# Patient Record
Sex: FEMALE | Race: BLACK OR AFRICAN AMERICAN | Employment: UNEMPLOYED | URBAN - METROPOLITAN AREA
[De-identification: names, ages, dates, MRNs, and addresses within clinical notes are randomized per-mention and may not be internally consistent; named-entity substitution may affect disease eponyms.]

---

## 2017-02-13 ENCOUNTER — HOSPITAL ENCOUNTER (EMERGENCY)
Age: 57
Discharge: HOME | End: 2017-02-13
Attending: EMERGENCY MEDICINE
Payer: COMMERCIAL

## 2017-02-13 VITALS
BODY MASS INDEX: 45.99 KG/M2 | RESPIRATION RATE: 20 BRPM | SYSTOLIC BLOOD PRESSURE: 180 MMHG | WEIGHT: 293 LBS | TEMPERATURE: 98.2 F | DIASTOLIC BLOOD PRESSURE: 98 MMHG | HEART RATE: 62 BPM | OXYGEN SATURATION: 96 % | HEIGHT: 67 IN

## 2017-02-13 DIAGNOSIS — G89.4 CHRONIC PAIN SYNDROME: ICD-10-CM

## 2017-02-13 DIAGNOSIS — Z76.0 ENCOUNTER FOR MEDICATION REFILL: ICD-10-CM

## 2017-02-13 DIAGNOSIS — L02.91 ABSCESS: Primary | ICD-10-CM

## 2017-02-13 LAB
-: ABNORMAL
AMORPHOUS: ABNORMAL
BACTERIA: ABNORMAL
BILIRUBIN URINE: NEGATIVE
CASTS UA: ABNORMAL /LPF (ref 0–2)
COLOR: YELLOW
COMMENT UA: ABNORMAL
CRYSTALS, UA: ABNORMAL /HPF
EPITHELIAL CELLS UA: ABNORMAL /HPF (ref 0–5)
GLUCOSE URINE: NEGATIVE
KETONES, URINE: NEGATIVE
LEUKOCYTE ESTERASE, URINE: NEGATIVE
MUCUS: ABNORMAL
NITRITE, URINE: NEGATIVE
OTHER OBSERVATIONS UA: ABNORMAL
PH UA: 8.5 (ref 5–8)
PROTEIN UA: NEGATIVE
RBC UA: ABNORMAL /HPF (ref 0–2)
RENAL EPITHELIAL, UA: ABNORMAL /HPF
SPECIFIC GRAVITY UA: 1.02 (ref 1–1.03)
TRICHOMONAS: ABNORMAL
TURBIDITY: ABNORMAL
URINE HGB: NEGATIVE
UROBILINOGEN, URINE: NORMAL
WBC UA: ABNORMAL /HPF (ref 0–5)
YEAST: ABNORMAL

## 2017-02-13 PROCEDURE — G0383 LEV 4 HOSP TYPE B ED VISIT: HCPCS

## 2017-02-13 PROCEDURE — 87086 URINE CULTURE/COLONY COUNT: CPT

## 2017-02-13 PROCEDURE — 6370000000 HC RX 637 (ALT 250 FOR IP): Performed by: NURSE PRACTITIONER

## 2017-02-13 PROCEDURE — 81001 URINALYSIS AUTO W/SCOPE: CPT

## 2017-02-13 RX ORDER — ACETAMINOPHEN 500 MG
1000 TABLET ORAL ONCE
Status: DISCONTINUED | OUTPATIENT
Start: 2017-02-13 | End: 2017-02-13

## 2017-02-13 RX ORDER — SIMVASTATIN 20 MG
20 TABLET ORAL NIGHTLY
Qty: 14 TABLET | Refills: 0 | Status: SHIPPED | OUTPATIENT
Start: 2017-02-13 | End: 2017-02-16

## 2017-02-13 RX ORDER — CITALOPRAM 20 MG/1
20 TABLET ORAL DAILY
Qty: 14 TABLET | Refills: 0 | Status: SHIPPED | OUTPATIENT
Start: 2017-02-13 | End: 2017-02-16 | Stop reason: SDUPTHER

## 2017-02-13 RX ORDER — DOXYCYCLINE HYCLATE 100 MG
100 TABLET ORAL ONCE
Status: COMPLETED | OUTPATIENT
Start: 2017-02-13 | End: 2017-02-13

## 2017-02-13 RX ORDER — AMLODIPINE BESYLATE 5 MG/1
5 TABLET ORAL DAILY
Qty: 14 TABLET | Refills: 0 | Status: SHIPPED | OUTPATIENT
Start: 2017-02-13 | End: 2017-02-16

## 2017-02-13 RX ORDER — DOXYCYCLINE HYCLATE 100 MG
100 TABLET ORAL 2 TIMES DAILY
Qty: 14 TABLET | Refills: 0 | Status: SHIPPED | OUTPATIENT
Start: 2017-02-13 | End: 2017-02-20

## 2017-02-13 RX ORDER — AMLODIPINE BESYLATE 5 MG/1
5 TABLET ORAL ONCE
Status: COMPLETED | OUTPATIENT
Start: 2017-02-13 | End: 2017-02-13

## 2017-02-13 RX ORDER — GABAPENTIN 300 MG/1
300 CAPSULE ORAL 3 TIMES DAILY
Qty: 42 CAPSULE | Refills: 0 | Status: SHIPPED | OUTPATIENT
Start: 2017-02-13 | End: 2017-02-16

## 2017-02-13 RX ORDER — OXYCODONE HYDROCHLORIDE AND ACETAMINOPHEN 5; 325 MG/1; MG/1
2 TABLET ORAL ONCE
Status: COMPLETED | OUTPATIENT
Start: 2017-02-13 | End: 2017-02-13

## 2017-02-13 RX ADMIN — DOXYCYCLINE HYCLATE 100 MG: 100 TABLET, COATED ORAL at 21:05

## 2017-02-13 RX ADMIN — AMLODIPINE BESYLATE 5 MG: 5 TABLET ORAL at 21:05

## 2017-02-13 RX ADMIN — OXYCODONE HYDROCHLORIDE AND ACETAMINOPHEN 2 TABLET: 5; 325 TABLET ORAL at 20:07

## 2017-02-13 ASSESSMENT — ENCOUNTER SYMPTOMS
TROUBLE SWALLOWING: 0
NAUSEA: 0
VOMITING: 0
BACK PAIN: 1
ROS SKIN COMMENTS: ABSCESS
SHORTNESS OF BREATH: 0
ABDOMINAL PAIN: 0
COUGH: 0

## 2017-02-13 ASSESSMENT — PAIN SCALES - GENERAL
PAINLEVEL_OUTOF10: 10
PAINLEVEL_OUTOF10: 10

## 2017-02-13 ASSESSMENT — PAIN DESCRIPTION - DESCRIPTORS: DESCRIPTORS: ACHING

## 2017-02-13 ASSESSMENT — PAIN DESCRIPTION - PAIN TYPE: TYPE: ACUTE PAIN;CHRONIC PAIN

## 2017-02-13 ASSESSMENT — PAIN DESCRIPTION - LOCATION: LOCATION: BACK;HEAD

## 2017-02-13 ASSESSMENT — PAIN DESCRIPTION - FREQUENCY: FREQUENCY: CONTINUOUS

## 2017-02-14 LAB
CULTURE: NORMAL
CULTURE: NORMAL
Lab: NORMAL
SPECIMEN DESCRIPTION: NORMAL
STATUS: NORMAL

## 2017-02-16 ENCOUNTER — OFFICE VISIT (OUTPATIENT)
Dept: FAMILY MEDICINE CLINIC | Facility: CLINIC | Age: 57
End: 2017-02-16

## 2017-02-16 VITALS
TEMPERATURE: 97.1 F | BODY MASS INDEX: 45.99 KG/M2 | HEIGHT: 67 IN | HEART RATE: 73 BPM | WEIGHT: 293 LBS | DIASTOLIC BLOOD PRESSURE: 78 MMHG | SYSTOLIC BLOOD PRESSURE: 140 MMHG

## 2017-02-16 DIAGNOSIS — I10 ESSENTIAL HYPERTENSION: ICD-10-CM

## 2017-02-16 DIAGNOSIS — Z76.0 MEDICATION REFILL: ICD-10-CM

## 2017-02-16 DIAGNOSIS — M54.50 CHRONIC MIDLINE LOW BACK PAIN WITHOUT SCIATICA: Primary | ICD-10-CM

## 2017-02-16 DIAGNOSIS — E66.01 MORBID OBESITY DUE TO EXCESS CALORIES (HCC): ICD-10-CM

## 2017-02-16 DIAGNOSIS — G89.29 CHRONIC MIDLINE LOW BACK PAIN WITHOUT SCIATICA: Primary | ICD-10-CM

## 2017-02-16 PROCEDURE — 99203 OFFICE O/P NEW LOW 30 MIN: CPT | Performed by: INTERNAL MEDICINE

## 2017-02-16 RX ORDER — RANITIDINE 300 MG/1
300 TABLET ORAL NIGHTLY
Qty: 30 TABLET | Refills: 3 | Status: SHIPPED | OUTPATIENT
Start: 2017-02-16 | End: 2017-04-06 | Stop reason: SDUPTHER

## 2017-02-16 RX ORDER — GABAPENTIN 300 MG/1
300 CAPSULE ORAL 3 TIMES DAILY
Qty: 90 CAPSULE | Refills: 2 | Status: SHIPPED | OUTPATIENT
Start: 2017-02-16 | End: 2017-04-06 | Stop reason: SDUPTHER

## 2017-02-16 RX ORDER — MELOXICAM 7.5 MG/1
7.5 TABLET ORAL DAILY
Qty: 30 TABLET | Refills: 0 | Status: SHIPPED | OUTPATIENT
Start: 2017-02-16 | End: 2017-04-06 | Stop reason: SDUPTHER

## 2017-02-16 RX ORDER — AMLODIPINE BESYLATE 5 MG/1
10 TABLET ORAL DAILY
Qty: 30 TABLET | Refills: 3 | Status: SHIPPED | OUTPATIENT
Start: 2017-02-16 | End: 2017-04-06 | Stop reason: SDUPTHER

## 2017-02-16 RX ORDER — SIMVASTATIN 20 MG
20 TABLET ORAL 3 TIMES DAILY
Qty: 30 TABLET | Refills: 11 | Status: SHIPPED | OUTPATIENT
Start: 2017-02-16 | End: 2017-04-06 | Stop reason: SDUPTHER

## 2017-02-16 RX ORDER — CITALOPRAM 20 MG/1
20 TABLET ORAL DAILY
Qty: 14 TABLET | Refills: 0 | Status: SHIPPED | OUTPATIENT
Start: 2017-02-16 | End: 2017-04-11 | Stop reason: ALTCHOICE

## 2017-02-16 ASSESSMENT — ENCOUNTER SYMPTOMS
ABDOMINAL PAIN: 0
BACK PAIN: 1
COUGH: 0
SHORTNESS OF BREATH: 0

## 2017-03-15 ENCOUNTER — HOSPITAL ENCOUNTER (OUTPATIENT)
Dept: PHYSICAL THERAPY | Age: 57
Setting detail: THERAPIES SERIES
Discharge: HOME | End: 2017-03-15
Payer: COMMERCIAL

## 2017-03-15 ENCOUNTER — TELEPHONE (OUTPATIENT)
Dept: PAIN MANAGEMENT | Age: 57
End: 2017-03-15

## 2017-03-15 PROCEDURE — 97162 PT EVAL MOD COMPLEX 30 MIN: CPT

## 2017-03-15 PROCEDURE — 97110 THERAPEUTIC EXERCISES: CPT

## 2017-03-15 ASSESSMENT — PAIN DESCRIPTION - ORIENTATION: ORIENTATION: LOWER

## 2017-03-15 ASSESSMENT — PAIN DESCRIPTION - PROGRESSION: CLINICAL_PROGRESSION: NOT CHANGED

## 2017-03-15 ASSESSMENT — PAIN DESCRIPTION - LOCATION: LOCATION: BACK

## 2017-03-15 ASSESSMENT — PAIN DESCRIPTION - ONSET: ONSET: ON-GOING

## 2017-03-15 ASSESSMENT — PAIN SCALES - GENERAL: PAINLEVEL_OUTOF10: 7

## 2017-03-15 ASSESSMENT — PAIN DESCRIPTION - PAIN TYPE: TYPE: CHRONIC PAIN

## 2017-03-15 ASSESSMENT — PAIN DESCRIPTION - FREQUENCY: FREQUENCY: CONTINUOUS

## 2017-03-21 ENCOUNTER — HOSPITAL ENCOUNTER (OUTPATIENT)
Dept: PHYSICAL THERAPY | Age: 57
Setting detail: THERAPIES SERIES
Discharge: HOME | End: 2017-03-21
Payer: COMMERCIAL

## 2017-03-21 ENCOUNTER — TELEPHONE (OUTPATIENT)
Dept: PAIN MANAGEMENT | Age: 57
End: 2017-03-21

## 2017-03-21 ENCOUNTER — TELEPHONE (OUTPATIENT)
Dept: FAMILY MEDICINE CLINIC | Age: 57
End: 2017-03-21

## 2017-03-21 PROCEDURE — 97113 AQUATIC THERAPY/EXERCISES: CPT

## 2017-03-21 ASSESSMENT — PAIN SCALES - GENERAL: PAINLEVEL_OUTOF10: 7

## 2017-03-21 ASSESSMENT — PAIN DESCRIPTION - LOCATION: LOCATION: BACK

## 2017-03-21 ASSESSMENT — PAIN DESCRIPTION - DIRECTION: RADIATING_TOWARDS: DENIES

## 2017-03-21 ASSESSMENT — PAIN DESCRIPTION - PAIN TYPE: TYPE: CHRONIC PAIN

## 2017-03-21 ASSESSMENT — PAIN DESCRIPTION - ORIENTATION: ORIENTATION: MID;LOWER

## 2017-03-23 ENCOUNTER — HOSPITAL ENCOUNTER (OUTPATIENT)
Dept: PHYSICAL THERAPY | Age: 57
Setting detail: THERAPIES SERIES
Discharge: HOME | End: 2017-03-23
Payer: COMMERCIAL

## 2017-03-23 PROCEDURE — 97113 AQUATIC THERAPY/EXERCISES: CPT

## 2017-03-23 ASSESSMENT — PAIN DESCRIPTION - ORIENTATION: ORIENTATION: MID;LOWER

## 2017-03-23 ASSESSMENT — PAIN DESCRIPTION - DIRECTION: RADIATING_TOWARDS: DENIES

## 2017-03-23 ASSESSMENT — PAIN SCALES - GENERAL: PAINLEVEL_OUTOF10: 7

## 2017-03-23 ASSESSMENT — PAIN DESCRIPTION - PAIN TYPE: TYPE: CHRONIC PAIN

## 2017-03-23 ASSESSMENT — PAIN DESCRIPTION - LOCATION: LOCATION: BACK

## 2017-03-30 ENCOUNTER — HOSPITAL ENCOUNTER (OUTPATIENT)
Dept: PHYSICAL THERAPY | Age: 57
Setting detail: THERAPIES SERIES
Discharge: HOME | End: 2017-03-30
Payer: COMMERCIAL

## 2017-03-30 PROCEDURE — 97113 AQUATIC THERAPY/EXERCISES: CPT

## 2017-03-30 ASSESSMENT — PAIN DESCRIPTION - DIRECTION: RADIATING_TOWARDS: DENIES

## 2017-03-30 ASSESSMENT — PAIN DESCRIPTION - LOCATION: LOCATION: BACK

## 2017-03-30 ASSESSMENT — PAIN SCALES - GENERAL: PAINLEVEL_OUTOF10: 7

## 2017-03-30 ASSESSMENT — PAIN DESCRIPTION - PAIN TYPE: TYPE: CHRONIC PAIN

## 2017-03-30 ASSESSMENT — PAIN DESCRIPTION - ORIENTATION: ORIENTATION: MID;LOWER

## 2017-04-04 ENCOUNTER — HOSPITAL ENCOUNTER (OUTPATIENT)
Dept: PHYSICAL THERAPY | Age: 57
Setting detail: THERAPIES SERIES
Discharge: HOME | End: 2017-04-04
Payer: COMMERCIAL

## 2017-04-04 PROCEDURE — 97113 AQUATIC THERAPY/EXERCISES: CPT

## 2017-04-04 ASSESSMENT — PAIN SCALES - GENERAL: PAINLEVEL_OUTOF10: 10

## 2017-04-04 ASSESSMENT — PAIN DESCRIPTION - ORIENTATION: ORIENTATION: RIGHT;LEFT

## 2017-04-04 ASSESSMENT — PAIN DESCRIPTION - PAIN TYPE: TYPE: CHRONIC PAIN

## 2017-04-05 ENCOUNTER — HOSPITAL ENCOUNTER (OUTPATIENT)
Dept: PHYSICAL THERAPY | Age: 57
Setting detail: THERAPIES SERIES
Discharge: HOME | End: 2017-04-05
Payer: COMMERCIAL

## 2017-04-05 ENCOUNTER — HOSPITAL ENCOUNTER (OUTPATIENT)
Dept: GENERAL RADIOLOGY | Age: 57
Discharge: HOME | End: 2017-04-05
Payer: COMMERCIAL

## 2017-04-05 ENCOUNTER — HOSPITAL ENCOUNTER (OUTPATIENT)
Age: 57
Discharge: HOME | End: 2017-04-05
Payer: COMMERCIAL

## 2017-04-05 DIAGNOSIS — M54.50 CHRONIC MIDLINE LOW BACK PAIN WITHOUT SCIATICA: ICD-10-CM

## 2017-04-05 DIAGNOSIS — G89.29 CHRONIC MIDLINE LOW BACK PAIN WITHOUT SCIATICA: ICD-10-CM

## 2017-04-05 PROCEDURE — 72100 X-RAY EXAM L-S SPINE 2/3 VWS: CPT

## 2017-04-05 PROCEDURE — 72040 X-RAY EXAM NECK SPINE 2-3 VW: CPT

## 2017-04-05 PROCEDURE — 97113 AQUATIC THERAPY/EXERCISES: CPT

## 2017-04-05 ASSESSMENT — PAIN DESCRIPTION - ORIENTATION: ORIENTATION: RIGHT;LEFT;LOWER

## 2017-04-05 ASSESSMENT — PAIN SCALES - GENERAL: PAINLEVEL_OUTOF10: 10

## 2017-04-05 ASSESSMENT — PAIN DESCRIPTION - LOCATION: LOCATION: GENERALIZED;BACK;KNEE

## 2017-04-05 ASSESSMENT — PAIN DESCRIPTION - PAIN TYPE: TYPE: CHRONIC PAIN

## 2017-04-06 ENCOUNTER — OFFICE VISIT (OUTPATIENT)
Dept: FAMILY MEDICINE CLINIC | Age: 57
End: 2017-04-06
Payer: COMMERCIAL

## 2017-04-06 VITALS
HEIGHT: 67 IN | TEMPERATURE: 97.5 F | WEIGHT: 289 LBS | DIASTOLIC BLOOD PRESSURE: 89 MMHG | SYSTOLIC BLOOD PRESSURE: 142 MMHG | BODY MASS INDEX: 45.36 KG/M2 | HEART RATE: 81 BPM

## 2017-04-06 DIAGNOSIS — E66.01 MORBID OBESITY DUE TO EXCESS CALORIES (HCC): ICD-10-CM

## 2017-04-06 DIAGNOSIS — M54.50 CHRONIC MIDLINE LOW BACK PAIN WITHOUT SCIATICA: Primary | ICD-10-CM

## 2017-04-06 DIAGNOSIS — G89.29 CHRONIC MIDLINE LOW BACK PAIN WITHOUT SCIATICA: Primary | ICD-10-CM

## 2017-04-06 DIAGNOSIS — Z76.0 MEDICATION REFILL: ICD-10-CM

## 2017-04-06 PROCEDURE — 99213 OFFICE O/P EST LOW 20 MIN: CPT | Performed by: INTERNAL MEDICINE

## 2017-04-06 RX ORDER — AMLODIPINE BESYLATE 5 MG/1
10 TABLET ORAL DAILY
Qty: 30 TABLET | Refills: 3 | Status: SHIPPED | OUTPATIENT
Start: 2017-04-06 | End: 2017-04-06 | Stop reason: SDUPTHER

## 2017-04-06 RX ORDER — GABAPENTIN 300 MG/1
300 CAPSULE ORAL 3 TIMES DAILY
Qty: 90 CAPSULE | Refills: 2 | Status: SHIPPED | OUTPATIENT
Start: 2017-04-06 | End: 2019-02-04

## 2017-04-06 RX ORDER — RANITIDINE 300 MG/1
300 TABLET ORAL NIGHTLY
Qty: 30 TABLET | Refills: 3 | Status: SHIPPED | OUTPATIENT
Start: 2017-04-06 | End: 2017-04-06 | Stop reason: SDUPTHER

## 2017-04-06 RX ORDER — GABAPENTIN 300 MG/1
300 CAPSULE ORAL 3 TIMES DAILY
Qty: 90 CAPSULE | Refills: 2 | Status: SHIPPED | OUTPATIENT
Start: 2017-04-06 | End: 2017-04-06 | Stop reason: SDUPTHER

## 2017-04-06 RX ORDER — RANITIDINE 300 MG/1
300 TABLET ORAL NIGHTLY
Qty: 30 TABLET | Refills: 3 | Status: SHIPPED | OUTPATIENT
Start: 2017-04-06 | End: 2017-10-30 | Stop reason: SDUPTHER

## 2017-04-06 RX ORDER — SIMVASTATIN 20 MG
20 TABLET ORAL 3 TIMES DAILY
Qty: 30 TABLET | Refills: 11 | Status: SHIPPED | OUTPATIENT
Start: 2017-04-06 | End: 2017-04-06 | Stop reason: SDUPTHER

## 2017-04-06 RX ORDER — AMLODIPINE BESYLATE 5 MG/1
10 TABLET ORAL DAILY
Qty: 30 TABLET | Refills: 3 | Status: SHIPPED | OUTPATIENT
Start: 2017-04-06 | End: 2017-05-12 | Stop reason: DRUGHIGH

## 2017-04-06 RX ORDER — MELOXICAM 7.5 MG/1
7.5 TABLET ORAL DAILY
Qty: 30 TABLET | Refills: 0 | Status: SHIPPED | OUTPATIENT
Start: 2017-04-06 | End: 2017-04-06 | Stop reason: ALTCHOICE

## 2017-04-06 RX ORDER — SIMVASTATIN 20 MG
20 TABLET ORAL 3 TIMES DAILY
Qty: 30 TABLET | Refills: 11 | Status: SHIPPED | OUTPATIENT
Start: 2017-04-06 | End: 2017-05-12 | Stop reason: SDUPTHER

## 2017-04-06 ASSESSMENT — ENCOUNTER SYMPTOMS
ABDOMINAL PAIN: 0
COUGH: 0
SHORTNESS OF BREATH: 0

## 2017-04-11 ENCOUNTER — INITIAL CONSULT (OUTPATIENT)
Dept: PAIN MANAGEMENT | Age: 57
End: 2017-04-11
Payer: COMMERCIAL

## 2017-04-11 VITALS
HEIGHT: 66 IN | SYSTOLIC BLOOD PRESSURE: 165 MMHG | WEIGHT: 293 LBS | HEART RATE: 73 BPM | RESPIRATION RATE: 16 BRPM | BODY MASS INDEX: 47.09 KG/M2 | OXYGEN SATURATION: 98 % | DIASTOLIC BLOOD PRESSURE: 102 MMHG

## 2017-04-11 DIAGNOSIS — G89.29 BILATERAL CHRONIC KNEE PAIN: ICD-10-CM

## 2017-04-11 DIAGNOSIS — E66.01 MORBID OBESITY WITH BMI OF 45.0-49.9, ADULT (HCC): ICD-10-CM

## 2017-04-11 DIAGNOSIS — M25.562 BILATERAL CHRONIC KNEE PAIN: ICD-10-CM

## 2017-04-11 DIAGNOSIS — M25.561 BILATERAL CHRONIC KNEE PAIN: ICD-10-CM

## 2017-04-11 DIAGNOSIS — M47.816 SPONDYLOSIS OF LUMBAR REGION WITHOUT MYELOPATHY OR RADICULOPATHY: Primary | ICD-10-CM

## 2017-04-11 PROCEDURE — 99244 OFF/OP CNSLTJ NEW/EST MOD 40: CPT | Performed by: ANESTHESIOLOGY

## 2017-04-11 RX ORDER — MELOXICAM 7.5 MG/1
7.5 TABLET ORAL DAILY
COMMUNITY
End: 2017-05-12

## 2017-04-11 ASSESSMENT — ENCOUNTER SYMPTOMS
EYES NEGATIVE: 1
COUGH: 0
STRIDOR: 0
GASTROINTESTINAL NEGATIVE: 1
BACK PAIN: 1
CHOKING: 0
WHEEZING: 1
CHEST TIGHTNESS: 0
APNEA: 0
SHORTNESS OF BREATH: 0

## 2017-04-13 ENCOUNTER — TELEPHONE (OUTPATIENT)
Dept: FAMILY MEDICINE CLINIC | Age: 57
End: 2017-04-13

## 2017-04-13 ENCOUNTER — TELEPHONE (OUTPATIENT)
Dept: PAIN MANAGEMENT | Age: 57
End: 2017-04-13

## 2017-04-13 ENCOUNTER — HOSPITAL ENCOUNTER (OUTPATIENT)
Dept: PHYSICAL THERAPY | Age: 57
Setting detail: THERAPIES SERIES
Discharge: HOME | End: 2017-04-13
Payer: COMMERCIAL

## 2017-04-13 PROCEDURE — 97113 AQUATIC THERAPY/EXERCISES: CPT

## 2017-04-13 PROCEDURE — 97164 PT RE-EVAL EST PLAN CARE: CPT

## 2017-04-13 ASSESSMENT — PAIN DESCRIPTION - LOCATION
LOCATION: BACK;KNEE
LOCATION: BACK;KNEE;HIP

## 2017-04-13 ASSESSMENT — PAIN DESCRIPTION - ORIENTATION
ORIENTATION: LOWER;RIGHT;LEFT
ORIENTATION: RIGHT;LEFT;LOWER

## 2017-04-13 ASSESSMENT — PAIN DESCRIPTION - PROGRESSION: CLINICAL_PROGRESSION: GRADUALLY IMPROVING

## 2017-04-13 ASSESSMENT — PAIN DESCRIPTION - DESCRIPTORS
DESCRIPTORS: CONSTANT;ACHING;SHARP
DESCRIPTORS: ACHING;CONSTANT;SHARP

## 2017-04-13 ASSESSMENT — PAIN DESCRIPTION - PAIN TYPE
TYPE: CHRONIC PAIN
TYPE: CHRONIC PAIN

## 2017-04-13 ASSESSMENT — PAIN DESCRIPTION - FREQUENCY: FREQUENCY: CONTINUOUS

## 2017-04-13 ASSESSMENT — PAIN SCALES - GENERAL
PAINLEVEL_OUTOF10: 3
PAINLEVEL_OUTOF10: 3

## 2017-04-19 ENCOUNTER — HOSPITAL ENCOUNTER (OUTPATIENT)
Dept: PHYSICAL THERAPY | Age: 57
Setting detail: THERAPIES SERIES
Discharge: HOME | End: 2017-04-19
Payer: COMMERCIAL

## 2017-04-19 PROCEDURE — 97113 AQUATIC THERAPY/EXERCISES: CPT

## 2017-04-19 ASSESSMENT — PAIN DESCRIPTION - PAIN TYPE: TYPE: CHRONIC PAIN

## 2017-04-19 ASSESSMENT — PAIN SCALES - GENERAL: PAINLEVEL_OUTOF10: 5

## 2017-04-19 ASSESSMENT — PAIN DESCRIPTION - DESCRIPTORS: DESCRIPTORS: CONSTANT;ACHING;SHARP;STABBING

## 2017-04-19 ASSESSMENT — PAIN DESCRIPTION - LOCATION: LOCATION: GENERALIZED

## 2017-04-21 ENCOUNTER — HOSPITAL ENCOUNTER (OUTPATIENT)
Dept: PHYSICAL THERAPY | Age: 57
Setting detail: THERAPIES SERIES
Discharge: HOME | End: 2017-04-21
Payer: COMMERCIAL

## 2017-04-21 ENCOUNTER — TELEPHONE (OUTPATIENT)
Dept: FAMILY MEDICINE CLINIC | Age: 57
End: 2017-04-21

## 2017-04-21 DIAGNOSIS — F40.240 CLAUSTROPHOBIA: Primary | ICD-10-CM

## 2017-04-21 PROCEDURE — 97110 THERAPEUTIC EXERCISES: CPT

## 2017-04-21 RX ORDER — ALPRAZOLAM 1 MG/1
1 TABLET ORAL
Qty: 2 TABLET | Refills: 0 | OUTPATIENT
Start: 2017-04-21 | End: 2017-04-21 | Stop reason: SDUPTHER

## 2017-04-21 RX ORDER — ALPRAZOLAM 1 MG/1
1 TABLET ORAL
Qty: 2 TABLET | Refills: 0 | OUTPATIENT
Start: 2017-04-21 | End: 2017-04-21

## 2017-04-21 ASSESSMENT — PAIN DESCRIPTION - PAIN TYPE: TYPE: CHRONIC PAIN

## 2017-04-21 ASSESSMENT — PAIN DESCRIPTION - LOCATION: LOCATION: BACK

## 2017-04-21 ASSESSMENT — PAIN SCALES - GENERAL: PAINLEVEL_OUTOF10: 7

## 2017-04-21 ASSESSMENT — PAIN DESCRIPTION - ORIENTATION: ORIENTATION: MID;LOWER

## 2017-04-24 ENCOUNTER — TELEPHONE (OUTPATIENT)
Dept: FAMILY MEDICINE CLINIC | Age: 57
End: 2017-04-24

## 2017-04-24 DIAGNOSIS — M54.5 CHRONIC LOW BACK PAIN, UNSPECIFIED BACK PAIN LATERALITY, WITH SCIATICA PRESENCE UNSPECIFIED: Primary | ICD-10-CM

## 2017-04-24 DIAGNOSIS — G89.29 CHRONIC LOW BACK PAIN, UNSPECIFIED BACK PAIN LATERALITY, WITH SCIATICA PRESENCE UNSPECIFIED: Primary | ICD-10-CM

## 2017-04-25 ENCOUNTER — HOSPITAL ENCOUNTER (OUTPATIENT)
Dept: PHYSICAL THERAPY | Age: 57
Setting detail: THERAPIES SERIES
Discharge: HOME | End: 2017-04-25
Payer: COMMERCIAL

## 2017-04-25 PROCEDURE — 97110 THERAPEUTIC EXERCISES: CPT

## 2017-04-25 ASSESSMENT — PAIN SCALES - GENERAL: PAINLEVEL_OUTOF10: 7

## 2017-04-25 ASSESSMENT — PAIN DESCRIPTION - PAIN TYPE: TYPE: CHRONIC PAIN

## 2017-04-25 ASSESSMENT — PAIN DESCRIPTION - PROGRESSION: CLINICAL_PROGRESSION: GRADUALLY IMPROVING

## 2017-04-25 ASSESSMENT — PAIN DESCRIPTION - LOCATION: LOCATION: BACK

## 2017-04-27 ENCOUNTER — HOSPITAL ENCOUNTER (OUTPATIENT)
Dept: PHYSICAL THERAPY | Age: 57
Setting detail: THERAPIES SERIES
Discharge: HOME | End: 2017-04-27
Payer: COMMERCIAL

## 2017-05-08 ENCOUNTER — TELEPHONE (OUTPATIENT)
Dept: FAMILY MEDICINE CLINIC | Age: 57
End: 2017-05-08

## 2017-05-08 DIAGNOSIS — Z76.0 MEDICATION REFILL: Primary | ICD-10-CM

## 2017-05-12 ENCOUNTER — HOSPITAL ENCOUNTER (OUTPATIENT)
Dept: PAIN MANAGEMENT | Age: 57
Discharge: HOME | End: 2017-05-12
Payer: COMMERCIAL

## 2017-05-12 VITALS
DIASTOLIC BLOOD PRESSURE: 118 MMHG | TEMPERATURE: 98.4 F | WEIGHT: 289 LBS | SYSTOLIC BLOOD PRESSURE: 160 MMHG | BODY MASS INDEX: 45.36 KG/M2 | RESPIRATION RATE: 16 BRPM | HEIGHT: 67 IN | HEART RATE: 80 BPM

## 2017-05-12 DIAGNOSIS — M25.561 CHRONIC PAIN OF RIGHT KNEE: ICD-10-CM

## 2017-05-12 DIAGNOSIS — G89.29 CHRONIC PAIN OF RIGHT KNEE: ICD-10-CM

## 2017-05-12 DIAGNOSIS — G89.29 CHRONIC PAIN OF LEFT KNEE: ICD-10-CM

## 2017-05-12 DIAGNOSIS — M25.562 CHRONIC PAIN OF LEFT KNEE: ICD-10-CM

## 2017-05-12 DIAGNOSIS — G89.29 CHRONIC LOW BACK PAIN, UNSPECIFIED BACK PAIN LATERALITY, WITH SCIATICA PRESENCE UNSPECIFIED: Primary | ICD-10-CM

## 2017-05-12 DIAGNOSIS — M54.5 CHRONIC LOW BACK PAIN, UNSPECIFIED BACK PAIN LATERALITY, WITH SCIATICA PRESENCE UNSPECIFIED: Primary | ICD-10-CM

## 2017-05-12 PROCEDURE — 99204 OFFICE O/P NEW MOD 45 MIN: CPT

## 2017-05-12 PROCEDURE — 80307 DRUG TEST PRSMV CHEM ANLYZR: CPT

## 2017-05-12 RX ORDER — AMLODIPINE BESYLATE 10 MG/1
10 TABLET ORAL DAILY
Qty: 30 TABLET | Refills: 3 | Status: SHIPPED | OUTPATIENT
Start: 2017-05-12 | End: 2017-10-30 | Stop reason: SDUPTHER

## 2017-05-12 RX ORDER — SIMVASTATIN 20 MG
20 TABLET ORAL NIGHTLY
Qty: 30 TABLET | Refills: 11 | Status: SHIPPED | OUTPATIENT
Start: 2017-05-12 | End: 2019-02-04

## 2017-05-12 ASSESSMENT — PAIN DESCRIPTION - DIRECTION: RADIATING_TOWARDS: NO RADIATION

## 2017-05-12 ASSESSMENT — PAIN SCALES - GENERAL: PAINLEVEL_OUTOF10: 7

## 2017-05-12 ASSESSMENT — ENCOUNTER SYMPTOMS
EYE DISCHARGE: 0
JAUNDICE: 0
UNUSUAL HAIR DISTRIBUTION: 0
BACK PAIN: 1
BOWEL INCONTINENCE: 0
STRIDOR: 0
SUSPICIOUS LESIONS: 0
HEMOPTYSIS: 0
SPUTUM PRODUCTION: 0

## 2017-05-12 ASSESSMENT — PAIN DESCRIPTION - FREQUENCY: FREQUENCY: CONTINUOUS

## 2017-05-12 ASSESSMENT — PAIN DESCRIPTION - ORIENTATION: ORIENTATION: LOWER;LEFT;MID

## 2017-05-12 ASSESSMENT — PAIN DESCRIPTION - ONSET: ONSET: ON-GOING

## 2017-05-12 ASSESSMENT — PAIN DESCRIPTION - DESCRIPTORS: DESCRIPTORS: CONSTANT;STABBING;NUMBNESS

## 2017-05-12 ASSESSMENT — PAIN DESCRIPTION - PROGRESSION: CLINICAL_PROGRESSION: NOT CHANGED

## 2017-05-15 LAB
6-ACETYLMORPHINE, UR: NOT DETECTED
7-AMINOCLONAZEPAM, URINE: NOT DETECTED
ALPHA-OH-ALPRAZ, URINE: NOT DETECTED
ALPRAZOLAM, URINE: NOT DETECTED
AMPHETAMINES, URINE: NOT DETECTED
BARBITURATES, URINE: NOT DETECTED
BENZOYLECGONINE, UR: PRESENT
BUPRENORPHINE URINE: NOT DETECTED
CARISOPRODOL, UR: NOT DETECTED
CLONAZEPAM, URINE: NOT DETECTED
CODEINE, URINE: NOT DETECTED
CREATININE URINE: 139.3 MG/DL (ref 20–400)
DIAZEPAM, URINE: NOT DETECTED
EER PAIN MGT DRUG PANEL, HIGH RES/EMIT U: NORMAL
ETHYL GLUCURONIDE UR: NOT DETECTED
FENTANYL URINE: NOT DETECTED
HYDROCODONE, URINE: NOT DETECTED
HYDROMORPHONE, URINE: NOT DETECTED
LORAZEPAM, URINE: NOT DETECTED
MARIJUANA METAB, UR: PRESENT
MDA, UR: NOT DETECTED
MDEA, EVE, UR: NOT DETECTED
MDMA URINE: NOT DETECTED
MEPERIDINE METAB, UR: NOT DETECTED
METHADONE, URINE: NOT DETECTED
METHAMPHETAMINE, URINE: NOT DETECTED
METHYLPHENIDATE: NOT DETECTED
MIDAZOLAM, URINE: NOT DETECTED
MORPHINE URINE: NOT DETECTED
NORBUPRENORPHINE, URINE: NOT DETECTED
NORDIAZEPAM, URINE: NOT DETECTED
NORFENTANYL, URINE: NOT DETECTED
NORHYDROCODONE, URINE: NOT DETECTED
NOROXYCODONE, URINE: NOT DETECTED
NOROXYMORPHONE, URINE: NOT DETECTED
OXAZEPAM, URINE: NOT DETECTED
OXYCODONE URINE: NOT DETECTED
OXYMORPHONE, URINE: NOT DETECTED
PAIN MGT DRUG PANEL, HI RES, UR: NORMAL
PCP,URINE: NOT DETECTED
PHENTERMINE, UR: NOT DETECTED
PROPOXYPHENE, URINE: NOT DETECTED
TAPENTADOL, URINE: NOT DETECTED
TAPENTADOL-O-SULFATE, URINE: NOT DETECTED
TEMAZEPAM, URINE: NOT DETECTED
TRAMADOL, URINE: NOT DETECTED
ZOLPIDEM, URINE: NOT DETECTED

## 2017-06-24 ENCOUNTER — HOSPITAL ENCOUNTER (EMERGENCY)
Age: 57
Discharge: HOME | End: 2017-06-25
Attending: EMERGENCY MEDICINE
Payer: COMMERCIAL

## 2017-06-24 VITALS
RESPIRATION RATE: 16 BRPM | HEIGHT: 67 IN | HEART RATE: 78 BPM | WEIGHT: 289 LBS | DIASTOLIC BLOOD PRESSURE: 99 MMHG | BODY MASS INDEX: 45.36 KG/M2 | OXYGEN SATURATION: 95 % | TEMPERATURE: 97.7 F | SYSTOLIC BLOOD PRESSURE: 145 MMHG

## 2017-06-24 DIAGNOSIS — M54.5 ACUTE LOW BACK PAIN, UNSPECIFIED BACK PAIN LATERALITY, WITH SCIATICA PRESENCE UNSPECIFIED: Primary | ICD-10-CM

## 2017-06-24 PROCEDURE — 99283 EMERGENCY DEPT VISIT LOW MDM: CPT

## 2017-06-24 PROCEDURE — 6360000002 HC RX W HCPCS: Performed by: STUDENT IN AN ORGANIZED HEALTH CARE EDUCATION/TRAINING PROGRAM

## 2017-06-24 PROCEDURE — 96372 THER/PROPH/DIAG INJ SC/IM: CPT

## 2017-06-24 RX ORDER — KETOROLAC TROMETHAMINE 30 MG/ML
30 INJECTION, SOLUTION INTRAMUSCULAR; INTRAVENOUS ONCE
Status: COMPLETED | OUTPATIENT
Start: 2017-06-24 | End: 2017-06-24

## 2017-06-24 RX ADMIN — KETOROLAC TROMETHAMINE 30 MG: 30 INJECTION, SOLUTION INTRAMUSCULAR; INTRAVENOUS at 23:41

## 2017-06-24 ASSESSMENT — ENCOUNTER SYMPTOMS
ABDOMINAL PAIN: 0
SHORTNESS OF BREATH: 0
WHEEZING: 0
BACK PAIN: 1
CHEST TIGHTNESS: 0
COUGH: 0
CONSTIPATION: 0

## 2017-06-24 ASSESSMENT — PAIN DESCRIPTION - LOCATION: LOCATION: BACK

## 2017-06-24 ASSESSMENT — PAIN SCALES - GENERAL
PAINLEVEL_OUTOF10: 10
PAINLEVEL_OUTOF10: 10

## 2017-06-24 ASSESSMENT — PAIN DESCRIPTION - FREQUENCY: FREQUENCY: CONTINUOUS

## 2017-06-25 PROCEDURE — 6370000000 HC RX 637 (ALT 250 FOR IP): Performed by: STUDENT IN AN ORGANIZED HEALTH CARE EDUCATION/TRAINING PROGRAM

## 2017-06-25 RX ORDER — MELOXICAM 15 MG/1
15 TABLET ORAL DAILY
Qty: 10 TABLET | Refills: 0 | Status: SHIPPED | OUTPATIENT
Start: 2017-06-25 | End: 2017-10-11

## 2017-06-25 RX ORDER — CYCLOBENZAPRINE HCL 5 MG
5 TABLET ORAL 3 TIMES DAILY PRN
Qty: 20 TABLET | Refills: 0 | Status: SHIPPED | OUTPATIENT
Start: 2017-06-25 | End: 2017-06-30

## 2017-06-25 RX ORDER — OXYCODONE HYDROCHLORIDE AND ACETAMINOPHEN 5; 325 MG/1; MG/1
1 TABLET ORAL ONCE
Status: COMPLETED | OUTPATIENT
Start: 2017-06-25 | End: 2017-06-25

## 2017-06-25 RX ADMIN — OXYCODONE HYDROCHLORIDE AND ACETAMINOPHEN 1 TABLET: 5; 325 TABLET ORAL at 00:35

## 2017-06-25 ASSESSMENT — PAIN SCALES - GENERAL: PAINLEVEL_OUTOF10: 10

## 2017-06-28 ENCOUNTER — OFFICE VISIT (OUTPATIENT)
Dept: FAMILY MEDICINE CLINIC | Age: 57
End: 2017-06-28
Payer: COMMERCIAL

## 2017-06-28 ENCOUNTER — TELEPHONE (OUTPATIENT)
Dept: FAMILY MEDICINE CLINIC | Age: 57
End: 2017-06-28

## 2017-06-28 VITALS
BODY MASS INDEX: 45.92 KG/M2 | TEMPERATURE: 96.5 F | HEIGHT: 67 IN | WEIGHT: 292.6 LBS | DIASTOLIC BLOOD PRESSURE: 100 MMHG | SYSTOLIC BLOOD PRESSURE: 141 MMHG | HEART RATE: 82 BPM

## 2017-06-28 DIAGNOSIS — M54.42 CHRONIC LEFT-SIDED LOW BACK PAIN WITH LEFT-SIDED SCIATICA: Primary | ICD-10-CM

## 2017-06-28 DIAGNOSIS — G89.29 CHRONIC LEFT-SIDED LOW BACK PAIN WITH LEFT-SIDED SCIATICA: Primary | ICD-10-CM

## 2017-06-28 DIAGNOSIS — I10 ESSENTIAL HYPERTENSION: ICD-10-CM

## 2017-06-28 DIAGNOSIS — E66.01 MORBID OBESITY WITH BMI OF 45.0-49.9, ADULT (HCC): ICD-10-CM

## 2017-06-28 DIAGNOSIS — E79.0 ELEVATED URIC ACID IN BLOOD: ICD-10-CM

## 2017-06-28 DIAGNOSIS — F17.200 SMOKING: Primary | ICD-10-CM

## 2017-06-28 PROCEDURE — 99213 OFFICE O/P EST LOW 20 MIN: CPT | Performed by: INTERNAL MEDICINE

## 2017-06-28 RX ORDER — IBUPROFEN 800 MG/1
800 TABLET ORAL EVERY 8 HOURS PRN
Qty: 60 TABLET | Refills: 1 | Status: SHIPPED | OUTPATIENT
Start: 2017-06-28 | End: 2017-08-14 | Stop reason: ALTCHOICE

## 2017-06-28 RX ORDER — SPIRONOLACTONE 25 MG/1
25 TABLET ORAL DAILY
Qty: 30 TABLET | Refills: 3 | Status: SHIPPED | OUTPATIENT
Start: 2017-06-28 | End: 2017-10-30 | Stop reason: SDUPTHER

## 2017-06-28 ASSESSMENT — ENCOUNTER SYMPTOMS
SHORTNESS OF BREATH: 0
BACK PAIN: 1
COUGH: 0

## 2017-07-03 RX ORDER — NICOTINE 21 MG/24HR
1 PATCH, TRANSDERMAL 24 HOURS TRANSDERMAL EVERY 24 HOURS
Qty: 30 PATCH | Refills: 3 | Status: SHIPPED | OUTPATIENT
Start: 2017-07-03 | End: 2019-02-11

## 2017-07-26 ENCOUNTER — HOSPITAL ENCOUNTER (OUTPATIENT)
Age: 57
Discharge: HOME | End: 2017-07-26
Payer: COMMERCIAL

## 2017-07-26 DIAGNOSIS — E79.0 ELEVATED URIC ACID IN BLOOD: ICD-10-CM

## 2017-07-26 DIAGNOSIS — E66.01 MORBID OBESITY WITH BMI OF 45.0-49.9, ADULT (HCC): ICD-10-CM

## 2017-07-26 LAB
ANION GAP SERPL CALCULATED.3IONS-SCNC: 12 MMOL/L (ref 9–17)
BUN BLDV-MCNC: 12 MG/DL (ref 6–20)
BUN/CREAT BLD: NORMAL (ref 9–20)
CALCIUM SERPL-MCNC: 9.1 MG/DL (ref 8.6–10.4)
CHLORIDE BLD-SCNC: 103 MMOL/L (ref 98–107)
CHOLESTEROL/HDL RATIO: 3.3
CHOLESTEROL: 164 MG/DL
CO2: 26 MMOL/L (ref 20–31)
CREAT SERPL-MCNC: 0.65 MG/DL (ref 0.5–0.9)
GFR AFRICAN AMERICAN: >60 ML/MIN
GFR NON-AFRICAN AMERICAN: >60 ML/MIN
GFR SERPL CREATININE-BSD FRML MDRD: NORMAL ML/MIN/{1.73_M2}
GFR SERPL CREATININE-BSD FRML MDRD: NORMAL ML/MIN/{1.73_M2}
GLUCOSE BLD-MCNC: 85 MG/DL (ref 70–99)
HDLC SERPL-MCNC: 50 MG/DL
LDL CHOLESTEROL: 96 MG/DL (ref 0–130)
POTASSIUM SERPL-SCNC: 4 MMOL/L (ref 3.7–5.3)
SODIUM BLD-SCNC: 141 MMOL/L (ref 135–144)
TRIGL SERPL-MCNC: 90 MG/DL
URIC ACID: 4.6 MG/DL (ref 2.4–5.7)
VLDLC SERPL CALC-MCNC: NORMAL MG/DL (ref 1–30)

## 2017-07-26 PROCEDURE — 80048 BASIC METABOLIC PNL TOTAL CA: CPT

## 2017-07-26 PROCEDURE — 80061 LIPID PANEL: CPT

## 2017-07-26 PROCEDURE — 84550 ASSAY OF BLOOD/URIC ACID: CPT

## 2017-07-26 PROCEDURE — 36415 COLL VENOUS BLD VENIPUNCTURE: CPT

## 2017-07-27 ENCOUNTER — TELEPHONE (OUTPATIENT)
Dept: BARIATRICS/WEIGHT MGMT | Age: 57
End: 2017-07-27

## 2017-08-01 ENCOUNTER — HOSPITAL ENCOUNTER (OUTPATIENT)
Dept: PAIN MANAGEMENT | Age: 57
End: 2017-08-01
Payer: COMMERCIAL

## 2017-08-01 ENCOUNTER — HOSPITAL ENCOUNTER (OUTPATIENT)
Dept: MRI IMAGING | Age: 57
Discharge: HOME | End: 2017-08-01
Payer: COMMERCIAL

## 2017-08-01 DIAGNOSIS — M54.5 CHRONIC LOW BACK PAIN, UNSPECIFIED BACK PAIN LATERALITY, WITH SCIATICA PRESENCE UNSPECIFIED: ICD-10-CM

## 2017-08-01 DIAGNOSIS — G89.29 CHRONIC LOW BACK PAIN, UNSPECIFIED BACK PAIN LATERALITY, WITH SCIATICA PRESENCE UNSPECIFIED: ICD-10-CM

## 2017-08-01 PROCEDURE — 72148 MRI LUMBAR SPINE W/O DYE: CPT

## 2017-08-14 ENCOUNTER — OFFICE VISIT (OUTPATIENT)
Dept: FAMILY MEDICINE CLINIC | Age: 57
End: 2017-08-14
Payer: COMMERCIAL

## 2017-08-14 VITALS
TEMPERATURE: 98.5 F | SYSTOLIC BLOOD PRESSURE: 108 MMHG | HEIGHT: 67 IN | BODY MASS INDEX: 44.67 KG/M2 | WEIGHT: 284.6 LBS | HEART RATE: 73 BPM | DIASTOLIC BLOOD PRESSURE: 81 MMHG

## 2017-08-14 DIAGNOSIS — E66.01 MORBID OBESITY DUE TO EXCESS CALORIES (HCC): ICD-10-CM

## 2017-08-14 DIAGNOSIS — M54.42 CHRONIC LEFT-SIDED LOW BACK PAIN WITH LEFT-SIDED SCIATICA: Primary | ICD-10-CM

## 2017-08-14 DIAGNOSIS — F17.200 SMOKING: ICD-10-CM

## 2017-08-14 DIAGNOSIS — G89.29 CHRONIC LEFT-SIDED LOW BACK PAIN WITH LEFT-SIDED SCIATICA: Primary | ICD-10-CM

## 2017-08-14 DIAGNOSIS — Z12.11 COLON CANCER SCREENING: ICD-10-CM

## 2017-08-14 DIAGNOSIS — Z23 NEED FOR VACCINATION: ICD-10-CM

## 2017-08-14 PROCEDURE — 90472 IMMUNIZATION ADMIN EACH ADD: CPT | Performed by: INTERNAL MEDICINE

## 2017-08-14 PROCEDURE — 99213 OFFICE O/P EST LOW 20 MIN: CPT | Performed by: INTERNAL MEDICINE

## 2017-08-14 PROCEDURE — 90732 PPSV23 VACC 2 YRS+ SUBQ/IM: CPT | Performed by: INTERNAL MEDICINE

## 2017-08-14 PROCEDURE — 90471 IMMUNIZATION ADMIN: CPT | Performed by: INTERNAL MEDICINE

## 2017-08-14 PROCEDURE — 90715 TDAP VACCINE 7 YRS/> IM: CPT | Performed by: INTERNAL MEDICINE

## 2017-08-14 RX ORDER — NAPROXEN 500 MG/1
500 TABLET ORAL 2 TIMES DAILY WITH MEALS
Qty: 60 TABLET | Refills: 3 | Status: SHIPPED | OUTPATIENT
Start: 2017-08-14 | End: 2017-09-23

## 2017-08-14 ASSESSMENT — ENCOUNTER SYMPTOMS
BACK PAIN: 1
ABDOMINAL PAIN: 0
COUGH: 0
SHORTNESS OF BREATH: 0

## 2017-09-06 ENCOUNTER — TELEPHONE (OUTPATIENT)
Dept: FAMILY MEDICINE CLINIC | Age: 57
End: 2017-09-06

## 2017-09-06 DIAGNOSIS — R19.5 POSITIVE FIT (FECAL IMMUNOCHEMICAL TEST): Primary | ICD-10-CM

## 2017-09-06 DIAGNOSIS — Z12.11 COLON CANCER SCREENING: ICD-10-CM

## 2017-09-06 LAB
CONTROL: PRESENT
HEMOCCULT STL QL: POSITIVE

## 2017-09-06 PROCEDURE — 82274 ASSAY TEST FOR BLOOD FECAL: CPT | Performed by: INTERNAL MEDICINE

## 2017-09-13 ENCOUNTER — OFFICE VISIT (OUTPATIENT)
Dept: SURGERY | Age: 57
End: 2017-09-13
Payer: COMMERCIAL

## 2017-09-13 VITALS
WEIGHT: 277 LBS | TEMPERATURE: 97 F | SYSTOLIC BLOOD PRESSURE: 126 MMHG | HEIGHT: 67 IN | BODY MASS INDEX: 43.47 KG/M2 | HEART RATE: 74 BPM | DIASTOLIC BLOOD PRESSURE: 78 MMHG

## 2017-09-13 DIAGNOSIS — R19.5 POSITIVE FIT (FECAL IMMUNOCHEMICAL TEST): Primary | ICD-10-CM

## 2017-09-13 PROCEDURE — 99203 OFFICE O/P NEW LOW 30 MIN: CPT | Performed by: STUDENT IN AN ORGANIZED HEALTH CARE EDUCATION/TRAINING PROGRAM

## 2017-09-13 RX ORDER — POLYETHYLENE GLYCOL 3350 17 G/17G
250 POWDER, FOR SOLUTION ORAL ONCE
Qty: 250 G | Refills: 0 | Status: SHIPPED | OUTPATIENT
Start: 2017-09-13 | End: 2017-09-13

## 2017-09-15 ENCOUNTER — TELEPHONE (OUTPATIENT)
Dept: SURGERY | Age: 57
End: 2017-09-15

## 2017-09-22 ENCOUNTER — HOSPITAL ENCOUNTER (OUTPATIENT)
Age: 57
Setting detail: SPECIMEN
Discharge: HOME | End: 2017-09-22
Payer: COMMERCIAL

## 2017-09-22 ENCOUNTER — OFFICE VISIT (OUTPATIENT)
Dept: FAMILY MEDICINE CLINIC | Age: 57
End: 2017-09-22
Payer: COMMERCIAL

## 2017-09-22 VITALS
DIASTOLIC BLOOD PRESSURE: 90 MMHG | WEIGHT: 289.2 LBS | HEART RATE: 73 BPM | BODY MASS INDEX: 45.39 KG/M2 | HEIGHT: 67 IN | TEMPERATURE: 97 F | SYSTOLIC BLOOD PRESSURE: 130 MMHG

## 2017-09-22 DIAGNOSIS — Z12.4 CERVICAL CANCER SCREENING: ICD-10-CM

## 2017-09-22 DIAGNOSIS — Z01.419 WOMEN'S ANNUAL ROUTINE GYNECOLOGICAL EXAMINATION: Primary | ICD-10-CM

## 2017-09-22 DIAGNOSIS — Z23 NEED FOR INFLUENZA VACCINATION: ICD-10-CM

## 2017-09-22 PROCEDURE — 90471 IMMUNIZATION ADMIN: CPT | Performed by: INTERNAL MEDICINE

## 2017-09-22 PROCEDURE — 90688 IIV4 VACCINE SPLT 0.5 ML IM: CPT | Performed by: INTERNAL MEDICINE

## 2017-09-22 ASSESSMENT — PATIENT HEALTH QUESTIONNAIRE - PHQ9
2. FEELING DOWN, DEPRESSED OR HOPELESS: 1
SUM OF ALL RESPONSES TO PHQ QUESTIONS 1-9: 2
1. LITTLE INTEREST OR PLEASURE IN DOING THINGS: 1
SUM OF ALL RESPONSES TO PHQ9 QUESTIONS 1 & 2: 2

## 2017-09-22 ASSESSMENT — ENCOUNTER SYMPTOMS: ABDOMINAL PAIN: 0

## 2017-09-23 ENCOUNTER — HOSPITAL ENCOUNTER (EMERGENCY)
Age: 57
Discharge: HOME | End: 2017-09-23
Attending: EMERGENCY MEDICINE
Payer: COMMERCIAL

## 2017-09-23 ENCOUNTER — APPOINTMENT (OUTPATIENT)
Dept: GENERAL RADIOLOGY | Age: 57
End: 2017-09-23
Payer: COMMERCIAL

## 2017-09-23 VITALS
SYSTOLIC BLOOD PRESSURE: 172 MMHG | HEART RATE: 89 BPM | TEMPERATURE: 98.7 F | DIASTOLIC BLOOD PRESSURE: 111 MMHG | OXYGEN SATURATION: 96 % | RESPIRATION RATE: 17 BRPM

## 2017-09-23 DIAGNOSIS — M79.675 PAIN OF TOE OF LEFT FOOT: Primary | ICD-10-CM

## 2017-09-23 DIAGNOSIS — F17.200 SMOKER: ICD-10-CM

## 2017-09-23 DIAGNOSIS — I15.9 SECONDARY HYPERTENSION: ICD-10-CM

## 2017-09-23 PROCEDURE — G0382 LEV 3 HOSP TYPE B ED VISIT: HCPCS

## 2017-09-23 PROCEDURE — 73630 X-RAY EXAM OF FOOT: CPT

## 2017-09-23 RX ORDER — NAPROXEN 500 MG/1
500 TABLET ORAL 3 TIMES DAILY PRN
Qty: 60 TABLET | Refills: 0 | Status: SHIPPED | OUTPATIENT
Start: 2017-09-23 | End: 2018-05-18

## 2017-09-23 RX ORDER — NAPROXEN 250 MG/1
500 TABLET ORAL ONCE
Status: DISCONTINUED | OUTPATIENT
Start: 2017-09-23 | End: 2017-09-23 | Stop reason: HOSPADM

## 2017-09-23 ASSESSMENT — PAIN DESCRIPTION - LOCATION: LOCATION: TOE (COMMENT WHICH ONE)

## 2017-09-23 ASSESSMENT — PAIN DESCRIPTION - ORIENTATION: ORIENTATION: LEFT

## 2017-09-23 ASSESSMENT — PAIN DESCRIPTION - DESCRIPTORS: DESCRIPTORS: SHARP

## 2017-09-23 ASSESSMENT — PAIN DESCRIPTION - PAIN TYPE: TYPE: ACUTE PAIN

## 2017-09-23 ASSESSMENT — PAIN SCALES - GENERAL: PAINLEVEL_OUTOF10: 7

## 2017-09-26 LAB
HPV SAMPLE: NORMAL
HPV SOURCE: NORMAL
HPV, GENOTYPE 16: NOT DETECTED
HPV, GENOTYPE 18: NOT DETECTED
HPV, HIGH RISK OTHER: NOT DETECTED
HPV, INTERPRETATION: NORMAL

## 2017-09-28 LAB — CYTOLOGY REPORT: NORMAL

## 2017-10-02 ENCOUNTER — TELEPHONE (OUTPATIENT)
Dept: SURGERY | Age: 57
End: 2017-10-02

## 2017-10-02 NOTE — TELEPHONE ENCOUNTER
Pt called and spoke with Neyda Rachel at [facility]. Pt is scheduled for a colonoscopy today but stated her stools weren't clear. Called Magda at [employer], per Dr. Marcum Search cancel. Pt was informed and I spoke with Lali Bauman at [facility] to cancel.

## 2017-10-04 ENCOUNTER — TELEPHONE (OUTPATIENT)
Dept: SURGERY | Age: 57
End: 2017-10-04

## 2017-10-12 ENCOUNTER — ANESTHESIA EVENT (OUTPATIENT)
Dept: OPERATING ROOM | Age: 57
End: 2017-10-12
Payer: COMMERCIAL

## 2017-10-13 ENCOUNTER — HOSPITAL ENCOUNTER (OUTPATIENT)
Age: 57
Setting detail: OUTPATIENT SURGERY
Discharge: HOME | End: 2017-10-13
Attending: SURGERY | Admitting: SURGERY
Payer: COMMERCIAL

## 2017-10-13 ENCOUNTER — ANESTHESIA (OUTPATIENT)
Dept: OPERATING ROOM | Age: 57
End: 2017-10-13
Payer: COMMERCIAL

## 2017-10-13 VITALS
WEIGHT: 274 LBS | SYSTOLIC BLOOD PRESSURE: 101 MMHG | HEART RATE: 74 BPM | OXYGEN SATURATION: 97 % | DIASTOLIC BLOOD PRESSURE: 73 MMHG | BODY MASS INDEX: 43 KG/M2 | HEIGHT: 67 IN | RESPIRATION RATE: 18 BRPM | TEMPERATURE: 98.4 F

## 2017-10-13 VITALS — SYSTOLIC BLOOD PRESSURE: 122 MMHG | DIASTOLIC BLOOD PRESSURE: 68 MMHG | OXYGEN SATURATION: 99 %

## 2017-10-13 LAB
EKG ATRIAL RATE: 74 BPM
EKG P AXIS: -2 DEGREES
EKG P-R INTERVAL: 140 MS
EKG Q-T INTERVAL: 392 MS
EKG QRS DURATION: 84 MS
EKG QTC CALCULATION (BAZETT): 435 MS
EKG R AXIS: 3 DEGREES
EKG T AXIS: 55 DEGREES
EKG VENTRICULAR RATE: 74 BPM

## 2017-10-13 PROCEDURE — 3609010400 HC COLONOSCOPY POLYPECTOMY HOT BIOPSY: Performed by: SURGERY

## 2017-10-13 PROCEDURE — 7100000011 HC PHASE II RECOVERY - ADDTL 15 MIN: Performed by: SURGERY

## 2017-10-13 PROCEDURE — 88305 TISSUE EXAM BY PATHOLOGIST: CPT

## 2017-10-13 PROCEDURE — 2500000003 HC RX 250 WO HCPCS

## 2017-10-13 PROCEDURE — 7100000010 HC PHASE II RECOVERY - FIRST 15 MIN: Performed by: SURGERY

## 2017-10-13 PROCEDURE — 6360000002 HC RX W HCPCS

## 2017-10-13 PROCEDURE — 3700000001 HC ADD 15 MINUTES (ANESTHESIA): Performed by: SURGERY

## 2017-10-13 PROCEDURE — 2580000003 HC RX 258: Performed by: ANESTHESIOLOGY

## 2017-10-13 PROCEDURE — 2580000003 HC RX 258

## 2017-10-13 PROCEDURE — 93005 ELECTROCARDIOGRAM TRACING: CPT

## 2017-10-13 PROCEDURE — 3700000000 HC ANESTHESIA ATTENDED CARE: Performed by: SURGERY

## 2017-10-13 RX ORDER — FENTANYL CITRATE 50 UG/ML
25 INJECTION, SOLUTION INTRAMUSCULAR; INTRAVENOUS EVERY 5 MIN PRN
Status: DISCONTINUED | OUTPATIENT
Start: 2017-10-13 | End: 2017-10-13 | Stop reason: HOSPADM

## 2017-10-13 RX ORDER — PROPOFOL 10 MG/ML
INJECTION, EMULSION INTRAVENOUS CONTINUOUS PRN
Status: DISCONTINUED | OUTPATIENT
Start: 2017-10-13 | End: 2017-10-13 | Stop reason: SDUPTHER

## 2017-10-13 RX ORDER — MORPHINE SULFATE 2 MG/ML
2 INJECTION, SOLUTION INTRAMUSCULAR; INTRAVENOUS EVERY 5 MIN PRN
Status: DISCONTINUED | OUTPATIENT
Start: 2017-10-13 | End: 2017-10-13 | Stop reason: HOSPADM

## 2017-10-13 RX ORDER — OXYCODONE HYDROCHLORIDE AND ACETAMINOPHEN 5; 325 MG/1; MG/1
1 TABLET ORAL PRN
Status: DISCONTINUED | OUTPATIENT
Start: 2017-10-13 | End: 2017-10-13 | Stop reason: HOSPADM

## 2017-10-13 RX ORDER — LIDOCAINE HYDROCHLORIDE 10 MG/ML
1 INJECTION, SOLUTION EPIDURAL; INFILTRATION; INTRACAUDAL; PERINEURAL
Status: DISCONTINUED | OUTPATIENT
Start: 2017-10-13 | End: 2017-10-13 | Stop reason: HOSPADM

## 2017-10-13 RX ORDER — SODIUM CHLORIDE, SODIUM LACTATE, POTASSIUM CHLORIDE, CALCIUM CHLORIDE 600; 310; 30; 20 MG/100ML; MG/100ML; MG/100ML; MG/100ML
INJECTION, SOLUTION INTRAVENOUS CONTINUOUS
Status: DISCONTINUED | OUTPATIENT
Start: 2017-10-13 | End: 2017-10-13 | Stop reason: HOSPADM

## 2017-10-13 RX ORDER — LIDOCAINE HYDROCHLORIDE 10 MG/ML
INJECTION, SOLUTION EPIDURAL; INFILTRATION; INTRACAUDAL; PERINEURAL PRN
Status: DISCONTINUED | OUTPATIENT
Start: 2017-10-13 | End: 2017-10-13 | Stop reason: SDUPTHER

## 2017-10-13 RX ORDER — LABETALOL HYDROCHLORIDE 5 MG/ML
5 INJECTION, SOLUTION INTRAVENOUS EVERY 10 MIN PRN
Status: DISCONTINUED | OUTPATIENT
Start: 2017-10-13 | End: 2017-10-13 | Stop reason: HOSPADM

## 2017-10-13 RX ORDER — PROPOFOL 10 MG/ML
INJECTION, EMULSION INTRAVENOUS PRN
Status: DISCONTINUED | OUTPATIENT
Start: 2017-10-13 | End: 2017-10-13 | Stop reason: SDUPTHER

## 2017-10-13 RX ORDER — OXYCODONE HYDROCHLORIDE AND ACETAMINOPHEN 5; 325 MG/1; MG/1
2 TABLET ORAL PRN
Status: DISCONTINUED | OUTPATIENT
Start: 2017-10-13 | End: 2017-10-13 | Stop reason: HOSPADM

## 2017-10-13 RX ORDER — ONDANSETRON 2 MG/ML
4 INJECTION INTRAMUSCULAR; INTRAVENOUS
Status: DISCONTINUED | OUTPATIENT
Start: 2017-10-13 | End: 2017-10-13 | Stop reason: HOSPADM

## 2017-10-13 RX ORDER — DIPHENHYDRAMINE HYDROCHLORIDE 50 MG/ML
12.5 INJECTION INTRAMUSCULAR; INTRAVENOUS
Status: DISCONTINUED | OUTPATIENT
Start: 2017-10-13 | End: 2017-10-13 | Stop reason: HOSPADM

## 2017-10-13 RX ORDER — SODIUM CHLORIDE, SODIUM LACTATE, POTASSIUM CHLORIDE, CALCIUM CHLORIDE 600; 310; 30; 20 MG/100ML; MG/100ML; MG/100ML; MG/100ML
INJECTION, SOLUTION INTRAVENOUS CONTINUOUS PRN
Status: DISCONTINUED | OUTPATIENT
Start: 2017-10-13 | End: 2017-10-13 | Stop reason: SDUPTHER

## 2017-10-13 RX ADMIN — LIDOCAINE HYDROCHLORIDE 50 MG: 10 INJECTION, SOLUTION EPIDURAL; INFILTRATION; INTRACAUDAL; PERINEURAL at 10:29

## 2017-10-13 RX ADMIN — PROPOFOL 50 MG: 10 INJECTION, EMULSION INTRAVENOUS at 10:35

## 2017-10-13 RX ADMIN — PROPOFOL 75 MCG/KG/MIN: 10 INJECTION, EMULSION INTRAVENOUS at 10:31

## 2017-10-13 RX ADMIN — SODIUM CHLORIDE, POTASSIUM CHLORIDE, SODIUM LACTATE AND CALCIUM CHLORIDE: 600; 310; 30; 20 INJECTION, SOLUTION INTRAVENOUS at 10:25

## 2017-10-13 RX ADMIN — PROPOFOL 20 MG: 10 INJECTION, EMULSION INTRAVENOUS at 10:31

## 2017-10-13 RX ADMIN — SODIUM CHLORIDE, POTASSIUM CHLORIDE, SODIUM LACTATE AND CALCIUM CHLORIDE: 600; 310; 30; 20 INJECTION, SOLUTION INTRAVENOUS at 09:25

## 2017-10-13 RX ADMIN — PROPOFOL 40 MG: 10 INJECTION, EMULSION INTRAVENOUS at 10:29

## 2017-10-13 ASSESSMENT — PAIN SCALES - GENERAL
PAINLEVEL_OUTOF10: 0

## 2017-10-13 ASSESSMENT — COPD QUESTIONNAIRES: CAT_SEVERITY: MODERATE

## 2017-10-13 ASSESSMENT — PAIN - FUNCTIONAL ASSESSMENT: PAIN_FUNCTIONAL_ASSESSMENT: 0-10

## 2017-10-13 ASSESSMENT — PAIN DESCRIPTION - DESCRIPTORS: DESCRIPTORS: ACHING;CONSTANT

## 2017-10-13 NOTE — ANESTHESIA POSTPROCEDURE EVALUATION
Department of Anesthesiology  Postprocedure Note    Patient: Alyssa Jones  MRN: [id number]  YOB: 1960  Date of evaluation: 10/13/2017  Time:  12:46 PM     Procedure Summary     Date:  10/13/17 Room / Location:  [facility] OR  / [facility] OR    Anesthesia Start:  1025 Anesthesia Stop:  0411    Procedure:  COLONOSCOPY WITH BIOPSY AND POLYPECTOMY (N/A ) Diagnosis:  (POSITIVE FIT)    Surgeon:  Mat Alarcon IV, DO Responsible Provider:  Marylene Sartorius, MD    Anesthesia Type:  MAC ASA Status:  3          Anesthesia Type: MAC    Chichi Phase I:      Chichi Phase II: Chichi Score: 10    Last vitals: Reviewed and per EMR flowsheets.        Anesthesia Post Evaluation    Patient location during evaluation: PACU  Patient participation: complete - patient participated  Level of consciousness: awake and alert  Pain score: 0  Airway patency: patent  Nausea & Vomiting: no nausea and no vomiting  Complications: no  Cardiovascular status: hemodynamically stable  Respiratory status: acceptable  Hydration status: euvolemic

## 2017-10-13 NOTE — ANESTHESIA PRE PROCEDURE
 HAMMER TOE SURGERY Bilateral     HYSTERECTOMY      UTERUS, CERVIX     TUBAL LIGATION      X-RAY FOOT 3+VW      both     Social History   Substance Use Topics    Smoking status: Current Some Day Smoker     Packs/day: 1.00     Years: 41.00     Types: Cigarettes    Smokeless tobacco: Never Used      Comment: down to 1/4 pack per day    Alcohol use 1.2 oz/week     2 Shots of liquor per week      Comment: 4 TIMES A YEAR         Vital Signs (Current)   Vitals:    10/13/17 0918   BP: 123/85   Pulse: 69   Resp: 24   Temp: 97 °F (36.1 °C)   SpO2: 98%     Vital Signs Statistics (for past 48 hrs)     Temp  Av °F (36.1 °C)  Min: 97 °F (36.1 °C)   Min taken time: 10/13/17 0918  Max: 97 °F (36.1 °C)   Max taken time: 10/13/17 0918  Pulse  Av  Min: 71   Min taken time: 10/13/17 0918  Max: 71   Max taken time: 10/13/17 0918  Resp  Av  Min: 24   Min taken time: 10/13/17 0918  Max: 24   Max taken time: 10/13/17 0918  BP  Min: 123/85   Min taken time: 10/13/17 0918  Max: 123/85   Max taken time: 10/13/17 0918  SpO2  Av %  Min: 98 %   Min taken time: 10/13/17 0918  Max: 98 %   Max taken time: 10/13/17 0918  BP Readings from Last 3 Encounters:   10/13/17 123/85   17 (!) 172/111   17 (!) 130/90       BMI  Body mass index is 42.91 kg/m².     CBC   Lab Results   Component Value Date    WBC 9.3 2015    RBC 4.25 2015    HGB 12.5 2015    HCT 38.1 2015    MCV 89.5 2015    RDW 17.7 2015     2015       CMP    Lab Results   Component Value Date     2017    K 4.0 2017     2017    CO2 26 2017    BUN 12 2017    CREATININE 0.65 2017    GFRAA >60 2017    LABGLOM >60 2017    GLUCOSE 85 2017    PROT 6.8 2015    CALCIUM 9.1 2017    BILITOT 0.16 2015    ALKPHOS 107 2015    AST 12 2015    ALT 12 2015       BMP    Lab Results   Component Value Date     07/26/2017    K 4.0 07/26/2017     07/26/2017    CO2 26 07/26/2017    BUN 12 07/26/2017    CREATININE 0.65 07/26/2017    CALCIUM 9.1 07/26/2017    GFRAA >60 07/26/2017    LABGLOM >60 07/26/2017    GLUCOSE 85 07/26/2017       POC Testing  No results for input(s): POCGLU, POCNA, POCK, POCCL, POCBUN, POCHEMO, POCHCT in the last 72 hours. Coags    Lab Results   Component Value Date    PROTIME 10.1 08/27/2012    INR 0.9 08/27/2012    APTT 24.7 08/27/2012       HCG (If Applicable) No results found for: PREGTESTUR, PREGSERUM, HCG, HCGQUANT     ABGs No results found for: PHART, PO2ART, BEX8XGF, EYD6FBW, BEART, M7ZVFFES     Type & Screen (If Applicable)  No results found for: LABABO, [address]    Radiology (If Applicable)    Cardiac Testing (If Applicable)     EKG (If Applicable) nl          Medications prior to admission:   Prior to Admission medications    Medication Sig Start Date End Date Taking?  Authorizing Provider   naproxen (NAPROSYN) 500 MG tablet Take 1 tablet by mouth 3 times daily as needed for Pain  Patient taking differently: Take 500 mg by mouth 2 times daily as needed for Pain  9/23/17  Yes Julienne Hudson MD   nicotine (NICODERM CQ) 14 MG/24HR Place 1 patch onto the skin every 24 hours 7/3/17 7/3/18 Yes Latina Hodgkins, MD   spironolactone (ALDACTONE) 25 MG tablet Take 1 tablet by mouth daily  Patient taking differently: Take 25 mg by mouth nightly  6/28/17  Yes Latina Hodgkins, MD   simvastatin (ZOCOR) 20 MG tablet Take 1 tablet by mouth nightly 5/12/17  Yes Latina Hodgkins, MD   amLODIPine (NORVASC) 10 MG tablet Take 1 tablet by mouth daily  Patient taking differently: Take 10 mg by mouth nightly  5/12/17  Yes Latina Hodgkins, MD   gabapentin (NEURONTIN) 300 MG capsule Take 1 capsule by mouth 3 times daily 4/6/17  Yes Latina Hodgkins, MD   ranitidine (ZANTAC) 300 MG tablet Take 1 tablet by mouth nightly 4/6/17  Yes Latina Hodgkins, MD   EPINEPHrine (EPIPEN) 0.3 MG/0.3ML SOAJ injection Use as directed for allergic reaction 6/24/14   Daiana Jovel MD       Current medications:    Current Facility-Administered Medications   Medication Dose Route Frequency Provider Last Rate Last Dose    lactated ringers infusion   Intravenous Continuous Eric George  mL/hr at 10/13/17 0925      lidocaine PF 1 % injection 1 mL  1 mL Intradermal Once PRN Eric George MD           Allergies: Allergies   Allergen Reactions    Lisinopril Anaphylaxis and Swelling     swelling    Bee Venom Swelling     NO TROUBLE BREATHING    Sulfa Antibiotics Itching, Rash and Other (See Comments)     REDNESS       Problem List:    Patient Active Problem List   Diagnosis Code    Idiopathic angioedema T78. 3XXA    HTN (hypertension) I10    Back pain M54.9    Morbid obesity with BMI of 45.0-49.9, adult (HCC) E66.01, Z68.42    Morbid obesity due to excess calories (HCC) E66.01    Smoking F17.200    Need for vaccination Z23    Colon cancer screening Z12.11       Past Medical History:        Diagnosis Date    Arthritis     GENERALIZED ALL OVER    Cancer ([facility] [address] 1992    CERVICAL, UTERINE    Chronic back pain     scioliosis    Depression     GERD (gastroesophageal reflux disease)     Hyperlipidemia     Hypertension 2007    Pain management clinic    Insomnia     Obesity     BMI -  43    Bre     has been told by family that she stops breathing    Use of cane as ambulatory aid     Wears glasses     READING       Past Surgical History:        Procedure Laterality Date    BREAST SURGERY Left     CYST    BUNIONECTOMY Bilateral     CERVIX SURGERY  1992    FOOT SURGERY Left     PINS IN ALL TOES    HAMMER TOE SURGERY Bilateral     HYSTERECTOMY  1992    UTERUS, CERVIX     TUBAL LIGATION  1982    X-RAY FOOT 3+VW      both       Social History:    Social History   Substance Use Topics    Smoking status: Current Some Day Smoker     Packs/day: 1.00     Years: 41.00     Types: Cigarettes    Smokeless tobacco: Never Used      Comment: down to 1/4 pack per day    Alcohol use 1.2 oz/week     2 Shots of liquor per week      Comment: 4 TIMES A YEAR                                Ready to quit: No  Counseling given: No      Vital Signs (Current):   Vitals:    10/11/17 1456 10/13/17 0901 10/13/17 0918   BP:   123/85   Pulse:   69   Resp:   24   Temp:   97 °F (36.1 °C)   TempSrc:   Temporal   SpO2:   98%   Weight: 274 lb (124.3 kg) 274 lb (124.3 kg)    Height: 5' 7\" (1.702 m) 5' 7\" (1.702 m)                                               BP Readings from Last 3 Encounters:   10/13/17 123/85   09/23/17 (!) 172/111   09/22/17 (!) 130/90       NPO Status: Time of last liquid consumption: 2230                        Time of last solid consumption: 1900                        Date of last liquid consumption: 10/12/17                        Date of last solid food consumption: 10/11/17    BMI:   Wt Readings from Last 3 Encounters:   10/13/17 274 lb (124.3 kg)   09/22/17 289 lb 3.2 oz (131.2 kg)   09/13/17 277 lb (125.6 kg)     Body mass index is 42.91 kg/m². CBC:   Lab Results   Component Value Date    WBC 9.3 03/04/2015    RBC 4.25 03/04/2015    HGB 12.5 03/04/2015    HCT 38.1 03/04/2015    MCV 89.5 03/04/2015    RDW 17.7 03/04/2015     03/04/2015       CMP:   Lab Results   Component Value Date     07/26/2017    K 4.0 07/26/2017     07/26/2017    CO2 26 07/26/2017    BUN 12 07/26/2017    CREATININE 0.65 07/26/2017    GFRAA >60 07/26/2017    LABGLOM >60 07/26/2017    GLUCOSE 85 07/26/2017    PROT 6.8 03/04/2015    CALCIUM 9.1 07/26/2017    BILITOT 0.16 03/04/2015    ALKPHOS 107 03/04/2015    AST 12 03/04/2015    ALT 12 03/04/2015       POC Tests: No results for input(s): POCGLU, POCNA, POCK, POCCL, POCBUN, POCHEMO, POCHCT in the last 72 hours.     Coags:   Lab Results   Component Value Date    PROTIME 10.1 08/27/2012    INR 0.9 08/27/2012    APTT 24.7 08/27/2012       HCG (If Applicable): No results found for: PREGTESTUR, PREGSERUM, HCG, HCGQUANT     ABGs: No results found for: PHART, PO2ART, VBJ2EON, MFH7VEG, BEART, H9OMZPWF     Type & Screen (If Applicable):  No results found for: LABABO, LABRH    Anesthesia Evaluation   no history of anesthetic complications:   Airway: Mallampati: II     Neck ROM: full   Dental:          Pulmonary:   (+) COPD: moderate,      (-) recent URI                        ROS comment: 41 pk yrs  Heavy snoring   Cardiovascular:    (+) hypertension:,                ROS comment: -cp,syn,pnd     Neuro/Psych:      (-) seizures and CVA              Comments: depression GI/Hepatic/Renal:   (+) GERD: well controlled,           Endo/Other:        (-) no Type II DM               Abdominal:           Vascular:                                      Anesthesia Plan      MAC     ASA 3     (Asa 3 MO)  Induction: intravenous.                           Brisa Jolley MD   10/13/2017

## 2017-10-13 NOTE — H&P
H&P   General Surgery        Pt Name: Dia Lazaro  MRN: [id number]  YOB: 1960  Date of evaluation: 10/13/2017      [x] I have examined the patient and reviewed the H&P/Consult completed, and there are no changes to the patient or plans. [] I have examined the patient and reviewed the H&P/Consult and have noted the following changes:     States having proper bowel prep yesterday. Will proceed with colonoscopy today. Consent was obtained at the clinic and is in the chart. All questions were answered appropriately. Physical Exam:  There were no vitals filed for this visit. Gen:  A&Ox3, NAD  CVS: Regular rate and rhythm, no murmurs, no rubs or gallops  Resp: Good bilateral air entry, clear to auscultation b/l, no wheeze or rhonchi  Abd: soft, non-tender, non-distended       Electronically signed by Jeremi Barajas DO  on 10/13/2017 at [address]   History and Physical        PATIENT NAME: Milton Mail OF BIRTH: 1960     TODAY'S DATE: 9/13/2017     CHIEF COMPLAINT:  Positive FIT     HISTORY OF PRESENT ILLNESS:  This is a 64 y.o. female w/ recent diagnosis of Positive FIT test that presents to our clinic for evaluation for a colonoscopy. She denies any family hx of colorectal cancer. She denies any symptoms of rectal bleeding, rectal pain, or any other concerning GI symptoms at this time.  She had the positive FIT as her yearly screening and is now in our office to schedule a colonoscopy.      Past Medical History:    Past Medical History             Diagnosis Date    Chronic back pain       scioliosis    Depression      Hypertension 2007     Pain management clinic    Obesity       BMI -  43            Past Surgical History:    Past Surgical History             Procedure Laterality Date    BREAST SURGERY         left    CERVIX SURGERY        HYSTERECTOMY        TUBAL LIGATION        X-RAY FOOT 3+VW         both            Medications:  Scheduled Meds:  Continuous Infusions:  PRN Meds:.     Allergies:  Lisinopril and Sulfa antibiotics     Social History:   Social History   Social History            Social History    Marital status: Single       Spouse name: N/A    Number of children: N/A    Years of education: N/A          Occupational History    Not on file.              Social History Main Topics    Smoking status: Current Every Day Smoker       Packs/day: 0.50       Years: 30.00       Types: Cigarettes    Smokeless tobacco: Not on file    Alcohol use Yes          Comment: Social    Drug use: No         Comment: Social    Sexual activity: Not Currently      Other Topics Concern    Not on file      Social History Narrative            Family History:   Family History              Problem Relation Age of Onset    High Blood Pressure Mother      Cancer Mother         cervical and breast    Diabetes Father      Heart Disease Maternal Aunt         times 2            REVIEW OF SYSTEMS:       General: Energy level normal for pt. HEENT: Denies sore throat  Cardiovascular: Denies chest pain  Pulmonary: Denies cough, shortness of breath  GI:  Denies hx of constipation  : Denies polyuria, dysuria, hematuria  Endocrine: Denies diabetes, thyroid problems. Hem/Onc: Denies anemia, h/o bleeding or clotting problems.    Neurological: Denies h/o CVA, TIA  Skin: Denies rashes, ulcers  Musculoskeletal: Denies muscle, joint, back pain     PHYSICAL EXAM:    VITALS:  /78  Pulse 74  Temp 97 °F (36.1 °C) (Temporal)   Ht 5' 7\" (1.702 m)  Wt 277 lb (125.6 kg)  BMI 43.38 kg/m2     CONSTITUTIONAL:  Alert and oriented, no acute distress  HEAD: normocephalic, atraumatic  EYES:sclera non icteric  ENT: Mucus membranes moist  NECK:  trachea midline   LUNGS:  Good air movement bilaterally  CARDIOVASCULAR: Regular rate and rhythm  ABDOMEN: soft, non tender, non distended, no rebound or guarding  MUSCULOSKELETAL:  Equal strength bilaterally  SKIN: No abscess or rash  NEUROLOGIC: no focal deficits  PSYCH: affect appropriate     ASSESSMENT       Patient Active Problem List   Diagnosis    Idiopathic angioedema    HTN (hypertension)    Back pain    Morbid obesity with BMI of 45.0-49.9, adult ([address]    Morbid obesity due to excess calories ([address]    Smoking    Need for vaccination    Colon cancer screening      1. Positive FIT test      PLAN     1. Will proceed with colonoscopy at the nearest convenient available time. The risks benefits and alternatives were discussed with her. Perioperative preparation was discussed and all questions were answered and she expresses understanding of the intervention. Consent was obtained. 2. RX for miralax was sent to the patient's pharmacy. Bowel prep instructions provided and went over with the patient in detail. All questions answered. 3. Will only schedule follow up after colonoscopy if biopsy/abnormal findings, etc. If normal colonoscopy will not need follow up. Thank you.     Patient's case was discussed with Dr. Julieta Deal.        Electronically signed by Joycelyn Geller DO  on 9/13/2017 at 10:34 AM      I have reviewed the resident's note and I either performed the key elements of the medical history and physical exam or was present with the resident when the key elements of the medical history and physical exam were performed.  I have discussed the findings, established the care plan and recommendations with Resident.     Electronically signed by Edouard Jovel IV, DO on 9/20/17 at 9:16 AM

## 2017-10-16 LAB — SURGICAL PATHOLOGY REPORT: NORMAL

## 2017-10-30 DIAGNOSIS — I10 ESSENTIAL HYPERTENSION: ICD-10-CM

## 2017-10-30 DIAGNOSIS — Z76.0 MEDICATION REFILL: ICD-10-CM

## 2017-10-30 RX ORDER — SPIRONOLACTONE 25 MG/1
25 TABLET ORAL DAILY
Qty: 30 TABLET | Refills: 3 | Status: SHIPPED | OUTPATIENT
Start: 2017-10-30 | End: 2018-03-06 | Stop reason: SDUPTHER

## 2017-10-30 RX ORDER — AMLODIPINE BESYLATE 10 MG/1
TABLET ORAL
Qty: 30 TABLET | Refills: 3 | Status: SHIPPED | OUTPATIENT
Start: 2017-10-30 | End: 2018-03-06 | Stop reason: SDUPTHER

## 2017-10-30 RX ORDER — RANITIDINE 300 MG/1
300 TABLET ORAL NIGHTLY
Qty: 30 TABLET | Refills: 3 | Status: SHIPPED | OUTPATIENT
Start: 2017-10-30 | End: 2018-03-06 | Stop reason: SDUPTHER

## 2017-11-16 ENCOUNTER — OFFICE VISIT (OUTPATIENT)
Dept: FAMILY MEDICINE CLINIC | Age: 57
End: 2017-11-16
Payer: COMMERCIAL

## 2017-11-16 VITALS
BODY MASS INDEX: 44.73 KG/M2 | HEART RATE: 73 BPM | HEIGHT: 67 IN | DIASTOLIC BLOOD PRESSURE: 73 MMHG | SYSTOLIC BLOOD PRESSURE: 120 MMHG | WEIGHT: 285 LBS | TEMPERATURE: 97.1 F

## 2017-11-16 DIAGNOSIS — Z11.4 ENCOUNTER FOR SCREENING FOR HIV: ICD-10-CM

## 2017-11-16 DIAGNOSIS — Z12.39 BREAST CANCER SCREENING: ICD-10-CM

## 2017-11-16 DIAGNOSIS — R06.83 SNORING: Primary | ICD-10-CM

## 2017-11-16 DIAGNOSIS — Z11.59 ENCOUNTER FOR HEPATITIS C SCREENING TEST FOR LOW RISK PATIENT: ICD-10-CM

## 2017-11-16 DIAGNOSIS — F17.200 SMOKING: ICD-10-CM

## 2017-11-16 DIAGNOSIS — Z87.891 PERSONAL HISTORY OF TOBACCO USE: ICD-10-CM

## 2017-11-16 PROCEDURE — 3017F COLORECTAL CA SCREEN DOC REV: CPT | Performed by: INTERNAL MEDICINE

## 2017-11-16 PROCEDURE — G8427 DOCREV CUR MEDS BY ELIG CLIN: HCPCS | Performed by: INTERNAL MEDICINE

## 2017-11-16 PROCEDURE — G8484 FLU IMMUNIZE NO ADMIN: HCPCS | Performed by: INTERNAL MEDICINE

## 2017-11-16 PROCEDURE — 3014F SCREEN MAMMO DOC REV: CPT | Performed by: INTERNAL MEDICINE

## 2017-11-16 PROCEDURE — G8417 CALC BMI ABV UP PARAM F/U: HCPCS | Performed by: INTERNAL MEDICINE

## 2017-11-16 PROCEDURE — G0296 VISIT TO DETERM LDCT ELIG: HCPCS | Performed by: INTERNAL MEDICINE

## 2017-11-16 PROCEDURE — 99213 OFFICE O/P EST LOW 20 MIN: CPT | Performed by: INTERNAL MEDICINE

## 2017-11-16 PROCEDURE — 4004F PT TOBACCO SCREEN RCVD TLK: CPT | Performed by: INTERNAL MEDICINE

## 2017-11-16 ASSESSMENT — ENCOUNTER SYMPTOMS: ANAL BLEEDING: 0

## 2017-11-16 NOTE — PATIENT INSTRUCTIONS
cancer risk. If you have other serious medical conditions (other cancers, congestive heart failure) that limit your life expectancy to less than 10 years, you should not undergo lung cancer screening with LDCT. The chance is 20%-60% that the LDCT result will show abnormalities. This would require additional testing which could include repeat imaging or even invasive procedures. Most (about 95%) of \"abnormal\" LDCT results are false in the sense that no lung cancer is ultimately found. Additionally, some (about 10%) of the cancers found would not affect your life expectancy, even if undetected and untreated. If you are still smoking, the single most important thing that you can do to reduce your risk of dying of lung cancer is to quit. For this screening to be covered by Medicare and most other insurers, strict criteria must be met. If you do not meet these criteria, but still wish to undergo LDCT testing, you will be required to sign a waiver indicating your willingness to pay for the scan.

## 2017-11-16 NOTE — PROGRESS NOTES
Attending Physician Statement  I have discussed the care of Nikky Rodriguez, including pertinent history and exam findings,  with the resident. I have reviewed the key elements of all parts of the encounter with the resident. I agree with the assessment, plan and orders as documented by the resident.   (GE Modifier)
Visit Information    Have you changed or started any medications since your last visit including any over-the-counter medicines, vitamins, or herbal medicines? no   Have you stopped taking any of your medications? Is so, why? -  no  Are you having any side effects from any of your medications? - no    Have you seen any other physician or provider since your last visit?  no   Have you had any other diagnostic tests since your last visit?  no   Have you been seen in the emergency room and/or had an admission in a hospital since we last saw you?  yes - [facility]   Have you had your routine dental cleaning in the past 6 months?  no     Do you have an active MyChart account? If no, what is the barrier?   No: Declined    Patient Care Team:  Bethel Blevins MD as PCP - General (Family Medicine)    Medical History Review  Past Medical, Family, and Social History reviewed and does not contribute to the patient presenting condition    Health Maintenance   Topic Date Due    Hepatitis C screen  1960    HIV screen  11/19/1975    Smoker: low dose lung CT screening  11/19/2015    Breast cancer screen  09/16/2017    Colon Cancer Screen FIT/FOBT  09/05/2018    Lipid screen  07/26/2022    Cervical cancer screen  09/22/2022    DTaP/Tdap/Td vaccine (2 - Td) 08/14/2027    Flu vaccine  Completed    Pneumococcal med risk  Completed
CHOL 164 07/26/2017    TRIG 90 07/26/2017    HDL 50 07/26/2017    ALT 12 03/04/2015    AST 12 03/04/2015     07/26/2017    K 4.0 07/26/2017     07/26/2017    CREATININE 0.65 07/26/2017    BUN 12 07/26/2017    CO2 26 07/26/2017    TSH 1.54 06/23/2014    INR 0.9 08/27/2012    LABA1C 5.9 10/23/2014    LABMICR 25 09/29/2015     Lab Results   Component Value Date    CALCIUM 9.1 07/26/2017    PHOS 2.5 (L) 03/04/2015     Lab Results   Component Value Date    LDLCHOLESTEROL 96 07/26/2017       Assessment and Plan:    1. Snoring secondary to morbid obesity  - Baseline Diagnostic Sleep Study; Future  - Follow with weight management regarding her morbid obesity    2. Breast cancer screening  - [facility] DIGITAL SCREEN W OR WO CAD BILATERAL; Future    3. Personal history of tobacco use  - Educated about smoking cessation  - CT Lung Screening; Future    4. Encounter for screening for HIV  - HIV Screen; Future    5. Encounter for hepatitis C screening test for low risk patient  - Hepatitis C Antibody; Future          Requested Prescriptions      No prescriptions requested or ordered in this encounter       There are no discontinued medications. Nikkie Carlin received counseling on the following healthy behaviors: nutrition, exercise and medication adherence    Discussed use, benefit, and side effects of prescribed medications. Barriers to medication compliance addressed. All patient questions answered. Pt voiced understanding. Return in about 6 weeks (around 12/28/2017) for F/u sleep study.

## 2017-11-20 ENCOUNTER — TELEPHONE (OUTPATIENT)
Dept: ONCOLOGY | Age: 57
End: 2017-11-20

## 2017-11-27 ENCOUNTER — HOSPITAL ENCOUNTER (OUTPATIENT)
Dept: MAMMOGRAPHY | Age: 57
Discharge: HOME | End: 2017-11-27
Payer: COMMERCIAL

## 2017-11-27 DIAGNOSIS — Z12.39 BREAST CANCER SCREENING: ICD-10-CM

## 2017-11-27 PROCEDURE — G0202 SCR MAMMO BI INCL CAD: HCPCS

## 2017-12-03 ENCOUNTER — HOSPITAL ENCOUNTER (OUTPATIENT)
Dept: SLEEP CENTER | Age: 57
Discharge: HOME | End: 2017-12-03
Payer: COMMERCIAL

## 2017-12-03 DIAGNOSIS — R06.83 SNORING: ICD-10-CM

## 2017-12-03 PROCEDURE — 95810 POLYSOM 6/> YRS 4/> PARAM: CPT

## 2017-12-03 ASSESSMENT — SLEEP AND FATIGUE QUESTIONNAIRES: NECK CIRCUMFERENCE (INCHES): 38

## 2017-12-04 VITALS — BODY MASS INDEX: 44.73 KG/M2 | RESPIRATION RATE: 18 BRPM | HEIGHT: 67 IN | WEIGHT: 285 LBS | HEART RATE: 67 BPM

## 2017-12-04 ASSESSMENT — SLEEP AND FATIGUE QUESTIONNAIRES
HOW LIKELY ARE YOU TO NOD OFF OR FALL ASLEEP WHILE LYING DOWN TO REST IN THE AFTERNOON WHEN CIRCUMSTANCES PERMIT: 3
HOW LIKELY ARE YOU TO NOD OFF OR FALL ASLEEP WHILE SITTING INACTIVE IN A PUBLIC PLACE: 0
HOW LIKELY ARE YOU TO NOD OFF OR FALL ASLEEP WHILE SITTING AND READING: 2
HOW LIKELY ARE YOU TO NOD OFF OR FALL ASLEEP WHILE WATCHING TV: 2
HOW LIKELY ARE YOU TO NOD OFF OR FALL ASLEEP IN A CAR, WHILE STOPPED FOR A FEW MINUTES IN TRAFFIC: 0
HOW LIKELY ARE YOU TO NOD OFF OR FALL ASLEEP WHEN YOU ARE A PASSENGER IN A CAR FOR AN HOUR WITHOUT A BREAK: 0
ESS TOTAL SCORE: 7
HOW LIKELY ARE YOU TO NOD OFF OR FALL ASLEEP WHILE SITTING QUIETLY AFTER LUNCH WITHOUT ALCOHOL: 0
HOW LIKELY ARE YOU TO NOD OFF OR FALL ASLEEP WHILE SITTING AND TALKING TO SOMEONE: 0

## 2017-12-08 ENCOUNTER — HOSPITAL ENCOUNTER (EMERGENCY)
Age: 57
Discharge: HOME | End: 2017-12-08
Attending: EMERGENCY MEDICINE
Payer: COMMERCIAL

## 2017-12-08 VITALS
RESPIRATION RATE: 20 BRPM | TEMPERATURE: 97.9 F | BODY MASS INDEX: 44.73 KG/M2 | HEIGHT: 67 IN | DIASTOLIC BLOOD PRESSURE: 96 MMHG | HEART RATE: 67 BPM | OXYGEN SATURATION: 100 % | WEIGHT: 285 LBS | SYSTOLIC BLOOD PRESSURE: 161 MMHG

## 2017-12-08 DIAGNOSIS — R22.0 FACIAL SWELLING: Primary | ICD-10-CM

## 2017-12-08 PROCEDURE — 99283 EMERGENCY DEPT VISIT LOW MDM: CPT

## 2017-12-08 PROCEDURE — 6370000000 HC RX 637 (ALT 250 FOR IP): Performed by: EMERGENCY MEDICINE

## 2017-12-08 RX ORDER — DIPHENHYDRAMINE HCL 25 MG
25 CAPSULE ORAL EVERY 4 HOURS PRN
Qty: 1 CAPSULE | Refills: 0 | Status: SHIPPED | OUTPATIENT
Start: 2017-12-08 | End: 2017-12-08

## 2017-12-08 RX ORDER — PREDNISONE 20 MG/1
40 TABLET ORAL ONCE
Status: COMPLETED | OUTPATIENT
Start: 2017-12-08 | End: 2017-12-08

## 2017-12-08 RX ORDER — DIPHENHYDRAMINE HCL 25 MG
25 CAPSULE ORAL EVERY 4 HOURS PRN
Qty: 20 CAPSULE | Refills: 0 | Status: SHIPPED | OUTPATIENT
Start: 2017-12-08 | End: 2017-12-18

## 2017-12-08 RX ORDER — DIPHENHYDRAMINE HCL 25 MG
25 TABLET ORAL ONCE
Status: COMPLETED | OUTPATIENT
Start: 2017-12-08 | End: 2017-12-08

## 2017-12-08 RX ORDER — DIAPER,BRIEF,INFANT-TODD,DISP
EACH MISCELLANEOUS
Qty: 1 TUBE | Refills: 1 | Status: SHIPPED | OUTPATIENT
Start: 2017-12-08 | End: 2017-12-15

## 2017-12-08 RX ADMIN — PREDNISONE 40 MG: 20 TABLET ORAL at 16:12

## 2017-12-08 RX ADMIN — DIPHENHYDRAMINE HCL 25 MG: 25 TABLET ORAL at 16:12

## 2017-12-08 ASSESSMENT — ENCOUNTER SYMPTOMS
WHEEZING: 0
ABDOMINAL PAIN: 0
RHINORRHEA: 0
SORE THROAT: 0
FACIAL SWELLING: 1
VOMITING: 0
COUGH: 0
STRIDOR: 0
VOICE CHANGE: 0
TROUBLE SWALLOWING: 0
NAUSEA: 0
SHORTNESS OF BREATH: 0

## 2017-12-08 ASSESSMENT — PAIN DESCRIPTION - PAIN TYPE: TYPE: CHRONIC PAIN

## 2017-12-08 ASSESSMENT — PAIN DESCRIPTION - LOCATION: LOCATION: GENERALIZED

## 2017-12-08 ASSESSMENT — PAIN SCALES - GENERAL: PAINLEVEL_OUTOF10: 6

## 2017-12-08 ASSESSMENT — PAIN DESCRIPTION - DESCRIPTORS: DESCRIPTORS: ACHING

## 2017-12-08 ASSESSMENT — PAIN DESCRIPTION - FREQUENCY: FREQUENCY: CONTINUOUS

## 2017-12-08 NOTE — ED PROVIDER NOTES
[facility]  ED  Emergency Department Encounter  Emergency Medicine Resident     Pt Name: Delroy Vizcaino  MRN: [id number]  [place] 1960  Date of evaluation: 12/8/17  PCP:  Leyla Emery MD    [address] [place] [address]       Chief Complaint   Patient presents with    Allergic Reaction     Pt woke up with swelling to face. No throat or tongue swelling. Pt with previous reaction to lisinopril four years ago and no longer takes it. Pt denies using any new perfumes, detergent or soaps. HISTORY OF PRESENT ILLNESS  (Location/Symptom, Timing/Onset, Context/Setting, Quality, Duration, Modifying Factors, Severity.)      Delroy Vizcaino is a 62 y.o. female who presents a PMH of hypertension and arthritis presenting with swelling of her face. The swelling began this morning when she woke up around 8am. It is not associated with any shortness of breath, chest pain or trouble breathing. She had a reaction of angioedema to lisinopril a few years ago but she had not taken it since this incident. She has allergies to sulfa drugs, bee venom and lisinopril. She denies any pain, pressure or discomfort associated with the swelling. She did not change any products that she used on her skin or take any new medications. She denies any change in vision. She denies any change in sensation. She stated that her skin itches. Patient denies any fever, chills, nausea, vomiting, abdominal pain, chest pain, shortness of breath or any other complaints. REVIEW OF SYSTEMS    (2-9 systems for level 4, 10 or more for level 5)      Review of Systems   Constitutional: Negative for chills and fever. HENT: Positive for facial swelling. Negative for congestion, rhinorrhea, sore throat, trouble swallowing and voice change. Respiratory: Negative for cough, shortness of breath, wheezing and stridor. Cardiovascular: Negative for chest pain and leg swelling. Gastrointestinal: Negative for abdominal pain, nausea and vomiting. family history were discussed and reviewed with the patient/family and is included here or in HPI if pertinent. ALLERGIES / IMMUNIZATIONS / HOME MEDICATIONS       Allergies:  Lisinopril; Bee venom; and Sulfa antibiotics      IMMUNIZATIONS    Immunization History   Administered Date(s) Administered    Influenza, Quadv, 3 Years and older, IM 09/22/2017    Pneumococcal Polysaccharide ([id number]) 08/14/2017    Tdap (Boostrix, Adacel) 08/14/2017         Home Medications:  Prior to Admission medications    Medication Sig Start Date End Date Taking? Authorizing Provider   hydrocortisone 1 % cream Apply topically 2 -3 times daily for 5 - 7 days. 12/8/17 12/15/17 Yes Marixa Pimentel DO   diphenhydrAMINE (BENADRYL) 25 MG capsule Take 1 capsule by mouth every 4 hours as needed for Itching 12/8/17 12/18/17 Yes Marixa Pimentel DO   spironolactone (ALDACTONE) 25 MG tablet TAKE 1 TABLET BY MOUTH DAILY 10/30/17   Heidi Patel MD   ranitidine (ZANTAC) 300 MG tablet TAKE 1 TABLET BY MOUTH NIGHTLY 10/30/17   Heidi Patel MD   amLODIPine (NORVASC) 10 MG tablet TAKE 1 TABLET EVERY DAY 10/30/17   Heidi Patel MD   naproxen (NAPROSYN) 500 MG tablet Take 1 tablet by mouth 3 times daily as needed for Pain  Patient taking differently: Take 500 mg by mouth 2 times daily as needed for Pain  9/23/17   Beryle Snowball, MD   nicotine (NICODERM CQ) 14 MG/24HR Place 1 patch onto the skin every 24 hours 7/3/17 7/3/18  Heidi Patel MD   simvastatin (ZOCOR) 20 MG tablet Take 1 tablet by mouth nightly 5/12/17   Heidi Patel MD   gabapentin (NEURONTIN) 300 MG capsule Take 1 capsule by mouth 3 times daily 4/6/17   Heidi Patel MD   EPINEPHrine (EPIPEN) 0.3 MG/0.3ML SOAJ injection Use as directed for allergic reaction 6/24/14   Braulio Perez MD         PHYSICAL EXAM   (up to 7 for level 4, 8 or more for level 5)      INITIAL VITALS:    height is 5' 7\" (1.702 m) and weight is 285 lb (129.3 kg).  Her

## 2017-12-12 DIAGNOSIS — G89.29 CHRONIC LEFT-SIDED LOW BACK PAIN WITH LEFT-SIDED SCIATICA: ICD-10-CM

## 2017-12-12 DIAGNOSIS — M54.42 CHRONIC LEFT-SIDED LOW BACK PAIN WITH LEFT-SIDED SCIATICA: ICD-10-CM

## 2017-12-13 RX ORDER — NAPROXEN 500 MG/1
500 TABLET ORAL 2 TIMES DAILY WITH MEALS
Qty: 60 TABLET | Refills: 3 | Status: SHIPPED | OUTPATIENT
Start: 2017-12-13 | End: 2018-03-08 | Stop reason: SDUPTHER

## 2017-12-13 NOTE — TELEPHONE ENCOUNTER
Medication is on med list please review and address    Please address the medication refill and close the encounter. If I can be of assistance, please route to the applicable pool. Thank you. Next Visit Date:  Future Appointments  Date Time Provider Nola Olmos   1/4/2018 10:30 AM Shabana Rios MD [address] [place] [address] Maintenance   Topic Date Due    Hepatitis C screen  1960    HIV screen  11/19/1975    Smoker: low dose lung CT screening  11/19/2015    Colon Cancer Screen FIT/FOBT  09/05/2018    Breast cancer screen  11/27/2019    Lipid screen  07/26/2022    Cervical cancer screen  09/22/2022    DTaP/Tdap/Td vaccine (2 - Td) 08/14/2027    Flu vaccine  Completed    Pneumococcal med risk  Completed       Hemoglobin A1C (%)   Date Value   10/23/2014 5.9             ( goal A1C is < 7)   Microalb/Crt.  Ratio (mcg/mg creat)   Date Value   09/29/2015 25     LDL Cholesterol (mg/dL)   Date Value   07/26/2017 96       (goal LDL is <100)   AST (U/L)   Date Value   03/04/2015 12     ALT (U/L)   Date Value   03/04/2015 12     BUN (mg/dL)   Date Value   07/26/2017 12     BP Readings from Last 3 Encounters:   12/08/17 (!) 161/96   11/16/17 120/73   10/13/17 122/68          (goal 120/80)    All Future Testing planned in [facility]  Lab Frequency Next Occurrence   XR Knee Bilateral Standard Once 04/11/2017   XR Knee Bilateral Standard Once 05/12/2017   CT Lung Screening Once 06/11/2018   Hepatitis C Antibody Once 02/16/2018   HIV Screen Once 02/16/2018               Patient Active Problem List:     Idiopathic angioedema     HTN (hypertension)     Back pain     Morbid obesity with BMI of 45.0-49.9, adult ([address]     Morbid obesity due to excess calories ([address]     Smoking     Need for vaccination     Colon cancer screening

## 2017-12-20 ENCOUNTER — TELEPHONE (OUTPATIENT)
Dept: FAMILY MEDICINE CLINIC | Age: 57
End: 2017-12-20

## 2017-12-22 ENCOUNTER — TELEPHONE (OUTPATIENT)
Dept: CASE MANAGEMENT | Age: 57
End: 2017-12-22

## 2017-12-22 NOTE — LETTER
12/22/2017        Sachin Fischer  [address] [place] [address], [id number]    Dear Sachin Fischer:    Our records indicate that you did not have the CT lung screening that was scheduled for you. CT lung screening is indicated for those who have an increased risk for lung cancer. One life is saved for every 320 high risk persons screened. To re-schedule this screening contact our scheduling department at [phone]. Kindest regards,        Farida Monge, RN, BSN  Lung Nurse [facility]  [phone] office  [phone] scheduling    CC:   Carol Rodas MD

## 2017-12-26 ENCOUNTER — TELEPHONE (OUTPATIENT)
Dept: FAMILY MEDICINE CLINIC | Age: 57
End: 2017-12-26

## 2017-12-26 DIAGNOSIS — G47.33 OSA (OBSTRUCTIVE SLEEP APNEA): Primary | ICD-10-CM

## 2017-12-26 NOTE — PROGRESS NOTES
Patient is referred to sleep specialist as she needs CPAP.      Signed  Jeffery Krishnamurthy MD   Family medicine resident - PGY 3

## 2018-01-04 ENCOUNTER — HOSPITAL ENCOUNTER (OUTPATIENT)
Age: 58
Setting detail: SPECIMEN
Discharge: HOME | End: 2018-01-04
Payer: COMMERCIAL

## 2018-01-04 ENCOUNTER — OFFICE VISIT (OUTPATIENT)
Dept: FAMILY MEDICINE CLINIC | Age: 58
End: 2018-01-04
Payer: COMMERCIAL

## 2018-01-04 VITALS
HEIGHT: 67 IN | TEMPERATURE: 97.2 F | DIASTOLIC BLOOD PRESSURE: 84 MMHG | BODY MASS INDEX: 45.74 KG/M2 | WEIGHT: 291.4 LBS | SYSTOLIC BLOOD PRESSURE: 142 MMHG | HEART RATE: 70 BPM

## 2018-01-04 DIAGNOSIS — I10 ESSENTIAL HYPERTENSION: ICD-10-CM

## 2018-01-04 DIAGNOSIS — G47.33 OSA (OBSTRUCTIVE SLEEP APNEA): ICD-10-CM

## 2018-01-04 DIAGNOSIS — I10 ESSENTIAL HYPERTENSION: Primary | ICD-10-CM

## 2018-01-04 LAB
ESTIMATED AVERAGE GLUCOSE: 111 MG/DL
HBA1C MFR BLD: 5.5 % (ref 4–6)

## 2018-01-04 PROCEDURE — 99213 OFFICE O/P EST LOW 20 MIN: CPT | Performed by: INTERNAL MEDICINE

## 2018-01-04 PROCEDURE — G8484 FLU IMMUNIZE NO ADMIN: HCPCS | Performed by: INTERNAL MEDICINE

## 2018-01-04 PROCEDURE — 3014F SCREEN MAMMO DOC REV: CPT | Performed by: INTERNAL MEDICINE

## 2018-01-04 PROCEDURE — G8417 CALC BMI ABV UP PARAM F/U: HCPCS | Performed by: INTERNAL MEDICINE

## 2018-01-04 PROCEDURE — G8427 DOCREV CUR MEDS BY ELIG CLIN: HCPCS | Performed by: INTERNAL MEDICINE

## 2018-01-04 PROCEDURE — 3017F COLORECTAL CA SCREEN DOC REV: CPT | Performed by: INTERNAL MEDICINE

## 2018-01-04 PROCEDURE — 4004F PT TOBACCO SCREEN RCVD TLK: CPT | Performed by: INTERNAL MEDICINE

## 2018-01-04 NOTE — PROGRESS NOTES
Subjective:    Lonnie Kelsey is a 62 y.o. female with  has a past medical history of Arthritis; Cancer ([address] Chronic back pain; Depression; GERD (gastroesophageal reflux disease); Hyperlipidemia; Hypertension; Insomnia; Obesity; Snores; Use of cane as ambulatory aid; and Wears glasses. Family History   Problem Relation Age of Onset    High Blood Pressure Mother     Cancer Mother      cervical and breast    Diabetes Father     Heart Attack Father     Heart Disease Maternal Aunt      times 2       Presented to the office today for:  Chief Complaint   Patient presents with    Results     sleep study results have been scanned into media        HPI  Pt is here for:    HTN and GREYSON:  She has HTN and is taking norvasc 10 and aldactone 25. Denies headache, SOB, chest pain, worsening numbness or weakness. She had sleep study done and was positive. She needs CPAP at night time. She also needs to follow with sleep specialist for that. She has morbid obesity. She is smoker and on nicotine patch 14. She needs to have CT lung screening. Review of Systems   Constitutional: Negative for chills and fever. Cardiovascular: Negative for chest pain. Neurological: Negative for weakness, numbness and headaches. Objective:    BP (!) 142/84 (Site: Left Arm, Position: Sitting, Cuff Size: Medium Adult) Comment: manual  Pulse 70   Temp 97.2 °F (36.2 °C) (Temporal)   Ht 5' 7.01\" (1.702 m)   Wt 291 lb 6.4 oz (132.2 kg)   BMI 45.63 kg/m²    BP Readings from Last 3 Encounters:   01/04/18 (!) 142/84   12/08/17 (!) 161/96   11/16/17 120/73       Physical Exam   Constitutional:   Morbidly obese   HENT:   Head: Normocephalic. Cardiovascular: Normal rate and regular rhythm. Pulmonary/Chest: Effort normal and breath sounds normal.   Nursing note and vitals reviewed.       Lab Results   Component Value Date    WBC 9.3 03/04/2015    HGB 12.5 03/04/2015    HCT 38.1 03/04/2015     03/04/2015    CHOL 164 07/26/2017    TRIG

## 2018-01-11 DIAGNOSIS — G47.33 OSA (OBSTRUCTIVE SLEEP APNEA): Primary | ICD-10-CM

## 2018-02-19 ENCOUNTER — HOSPITAL ENCOUNTER (OUTPATIENT)
Dept: SLEEP CENTER | Age: 58
Discharge: HOME | End: 2018-02-21
Payer: COMMERCIAL

## 2018-02-19 DIAGNOSIS — G47.33 OSA (OBSTRUCTIVE SLEEP APNEA): ICD-10-CM

## 2018-02-19 PROCEDURE — 95811 POLYSOM 6/>YRS CPAP 4/> PARM: CPT

## 2018-03-06 DIAGNOSIS — G89.29 CHRONIC LEFT-SIDED LOW BACK PAIN WITH LEFT-SIDED SCIATICA: ICD-10-CM

## 2018-03-06 DIAGNOSIS — I10 ESSENTIAL HYPERTENSION: ICD-10-CM

## 2018-03-06 DIAGNOSIS — Z76.0 MEDICATION REFILL: ICD-10-CM

## 2018-03-06 DIAGNOSIS — M54.42 CHRONIC LEFT-SIDED LOW BACK PAIN WITH LEFT-SIDED SCIATICA: ICD-10-CM

## 2018-03-09 DIAGNOSIS — I10 ESSENTIAL HYPERTENSION: ICD-10-CM

## 2018-03-09 RX ORDER — SPIRONOLACTONE 25 MG/1
25 TABLET ORAL DAILY
Qty: 30 TABLET | Refills: 3 | Status: SHIPPED | OUTPATIENT
Start: 2018-03-09 | End: 2018-06-29 | Stop reason: SDUPTHER

## 2018-03-09 RX ORDER — AMLODIPINE BESYLATE 10 MG/1
TABLET ORAL
Qty: 30 TABLET | Refills: 3 | Status: SHIPPED | OUTPATIENT
Start: 2018-03-09 | End: 2018-06-29 | Stop reason: SDUPTHER

## 2018-03-09 RX ORDER — NAPROXEN 250 MG/1
500 TABLET ORAL 2 TIMES DAILY WITH MEALS
Qty: 60 TABLET | Refills: 3 | Status: SHIPPED | OUTPATIENT
Start: 2018-03-09 | End: 2018-06-18

## 2018-03-09 RX ORDER — RANITIDINE 300 MG/1
300 TABLET ORAL NIGHTLY
Qty: 30 TABLET | Refills: 3 | Status: SHIPPED | OUTPATIENT
Start: 2018-03-09 | End: 2018-06-29 | Stop reason: SDUPTHER

## 2018-03-12 ENCOUNTER — TELEPHONE (OUTPATIENT)
Dept: FAMILY MEDICINE CLINIC | Age: 58
End: 2018-03-12

## 2018-03-12 RX ORDER — SPIRONOLACTONE 25 MG/1
25 TABLET ORAL DAILY
Qty: 30 TABLET | Refills: 3 | OUTPATIENT
Start: 2018-03-12

## 2018-04-26 LAB — STATUS: NORMAL

## 2018-05-18 ENCOUNTER — OFFICE VISIT (OUTPATIENT)
Dept: FAMILY MEDICINE CLINIC | Age: 58
End: 2018-05-18
Payer: COMMERCIAL

## 2018-05-18 VITALS
SYSTOLIC BLOOD PRESSURE: 151 MMHG | DIASTOLIC BLOOD PRESSURE: 88 MMHG | TEMPERATURE: 96.6 F | HEIGHT: 67 IN | WEIGHT: 293 LBS | BODY MASS INDEX: 45.99 KG/M2 | HEART RATE: 69 BPM

## 2018-05-18 DIAGNOSIS — M79.89 LEG SWELLING: ICD-10-CM

## 2018-05-18 DIAGNOSIS — L02.93 RECURRENT BOILS: ICD-10-CM

## 2018-05-18 DIAGNOSIS — I10 ESSENTIAL HYPERTENSION: Primary | ICD-10-CM

## 2018-05-18 PROCEDURE — G8417 CALC BMI ABV UP PARAM F/U: HCPCS | Performed by: INTERNAL MEDICINE

## 2018-05-18 PROCEDURE — 99213 OFFICE O/P EST LOW 20 MIN: CPT | Performed by: INTERNAL MEDICINE

## 2018-05-18 PROCEDURE — G8427 DOCREV CUR MEDS BY ELIG CLIN: HCPCS | Performed by: INTERNAL MEDICINE

## 2018-05-18 PROCEDURE — 4004F PT TOBACCO SCREEN RCVD TLK: CPT | Performed by: INTERNAL MEDICINE

## 2018-05-18 PROCEDURE — 3017F COLORECTAL CA SCREEN DOC REV: CPT | Performed by: INTERNAL MEDICINE

## 2018-06-18 ENCOUNTER — OFFICE VISIT (OUTPATIENT)
Dept: FAMILY MEDICINE CLINIC | Age: 58
End: 2018-06-18
Payer: COMMERCIAL

## 2018-06-18 VITALS
WEIGHT: 293 LBS | BODY MASS INDEX: 45.99 KG/M2 | SYSTOLIC BLOOD PRESSURE: 116 MMHG | HEIGHT: 67 IN | DIASTOLIC BLOOD PRESSURE: 73 MMHG | HEART RATE: 64 BPM | TEMPERATURE: 96.9 F

## 2018-06-18 DIAGNOSIS — G89.29 CHRONIC LEFT-SIDED LOW BACK PAIN WITH LEFT-SIDED SCIATICA: Primary | ICD-10-CM

## 2018-06-18 DIAGNOSIS — M54.42 CHRONIC LEFT-SIDED LOW BACK PAIN WITH LEFT-SIDED SCIATICA: Primary | ICD-10-CM

## 2018-06-18 PROCEDURE — 4004F PT TOBACCO SCREEN RCVD TLK: CPT | Performed by: INTERNAL MEDICINE

## 2018-06-18 PROCEDURE — G8417 CALC BMI ABV UP PARAM F/U: HCPCS | Performed by: INTERNAL MEDICINE

## 2018-06-18 PROCEDURE — 99213 OFFICE O/P EST LOW 20 MIN: CPT | Performed by: INTERNAL MEDICINE

## 2018-06-18 PROCEDURE — 3017F COLORECTAL CA SCREEN DOC REV: CPT | Performed by: INTERNAL MEDICINE

## 2018-06-18 PROCEDURE — G8427 DOCREV CUR MEDS BY ELIG CLIN: HCPCS | Performed by: INTERNAL MEDICINE

## 2018-06-18 ASSESSMENT — ENCOUNTER SYMPTOMS
BACK PAIN: 1
COUGH: 0
SHORTNESS OF BREATH: 0

## 2018-06-19 ENCOUNTER — TELEPHONE (OUTPATIENT)
Dept: FAMILY MEDICINE CLINIC | Age: 58
End: 2018-06-19

## 2018-06-29 DIAGNOSIS — I10 ESSENTIAL HYPERTENSION: ICD-10-CM

## 2018-06-29 DIAGNOSIS — Z76.0 MEDICATION REFILL: ICD-10-CM

## 2018-06-29 RX ORDER — SPIRONOLACTONE 25 MG/1
25 TABLET ORAL DAILY
Qty: 30 TABLET | Refills: 3 | Status: SHIPPED | OUTPATIENT
Start: 2018-06-29 | End: 2018-08-22 | Stop reason: SDUPTHER

## 2018-06-29 RX ORDER — RANITIDINE 300 MG/1
300 TABLET ORAL NIGHTLY
Qty: 30 TABLET | Refills: 3 | Status: SHIPPED | OUTPATIENT
Start: 2018-06-29 | End: 2019-01-11 | Stop reason: SDUPTHER

## 2018-06-29 RX ORDER — AMLODIPINE BESYLATE 10 MG/1
TABLET ORAL
Qty: 30 TABLET | Refills: 3 | Status: SHIPPED | OUTPATIENT
Start: 2018-06-29 | End: 2018-08-22 | Stop reason: SDUPTHER

## 2018-08-22 ENCOUNTER — OFFICE VISIT (OUTPATIENT)
Dept: FAMILY MEDICINE CLINIC | Age: 58
End: 2018-08-22
Payer: COMMERCIAL

## 2018-08-22 VITALS
SYSTOLIC BLOOD PRESSURE: 154 MMHG | WEIGHT: 284 LBS | BODY MASS INDEX: 44.47 KG/M2 | DIASTOLIC BLOOD PRESSURE: 104 MMHG | HEART RATE: 80 BPM | TEMPERATURE: 97.2 F

## 2018-08-22 DIAGNOSIS — Z91.030 ALLERGY TO HONEY BEE VENOM: ICD-10-CM

## 2018-08-22 DIAGNOSIS — Z00.00 HEALTHCARE MAINTENANCE: ICD-10-CM

## 2018-08-22 DIAGNOSIS — E66.01 MORBID OBESITY WITH BMI OF 40.0-44.9, ADULT (HCC): ICD-10-CM

## 2018-08-22 DIAGNOSIS — G89.29 CHRONIC LEFT-SIDED LOW BACK PAIN WITH LEFT-SIDED SCIATICA: Primary | ICD-10-CM

## 2018-08-22 DIAGNOSIS — I10 ESSENTIAL HYPERTENSION: ICD-10-CM

## 2018-08-22 DIAGNOSIS — Z76.0 MEDICATION REFILL: ICD-10-CM

## 2018-08-22 DIAGNOSIS — M54.42 CHRONIC LEFT-SIDED LOW BACK PAIN WITH LEFT-SIDED SCIATICA: Primary | ICD-10-CM

## 2018-08-22 PROCEDURE — G8417 CALC BMI ABV UP PARAM F/U: HCPCS | Performed by: STUDENT IN AN ORGANIZED HEALTH CARE EDUCATION/TRAINING PROGRAM

## 2018-08-22 PROCEDURE — 4004F PT TOBACCO SCREEN RCVD TLK: CPT | Performed by: STUDENT IN AN ORGANIZED HEALTH CARE EDUCATION/TRAINING PROGRAM

## 2018-08-22 PROCEDURE — G8427 DOCREV CUR MEDS BY ELIG CLIN: HCPCS | Performed by: STUDENT IN AN ORGANIZED HEALTH CARE EDUCATION/TRAINING PROGRAM

## 2018-08-22 PROCEDURE — 3017F COLORECTAL CA SCREEN DOC REV: CPT | Performed by: STUDENT IN AN ORGANIZED HEALTH CARE EDUCATION/TRAINING PROGRAM

## 2018-08-22 PROCEDURE — 99213 OFFICE O/P EST LOW 20 MIN: CPT | Performed by: STUDENT IN AN ORGANIZED HEALTH CARE EDUCATION/TRAINING PROGRAM

## 2018-08-22 RX ORDER — SPIRONOLACTONE 25 MG/1
25 TABLET ORAL DAILY
Qty: 30 TABLET | Refills: 3 | Status: SHIPPED | OUTPATIENT
Start: 2018-08-22 | End: 2019-02-27 | Stop reason: SDUPTHER

## 2018-08-22 RX ORDER — EPINEPHRINE 0.3 MG/.3ML
INJECTION SUBCUTANEOUS
Qty: 1 EACH | Refills: 1 | Status: SHIPPED | OUTPATIENT
Start: 2018-08-22 | End: 2020-03-20

## 2018-08-22 RX ORDER — AMLODIPINE BESYLATE 10 MG/1
TABLET ORAL
Qty: 30 TABLET | Refills: 5 | Status: SHIPPED | OUTPATIENT
Start: 2018-08-22 | End: 2019-02-27 | Stop reason: SDUPTHER

## 2018-08-22 RX ORDER — NAPROXEN 500 MG/1
500 TABLET ORAL 2 TIMES DAILY WITH MEALS
Qty: 60 TABLET | Refills: 3 | Status: SHIPPED | OUTPATIENT
Start: 2018-08-22 | End: 2019-01-11 | Stop reason: SDUPTHER

## 2018-08-22 ASSESSMENT — ENCOUNTER SYMPTOMS
NAUSEA: 0
EYES NEGATIVE: 1
DIARRHEA: 0
SHORTNESS OF BREATH: 0
COUGH: 0
BACK PAIN: 1
GASTROINTESTINAL NEGATIVE: 1
CONSTIPATION: 0
WHEEZING: 0
VOMITING: 0

## 2018-08-22 NOTE — PROGRESS NOTES
Subjective:    Roderick Swann is a 62 y.o. female with  has a past medical history of Arthritis; Cancer ([address] Chronic back pain; Depression; GERD (gastroesophageal reflux disease); Hyperlipidemia; Hypertension; Insomnia; Obesity; Snores; Use of cane as ambulatory aid; and Wears glasses. Family History   Problem Relation Age of Onset    High Blood Pressure Mother     Cancer Mother         cervical and breast    Diabetes Father     Heart Attack Father     Heart Disease Maternal Aunt         times 2       Presented to the office today for:  Chief Complaint   Patient presents with    Back Pain     patient states she is doing better     HPI   Patient is here for her medication refills. Patient requires her refill for HTN medications - Spironolactone and Amlodipine. She no longer wants to take her Ranitidine (Zantac). Patient has had chronic back pain for 30+ years. Patient had back surgery at [facility] - July 17, 2018 . Infusion of L4/L5. No complications  Indicated she would follow-up with pain management. Patient is still taking some Oxycodone. Taking Naproxen with good relief/stiffness, improved mobility. Took a tube out/drain out the day she left. Dressing changed every other day, 2nd visit, the site started to 'ooze' more. No smell, became indurated. No fever, chills. Went to doctor on Monday for wound check, Ordered a wound vac August 1, 2018, and an RN comes in every other day to change/take care of the wound vac. Chronic Back Pain: Pain is unchanged. On average, pain is perceived as moderate (4-6 pain scale) sometimes severe, well controlled with medications. Change in quality of symptoms: no.  Associated symptoms: none. She denies any other symptoms. Nauseated at times when she is in pain. Current treatment: Patient finished her OT/PT goals. Medication side effects: none. Patient is now able to ambulate better since the surgery.   Walking straighter, sitting straighter, limp tablet; Refill: 3  -ECHO order reprinted, instructed patient to complete this test    3. Medication refill  - amLODIPine (NORVASC) 10 MG tablet; TAKE 1 TABLET EVERY DAY  Dispense: 30 tablet; Refill: 5    4. Allergy to honey bee venom  - EPINEPHrine (EPIPEN) 0.3 MG/0.3ML SOAJ injection; Use as directed for allergic reaction  Dispense: 1 each; Refill: 1    5. Morbid obesity with BMI of 40.0-44.9, adult ([facility]  -continue with current management, encourage diet/exercise. 6. Healthcare Maintenance  -will discuss during the next appointment  -low dose CT scan to be completed.   -flu shot to be scheduled      Bronwyn Berke received counseling on the following healthy behaviors: nutrition, exercise and medication adherence    Patient given educational materials : see patient instruction   Bronwyn Riley received counseling on the following healthy behaviors: nutrition, exercise, medication adherence and tobacco cessation  Reviewed prior labs and health maintenance  Continue current medications, diet and exercise. Discussed use, benefit, and side effects of prescribed medications. Barriers to medication compliance addressed. Patient given educational materials - see patient instructions  Was a self-tracking handout given in paper form or via [employer]? No    Requested Prescriptions     Pending Prescriptions Disp Refills    amLODIPine (NORVASC) 10 MG tablet 30 tablet 5     Sig: TAKE 1 TABLET EVERY DAY    spironolactone (ALDACTONE) 25 MG tablet 30 tablet 3     Sig: Take 1 tablet by mouth daily    EPINEPHrine (EPIPEN) 0.3 MG/0.3ML SOAJ injection 1 each 1     Sig: Use as directed for allergic reaction    naproxen (NAPROSYN) 500 MG tablet 60 tablet 3     Sig: Take 1 tablet by mouth 2 times daily (with meals)       All patient questions answered. Patient voiced understanding. Quality Measures    Body mass index is 44.47 kg/m². Elevated. Weight control planned discussed Healthy diet and regular exercise.     BP: (!) 139/101 Blood pressure is high. Treatment plan consists of No treatment change needed. Patient should be adherent to all of her BP medications. Lab Results   Component Value Date    LDLCHOLESTEROL 96 07/26/2017    (goal LDL reduction with dx if diabetes is 50% LDL reduction)      PHQ Scores 9/22/2017   PHQ2 Score 2   PHQ9 Score 2     Interpretation of Total Score Depression Severity: 1-4 = Minimal depression, 5-9 = Mild depression, 10-14 = Moderate depression, 15-19 = Moderately severe depression, 20-27 = Severe depression  Discussed use, benefit, and side effects of prescribed medications. Barriers to medication compliance addressed. All patient questions answered. Pt voiced understanding. There are no discontinued medications. Return in about 2 months (around 10/22/2018) for f/u flu shot.

## 2018-09-26 PROBLEM — Z12.11 COLON CANCER SCREENING: Status: RESOLVED | Noted: 2017-08-14 | Resolved: 2018-09-26

## 2019-01-04 ENCOUNTER — HOSPITAL ENCOUNTER (OUTPATIENT)
Dept: MAMMOGRAPHY | Age: 59
Discharge: HOME | End: 2019-01-06
Payer: COMMERCIAL

## 2019-01-04 DIAGNOSIS — Z12.31 ENCOUNTER FOR SCREENING MAMMOGRAM FOR BREAST CANCER: ICD-10-CM

## 2019-01-04 PROCEDURE — 77067 SCR MAMMO BI INCL CAD: CPT

## 2019-01-11 ENCOUNTER — TELEPHONE (OUTPATIENT)
Dept: FAMILY MEDICINE CLINIC | Age: 59
End: 2019-01-11

## 2019-01-11 DIAGNOSIS — Z76.0 MEDICATION REFILL: ICD-10-CM

## 2019-01-14 RX ORDER — RANITIDINE 300 MG/1
300 TABLET ORAL NIGHTLY
Qty: 30 TABLET | Refills: 3 | Status: SHIPPED | OUTPATIENT
Start: 2019-01-14 | End: 2019-02-04

## 2019-01-16 ENCOUNTER — HOSPITAL ENCOUNTER (OUTPATIENT)
Dept: ULTRASOUND IMAGING | Age: 59
Discharge: HOME | End: 2019-01-18
Payer: COMMERCIAL

## 2019-01-16 ENCOUNTER — HOSPITAL ENCOUNTER (OUTPATIENT)
Dept: MAMMOGRAPHY | Age: 59
End: 2019-01-16
Payer: COMMERCIAL

## 2019-01-16 ENCOUNTER — TELEPHONE (OUTPATIENT)
Dept: FAMILY MEDICINE CLINIC | Age: 59
End: 2019-01-16

## 2019-01-16 DIAGNOSIS — R92.8 ABNORMAL FINDING ON MAMMOGRAPHY: ICD-10-CM

## 2019-01-16 DIAGNOSIS — N63.20 MASS OF LEFT BREAST: Primary | ICD-10-CM

## 2019-01-16 PROCEDURE — 76642 ULTRASOUND BREAST LIMITED: CPT

## 2019-01-23 ENCOUNTER — HOSPITAL ENCOUNTER (OUTPATIENT)
Dept: ULTRASOUND IMAGING | Age: 59
Discharge: HOME | End: 2019-01-25
Payer: COMMERCIAL

## 2019-01-23 ENCOUNTER — HOSPITAL ENCOUNTER (OUTPATIENT)
Dept: MAMMOGRAPHY | Age: 59
Discharge: HOME | End: 2019-01-25
Payer: COMMERCIAL

## 2019-01-23 ENCOUNTER — TELEPHONE (OUTPATIENT)
Dept: FAMILY MEDICINE CLINIC | Age: 59
End: 2019-01-23

## 2019-01-23 VITALS — DIASTOLIC BLOOD PRESSURE: 82 MMHG | HEART RATE: 72 BPM | SYSTOLIC BLOOD PRESSURE: 136 MMHG

## 2019-01-23 DIAGNOSIS — N63.0 BREAST MASS: ICD-10-CM

## 2019-01-23 DIAGNOSIS — Z98.890 STATUS POST BREAST BIOPSY: ICD-10-CM

## 2019-01-23 PROCEDURE — 77065 DX MAMMO INCL CAD UNI: CPT

## 2019-01-23 PROCEDURE — 19083 BX BREAST 1ST LESION US IMAG: CPT

## 2019-01-23 PROCEDURE — 88377 M/PHMTRC ALYS ISHQUANT/SEMIQ: CPT

## 2019-01-23 PROCEDURE — 88360 TUMOR IMMUNOHISTOCHEM/MANUAL: CPT

## 2019-01-23 PROCEDURE — 88305 TISSUE EXAM BY PATHOLOGIST: CPT

## 2019-01-23 PROCEDURE — 2500000003 HC RX 250 WO HCPCS

## 2019-01-23 PROCEDURE — 88342 IMHCHEM/IMCYTCHM 1ST ANTB: CPT

## 2019-01-29 ENCOUNTER — TELEPHONE (OUTPATIENT)
Dept: FAMILY MEDICINE CLINIC | Age: 59
End: 2019-01-29

## 2019-01-29 DIAGNOSIS — C80.1 DUCTAL CARCINOMA (HCC): Primary | ICD-10-CM

## 2019-01-30 ENCOUNTER — TELEPHONE (OUTPATIENT)
Dept: SURGERY | Age: 59
End: 2019-01-30

## 2019-01-30 ENCOUNTER — HOSPITAL ENCOUNTER (OUTPATIENT)
Facility: MEDICAL CENTER | Age: 59
End: 2019-01-30
Payer: COMMERCIAL

## 2019-01-31 ENCOUNTER — TELEPHONE (OUTPATIENT)
Dept: ONCOLOGY | Age: 59
End: 2019-01-31

## 2019-02-01 ENCOUNTER — TELEPHONE (OUTPATIENT)
Dept: ONCOLOGY | Age: 59
End: 2019-02-01

## 2019-02-04 ENCOUNTER — TELEPHONE (OUTPATIENT)
Dept: ONCOLOGY | Age: 59
End: 2019-02-04

## 2019-02-04 ENCOUNTER — INITIAL CONSULT (OUTPATIENT)
Dept: ONCOLOGY | Age: 59
End: 2019-02-04
Payer: COMMERCIAL

## 2019-02-04 VITALS
DIASTOLIC BLOOD PRESSURE: 49 MMHG | RESPIRATION RATE: 20 BRPM | HEIGHT: 65 IN | WEIGHT: 287.9 LBS | TEMPERATURE: 98.3 F | BODY MASS INDEX: 47.97 KG/M2 | HEART RATE: 69 BPM | SYSTOLIC BLOOD PRESSURE: 94 MMHG

## 2019-02-04 DIAGNOSIS — F17.200 SMOKING: ICD-10-CM

## 2019-02-04 DIAGNOSIS — Z17.1 MALIGNANT NEOPLASM OF UPPER-OUTER QUADRANT OF LEFT BREAST IN FEMALE, ESTROGEN RECEPTOR NEGATIVE (HCC): Primary | ICD-10-CM

## 2019-02-04 DIAGNOSIS — C50.412 MALIGNANT NEOPLASM OF UPPER-OUTER QUADRANT OF LEFT BREAST IN FEMALE, ESTROGEN RECEPTOR NEGATIVE (HCC): Primary | ICD-10-CM

## 2019-02-04 DIAGNOSIS — E66.01 MORBID OBESITY DUE TO EXCESS CALORIES (HCC): ICD-10-CM

## 2019-02-04 PROCEDURE — 99201 HC NEW PT, E/M LEVEL 1: CPT

## 2019-02-04 PROCEDURE — 3017F COLORECTAL CA SCREEN DOC REV: CPT | Performed by: INTERNAL MEDICINE

## 2019-02-04 PROCEDURE — 99245 OFF/OP CONSLTJ NEW/EST HI 55: CPT | Performed by: INTERNAL MEDICINE

## 2019-02-04 PROCEDURE — G8417 CALC BMI ABV UP PARAM F/U: HCPCS | Performed by: INTERNAL MEDICINE

## 2019-02-04 PROCEDURE — G8427 DOCREV CUR MEDS BY ELIG CLIN: HCPCS | Performed by: INTERNAL MEDICINE

## 2019-02-04 PROCEDURE — G8484 FLU IMMUNIZE NO ADMIN: HCPCS | Performed by: INTERNAL MEDICINE

## 2019-02-05 LAB — SURGICAL PATHOLOGY REPORT: NORMAL

## 2019-02-06 ENCOUNTER — OFFICE VISIT (OUTPATIENT)
Dept: SURGERY | Age: 59
End: 2019-02-06
Payer: COMMERCIAL

## 2019-02-06 ENCOUNTER — HOSPITAL ENCOUNTER (OUTPATIENT)
Dept: RADIATION ONCOLOGY | Facility: MEDICAL CENTER | Age: 59
Discharge: HOME | End: 2019-02-06
Payer: COMMERCIAL

## 2019-02-06 VITALS
WEIGHT: 289.13 LBS | OXYGEN SATURATION: 97 % | RESPIRATION RATE: 12 BRPM | HEART RATE: 74 BPM | SYSTOLIC BLOOD PRESSURE: 136 MMHG | DIASTOLIC BLOOD PRESSURE: 87 MMHG | HEIGHT: 67 IN | TEMPERATURE: 98.4 F | BODY MASS INDEX: 45.38 KG/M2

## 2019-02-06 VITALS
DIASTOLIC BLOOD PRESSURE: 86 MMHG | BODY MASS INDEX: 45.36 KG/M2 | HEART RATE: 73 BPM | SYSTOLIC BLOOD PRESSURE: 130 MMHG | TEMPERATURE: 97 F | WEIGHT: 289 LBS | HEIGHT: 67 IN

## 2019-02-06 DIAGNOSIS — Z17.1 CARCINOMA OF UPPER-OUTER QUADRANT OF LEFT BREAST IN FEMALE, ESTROGEN RECEPTOR NEGATIVE (HCC): ICD-10-CM

## 2019-02-06 DIAGNOSIS — C50.912 INVASIVE DUCTAL CARCINOMA OF BREAST, FEMALE, LEFT (HCC): Primary | ICD-10-CM

## 2019-02-06 DIAGNOSIS — C50.412 CARCINOMA OF UPPER-OUTER QUADRANT OF LEFT BREAST IN FEMALE, ESTROGEN RECEPTOR NEGATIVE (HCC): ICD-10-CM

## 2019-02-06 PROCEDURE — 3017F COLORECTAL CA SCREEN DOC REV: CPT | Performed by: STUDENT IN AN ORGANIZED HEALTH CARE EDUCATION/TRAINING PROGRAM

## 2019-02-06 PROCEDURE — G8417 CALC BMI ABV UP PARAM F/U: HCPCS | Performed by: STUDENT IN AN ORGANIZED HEALTH CARE EDUCATION/TRAINING PROGRAM

## 2019-02-06 PROCEDURE — 99203 OFFICE O/P NEW LOW 30 MIN: CPT | Performed by: STUDENT IN AN ORGANIZED HEALTH CARE EDUCATION/TRAINING PROGRAM

## 2019-02-06 PROCEDURE — 99213 OFFICE O/P EST LOW 20 MIN: CPT | Performed by: RADIOLOGY

## 2019-02-06 PROCEDURE — 4004F PT TOBACCO SCREEN RCVD TLK: CPT | Performed by: STUDENT IN AN ORGANIZED HEALTH CARE EDUCATION/TRAINING PROGRAM

## 2019-02-06 PROCEDURE — G8484 FLU IMMUNIZE NO ADMIN: HCPCS | Performed by: STUDENT IN AN ORGANIZED HEALTH CARE EDUCATION/TRAINING PROGRAM

## 2019-02-06 PROCEDURE — G8427 DOCREV CUR MEDS BY ELIG CLIN: HCPCS | Performed by: STUDENT IN AN ORGANIZED HEALTH CARE EDUCATION/TRAINING PROGRAM

## 2019-02-06 ASSESSMENT — ENCOUNTER SYMPTOMS
GASTROINTESTINAL NEGATIVE: 1
RESPIRATORY NEGATIVE: 1
BACK PAIN: 1
EYES NEGATIVE: 1
ALLERGIC/IMMUNOLOGIC NEGATIVE: 1

## 2019-02-11 ENCOUNTER — ANESTHESIA EVENT (OUTPATIENT)
Dept: OPERATING ROOM | Age: 59
DRG: 362 | End: 2019-02-11
Payer: COMMERCIAL

## 2019-02-12 ENCOUNTER — HOSPITAL ENCOUNTER (INPATIENT)
Age: 59
LOS: 2 days | Discharge: HOME HEALTH | DRG: 362 | End: 2019-02-14
Attending: SURGERY | Admitting: SURGERY
Payer: COMMERCIAL

## 2019-02-12 ENCOUNTER — ANESTHESIA (OUTPATIENT)
Dept: OPERATING ROOM | Age: 59
DRG: 362 | End: 2019-02-12
Payer: COMMERCIAL

## 2019-02-12 ENCOUNTER — HOSPITAL ENCOUNTER (OUTPATIENT)
Dept: NUCLEAR MEDICINE | Age: 59
Discharge: HOME | DRG: 362 | End: 2019-02-14
Attending: SURGERY
Payer: COMMERCIAL

## 2019-02-12 VITALS — SYSTOLIC BLOOD PRESSURE: 135 MMHG | DIASTOLIC BLOOD PRESSURE: 96 MMHG | OXYGEN SATURATION: 88 % | TEMPERATURE: 97.1 F

## 2019-02-12 DIAGNOSIS — G89.18 POSTOPERATIVE PAIN: Primary | ICD-10-CM

## 2019-02-12 PROBLEM — Z90.13 STATUS POST MASTECTOMY, BILATERAL: Status: ACTIVE | Noted: 2019-02-12

## 2019-02-12 LAB
COMMENT: NORMAL
GFR NON-AFRICAN AMERICAN: >60 ML/MIN
GFR SERPL CREATININE-BSD FRML MDRD: >60 ML/MIN
GFR SERPL CREATININE-BSD FRML MDRD: NORMAL ML/MIN/{1.73_M2}
GLUCOSE BLD-MCNC: 87 MG/DL (ref 74–100)
POC CHLORIDE: 108 MMOL/L (ref 98–107)
POC CREATININE: 0.9 MG/DL (ref 0.51–1.19)
POC HEMATOCRIT: 43 % (ref 36–46)
POC HEMOGLOBIN: 14.8 G/DL (ref 12–16)
POC IONIZED CALCIUM: 1.29 MMOL/L (ref 1.15–1.33)
POC LACTIC ACID: 0.59 MMOL/L (ref 0.56–1.39)
POC POTASSIUM: 4.3 MMOL/L (ref 3.5–4.5)
POC SODIUM: 143 MMOL/L (ref 138–146)

## 2019-02-12 PROCEDURE — 2580000003 HC RX 258: Performed by: SURGERY

## 2019-02-12 PROCEDURE — 88360 TUMOR IMMUNOHISTOCHEM/MANUAL: CPT

## 2019-02-12 PROCEDURE — 6360000002 HC RX W HCPCS: Performed by: ANESTHESIOLOGY

## 2019-02-12 PROCEDURE — 88331 PATH CONSLTJ SURG 1 BLK 1SPC: CPT

## 2019-02-12 PROCEDURE — 85014 HEMATOCRIT: CPT

## 2019-02-12 PROCEDURE — 6370000000 HC RX 637 (ALT 250 FOR IP): Performed by: STUDENT IN AN ORGANIZED HEALTH CARE EDUCATION/TRAINING PROGRAM

## 2019-02-12 PROCEDURE — 3600000014 HC SURGERY LEVEL 4 ADDTL 15MIN: Performed by: SURGERY

## 2019-02-12 PROCEDURE — 87070 CULTURE OTHR SPECIMN AEROBIC: CPT

## 2019-02-12 PROCEDURE — A9520 TC99 TILMANOCEPT DIAG 0.5MCI: HCPCS | Performed by: SURGERY

## 2019-02-12 PROCEDURE — 7100000000 HC PACU RECOVERY - FIRST 15 MIN: Performed by: SURGERY

## 2019-02-12 PROCEDURE — 88377 M/PHMTRC ALYS ISHQUANT/SEMIQ: CPT

## 2019-02-12 PROCEDURE — 78195 LYMPH SYSTEM IMAGING: CPT

## 2019-02-12 PROCEDURE — 83605 ASSAY OF LACTIC ACID: CPT

## 2019-02-12 PROCEDURE — 87205 SMEAR GRAM STAIN: CPT

## 2019-02-12 PROCEDURE — 2709999900 HC NON-CHARGEABLE SUPPLY: Performed by: SURGERY

## 2019-02-12 PROCEDURE — 7100000001 HC PACU RECOVERY - ADDTL 15 MIN: Performed by: SURGERY

## 2019-02-12 PROCEDURE — 82435 ASSAY OF BLOOD CHLORIDE: CPT

## 2019-02-12 PROCEDURE — 87076 CULTURE ANAEROBE IDENT EACH: CPT

## 2019-02-12 PROCEDURE — 2500000003 HC RX 250 WO HCPCS: Performed by: SURGERY

## 2019-02-12 PROCEDURE — 82947 ASSAY GLUCOSE BLOOD QUANT: CPT

## 2019-02-12 PROCEDURE — 88332 PATH CONSLTJ SURG EA ADD BLK: CPT

## 2019-02-12 PROCEDURE — 6360000002 HC RX W HCPCS: Performed by: SURGERY

## 2019-02-12 PROCEDURE — 2580000003 HC RX 258: Performed by: ANESTHESIOLOGY

## 2019-02-12 PROCEDURE — 88307 TISSUE EXAM BY PATHOLOGIST: CPT

## 2019-02-12 PROCEDURE — 6370000000 HC RX 637 (ALT 250 FOR IP): Performed by: SURGERY

## 2019-02-12 PROCEDURE — 84132 ASSAY OF SERUM POTASSIUM: CPT

## 2019-02-12 PROCEDURE — 07B60ZX EXCISION OF LEFT AXILLARY LYMPHATIC, OPEN APPROACH, DIAGNOSTIC: ICD-10-PCS | Performed by: SURGERY

## 2019-02-12 PROCEDURE — 6360000002 HC RX W HCPCS: Performed by: NURSE ANESTHETIST, CERTIFIED REGISTERED

## 2019-02-12 PROCEDURE — 1200000000 HC SEMI PRIVATE

## 2019-02-12 PROCEDURE — 88342 IMHCHEM/IMCYTCHM 1ST ANTB: CPT

## 2019-02-12 PROCEDURE — 3430000000 HC RX DIAGNOSTIC RADIOPHARMACEUTICAL: Performed by: SURGERY

## 2019-02-12 PROCEDURE — 88309 TISSUE EXAM BY PATHOLOGIST: CPT

## 2019-02-12 PROCEDURE — 87075 CULTR BACTERIA EXCEPT BLOOD: CPT

## 2019-02-12 PROCEDURE — 3600000004 HC SURGERY LEVEL 4 BASE: Performed by: SURGERY

## 2019-02-12 PROCEDURE — 82565 ASSAY OF CREATININE: CPT

## 2019-02-12 PROCEDURE — 3700000000 HC ANESTHESIA ATTENDED CARE: Performed by: SURGERY

## 2019-02-12 PROCEDURE — 84295 ASSAY OF SERUM SODIUM: CPT

## 2019-02-12 PROCEDURE — 2500000003 HC RX 250 WO HCPCS: Performed by: NURSE ANESTHETIST, CERTIFIED REGISTERED

## 2019-02-12 PROCEDURE — 3700000001 HC ADD 15 MINUTES (ANESTHESIA): Performed by: SURGERY

## 2019-02-12 PROCEDURE — 86403 PARTICLE AGGLUT ANTBDY SCRN: CPT

## 2019-02-12 PROCEDURE — 0HTV0ZZ RESECTION OF BILATERAL BREAST, OPEN APPROACH: ICD-10-PCS | Performed by: SURGERY

## 2019-02-12 PROCEDURE — 82330 ASSAY OF CALCIUM: CPT

## 2019-02-12 RX ORDER — FENTANYL CITRATE 50 UG/ML
50 INJECTION, SOLUTION INTRAMUSCULAR; INTRAVENOUS EVERY 5 MIN PRN
Status: COMPLETED | OUTPATIENT
Start: 2019-02-12 | End: 2019-02-12

## 2019-02-12 RX ORDER — FENTANYL CITRATE 50 UG/ML
25 INJECTION, SOLUTION INTRAMUSCULAR; INTRAVENOUS EVERY 5 MIN PRN
Status: DISCONTINUED | OUTPATIENT
Start: 2019-02-12 | End: 2019-02-12 | Stop reason: HOSPADM

## 2019-02-12 RX ORDER — ROCURONIUM BROMIDE 10 MG/ML
INJECTION, SOLUTION INTRAVENOUS PRN
Status: DISCONTINUED | OUTPATIENT
Start: 2019-02-12 | End: 2019-02-12 | Stop reason: SDUPTHER

## 2019-02-12 RX ORDER — CYCLOBENZAPRINE HCL 10 MG
10 TABLET ORAL 3 TIMES DAILY
Status: DISCONTINUED | OUTPATIENT
Start: 2019-02-12 | End: 2019-02-14 | Stop reason: HOSPADM

## 2019-02-12 RX ORDER — SODIUM CHLORIDE 9 MG/ML
INJECTION, SOLUTION INTRAVENOUS CONTINUOUS
Status: DISCONTINUED | OUTPATIENT
Start: 2019-02-12 | End: 2019-02-14 | Stop reason: HOSPADM

## 2019-02-12 RX ORDER — AMLODIPINE BESYLATE 10 MG/1
10 TABLET ORAL DAILY
Status: DISCONTINUED | OUTPATIENT
Start: 2019-02-12 | End: 2019-02-14 | Stop reason: HOSPADM

## 2019-02-12 RX ORDER — ONDANSETRON 2 MG/ML
INJECTION INTRAMUSCULAR; INTRAVENOUS PRN
Status: DISCONTINUED | OUTPATIENT
Start: 2019-02-12 | End: 2019-02-12 | Stop reason: SDUPTHER

## 2019-02-12 RX ORDER — CEFAZOLIN SODIUM 1 G/3ML
INJECTION, POWDER, FOR SOLUTION INTRAMUSCULAR; INTRAVENOUS PRN
Status: DISCONTINUED | OUTPATIENT
Start: 2019-02-12 | End: 2019-02-12 | Stop reason: SDUPTHER

## 2019-02-12 RX ORDER — OXYCODONE HYDROCHLORIDE AND ACETAMINOPHEN 5; 325 MG/1; MG/1
1 TABLET ORAL EVERY 4 HOURS PRN
Status: DISCONTINUED | OUTPATIENT
Start: 2019-02-12 | End: 2019-02-14 | Stop reason: HOSPADM

## 2019-02-12 RX ORDER — MORPHINE SULFATE 4 MG/ML
4 INJECTION, SOLUTION INTRAMUSCULAR; INTRAVENOUS
Status: DISCONTINUED | OUTPATIENT
Start: 2019-02-12 | End: 2019-02-14 | Stop reason: HOSPADM

## 2019-02-12 RX ORDER — DEXAMETHASONE SODIUM PHOSPHATE 10 MG/ML
INJECTION INTRAMUSCULAR; INTRAVENOUS PRN
Status: DISCONTINUED | OUTPATIENT
Start: 2019-02-12 | End: 2019-02-12 | Stop reason: SDUPTHER

## 2019-02-12 RX ORDER — SODIUM CHLORIDE 0.9 % (FLUSH) 0.9 %
10 SYRINGE (ML) INJECTION PRN
Status: DISCONTINUED | OUTPATIENT
Start: 2019-02-12 | End: 2019-02-14 | Stop reason: HOSPADM

## 2019-02-12 RX ORDER — POTASSIUM CHLORIDE 20MEQ/15ML
40 LIQUID (ML) ORAL PRN
Status: DISCONTINUED | OUTPATIENT
Start: 2019-02-12 | End: 2019-02-14 | Stop reason: HOSPADM

## 2019-02-12 RX ORDER — LABETALOL HYDROCHLORIDE 5 MG/ML
INJECTION, SOLUTION INTRAVENOUS PRN
Status: DISCONTINUED | OUTPATIENT
Start: 2019-02-12 | End: 2019-02-12 | Stop reason: SDUPTHER

## 2019-02-12 RX ORDER — LIDOCAINE HYDROCHLORIDE 10 MG/ML
INJECTION, SOLUTION EPIDURAL; INFILTRATION; INTRACAUDAL; PERINEURAL PRN
Status: DISCONTINUED | OUTPATIENT
Start: 2019-02-12 | End: 2019-02-12 | Stop reason: SDUPTHER

## 2019-02-12 RX ORDER — MORPHINE SULFATE 2 MG/ML
2 INJECTION, SOLUTION INTRAMUSCULAR; INTRAVENOUS
Status: DISCONTINUED | OUTPATIENT
Start: 2019-02-12 | End: 2019-02-14 | Stop reason: HOSPADM

## 2019-02-12 RX ORDER — ONDANSETRON 2 MG/ML
4 INJECTION INTRAMUSCULAR; INTRAVENOUS EVERY 6 HOURS PRN
Status: DISCONTINUED | OUTPATIENT
Start: 2019-02-12 | End: 2019-02-14 | Stop reason: HOSPADM

## 2019-02-12 RX ORDER — BUPIVACAINE HYDROCHLORIDE 2.5 MG/ML
INJECTION, SOLUTION EPIDURAL; INFILTRATION; INTRACAUDAL PRN
Status: DISCONTINUED | OUTPATIENT
Start: 2019-02-12 | End: 2019-02-12 | Stop reason: ALTCHOICE

## 2019-02-12 RX ORDER — SODIUM CHLORIDE, SODIUM LACTATE, POTASSIUM CHLORIDE, CALCIUM CHLORIDE 600; 310; 30; 20 MG/100ML; MG/100ML; MG/100ML; MG/100ML
INJECTION, SOLUTION INTRAVENOUS CONTINUOUS
Status: DISCONTINUED | OUTPATIENT
Start: 2019-02-12 | End: 2019-02-12

## 2019-02-12 RX ORDER — KETOROLAC TROMETHAMINE 15 MG/ML
15 INJECTION, SOLUTION INTRAMUSCULAR; INTRAVENOUS EVERY 6 HOURS
Status: DISCONTINUED | OUTPATIENT
Start: 2019-02-12 | End: 2019-02-12

## 2019-02-12 RX ORDER — OXYCODONE HYDROCHLORIDE AND ACETAMINOPHEN 5; 325 MG/1; MG/1
2 TABLET ORAL EVERY 4 HOURS PRN
Status: DISCONTINUED | OUTPATIENT
Start: 2019-02-12 | End: 2019-02-14 | Stop reason: HOSPADM

## 2019-02-12 RX ORDER — MAGNESIUM HYDROXIDE 1200 MG/15ML
LIQUID ORAL CONTINUOUS PRN
Status: COMPLETED | OUTPATIENT
Start: 2019-02-12 | End: 2019-02-12

## 2019-02-12 RX ORDER — PROPOFOL 10 MG/ML
INJECTION, EMULSION INTRAVENOUS PRN
Status: DISCONTINUED | OUTPATIENT
Start: 2019-02-12 | End: 2019-02-12 | Stop reason: SDUPTHER

## 2019-02-12 RX ORDER — SPIRONOLACTONE 25 MG/1
25 TABLET ORAL DAILY
Status: DISCONTINUED | OUTPATIENT
Start: 2019-02-13 | End: 2019-02-14 | Stop reason: HOSPADM

## 2019-02-12 RX ORDER — POTASSIUM CHLORIDE 7.45 MG/ML
10 INJECTION INTRAVENOUS PRN
Status: DISCONTINUED | OUTPATIENT
Start: 2019-02-12 | End: 2019-02-14 | Stop reason: HOSPADM

## 2019-02-12 RX ORDER — POTASSIUM CHLORIDE 20 MEQ/1
40 TABLET, EXTENDED RELEASE ORAL PRN
Status: DISCONTINUED | OUTPATIENT
Start: 2019-02-12 | End: 2019-02-14 | Stop reason: HOSPADM

## 2019-02-12 RX ORDER — MIDAZOLAM HYDROCHLORIDE 1 MG/ML
INJECTION INTRAMUSCULAR; INTRAVENOUS PRN
Status: DISCONTINUED | OUTPATIENT
Start: 2019-02-12 | End: 2019-02-12 | Stop reason: SDUPTHER

## 2019-02-12 RX ORDER — GLYCOPYRROLATE 1 MG/5 ML
SYRINGE (ML) INTRAVENOUS PRN
Status: DISCONTINUED | OUTPATIENT
Start: 2019-02-12 | End: 2019-02-12 | Stop reason: SDUPTHER

## 2019-02-12 RX ORDER — SODIUM CHLORIDE 0.9 % (FLUSH) 0.9 %
10 SYRINGE (ML) INJECTION EVERY 12 HOURS SCHEDULED
Status: DISCONTINUED | OUTPATIENT
Start: 2019-02-12 | End: 2019-02-14 | Stop reason: HOSPADM

## 2019-02-12 RX ORDER — MAGNESIUM SULFATE 1 G/100ML
1 INJECTION INTRAVENOUS PRN
Status: DISCONTINUED | OUTPATIENT
Start: 2019-02-12 | End: 2019-02-14 | Stop reason: HOSPADM

## 2019-02-12 RX ORDER — KETOROLAC TROMETHAMINE 30 MG/ML
INJECTION, SOLUTION INTRAMUSCULAR; INTRAVENOUS PRN
Status: DISCONTINUED | OUTPATIENT
Start: 2019-02-12 | End: 2019-02-12 | Stop reason: SDUPTHER

## 2019-02-12 RX ADMIN — OXYCODONE HYDROCHLORIDE AND ACETAMINOPHEN 2 TABLET: 5; 325 TABLET ORAL at 23:30

## 2019-02-12 RX ADMIN — FENTANYL CITRATE 50 MCG: 50 INJECTION, SOLUTION INTRAMUSCULAR; INTRAVENOUS at 09:38

## 2019-02-12 RX ADMIN — LABETALOL 20 MG/4 ML (5 MG/ML) INTRAVENOUS SYRINGE 5 MG: at 10:14

## 2019-02-12 RX ADMIN — SODIUM CHLORIDE, POTASSIUM CHLORIDE, SODIUM LACTATE AND CALCIUM CHLORIDE: 600; 310; 30; 20 INJECTION, SOLUTION INTRAVENOUS at 08:30

## 2019-02-12 RX ADMIN — FENTANYL CITRATE 25 MCG: 50 INJECTION, SOLUTION INTRAMUSCULAR; INTRAVENOUS at 16:47

## 2019-02-12 RX ADMIN — ONDANSETRON 4 MG: 2 INJECTION INTRAMUSCULAR; INTRAVENOUS at 15:42

## 2019-02-12 RX ADMIN — Medication 3 G: at 09:54

## 2019-02-12 RX ADMIN — NEOSTIGMINE METHYLSULFATE 2.5 MG: 1 INJECTION, SOLUTION INTRAMUSCULAR; INTRAVENOUS; SUBCUTANEOUS at 15:40

## 2019-02-12 RX ADMIN — PHENYLEPHRINE HYDROCHLORIDE 50 MCG: 10 INJECTION INTRAVENOUS at 12:13

## 2019-02-12 RX ADMIN — ROCURONIUM BROMIDE 50 MG: 10 INJECTION, SOLUTION INTRAVENOUS at 09:41

## 2019-02-12 RX ADMIN — MIDAZOLAM HYDROCHLORIDE 2 MG: 1 INJECTION, SOLUTION INTRAMUSCULAR; INTRAVENOUS at 09:36

## 2019-02-12 RX ADMIN — FENTANYL CITRATE 100 MCG: 50 INJECTION, SOLUTION INTRAMUSCULAR; INTRAVENOUS at 10:37

## 2019-02-12 RX ADMIN — CEFAZOLIN 3000 MG: 330 INJECTION, POWDER, FOR SOLUTION INTRAMUSCULAR; INTRAVENOUS at 15:22

## 2019-02-12 RX ADMIN — CYCLOBENZAPRINE 10 MG: 10 TABLET, FILM COATED ORAL at 21:33

## 2019-02-12 RX ADMIN — FENTANYL CITRATE 50 MCG: 50 INJECTION, SOLUTION INTRAMUSCULAR; INTRAVENOUS at 09:40

## 2019-02-12 RX ADMIN — FENTANYL CITRATE 100 MCG: 50 INJECTION, SOLUTION INTRAMUSCULAR; INTRAVENOUS at 12:55

## 2019-02-12 RX ADMIN — SODIUM CHLORIDE, POTASSIUM CHLORIDE, SODIUM LACTATE AND CALCIUM CHLORIDE: 600; 310; 30; 20 INJECTION, SOLUTION INTRAVENOUS at 12:25

## 2019-02-12 RX ADMIN — SODIUM CHLORIDE: 9 INJECTION, SOLUTION INTRAVENOUS at 19:23

## 2019-02-12 RX ADMIN — LABETALOL 20 MG/4 ML (5 MG/ML) INTRAVENOUS SYRINGE 10 MG: at 09:49

## 2019-02-12 RX ADMIN — Medication 3 G: at 21:35

## 2019-02-12 RX ADMIN — LIDOCAINE HYDROCHLORIDE 50 MG: 10 INJECTION, SOLUTION EPIDURAL; INFILTRATION; INTRACAUDAL; PERINEURAL at 09:40

## 2019-02-12 RX ADMIN — PHENYLEPHRINE HYDROCHLORIDE 100 MCG: 10 INJECTION INTRAVENOUS at 09:49

## 2019-02-12 RX ADMIN — KETOROLAC TROMETHAMINE 30 MG: 30 INJECTION INTRAMUSCULAR; INTRAVENOUS at 15:04

## 2019-02-12 RX ADMIN — Medication 3 G: at 12:24

## 2019-02-12 RX ADMIN — SODIUM CHLORIDE, POTASSIUM CHLORIDE, SODIUM LACTATE AND CALCIUM CHLORIDE: 600; 310; 30; 20 INJECTION, SOLUTION INTRAVENOUS at 10:35

## 2019-02-12 RX ADMIN — LABETALOL 20 MG/4 ML (5 MG/ML) INTRAVENOUS SYRINGE 5 MG: at 10:32

## 2019-02-12 RX ADMIN — OXYCODONE HYDROCHLORIDE AND ACETAMINOPHEN 2 TABLET: 5; 325 TABLET ORAL at 19:21

## 2019-02-12 RX ADMIN — ONDANSETRON 4 MG: 2 INJECTION INTRAMUSCULAR; INTRAVENOUS at 10:33

## 2019-02-12 RX ADMIN — PROPOFOL 200 MG: 10 INJECTION, EMULSION INTRAVENOUS at 09:40

## 2019-02-12 RX ADMIN — PROPOFOL 150 MG: 10 INJECTION, EMULSION INTRAVENOUS at 09:44

## 2019-02-12 RX ADMIN — TILMANOCEPT 0.4 MILLICURIE: KIT at 08:30

## 2019-02-12 RX ADMIN — Medication 0.4 MG: at 15:40

## 2019-02-12 RX ADMIN — Medication 0.2 MG: at 09:46

## 2019-02-12 RX ADMIN — DEXAMETHASONE SODIUM PHOSPHATE 10 MG: 10 INJECTION, SOLUTION INTRAMUSCULAR; INTRAVENOUS at 10:33

## 2019-02-12 RX ADMIN — FENTANYL CITRATE 25 MCG: 50 INJECTION, SOLUTION INTRAMUSCULAR; INTRAVENOUS at 16:41

## 2019-02-12 RX ADMIN — PROPOFOL 50 MG: 10 INJECTION, EMULSION INTRAVENOUS at 15:52

## 2019-02-12 ASSESSMENT — PULMONARY FUNCTION TESTS
PIF_VALUE: 24
PIF_VALUE: 24
PIF_VALUE: 25
PIF_VALUE: 24
PIF_VALUE: 25
PIF_VALUE: 26
PIF_VALUE: 25
PIF_VALUE: 26
PIF_VALUE: 25
PIF_VALUE: 22
PIF_VALUE: 25
PIF_VALUE: 25
PIF_VALUE: 24
PIF_VALUE: 25
PIF_VALUE: 26
PIF_VALUE: 22
PIF_VALUE: 21
PIF_VALUE: 23
PIF_VALUE: 24
PIF_VALUE: 25
PIF_VALUE: 26
PIF_VALUE: 25
PIF_VALUE: 24
PIF_VALUE: 26
PIF_VALUE: 22
PIF_VALUE: 25
PIF_VALUE: 20
PIF_VALUE: 26
PIF_VALUE: 25
PIF_VALUE: 22
PIF_VALUE: 26
PIF_VALUE: 26
PIF_VALUE: 22
PIF_VALUE: 25
PIF_VALUE: 25
PIF_VALUE: 22
PIF_VALUE: 25
PIF_VALUE: 28
PIF_VALUE: 26
PIF_VALUE: 0
PIF_VALUE: 24
PIF_VALUE: 25
PIF_VALUE: 14
PIF_VALUE: 25
PIF_VALUE: 35
PIF_VALUE: 25
PIF_VALUE: 25
PIF_VALUE: 34
PIF_VALUE: 25
PIF_VALUE: 21
PIF_VALUE: 27
PIF_VALUE: 24
PIF_VALUE: 24
PIF_VALUE: 25
PIF_VALUE: 35
PIF_VALUE: 25
PIF_VALUE: 25
PIF_VALUE: 0
PIF_VALUE: 24
PIF_VALUE: 25
PIF_VALUE: 26
PIF_VALUE: 1
PIF_VALUE: 22
PIF_VALUE: 0
PIF_VALUE: 25
PIF_VALUE: 25
PIF_VALUE: 4
PIF_VALUE: 24
PIF_VALUE: 22
PIF_VALUE: 25
PIF_VALUE: 21
PIF_VALUE: 25
PIF_VALUE: 21
PIF_VALUE: 26
PIF_VALUE: 27
PIF_VALUE: 24
PIF_VALUE: 24
PIF_VALUE: 1
PIF_VALUE: 26
PIF_VALUE: 24
PIF_VALUE: 23
PIF_VALUE: 21
PIF_VALUE: 25
PIF_VALUE: 22
PIF_VALUE: 27
PIF_VALUE: 27
PIF_VALUE: 22
PIF_VALUE: 22
PIF_VALUE: 25
PIF_VALUE: 24
PIF_VALUE: 20
PIF_VALUE: 0
PIF_VALUE: 26
PIF_VALUE: 25
PIF_VALUE: 26
PIF_VALUE: 24
PIF_VALUE: 22
PIF_VALUE: 24
PIF_VALUE: 14
PIF_VALUE: 24
PIF_VALUE: 22
PIF_VALUE: 24
PIF_VALUE: 26
PIF_VALUE: 22
PIF_VALUE: 26
PIF_VALUE: 24
PIF_VALUE: 25
PIF_VALUE: 27
PIF_VALUE: 0
PIF_VALUE: 1
PIF_VALUE: 14
PIF_VALUE: 24
PIF_VALUE: 25
PIF_VALUE: 26
PIF_VALUE: 22
PIF_VALUE: 21
PIF_VALUE: 25
PIF_VALUE: 26
PIF_VALUE: 14
PIF_VALUE: 24
PIF_VALUE: 22
PIF_VALUE: 25
PIF_VALUE: 20
PIF_VALUE: 26
PIF_VALUE: 25
PIF_VALUE: 24
PIF_VALUE: 22
PIF_VALUE: 26
PIF_VALUE: 22
PIF_VALUE: 26
PIF_VALUE: 24
PIF_VALUE: 26
PIF_VALUE: 26
PIF_VALUE: 27
PIF_VALUE: 25
PIF_VALUE: 26
PIF_VALUE: 25
PIF_VALUE: 24
PIF_VALUE: 25
PIF_VALUE: 22
PIF_VALUE: 23
PIF_VALUE: 24
PIF_VALUE: 25
PIF_VALUE: 38
PIF_VALUE: 25
PIF_VALUE: 21
PIF_VALUE: 24
PIF_VALUE: 27
PIF_VALUE: 26
PIF_VALUE: 22
PIF_VALUE: 25
PIF_VALUE: 27
PIF_VALUE: 26
PIF_VALUE: 24
PIF_VALUE: 22
PIF_VALUE: 25
PIF_VALUE: 25
PIF_VALUE: 21
PIF_VALUE: 24
PIF_VALUE: 25
PIF_VALUE: 25
PIF_VALUE: 0
PIF_VALUE: 22
PIF_VALUE: 25
PIF_VALUE: 24
PIF_VALUE: 14
PIF_VALUE: 26
PIF_VALUE: 26
PIF_VALUE: 25
PIF_VALUE: 21
PIF_VALUE: 25
PIF_VALUE: 25
PIF_VALUE: 21
PIF_VALUE: 25
PIF_VALUE: 26
PIF_VALUE: 22
PIF_VALUE: 26
PIF_VALUE: 26
PIF_VALUE: 24
PIF_VALUE: 26
PIF_VALUE: 24
PIF_VALUE: 26
PIF_VALUE: 1
PIF_VALUE: 23
PIF_VALUE: 22
PIF_VALUE: 24
PIF_VALUE: 25
PIF_VALUE: 22
PIF_VALUE: 25
PIF_VALUE: 26
PIF_VALUE: 25
PIF_VALUE: 26
PIF_VALUE: 25
PIF_VALUE: 25
PIF_VALUE: 24
PIF_VALUE: 24
PIF_VALUE: 22
PIF_VALUE: 36
PIF_VALUE: 26
PIF_VALUE: 25
PIF_VALUE: 22
PIF_VALUE: 21
PIF_VALUE: 23
PIF_VALUE: 22
PIF_VALUE: 26
PIF_VALUE: 26
PIF_VALUE: 24
PIF_VALUE: 22
PIF_VALUE: 25
PIF_VALUE: 25
PIF_VALUE: 26
PIF_VALUE: 24
PIF_VALUE: 24
PIF_VALUE: 26
PIF_VALUE: 26
PIF_VALUE: 24
PIF_VALUE: 25
PIF_VALUE: 22
PIF_VALUE: 27
PIF_VALUE: 25
PIF_VALUE: 19
PIF_VALUE: 14
PIF_VALUE: 25
PIF_VALUE: 24
PIF_VALUE: 22
PIF_VALUE: 25
PIF_VALUE: 23
PIF_VALUE: 26
PIF_VALUE: 24
PIF_VALUE: 21
PIF_VALUE: 28
PIF_VALUE: 22
PIF_VALUE: 23
PIF_VALUE: 24
PIF_VALUE: 0
PIF_VALUE: 24
PIF_VALUE: 14
PIF_VALUE: 25
PIF_VALUE: 27
PIF_VALUE: 26
PIF_VALUE: 22
PIF_VALUE: 26
PIF_VALUE: 25
PIF_VALUE: 25
PIF_VALUE: 26
PIF_VALUE: 35
PIF_VALUE: 25
PIF_VALUE: 24
PIF_VALUE: 25
PIF_VALUE: 25
PIF_VALUE: 26
PIF_VALUE: 25
PIF_VALUE: 26
PIF_VALUE: 11
PIF_VALUE: 27
PIF_VALUE: 22
PIF_VALUE: 21
PIF_VALUE: 35
PIF_VALUE: 24
PIF_VALUE: 22
PIF_VALUE: 24
PIF_VALUE: 22
PIF_VALUE: 27
PIF_VALUE: 24
PIF_VALUE: 26
PIF_VALUE: 25
PIF_VALUE: 24
PIF_VALUE: 34
PIF_VALUE: 22
PIF_VALUE: 14
PIF_VALUE: 21
PIF_VALUE: 14
PIF_VALUE: 24
PIF_VALUE: 25
PIF_VALUE: 26
PIF_VALUE: 27
PIF_VALUE: 25
PIF_VALUE: 26
PIF_VALUE: 24
PIF_VALUE: 25
PIF_VALUE: 22
PIF_VALUE: 24
PIF_VALUE: 25
PIF_VALUE: 22
PIF_VALUE: 26
PIF_VALUE: 25
PIF_VALUE: 25
PIF_VALUE: 27
PIF_VALUE: 25
PIF_VALUE: 26
PIF_VALUE: 25
PIF_VALUE: 25
PIF_VALUE: 26
PIF_VALUE: 26
PIF_VALUE: 25
PIF_VALUE: 27
PIF_VALUE: 26
PIF_VALUE: 29
PIF_VALUE: 26
PIF_VALUE: 24
PIF_VALUE: 24
PIF_VALUE: 26
PIF_VALUE: 22
PIF_VALUE: 24
PIF_VALUE: 27
PIF_VALUE: 25
PIF_VALUE: 22
PIF_VALUE: 24
PIF_VALUE: 26
PIF_VALUE: 26
PIF_VALUE: 22
PIF_VALUE: 24
PIF_VALUE: 22
PIF_VALUE: 0
PIF_VALUE: 23
PIF_VALUE: 25
PIF_VALUE: 24
PIF_VALUE: 22
PIF_VALUE: 20
PIF_VALUE: 25
PIF_VALUE: 24
PIF_VALUE: 22
PIF_VALUE: 26
PIF_VALUE: 1
PIF_VALUE: 24
PIF_VALUE: 26
PIF_VALUE: 22
PIF_VALUE: 23
PIF_VALUE: 22
PIF_VALUE: 21
PIF_VALUE: 26
PIF_VALUE: 21
PIF_VALUE: 26
PIF_VALUE: 26
PIF_VALUE: 25
PIF_VALUE: 26
PIF_VALUE: 24
PIF_VALUE: 26
PIF_VALUE: 24
PIF_VALUE: 26
PIF_VALUE: 26
PIF_VALUE: 24
PIF_VALUE: 25
PIF_VALUE: 22
PIF_VALUE: 26
PIF_VALUE: 29
PIF_VALUE: 26
PIF_VALUE: 22
PIF_VALUE: 23
PIF_VALUE: 25
PIF_VALUE: 24
PIF_VALUE: 25
PIF_VALUE: 24
PIF_VALUE: 26
PIF_VALUE: 26

## 2019-02-12 ASSESSMENT — PAIN - FUNCTIONAL ASSESSMENT: PAIN_FUNCTIONAL_ASSESSMENT: 0-10

## 2019-02-12 ASSESSMENT — PAIN SCALES - GENERAL
PAINLEVEL_OUTOF10: 6
PAINLEVEL_OUTOF10: 3
PAINLEVEL_OUTOF10: 0
PAINLEVEL_OUTOF10: 7
PAINLEVEL_OUTOF10: 6
PAINLEVEL_OUTOF10: 7
PAINLEVEL_OUTOF10: 6

## 2019-02-12 ASSESSMENT — LIFESTYLE VARIABLES: SMOKING_STATUS: 1

## 2019-02-13 PROBLEM — Z90.13 S/P MASTECTOMY, BILATERAL: Status: ACTIVE | Noted: 2019-02-13

## 2019-02-13 LAB
ANION GAP SERPL CALCULATED.3IONS-SCNC: 12 MMOL/L (ref 9–17)
BUN BLDV-MCNC: 19 MG/DL (ref 6–20)
BUN/CREAT BLD: ABNORMAL (ref 9–20)
CALCIUM SERPL-MCNC: 8.4 MG/DL (ref 8.6–10.4)
CHLORIDE BLD-SCNC: 104 MMOL/L (ref 98–107)
CO2: 21 MMOL/L (ref 20–31)
CREAT SERPL-MCNC: 1.19 MG/DL (ref 0.5–0.9)
GFR AFRICAN AMERICAN: 56 ML/MIN
GFR NON-AFRICAN AMERICAN: 47 ML/MIN
GFR SERPL CREATININE-BSD FRML MDRD: ABNORMAL ML/MIN/{1.73_M2}
GFR SERPL CREATININE-BSD FRML MDRD: ABNORMAL ML/MIN/{1.73_M2}
GLUCOSE BLD-MCNC: 136 MG/DL (ref 70–99)
HCT VFR BLD CALC: 28.1 % (ref 36.3–47.1)
HEMOGLOBIN: 9.1 G/DL (ref 11.9–15.1)
MAGNESIUM: 2 MG/DL (ref 1.6–2.6)
MCH RBC QN AUTO: 28.7 PG (ref 25.2–33.5)
MCHC RBC AUTO-ENTMCNC: 32.4 G/DL (ref 28.4–34.8)
MCV RBC AUTO: 88.6 FL (ref 82.6–102.9)
NRBC AUTOMATED: 0 PER 100 WBC
PDW BLD-RTO: 15.8 % (ref 11.8–14.4)
PLATELET # BLD: 216 K/UL (ref 138–453)
PMV BLD AUTO: 10.5 FL (ref 8.1–13.5)
POTASSIUM SERPL-SCNC: 5 MMOL/L (ref 3.7–5.3)
RBC # BLD: 3.17 M/UL (ref 3.95–5.11)
SODIUM BLD-SCNC: 137 MMOL/L (ref 135–144)
WBC # BLD: 18.7 K/UL (ref 3.5–11.3)

## 2019-02-13 PROCEDURE — 6370000000 HC RX 637 (ALT 250 FOR IP): Performed by: SURGERY

## 2019-02-13 PROCEDURE — 83735 ASSAY OF MAGNESIUM: CPT

## 2019-02-13 PROCEDURE — 1200000000 HC SEMI PRIVATE

## 2019-02-13 PROCEDURE — 6370000000 HC RX 637 (ALT 250 FOR IP): Performed by: STUDENT IN AN ORGANIZED HEALTH CARE EDUCATION/TRAINING PROGRAM

## 2019-02-13 PROCEDURE — 97530 THERAPEUTIC ACTIVITIES: CPT

## 2019-02-13 PROCEDURE — 80048 BASIC METABOLIC PNL TOTAL CA: CPT

## 2019-02-13 PROCEDURE — 36415 COLL VENOUS BLD VENIPUNCTURE: CPT

## 2019-02-13 PROCEDURE — 6360000002 HC RX W HCPCS: Performed by: STUDENT IN AN ORGANIZED HEALTH CARE EDUCATION/TRAINING PROGRAM

## 2019-02-13 PROCEDURE — 85027 COMPLETE CBC AUTOMATED: CPT

## 2019-02-13 PROCEDURE — 97161 PT EVAL LOW COMPLEX 20 MIN: CPT

## 2019-02-13 PROCEDURE — 2580000003 HC RX 258: Performed by: SURGERY

## 2019-02-13 PROCEDURE — 6360000002 HC RX W HCPCS: Performed by: SURGERY

## 2019-02-13 PROCEDURE — 97165 OT EVAL LOW COMPLEX 30 MIN: CPT

## 2019-02-13 PROCEDURE — 97535 SELF CARE MNGMENT TRAINING: CPT

## 2019-02-13 RX ORDER — FENTANYL CITRATE 50 UG/ML
50 INJECTION, SOLUTION INTRAMUSCULAR; INTRAVENOUS 2 TIMES DAILY PRN
Status: DISCONTINUED | OUTPATIENT
Start: 2019-02-13 | End: 2019-02-14 | Stop reason: HOSPADM

## 2019-02-13 RX ORDER — GABAPENTIN 300 MG/1
300 CAPSULE ORAL 3 TIMES DAILY
Status: DISCONTINUED | OUTPATIENT
Start: 2019-02-13 | End: 2019-02-14 | Stop reason: HOSPADM

## 2019-02-13 RX ORDER — ACETAMINOPHEN 10 MG/ML
1000 INJECTION, SOLUTION INTRAVENOUS ONCE
Status: COMPLETED | OUTPATIENT
Start: 2019-02-13 | End: 2019-02-13

## 2019-02-13 RX ADMIN — ACETAMINOPHEN 1000 MG: 10 INJECTION, SOLUTION INTRAVENOUS at 10:45

## 2019-02-13 RX ADMIN — SPIRONOLACTONE 25 MG: 25 TABLET ORAL at 08:06

## 2019-02-13 RX ADMIN — SODIUM CHLORIDE: 9 INJECTION, SOLUTION INTRAVENOUS at 10:45

## 2019-02-13 RX ADMIN — OXYCODONE HYDROCHLORIDE AND ACETAMINOPHEN 2 TABLET: 5; 325 TABLET ORAL at 12:41

## 2019-02-13 RX ADMIN — Medication 3 G: at 05:08

## 2019-02-13 RX ADMIN — GABAPENTIN 300 MG: 300 CAPSULE ORAL at 10:06

## 2019-02-13 RX ADMIN — GABAPENTIN 300 MG: 300 CAPSULE ORAL at 20:01

## 2019-02-13 RX ADMIN — CYCLOBENZAPRINE 10 MG: 10 TABLET, FILM COATED ORAL at 12:41

## 2019-02-13 RX ADMIN — OXYCODONE HYDROCHLORIDE AND ACETAMINOPHEN 2 TABLET: 5; 325 TABLET ORAL at 08:04

## 2019-02-13 RX ADMIN — AMLODIPINE BESYLATE 10 MG: 10 TABLET ORAL at 08:06

## 2019-02-13 RX ADMIN — OXYCODONE HYDROCHLORIDE AND ACETAMINOPHEN 2 TABLET: 5; 325 TABLET ORAL at 19:40

## 2019-02-13 RX ADMIN — OXYCODONE HYDROCHLORIDE AND ACETAMINOPHEN 2 TABLET: 5; 325 TABLET ORAL at 03:39

## 2019-02-13 RX ADMIN — GABAPENTIN 300 MG: 300 CAPSULE ORAL at 14:26

## 2019-02-13 RX ADMIN — OXYCODONE HYDROCHLORIDE AND ACETAMINOPHEN 2 TABLET: 5; 325 TABLET ORAL at 23:42

## 2019-02-13 RX ADMIN — CYCLOBENZAPRINE 10 MG: 10 TABLET, FILM COATED ORAL at 08:04

## 2019-02-13 ASSESSMENT — PAIN SCALES - GENERAL
PAINLEVEL_OUTOF10: 7
PAINLEVEL_OUTOF10: 8
PAINLEVEL_OUTOF10: 7
PAINLEVEL_OUTOF10: 5
PAINLEVEL_OUTOF10: 7
PAINLEVEL_OUTOF10: 10
PAINLEVEL_OUTOF10: 5
PAINLEVEL_OUTOF10: 7

## 2019-02-13 ASSESSMENT — PAIN DESCRIPTION - PAIN TYPE
TYPE: ACUTE PAIN
TYPE: ACUTE PAIN

## 2019-02-13 ASSESSMENT — PAIN DESCRIPTION - LOCATION
LOCATION: ABDOMEN
LOCATION: INCISION;ABDOMEN

## 2019-02-14 VITALS
BODY MASS INDEX: 41.59 KG/M2 | DIASTOLIC BLOOD PRESSURE: 81 MMHG | HEART RATE: 90 BPM | TEMPERATURE: 97.8 F | OXYGEN SATURATION: 98 % | WEIGHT: 265 LBS | RESPIRATION RATE: 16 BRPM | HEIGHT: 67 IN | SYSTOLIC BLOOD PRESSURE: 137 MMHG

## 2019-02-14 LAB
ANION GAP SERPL CALCULATED.3IONS-SCNC: 9 MMOL/L (ref 9–17)
BUN BLDV-MCNC: 31 MG/DL (ref 6–20)
BUN/CREAT BLD: ABNORMAL (ref 9–20)
CALCIUM SERPL-MCNC: 8.2 MG/DL (ref 8.6–10.4)
CHLORIDE BLD-SCNC: 104 MMOL/L (ref 98–107)
CO2: 23 MMOL/L (ref 20–31)
CREAT SERPL-MCNC: 1.07 MG/DL (ref 0.5–0.9)
GFR AFRICAN AMERICAN: >60 ML/MIN
GFR NON-AFRICAN AMERICAN: 53 ML/MIN
GFR SERPL CREATININE-BSD FRML MDRD: ABNORMAL ML/MIN/{1.73_M2}
GFR SERPL CREATININE-BSD FRML MDRD: ABNORMAL ML/MIN/{1.73_M2}
GLUCOSE BLD-MCNC: 108 MG/DL (ref 70–99)
HCT VFR BLD CALC: 22 % (ref 36.3–47.1)
HCT VFR BLD CALC: 22.8 % (ref 36.3–47.1)
HEMOGLOBIN: 7.1 G/DL (ref 11.9–15.1)
HEMOGLOBIN: 7.2 G/DL (ref 11.9–15.1)
MCH RBC QN AUTO: 28.7 PG (ref 25.2–33.5)
MCHC RBC AUTO-ENTMCNC: 31.1 G/DL (ref 28.4–34.8)
MCV RBC AUTO: 92.3 FL (ref 82.6–102.9)
NRBC AUTOMATED: 0 PER 100 WBC
PDW BLD-RTO: 16.3 % (ref 11.8–14.4)
PLATELET # BLD: 188 K/UL (ref 138–453)
PMV BLD AUTO: 10.3 FL (ref 8.1–13.5)
POTASSIUM SERPL-SCNC: 4.7 MMOL/L (ref 3.7–5.3)
RBC # BLD: 2.47 M/UL (ref 3.95–5.11)
SODIUM BLD-SCNC: 136 MMOL/L (ref 135–144)
WBC # BLD: 17.2 K/UL (ref 3.5–11.3)

## 2019-02-14 PROCEDURE — 85014 HEMATOCRIT: CPT

## 2019-02-14 PROCEDURE — 6370000000 HC RX 637 (ALT 250 FOR IP): Performed by: STUDENT IN AN ORGANIZED HEALTH CARE EDUCATION/TRAINING PROGRAM

## 2019-02-14 PROCEDURE — 36415 COLL VENOUS BLD VENIPUNCTURE: CPT

## 2019-02-14 PROCEDURE — 85027 COMPLETE CBC AUTOMATED: CPT

## 2019-02-14 PROCEDURE — 85018 HEMOGLOBIN: CPT

## 2019-02-14 PROCEDURE — 80048 BASIC METABOLIC PNL TOTAL CA: CPT

## 2019-02-14 PROCEDURE — 6370000000 HC RX 637 (ALT 250 FOR IP): Performed by: SURGERY

## 2019-02-14 RX ORDER — ONDANSETRON 4 MG/1
4 TABLET, ORALLY DISINTEGRATING ORAL 3 TIMES DAILY PRN
Qty: 21 TABLET | Refills: 0 | Status: SHIPPED | OUTPATIENT
Start: 2019-02-14 | End: 2019-03-18

## 2019-02-14 RX ORDER — GABAPENTIN 300 MG/1
300 CAPSULE ORAL 3 TIMES DAILY
Qty: 30 CAPSULE | Refills: 0 | Status: SHIPPED | OUTPATIENT
Start: 2019-02-14 | End: 2019-03-18 | Stop reason: ALTCHOICE

## 2019-02-14 RX ORDER — OXYCODONE HYDROCHLORIDE AND ACETAMINOPHEN 5; 325 MG/1; MG/1
1 TABLET ORAL EVERY 6 HOURS PRN
Qty: 28 TABLET | Refills: 0 | Status: SHIPPED | OUTPATIENT
Start: 2019-02-14 | End: 2019-02-21

## 2019-02-14 RX ORDER — GAUZE BANDAGE 4" X 4"
1 BANDAGE TOPICAL DAILY
Qty: 10 EACH | Refills: 2 | Status: SHIPPED | OUTPATIENT
Start: 2019-02-14 | End: 2019-09-12 | Stop reason: ALTCHOICE

## 2019-02-14 RX ORDER — BISACODYL 10 MG
10 SUPPOSITORY, RECTAL RECTAL ONCE
Status: DISCONTINUED | OUTPATIENT
Start: 2019-02-14 | End: 2019-02-14 | Stop reason: HOSPADM

## 2019-02-14 RX ORDER — DOCUSATE SODIUM 100 MG/1
100 CAPSULE, LIQUID FILLED ORAL 2 TIMES DAILY PRN
Qty: 60 CAPSULE | Refills: 0 | Status: SHIPPED | OUTPATIENT
Start: 2019-02-14 | End: 2019-08-01 | Stop reason: SDUPTHER

## 2019-02-14 RX ORDER — CYCLOBENZAPRINE HCL 10 MG
10 TABLET ORAL 3 TIMES DAILY PRN
Qty: 30 TABLET | Refills: 0 | Status: SHIPPED | OUTPATIENT
Start: 2019-02-14 | End: 2019-02-24

## 2019-02-14 RX ADMIN — OXYCODONE HYDROCHLORIDE AND ACETAMINOPHEN 2 TABLET: 5; 325 TABLET ORAL at 04:02

## 2019-02-14 RX ADMIN — SPIRONOLACTONE 25 MG: 25 TABLET ORAL at 09:22

## 2019-02-14 RX ADMIN — MAGNESIUM HYDROXIDE 30 ML: 400 SUSPENSION ORAL at 09:25

## 2019-02-14 RX ADMIN — GABAPENTIN 300 MG: 300 CAPSULE ORAL at 09:22

## 2019-02-14 RX ADMIN — OXYCODONE HYDROCHLORIDE AND ACETAMINOPHEN 2 TABLET: 5; 325 TABLET ORAL at 09:18

## 2019-02-14 RX ADMIN — GABAPENTIN 300 MG: 300 CAPSULE ORAL at 13:25

## 2019-02-14 RX ADMIN — AMLODIPINE BESYLATE 10 MG: 10 TABLET ORAL at 09:22

## 2019-02-14 RX ADMIN — OXYCODONE HYDROCHLORIDE AND ACETAMINOPHEN 2 TABLET: 5; 325 TABLET ORAL at 13:23

## 2019-02-14 ASSESSMENT — PAIN SCALES - GENERAL
PAINLEVEL_OUTOF10: 8
PAINLEVEL_OUTOF10: 7
PAINLEVEL_OUTOF10: 7

## 2019-02-15 LAB — SURGICAL PATHOLOGY REPORT: NORMAL

## 2019-02-17 ENCOUNTER — HOSPITAL ENCOUNTER (EMERGENCY)
Age: 59
Discharge: HOME | End: 2019-02-17
Attending: EMERGENCY MEDICINE
Payer: COMMERCIAL

## 2019-02-17 VITALS
WEIGHT: 265 LBS | TEMPERATURE: 98.1 F | SYSTOLIC BLOOD PRESSURE: 142 MMHG | RESPIRATION RATE: 15 BRPM | OXYGEN SATURATION: 100 % | DIASTOLIC BLOOD PRESSURE: 89 MMHG | BODY MASS INDEX: 41.5 KG/M2 | HEART RATE: 90 BPM

## 2019-02-17 DIAGNOSIS — T85.698A JP DRAIN, BROKEN, INITIAL ENCOUNTER: Primary | ICD-10-CM

## 2019-02-17 LAB
CULTURE: ABNORMAL
CULTURE: ABNORMAL
DIRECT EXAM: ABNORMAL
DIRECT EXAM: ABNORMAL
Lab: ABNORMAL
SPECIMEN DESCRIPTION: ABNORMAL

## 2019-02-17 PROCEDURE — 99283 EMERGENCY DEPT VISIT LOW MDM: CPT

## 2019-02-18 ENCOUNTER — TELEPHONE (OUTPATIENT)
Dept: ONCOLOGY | Age: 59
End: 2019-02-18

## 2019-02-19 DIAGNOSIS — M54.42 CHRONIC LEFT-SIDED LOW BACK PAIN WITH LEFT-SIDED SCIATICA: ICD-10-CM

## 2019-02-19 DIAGNOSIS — G89.29 CHRONIC LEFT-SIDED LOW BACK PAIN WITH LEFT-SIDED SCIATICA: ICD-10-CM

## 2019-02-19 DIAGNOSIS — Z76.0 MEDICATION REFILL: ICD-10-CM

## 2019-02-19 RX ORDER — NAPROXEN 500 MG/1
TABLET ORAL
Qty: 60 TABLET | Refills: 0 | Status: SHIPPED | OUTPATIENT
Start: 2019-02-19 | End: 2019-03-20 | Stop reason: SDUPTHER

## 2019-02-20 ENCOUNTER — OFFICE VISIT (OUTPATIENT)
Dept: SURGERY | Age: 59
End: 2019-02-20
Payer: COMMERCIAL

## 2019-02-20 VITALS
SYSTOLIC BLOOD PRESSURE: 117 MMHG | WEIGHT: 270 LBS | TEMPERATURE: 97.2 F | BODY MASS INDEX: 42.38 KG/M2 | HEIGHT: 67 IN | HEART RATE: 91 BPM | DIASTOLIC BLOOD PRESSURE: 72 MMHG

## 2019-02-20 DIAGNOSIS — C50.912 INVASIVE DUCTAL CARCINOMA OF BREAST, FEMALE, LEFT (HCC): ICD-10-CM

## 2019-02-20 DIAGNOSIS — Z48.89 ENCOUNTER FOR POSTOPERATIVE CARE: ICD-10-CM

## 2019-02-20 DIAGNOSIS — G89.18 POST-OPERATIVE PAIN: Primary | ICD-10-CM

## 2019-02-20 PROCEDURE — 99024 POSTOP FOLLOW-UP VISIT: CPT | Performed by: STUDENT IN AN ORGANIZED HEALTH CARE EDUCATION/TRAINING PROGRAM

## 2019-02-20 RX ORDER — OXYCODONE HYDROCHLORIDE AND ACETAMINOPHEN 5; 325 MG/1; MG/1
1 TABLET ORAL EVERY 6 HOURS PRN
Qty: 28 TABLET | Refills: 0 | Status: SHIPPED | OUTPATIENT
Start: 2019-02-20 | End: 2019-02-27

## 2019-02-27 ENCOUNTER — HOSPITAL ENCOUNTER (EMERGENCY)
Age: 59
Discharge: HOME | End: 2019-02-27
Attending: EMERGENCY MEDICINE
Payer: COMMERCIAL

## 2019-02-27 ENCOUNTER — OFFICE VISIT (OUTPATIENT)
Dept: SURGERY | Age: 59
End: 2019-02-27
Payer: COMMERCIAL

## 2019-02-27 VITALS
TEMPERATURE: 97 F | HEART RATE: 82 BPM | DIASTOLIC BLOOD PRESSURE: 64 MMHG | SYSTOLIC BLOOD PRESSURE: 104 MMHG | BODY MASS INDEX: 42.28 KG/M2 | WEIGHT: 270 LBS

## 2019-02-27 DIAGNOSIS — Z48.89 ENCOUNTER FOR POSTOPERATIVE CARE: Primary | ICD-10-CM

## 2019-02-27 DIAGNOSIS — Z90.13 STATUS POST MASTECTOMY, BILATERAL: ICD-10-CM

## 2019-02-27 DIAGNOSIS — C50.412 CARCINOMA OF UPPER-OUTER QUADRANT OF LEFT BREAST IN FEMALE, ESTROGEN RECEPTOR NEGATIVE (HCC): ICD-10-CM

## 2019-02-27 DIAGNOSIS — T85.698A BROKEN JACKSON-PRATT DRAIN, INITIAL ENCOUNTER: Primary | ICD-10-CM

## 2019-02-27 DIAGNOSIS — Z17.1 CARCINOMA OF UPPER-OUTER QUADRANT OF LEFT BREAST IN FEMALE, ESTROGEN RECEPTOR NEGATIVE (HCC): ICD-10-CM

## 2019-02-27 DIAGNOSIS — Z76.0 MEDICATION REFILL: ICD-10-CM

## 2019-02-27 DIAGNOSIS — I10 ESSENTIAL HYPERTENSION: ICD-10-CM

## 2019-02-27 PROCEDURE — 99024 POSTOP FOLLOW-UP VISIT: CPT | Performed by: STUDENT IN AN ORGANIZED HEALTH CARE EDUCATION/TRAINING PROGRAM

## 2019-02-27 PROCEDURE — 99283 EMERGENCY DEPT VISIT LOW MDM: CPT

## 2019-02-27 RX ORDER — SPIRONOLACTONE 25 MG/1
25 TABLET ORAL DAILY
Qty: 30 TABLET | Refills: 1 | Status: SHIPPED | OUTPATIENT
Start: 2019-02-27 | End: 2019-09-12 | Stop reason: ALTCHOICE

## 2019-02-27 RX ORDER — AMLODIPINE BESYLATE 10 MG/1
TABLET ORAL
Qty: 30 TABLET | Refills: 1 | Status: SHIPPED | OUTPATIENT
Start: 2019-02-27 | End: 2019-08-01 | Stop reason: SDUPTHER

## 2019-03-06 ENCOUNTER — OFFICE VISIT (OUTPATIENT)
Dept: SURGERY | Age: 59
End: 2019-03-06
Payer: COMMERCIAL

## 2019-03-06 VITALS
WEIGHT: 280.6 LBS | SYSTOLIC BLOOD PRESSURE: 146 MMHG | DIASTOLIC BLOOD PRESSURE: 85 MMHG | TEMPERATURE: 97 F | HEART RATE: 68 BPM | BODY MASS INDEX: 43.94 KG/M2

## 2019-03-06 DIAGNOSIS — Z48.89 ENCOUNTER FOR POSTOPERATIVE CARE: Primary | ICD-10-CM

## 2019-03-06 PROCEDURE — 99024 POSTOP FOLLOW-UP VISIT: CPT | Performed by: STUDENT IN AN ORGANIZED HEALTH CARE EDUCATION/TRAINING PROGRAM

## 2019-03-13 ENCOUNTER — OFFICE VISIT (OUTPATIENT)
Dept: SURGERY | Age: 59
End: 2019-03-13
Payer: COMMERCIAL

## 2019-03-13 VITALS
HEART RATE: 84 BPM | TEMPERATURE: 97.4 F | HEIGHT: 67 IN | WEIGHT: 273.2 LBS | DIASTOLIC BLOOD PRESSURE: 87 MMHG | SYSTOLIC BLOOD PRESSURE: 129 MMHG | BODY MASS INDEX: 42.88 KG/M2

## 2019-03-13 DIAGNOSIS — Z17.1 CARCINOMA OF UPPER-OUTER QUADRANT OF LEFT BREAST IN FEMALE, ESTROGEN RECEPTOR NEGATIVE (HCC): Primary | ICD-10-CM

## 2019-03-13 DIAGNOSIS — C50.412 CARCINOMA OF UPPER-OUTER QUADRANT OF LEFT BREAST IN FEMALE, ESTROGEN RECEPTOR NEGATIVE (HCC): Primary | ICD-10-CM

## 2019-03-13 PROCEDURE — 99024 POSTOP FOLLOW-UP VISIT: CPT | Performed by: SURGERY

## 2019-03-14 ENCOUNTER — HOSPITAL ENCOUNTER (OUTPATIENT)
Facility: MEDICAL CENTER | Age: 59
End: 2019-03-14
Payer: COMMERCIAL

## 2019-03-15 ENCOUNTER — TELEPHONE (OUTPATIENT)
Dept: FAMILY MEDICINE CLINIC | Age: 59
End: 2019-03-15

## 2019-03-18 ENCOUNTER — TELEPHONE (OUTPATIENT)
Dept: ONCOLOGY | Age: 59
End: 2019-03-18

## 2019-03-18 ENCOUNTER — OFFICE VISIT (OUTPATIENT)
Dept: ONCOLOGY | Age: 59
End: 2019-03-18
Payer: COMMERCIAL

## 2019-03-18 VITALS
HEART RATE: 62 BPM | DIASTOLIC BLOOD PRESSURE: 93 MMHG | SYSTOLIC BLOOD PRESSURE: 147 MMHG | BODY MASS INDEX: 42.9 KG/M2 | TEMPERATURE: 98.2 F | WEIGHT: 274 LBS

## 2019-03-18 DIAGNOSIS — Z17.1 MALIGNANT NEOPLASM OF UPPER-OUTER QUADRANT OF LEFT BREAST IN FEMALE, ESTROGEN RECEPTOR NEGATIVE (HCC): Primary | ICD-10-CM

## 2019-03-18 DIAGNOSIS — C50.412 MALIGNANT NEOPLASM OF UPPER-OUTER QUADRANT OF LEFT BREAST IN FEMALE, ESTROGEN RECEPTOR NEGATIVE (HCC): Primary | ICD-10-CM

## 2019-03-18 PROCEDURE — 99212 OFFICE O/P EST SF 10 MIN: CPT

## 2019-03-18 PROCEDURE — G8427 DOCREV CUR MEDS BY ELIG CLIN: HCPCS | Performed by: INTERNAL MEDICINE

## 2019-03-18 PROCEDURE — G8484 FLU IMMUNIZE NO ADMIN: HCPCS | Performed by: INTERNAL MEDICINE

## 2019-03-18 PROCEDURE — 99215 OFFICE O/P EST HI 40 MIN: CPT | Performed by: INTERNAL MEDICINE

## 2019-03-18 PROCEDURE — 4004F PT TOBACCO SCREEN RCVD TLK: CPT | Performed by: INTERNAL MEDICINE

## 2019-03-18 PROCEDURE — 99999 BREAST PROSTHETIC - BILATERAL: CPT | Performed by: INTERNAL MEDICINE

## 2019-03-18 PROCEDURE — 3017F COLORECTAL CA SCREEN DOC REV: CPT | Performed by: INTERNAL MEDICINE

## 2019-03-18 PROCEDURE — G8417 CALC BMI ABV UP PARAM F/U: HCPCS | Performed by: INTERNAL MEDICINE

## 2019-03-18 RX ORDER — OXYCODONE HYDROCHLORIDE AND ACETAMINOPHEN 5; 325 MG/1; MG/1
1 TABLET ORAL EVERY 8 HOURS PRN
Qty: 40 TABLET | Refills: 0 | Status: SHIPPED | OUTPATIENT
Start: 2019-03-18 | End: 2019-04-02

## 2019-03-19 ENCOUNTER — TELEPHONE (OUTPATIENT)
Dept: INTERVENTIONAL RADIOLOGY/VASCULAR | Age: 59
End: 2019-03-19

## 2019-03-19 ENCOUNTER — TELEPHONE (OUTPATIENT)
Dept: ONCOLOGY | Age: 59
End: 2019-03-19

## 2019-03-19 DIAGNOSIS — C50.412 MALIGNANT NEOPLASM OF UPPER-OUTER QUADRANT OF LEFT BREAST IN FEMALE, ESTROGEN RECEPTOR NEGATIVE (HCC): Primary | ICD-10-CM

## 2019-03-19 DIAGNOSIS — Z17.1 MALIGNANT NEOPLASM OF UPPER-OUTER QUADRANT OF LEFT BREAST IN FEMALE, ESTROGEN RECEPTOR NEGATIVE (HCC): Primary | ICD-10-CM

## 2019-03-20 DIAGNOSIS — Z76.0 MEDICATION REFILL: ICD-10-CM

## 2019-03-20 DIAGNOSIS — G89.29 CHRONIC LEFT-SIDED LOW BACK PAIN WITH LEFT-SIDED SCIATICA: ICD-10-CM

## 2019-03-20 DIAGNOSIS — M54.42 CHRONIC LEFT-SIDED LOW BACK PAIN WITH LEFT-SIDED SCIATICA: ICD-10-CM

## 2019-03-20 RX ORDER — NAPROXEN 500 MG/1
TABLET ORAL
Qty: 60 TABLET | Refills: 0 | Status: SHIPPED | OUTPATIENT
Start: 2019-03-20 | End: 2019-04-19 | Stop reason: SDUPTHER

## 2019-03-25 ENCOUNTER — HOSPITAL ENCOUNTER (OUTPATIENT)
Dept: NON INVASIVE DIAGNOSTICS | Age: 59
Discharge: HOME | End: 2019-03-25
Payer: COMMERCIAL

## 2019-03-25 DIAGNOSIS — Z17.1 MALIGNANT NEOPLASM OF UPPER-OUTER QUADRANT OF LEFT BREAST IN FEMALE, ESTROGEN RECEPTOR NEGATIVE (HCC): ICD-10-CM

## 2019-03-25 DIAGNOSIS — C50.412 MALIGNANT NEOPLASM OF UPPER-OUTER QUADRANT OF LEFT BREAST IN FEMALE, ESTROGEN RECEPTOR NEGATIVE (HCC): ICD-10-CM

## 2019-03-25 LAB
LV EF: 60 %
LVEF MODALITY: NORMAL

## 2019-03-25 PROCEDURE — 93306 TTE W/DOPPLER COMPLETE: CPT

## 2019-03-26 RX ORDER — CEFAZOLIN SODIUM 1 G/50ML
1 INJECTION, SOLUTION INTRAVENOUS
Status: CANCELLED | OUTPATIENT
Start: 2019-03-27 | End: 2019-03-27

## 2019-03-26 RX ORDER — SODIUM CHLORIDE 9 MG/ML
INJECTION, SOLUTION INTRAVENOUS CONTINUOUS
Status: CANCELLED | OUTPATIENT
Start: 2019-03-27

## 2019-03-27 ENCOUNTER — HOSPITAL ENCOUNTER (OUTPATIENT)
Dept: INTERVENTIONAL RADIOLOGY/VASCULAR | Age: 59
Discharge: HOME | End: 2019-03-29
Payer: COMMERCIAL

## 2019-03-27 VITALS
OXYGEN SATURATION: 100 % | SYSTOLIC BLOOD PRESSURE: 112 MMHG | WEIGHT: 270 LBS | HEIGHT: 67 IN | BODY MASS INDEX: 42.38 KG/M2 | RESPIRATION RATE: 17 BRPM | DIASTOLIC BLOOD PRESSURE: 56 MMHG | HEART RATE: 65 BPM | TEMPERATURE: 97.2 F

## 2019-03-27 DIAGNOSIS — C50.412 MALIGNANT NEOPLASM OF UPPER-OUTER QUADRANT OF LEFT FEMALE BREAST, UNSPECIFIED ESTROGEN RECEPTOR STATUS (HCC): ICD-10-CM

## 2019-03-27 LAB
INR BLD: 1
PARTIAL THROMBOPLASTIN TIME: 23.7 SEC (ref 23–31)
PROTHROMBIN TIME: 10.3 SEC (ref 9.7–11.6)

## 2019-03-27 PROCEDURE — 77001 FLUOROGUIDE FOR VEIN DEVICE: CPT | Performed by: RADIOLOGY

## 2019-03-27 PROCEDURE — 6360000002 HC RX W HCPCS: Performed by: RADIOLOGY

## 2019-03-27 PROCEDURE — 36561 INSERT TUNNELED CV CATH: CPT | Performed by: RADIOLOGY

## 2019-03-27 PROCEDURE — 7100000010 HC PHASE II RECOVERY - FIRST 15 MIN

## 2019-03-27 PROCEDURE — 7100000001 HC PACU RECOVERY - ADDTL 15 MIN

## 2019-03-27 PROCEDURE — 85730 THROMBOPLASTIN TIME PARTIAL: CPT

## 2019-03-27 PROCEDURE — 85610 PROTHROMBIN TIME: CPT

## 2019-03-27 PROCEDURE — 2580000003 HC RX 258: Performed by: RADIOLOGY

## 2019-03-27 PROCEDURE — 76937 US GUIDE VASCULAR ACCESS: CPT | Performed by: RADIOLOGY

## 2019-03-27 PROCEDURE — C1769 GUIDE WIRE: HCPCS

## 2019-03-27 PROCEDURE — 7100000011 HC PHASE II RECOVERY - ADDTL 15 MIN

## 2019-03-27 PROCEDURE — 99153 MOD SED SAME PHYS/QHP EA: CPT | Performed by: RADIOLOGY

## 2019-03-27 PROCEDURE — 99152 MOD SED SAME PHYS/QHP 5/>YRS: CPT | Performed by: RADIOLOGY

## 2019-03-27 PROCEDURE — 7100000000 HC PACU RECOVERY - FIRST 15 MIN

## 2019-03-27 RX ORDER — SODIUM CHLORIDE 9 MG/ML
INJECTION, SOLUTION INTRAVENOUS CONTINUOUS
Status: DISCONTINUED | OUTPATIENT
Start: 2019-03-27 | End: 2019-03-30 | Stop reason: HOSPADM

## 2019-03-27 RX ORDER — FENTANYL CITRATE 50 UG/ML
INJECTION, SOLUTION INTRAMUSCULAR; INTRAVENOUS
Status: COMPLETED | OUTPATIENT
Start: 2019-03-27 | End: 2019-03-27

## 2019-03-27 RX ORDER — ACETAMINOPHEN 325 MG/1
650 TABLET ORAL EVERY 4 HOURS PRN
Status: DISCONTINUED | OUTPATIENT
Start: 2019-03-27 | End: 2019-03-30 | Stop reason: HOSPADM

## 2019-03-27 RX ORDER — MIDAZOLAM HYDROCHLORIDE 1 MG/ML
INJECTION INTRAMUSCULAR; INTRAVENOUS
Status: COMPLETED | OUTPATIENT
Start: 2019-03-27 | End: 2019-03-27

## 2019-03-27 RX ORDER — CEFAZOLIN SODIUM 1 G/50ML
1 INJECTION, SOLUTION INTRAVENOUS
Status: COMPLETED | OUTPATIENT
Start: 2019-03-27 | End: 2019-03-27

## 2019-03-27 RX ADMIN — CEFAZOLIN SODIUM 1 G: 1 INJECTION, SOLUTION INTRAVENOUS at 11:33

## 2019-03-27 RX ADMIN — SODIUM CHLORIDE: 9 INJECTION, SOLUTION INTRAVENOUS at 10:24

## 2019-03-27 RX ADMIN — Medication 50 MCG: at 11:55

## 2019-03-27 RX ADMIN — Medication 500 UNITS: at 12:05

## 2019-03-27 RX ADMIN — Medication 50 MCG: at 11:48

## 2019-03-27 RX ADMIN — MIDAZOLAM 1 MG: 1 INJECTION INTRAMUSCULAR; INTRAVENOUS at 11:48

## 2019-03-27 RX ADMIN — MIDAZOLAM 1 MG: 1 INJECTION INTRAMUSCULAR; INTRAVENOUS at 11:55

## 2019-03-27 ASSESSMENT — PAIN DESCRIPTION - DESCRIPTORS: DESCRIPTORS: ACHING;DULL

## 2019-03-27 ASSESSMENT — PAIN - FUNCTIONAL ASSESSMENT: PAIN_FUNCTIONAL_ASSESSMENT: 0-10

## 2019-03-27 ASSESSMENT — PAIN SCALES - GENERAL: PAINLEVEL_OUTOF10: 0

## 2019-04-02 ENCOUNTER — TELEPHONE (OUTPATIENT)
Dept: INFUSION THERAPY | Facility: MEDICAL CENTER | Age: 59
End: 2019-04-02

## 2019-04-04 ENCOUNTER — HOSPITAL ENCOUNTER (OUTPATIENT)
Facility: MEDICAL CENTER | Age: 59
End: 2019-04-04
Payer: COMMERCIAL

## 2019-04-05 ENCOUNTER — HOSPITAL ENCOUNTER (OUTPATIENT)
Facility: MEDICAL CENTER | Age: 59
End: 2019-04-05
Payer: COMMERCIAL

## 2019-04-09 ENCOUNTER — INITIAL CONSULT (OUTPATIENT)
Dept: ONCOLOGY | Age: 59
End: 2019-04-09
Payer: COMMERCIAL

## 2019-04-09 DIAGNOSIS — C50.412 MALIGNANT NEOPLASM OF UPPER-OUTER QUADRANT OF LEFT BREAST IN FEMALE, ESTROGEN RECEPTOR NEGATIVE (HCC): Primary | ICD-10-CM

## 2019-04-09 DIAGNOSIS — Z17.1 MALIGNANT NEOPLASM OF UPPER-OUTER QUADRANT OF LEFT BREAST IN FEMALE, ESTROGEN RECEPTOR NEGATIVE (HCC): Primary | ICD-10-CM

## 2019-04-09 PROCEDURE — 99999 PR OFFICE/OUTPT VISIT,PROCEDURE ONLY: CPT | Performed by: INTERNAL MEDICINE

## 2019-04-09 PROCEDURE — 99214 OFFICE O/P EST MOD 30 MIN: CPT

## 2019-04-09 NOTE — TELEPHONE ENCOUNTER
PT WAS IN FOR CHEMO EDUCATION AND HAS NOT ANTIEMETICS , PPI OR NUMBING LOTION FOR MEDIPORT. ALL SCRIPTS CALLED IN AND PENDED TO MD FOR REVIEW.

## 2019-04-09 NOTE — PROGRESS NOTES
WRITER MET WITH SARAH IN THE LIBRARY FOR CHEMO EDUCATION. DOSE DENSE A C + NEULASTA X 4 CYCLES FOLLOWED BY WEEKLY TAXOL X 12. PLEASE REFER TO THE CHEMO ED CHECKLIST. REFERRAL TO SOCIAL SERVICE AND [facility]. DISTRESS TOOL COMPLETED AND ENTERED INTO Epic. FUNCTIONAL ASSESSMENT TOOL COMPLETED.

## 2019-04-11 ENCOUNTER — TELEPHONE (OUTPATIENT)
Dept: ONCOLOGY | Age: 59
End: 2019-04-11

## 2019-04-11 ENCOUNTER — OFFICE VISIT (OUTPATIENT)
Dept: ONCOLOGY | Age: 59
End: 2019-04-11
Payer: COMMERCIAL

## 2019-04-11 ENCOUNTER — HOSPITAL ENCOUNTER (OUTPATIENT)
Dept: INFUSION THERAPY | Facility: MEDICAL CENTER | Age: 59
Discharge: HOME | End: 2019-04-11
Payer: COMMERCIAL

## 2019-04-11 VITALS
SYSTOLIC BLOOD PRESSURE: 132 MMHG | BODY MASS INDEX: 40.72 KG/M2 | HEART RATE: 61 BPM | TEMPERATURE: 98.1 F | DIASTOLIC BLOOD PRESSURE: 76 MMHG | RESPIRATION RATE: 18 BRPM | WEIGHT: 260 LBS

## 2019-04-11 DIAGNOSIS — Z17.1 MALIGNANT NEOPLASM OF UPPER-OUTER QUADRANT OF LEFT BREAST IN FEMALE, ESTROGEN RECEPTOR NEGATIVE (HCC): Primary | ICD-10-CM

## 2019-04-11 DIAGNOSIS — C50.412 MALIGNANT NEOPLASM OF UPPER-OUTER QUADRANT OF LEFT BREAST IN FEMALE, ESTROGEN RECEPTOR NEGATIVE (HCC): Primary | ICD-10-CM

## 2019-04-11 LAB
ABSOLUTE EOS #: 0.2 K/UL (ref 0–0.4)
ABSOLUTE IMMATURE GRANULOCYTE: ABNORMAL K/UL (ref 0–0.3)
ABSOLUTE LYMPH #: 1.9 K/UL (ref 1–4.8)
ABSOLUTE MONO #: 0.7 K/UL (ref 0.2–0.8)
ALBUMIN SERPL-MCNC: 3.9 G/DL (ref 3.5–5.2)
ALBUMIN/GLOBULIN RATIO: ABNORMAL (ref 1–2.5)
ALP BLD-CCNC: 111 U/L (ref 35–104)
ALT SERPL-CCNC: 7 U/L (ref 5–33)
ANION GAP SERPL CALCULATED.3IONS-SCNC: 11 MMOL/L (ref 9–17)
AST SERPL-CCNC: 8 U/L
BASOPHILS # BLD: 1 % (ref 0–2)
BASOPHILS ABSOLUTE: 0.1 K/UL (ref 0–0.2)
BILIRUB SERPL-MCNC: <0.1 MG/DL (ref 0.3–1.2)
BUN BLDV-MCNC: 15 MG/DL (ref 6–20)
BUN/CREAT BLD: 21 (ref 9–20)
CALCIUM SERPL-MCNC: 9.2 MG/DL (ref 8.6–10.4)
CHLORIDE BLD-SCNC: 104 MMOL/L (ref 98–107)
CO2: 23 MMOL/L (ref 20–31)
CREAT SERPL-MCNC: 0.73 MG/DL (ref 0.5–0.9)
DIFFERENTIAL TYPE: ABNORMAL
EOSINOPHILS RELATIVE PERCENT: 3 % (ref 1–4)
GFR AFRICAN AMERICAN: >60 ML/MIN
GFR NON-AFRICAN AMERICAN: >60 ML/MIN
GFR SERPL CREATININE-BSD FRML MDRD: ABNORMAL ML/MIN/{1.73_M2}
GFR SERPL CREATININE-BSD FRML MDRD: ABNORMAL ML/MIN/{1.73_M2}
GLUCOSE BLD-MCNC: 106 MG/DL (ref 70–99)
HCT VFR BLD CALC: 32.6 % (ref 36–46)
HEMOGLOBIN: 10.6 G/DL (ref 12–16)
IMMATURE GRANULOCYTES: ABNORMAL %
LYMPHOCYTES # BLD: 23 % (ref 24–44)
MCH RBC QN AUTO: 26 PG (ref 26–34)
MCHC RBC AUTO-ENTMCNC: 32.4 G/DL (ref 31–37)
MCV RBC AUTO: 80.2 FL (ref 80–100)
MONOCYTES # BLD: 8 % (ref 1–7)
NRBC AUTOMATED: ABNORMAL PER 100 WBC
PDW BLD-RTO: 17.7 % (ref 11.5–14.5)
PLATELET # BLD: 285 K/UL (ref 130–400)
PLATELET ESTIMATE: ABNORMAL
PMV BLD AUTO: 7.7 FL (ref 6–12)
POTASSIUM SERPL-SCNC: 4.5 MMOL/L (ref 3.7–5.3)
RBC # BLD: 4.06 M/UL (ref 4–5.2)
RBC # BLD: ABNORMAL 10*6/UL
SEG NEUTROPHILS: 65 % (ref 36–66)
SEGMENTED NEUTROPHILS ABSOLUTE COUNT: 5.4 K/UL (ref 1.8–7.7)
SODIUM BLD-SCNC: 138 MMOL/L (ref 135–144)
TOTAL PROTEIN: 6.9 G/DL (ref 6.4–8.3)
WBC # BLD: 8.3 K/UL (ref 3.5–11)
WBC # BLD: ABNORMAL 10*3/UL

## 2019-04-11 PROCEDURE — 6360000002 HC RX W HCPCS: Performed by: INTERNAL MEDICINE

## 2019-04-11 PROCEDURE — 96411 CHEMO IV PUSH ADDL DRUG: CPT

## 2019-04-11 PROCEDURE — G8417 CALC BMI ABV UP PARAM F/U: HCPCS | Performed by: INTERNAL MEDICINE

## 2019-04-11 PROCEDURE — 3017F COLORECTAL CA SCREEN DOC REV: CPT | Performed by: INTERNAL MEDICINE

## 2019-04-11 PROCEDURE — 96413 CHEMO IV INFUSION 1 HR: CPT

## 2019-04-11 PROCEDURE — 96409 CHEMO IV PUSH SNGL DRUG: CPT

## 2019-04-11 PROCEDURE — 96367 TX/PROPH/DG ADDL SEQ IV INF: CPT

## 2019-04-11 PROCEDURE — G8427 DOCREV CUR MEDS BY ELIG CLIN: HCPCS | Performed by: INTERNAL MEDICINE

## 2019-04-11 PROCEDURE — 99211 OFF/OP EST MAY X REQ PHY/QHP: CPT | Performed by: INTERNAL MEDICINE

## 2019-04-11 PROCEDURE — 96417 CHEMO IV INFUS EACH ADDL SEQ: CPT

## 2019-04-11 PROCEDURE — 2580000003 HC RX 258: Performed by: INTERNAL MEDICINE

## 2019-04-11 PROCEDURE — 85025 COMPLETE CBC W/AUTO DIFF WBC: CPT

## 2019-04-11 PROCEDURE — 4004F PT TOBACCO SCREEN RCVD TLK: CPT | Performed by: INTERNAL MEDICINE

## 2019-04-11 PROCEDURE — 96375 TX/PRO/DX INJ NEW DRUG ADDON: CPT

## 2019-04-11 PROCEDURE — 80053 COMPREHEN METABOLIC PANEL: CPT

## 2019-04-11 PROCEDURE — 96377 APPLICATON ON-BODY INJECTOR: CPT

## 2019-04-11 PROCEDURE — 36591 DRAW BLOOD OFF VENOUS DEVICE: CPT

## 2019-04-11 PROCEDURE — 99214 OFFICE O/P EST MOD 30 MIN: CPT | Performed by: INTERNAL MEDICINE

## 2019-04-11 PROCEDURE — 96374 THER/PROPH/DIAG INJ IV PUSH: CPT

## 2019-04-11 RX ORDER — ONDANSETRON 8 MG/1
8 TABLET, ORALLY DISINTEGRATING ORAL EVERY 8 HOURS PRN
Qty: 30 TABLET | Refills: 2 | OUTPATIENT
Start: 2019-04-11 | End: 2020-03-20

## 2019-04-11 RX ORDER — DEXAMETHASONE SODIUM PHOSPHATE 10 MG/ML
10 INJECTION INTRAMUSCULAR; INTRAVENOUS ONCE
Status: COMPLETED | OUTPATIENT
Start: 2019-04-11 | End: 2019-04-11

## 2019-04-11 RX ORDER — SODIUM CHLORIDE 0.9 % (FLUSH) 0.9 %
5 SYRINGE (ML) INJECTION PRN
Status: CANCELLED | OUTPATIENT
Start: 2019-04-11

## 2019-04-11 RX ORDER — PALONOSETRON 0.05 MG/ML
0.25 INJECTION, SOLUTION INTRAVENOUS ONCE
Status: CANCELLED | OUTPATIENT
Start: 2019-04-11

## 2019-04-11 RX ORDER — PALONOSETRON 0.05 MG/ML
0.25 INJECTION, SOLUTION INTRAVENOUS ONCE
Status: COMPLETED | OUTPATIENT
Start: 2019-04-11 | End: 2019-04-11

## 2019-04-11 RX ORDER — SODIUM CHLORIDE 0.9 % (FLUSH) 0.9 %
10 SYRINGE (ML) INJECTION PRN
Status: DISCONTINUED | OUTPATIENT
Start: 2019-04-11 | End: 2019-04-12 | Stop reason: HOSPADM

## 2019-04-11 RX ORDER — HEPARIN SODIUM (PORCINE) LOCK FLUSH IV SOLN 100 UNIT/ML 100 UNIT/ML
500 SOLUTION INTRAVENOUS PRN
Status: DISCONTINUED | OUTPATIENT
Start: 2019-04-11 | End: 2019-04-12 | Stop reason: HOSPADM

## 2019-04-11 RX ORDER — OXYCODONE HYDROCHLORIDE AND ACETAMINOPHEN 5; 325 MG/1; MG/1
1 TABLET ORAL EVERY 6 HOURS PRN
Qty: 40 TABLET | Refills: 0 | Status: SHIPPED | OUTPATIENT
Start: 2019-04-11 | End: 2019-04-25 | Stop reason: SDUPTHER

## 2019-04-11 RX ORDER — DOXORUBICIN HYDROCHLORIDE 2 MG/ML
60 INJECTION, SOLUTION INTRAVENOUS ONCE
Status: CANCELLED | OUTPATIENT
Start: 2019-04-11

## 2019-04-11 RX ORDER — PROCHLORPERAZINE MALEATE 10 MG
10 TABLET ORAL EVERY 6 HOURS PRN
Qty: 120 TABLET | Refills: 3 | OUTPATIENT
Start: 2019-04-11 | End: 2020-03-20

## 2019-04-11 RX ORDER — METHYLPREDNISOLONE SODIUM SUCCINATE 125 MG/2ML
125 INJECTION, POWDER, LYOPHILIZED, FOR SOLUTION INTRAMUSCULAR; INTRAVENOUS ONCE
Status: CANCELLED | OUTPATIENT
Start: 2019-04-11

## 2019-04-11 RX ORDER — DOXORUBICIN HYDROCHLORIDE 2 MG/ML
60 INJECTION, SOLUTION INTRAVENOUS ONCE
Status: COMPLETED | OUTPATIENT
Start: 2019-04-11 | End: 2019-04-11

## 2019-04-11 RX ORDER — SODIUM CHLORIDE 9 MG/ML
INJECTION, SOLUTION INTRAVENOUS CONTINUOUS
Status: CANCELLED | OUTPATIENT
Start: 2019-04-11

## 2019-04-11 RX ORDER — DIPHENHYDRAMINE HYDROCHLORIDE 50 MG/ML
50 INJECTION INTRAMUSCULAR; INTRAVENOUS ONCE
Status: CANCELLED | OUTPATIENT
Start: 2019-04-11

## 2019-04-11 RX ORDER — HEPARIN SODIUM (PORCINE) LOCK FLUSH IV SOLN 100 UNIT/ML 100 UNIT/ML
500 SOLUTION INTRAVENOUS PRN
Status: CANCELLED | OUTPATIENT
Start: 2019-04-11

## 2019-04-11 RX ORDER — 0.9 % SODIUM CHLORIDE 0.9 %
10 VIAL (ML) INJECTION ONCE
Status: CANCELLED | OUTPATIENT
Start: 2019-04-11

## 2019-04-11 RX ORDER — OMEPRAZOLE 20 MG/1
20 CAPSULE, DELAYED RELEASE ORAL DAILY
Qty: 30 CAPSULE | Refills: 3 | OUTPATIENT
Start: 2019-04-11 | End: 2019-09-12 | Stop reason: ALTCHOICE

## 2019-04-11 RX ORDER — SODIUM CHLORIDE 0.9 % (FLUSH) 0.9 %
10 SYRINGE (ML) INJECTION PRN
Status: CANCELLED | OUTPATIENT
Start: 2019-04-11

## 2019-04-11 RX ORDER — LIDOCAINE AND PRILOCAINE 25; 25 MG/G; MG/G
CREAM TOPICAL
Qty: 1 TUBE | Refills: 3 | Status: SHIPPED | OUTPATIENT
Start: 2019-04-11 | End: 2019-07-18 | Stop reason: SDUPTHER

## 2019-04-11 RX ORDER — SODIUM CHLORIDE 9 MG/ML
INJECTION, SOLUTION INTRAVENOUS ONCE
Status: CANCELLED | OUTPATIENT
Start: 2019-04-11

## 2019-04-11 RX ORDER — SODIUM CHLORIDE 9 MG/ML
INJECTION, SOLUTION INTRAVENOUS ONCE
Status: DISCONTINUED | OUTPATIENT
Start: 2019-04-11 | End: 2019-04-12 | Stop reason: HOSPADM

## 2019-04-11 RX ADMIN — Medication 500 UNITS: at 14:40

## 2019-04-11 RX ADMIN — DOXORUBICIN HYDROCHLORIDE 140 MG: 2 INJECTION, SOLUTION INTRAVENOUS at 13:12

## 2019-04-11 RX ADMIN — CYCLOPHOSPHAMIDE 1400 MG: 1 INJECTION, POWDER, FOR SOLUTION INTRAVENOUS; ORAL at 13:21

## 2019-04-11 RX ADMIN — SODIUM CHLORIDE 150 MG: 900 INJECTION, SOLUTION INTRAVENOUS at 12:15

## 2019-04-11 RX ADMIN — PEGFILGRASTIM 6 MG: KIT SUBCUTANEOUS at 14:42

## 2019-04-11 RX ADMIN — PALONOSETRON HYDROCHLORIDE 0.25 MG: 0.25 INJECTION, SOLUTION INTRAVENOUS at 12:07

## 2019-04-11 RX ADMIN — DEXAMETHASONE SODIUM PHOSPHATE 10 MG: 10 INJECTION INTRAMUSCULAR; INTRAVENOUS at 12:12

## 2019-04-11 RX ADMIN — SODIUM CHLORIDE: 9 INJECTION, SOLUTION INTRAVENOUS at 12:00

## 2019-04-11 NOTE — PROGRESS NOTES
Patient here for labs, MD visit and chemo. Feels well today. Vitals obtained. Port accessed per policy and labs drawn and sent. Labs reviewed. Seen by MD.  Paticia Malcolm given per [facility]. Chemo given per [facility] with brisk blood flow obtained before during and after chemo given. Completed. Tolerated well. Port flushed per policy and gripper removed intact, band aid to site. Has a return visit scheduled. Discharged ambulatory per self.

## 2019-04-11 NOTE — TELEPHONE ENCOUNTER
Deandra Banks MD VISIT & TX  DR MACE IN TO SEE PATIENT  ORDERS RECEIVED  PROCEED W/TX  RV 2 WEEKS  MD VISIT 4/25/19 @11:40AM  [email or web address]  SCRIPT SENT TO PATIENTS PHARMACY  AVS PRINTED AND GIVEN TO PATIENT WITH INSTRUCTIONS  PATIENT REMAINS IN [address] [place] [address]

## 2019-04-11 NOTE — PROGRESS NOTES
Kirby Plunkett                                                                                                                  4/11/2019  MRN:   [id number]  YOB: 1960  PCP:                           Janett Duran MD  Referring Physician: No ref. provider found  Treating Physician Name: Barney White MD      Reason for visit:  Chief Complaint   Patient presents with    Follow-up     review status of disease    Pain     chronic     Current problems:   IDC of Left Breast cancer, Triple negative, pT2,pN0,M0    Active and recent treatments:  Bilateral Mastectomy with left sentinel lymph node    Summary of Case/History:    Kirby Plunkett a 62 y. o.female invasive carcinoma of the breast.     Patient initially felt a lump in her left breast for 2-3 months. She underwent screening mammogram which showed 2 cm mass in upper outer left breast.  Right breast do not have any concerning findings. Ultrasound of left breast conformed mass measuring 1.9 cm in the upper outer left breast.  Patient underwent biopsy on 1/23/19 which revealed invasive ductal carcinoma, grade 3, ER/MS negative. HER-2 results are pending.     Patient has history of uterus/cervical cancer diagnosed in 1992. Patient states she underwent surgery for the cervical cancer. She does not recall getting any radiation therapy or chemotherapy. Patient states she has been interested in getting bilateral breast reduction due to cosmetic reasons however now with a diagnosis of breast cancer she wishes to proceed with bilateral mastectomy.     Patient has history of breast cancer in her mother as well as 3 paternal aunts. Patient's mother also had cervical and uterine cancer.     Patient underwent bilateral mastectomy with left sentinel lymph node biopsy.   Pathological stage was T2, N0, M0    And is waiting to start chemotherapy for triple negative breast cancer using AC ordered by T    Interim History:    Patient presents to the clinic for follow-up visit and to discuss treatment plan. Patient has recovered from the surgery. Echocardiogram shows preserved left ventricle ejection fraction. Port site looks good. Denies nausea vomiting fever or chills. During this visit patient's allergy, social, medical, surgical history and medications were reviewed and updated.       Past Medical History:   Past Medical History:   Diagnosis Date    Arthritis     GENERALIZED ALL OVER RHEUMATOID AND OSTEO    Cancer ([address] 1992    CERVICAL, UTERINE    Cancer of adrenal cortex ([address] 01/2019    LEFT BREAST    Chronic back pain     scioliosis,  02/2019 WEARING A BACK BRACE    Degenerative disorder of bone     ALL OVER    Depression     Difficult intravenous access     HANDS AR BEST SITE    GERD (gastroesophageal reflux disease)     Hyperlipidemia     HX OF NO MEDS IN YEARS    Hypertension 2007    Pain management clinic    Insomnia     Obesity     BMI -  43    Sleep apnea 2016    NO LONGER USES MACHINE    Snores     has been told by family that she stops breathing    Use of cane as ambulatory aid     Wears glasses     READING    Wears glasses     \"CHEATERS\"       Past Surgical History:     Past Surgical History:   Procedure Laterality Date    BREAST SURGERY Left 1990    CYST    BUNIONECTOMY Bilateral     CERVIX SURGERY  1992    COLONOSCOPY  10/13/2017    FOOT SURGERY Left 2014    PINS IN ALL TOES    HAMMER TOE SURGERY Bilateral     HYSTERECTOMY  1992    UTERUS, CERVIX     MASTECTOMY Bilateral 2/12/2019    TOTAL MASTECTOMY performed by Scotty Bronson DO at [address] [facility], BILATERAL Bilateral 02/12/2019     TOTAL MASTECTOMY,  AXILLARY SENTINEL LYMPH NODE BIOPSY, FROZEN SECTION     OH COLSC FLX W/REMOVAL LESION BY HOT BX FORCEPS N/A 10/13/2017    COLONOSCOPY WITH BIOPSY AND POLYPECTOMY performed by Jann Jovel IV, DO at [facility] TUNNELED VENOUS PORT PLACEMENT  03/27/2019    X-RAY FOOT 3+VW both       Patient Family Social History:     Social History     Socioeconomic History    Marital status: Single     Spouse name: Not on file    Number of children: Not on file    Years of education: Not on file    Highest education level: Not on file   Occupational History    Not on file   Social Needs    Financial resource strain: Not on file    Food insecurity:     Worry: Not on file     Inability: Not on file    Transportation needs:     Medical: Not on file     Non-medical: Not on file   Tobacco Use    Smoking status: Current Some Day Smoker     Packs/day: 0.00     Years: 41.00     Pack years: 0.00     Types: Cigarettes    Smokeless tobacco: Never Used    Tobacco comment: STATES SMOKES A PACK A WEEK   Substance and Sexual Activity    Alcohol use: No    Drug use: Yes     Frequency: 7.0 times per week     Types: Marijuana     Comment: LAST USE 02/10/2018, AND PAIN PILLS     Sexual activity: Not Currently   Lifestyle    Physical activity:     Days per week: Not on file     Minutes per session: Not on file    Stress: Not on file   Relationships    Social connections:     Talks on phone: Not on file     Gets together: Not on file     Attends Nondenominational service: Not on file     Active member of club or organization: Not on file     Attends meetings of clubs or organizations: Not on file     Relationship status: Not on file    Intimate partner violence:     Fear of current or ex partner: Not on file     Emotionally abused: Not on file     Physically abused: Not on file     Forced sexual activity: Not on file   Other Topics Concern    Not on file   Social History Narrative    Not on file      Family History   Problem Relation Age of Onset    High Blood Pressure Mother     Cancer Mother         cervical and breast    Diabetes Father     Heart Attack Father     Heart Disease Maternal Aunt         times 2      Current Medications:     Current Outpatient Medications   Medication Sig Dispense Refill  prochlorperazine (COMPAZINE) 10 MG tablet Take 1 tablet by mouth every 6 hours as needed (CHEMO INDUCED NAUSEA) 120 tablet 3    ondansetron (ZOFRAN-ODT) 8 MG TBDP disintegrating tablet Place 1 tablet under the tongue every 8 hours as needed for Nausea or Vomiting (CHEMO INDUCED NAUSEA) 30 tablet 2    omeprazole (PRILOSEC) 20 MG delayed release capsule Take 1 capsule by mouth daily 30 capsule 3    lidocaine-prilocaine (EMLA) 2.5-2.5 % cream Apply topically as needed PRIOR TO MEDIPORT ACCESS, NO MORE THAN TWICE PER DAY. 1 Tube 3    spironolactone (ALDACTONE) 25 MG tablet Take 1 tablet by mouth daily 30 tablet 1    amLODIPine (NORVASC) 10 MG tablet TAKE 1 TABLET EVERY DAY 30 tablet 1    docusate sodium (COLACE) 100 MG capsule Take 1 capsule by mouth 2 times daily as needed for Constipation 60 capsule 0    Gauze Pads & Dressings (GAUZE DRESSING) 4\"X4\" PADS 1 each by Does not apply route daily 10 each 2    Gauze Pads & Dressings (ABDOMINAL PAD) 8\"X10\" PADS 1 Units by Does not apply route daily 10 each 2    Elastic Bandages & Supports (ABDOMINAL BINDER/ELASTIC XL) MISC Please give pt 1 XL abdominal binder, wear daily for support 1 each 1    EPINEPHrine (EPIPEN) 0.3 MG/0.3ML SOAJ injection Use as directed for allergic reaction (Patient taking differently: 0.3 mg as needed Use as directed for allergic reaction) 1 each 1    oxyCODONE-acetaminophen (PERCOCET) 5-325 MG per tablet Take 1 tablet by mouth every 6 hours as needed for Pain for up to 14 days. Take lowest dose possible to manage pain 40 tablet 0    furosemide (LASIX) 20 MG tablet Take 1 tablet by mouth daily as needed (leg swelling) 21 tablet 1    naproxen (NAPROSYN) 500 MG tablet TAKE 1 TABLET TWICE A DAY WITH MEALS 60 tablet 0     No current facility-administered medications for this visit.       Facility-Administered Medications Ordered in Other Visits   Medication Dose Route Frequency Provider Last Rate Last Dose    heparin flush 100 UNIT/ML injection 500 Units  500 Units Intracatheter PRN Khai Soria MD        sodium chloride flush 0.9 % injection 10 mL  10 mL Intravenous PRN Khai Soria MD           Allergies:   Lisinopril; Bee venom; and Sulfa antibiotics    Review of Systems:    Constitutional: No fever or chills. No night sweats, no weight loss   Eyes: No eye discharge, double vision, or eye pain   HEENT: negative for sore mouth, sore throat, hoarseness and voice change   Respiratory: negative for cough , sputum, dyspnea, wheezing, hemoptysis, chest pain   Cardiovascular: negative for chest pain, dyspnea, palpitations, orthopnea, PND   Gastrointestinal: negative for nausea, vomiting, diarrhea, constipation, abdominal pain, Dysphagia, hematemesis and hematochezia   Genitourinary: negative for frequency, dysuria, nocturia, urinary incontinence, and hematuria   Integument: negative for rash, skin lesions, bruises.    Hematologic/Lymphatic: negative for easy bruising, bleeding, lymphadenopathy, or petechiae   Endocrine: negative for heat or cold intolerance,weight changes, change in bowel habits and hair loss   Musculoskeletal: negative for myalgias, arthralgias, pain, joint swelling,and bone pain   Neurological: negative for headaches, dizziness, seizures, weakness, numbness        Physical Exam:    Vitals: /76   Pulse 61   Temp 98.1 °F (36.7 °C) (Oral)   Resp 18   Wt 260 lb (117.9 kg)   LMP 02/11/1992   BMI 40.72 kg/m²   General appearance - well appearing, no in pain or distress  Mental status - AAO X3  Eyes - pupils equal and reactive, extraocular eye movements intact  Mouth - mucous membranes moist, pharynx normal without lesions  Neck - supple, no significant adenopathy  Lymphatics - no palpable lymphadenopathy, no hepatosplenomegaly  Chest - clear to auscultation, no wheezes, rales or rhonchi, symmetric air entry  Heart - normal rate, regular rhythm, normal S1, S2, no murmurs  Abdomen - soft, nontender, nondistended, no masses or organomegaly  Neurological - alert, oriented, normal speech, no focal findings or movement disorder noted  Extremities - peripheral pulses normal, no pedal edema, no clubbing or cyanosis  Skin - normal coloration and turgor, no rashes, no suspicious skin lesions noted   Breast - examination consistent with bilateral mastectomy      DATA:    Results for orders placed or performed during the hospital encounter of 02/12/19   Anaerobic and Aerobic Culture   Result Value Ref Range    Specimen Description . AXILLA SWAB     Special Requests SKIN ABSCESS LEFT AXILLA     Direct Exam NO NEUTROPHILS SEEN     Direct Exam NO BACTERIA SEEN     Culture NORMAL YOANA     Culture ANAEROCOCCUS PREVOTII  LIGHT GROWTH   (A)    Hemoglobin and hematocrit, blood   Result Value Ref Range    POC Hemoglobin 14.8 12.0 - 16.0 g/dL    POC Hematocrit 43 36 - 46 %   SODIUM (POC)   Result Value Ref Range    POC Sodium 143 138 - 146 mmol/L   POTASSIUM (POC)   Result Value Ref Range    POC Potassium 4.3 3.5 - 4.5 mmol/L   CHLORIDE (POC)   Result Value Ref Range    POC Chloride 108 (H) 98 - 107 mmol/L   CALCIUM, IONIC (POC)   Result Value Ref Range    POC Ionized Calcium 1.29 1.15 - 1.33 mmol/L   Surgical Pathology   Result Value Ref Range    Comment SPECIMEN RECEIVED    Surgical Pathology   Result Value Ref Range    Surgical Pathology Report       [id number]  [employer]  [facility]  ANATOMIC PATHOLOGY  [address] [place] [address]. [facility], [address]  [phone]  Fax: [phone]   [facility]    Patient Name: Lon Mcclure  MR#: [id number]  Specimen #[id number]    Procedures/Addenda  MOLECULAR PATHOLOGY REPORT     Date Ordered:     2/18/2019     Status:  Signed Out       Date Complete:     2/18/2019     By:  Bee Serna M.D. Date Reported:     2/20/2019       INTERPRETATION    LEFT BREAST CARCINOMA:      -  NEGATIVE FOR HER2 (ERBB2) GENE AMPLIFICATION BY FISH.         -  HER2: [facility] Clinical [employer]cology to optimize accuracy and  consistency of HER2 (ERBB2) testing is for prompt appropriate  sectioning of specimens with cold ischemia time < 1 hour and fixation  in 10% buffered formalin for 6-72 hours. The [employer] kit is  designed to detect amplification of the HER2 (ERBB2) gene via  fluorescence in situ hybridization (FISH). This test is approved by  the U.S. Food and Drug Administration. Guevara Fuller M.D. Final Diagnosis  1. LYMPH NODES, EXCISION (LEFT SENTINEL):       - NO NEOPLASM DETECTED. 2.  BREAST WITH AXILLARY LYMPH NODES, MASTECTOMY (LEFT):       - INVASIVE DUCTAL CARCINOMA, 3.9 CM. - EXCISION MARGINS NEGATIVE FOR NEOPLASM.       - LYMPH NODES NEGATIVE FOR NEOPLASM. - NEGATIVE ESTROGEN RECEPTOR.       - NEGATIVE PROGESTERONE RECEPTOR.       - HER2 GENE AMPLIFICATION ASSESSMENT PENDING. 3.  BREAST, MASTECTOMY (RIGHT):       - FIBROCYSTIC CHANGE.       - NO ATYPIA OR NEOPLASIA DETECTED. Pete Watters M.D.  **Electronically Signed Out**         [facility]/2/15/2019  Clinical Information  Pre-op Diagnosis:  INVASIVE DUCTAL CARCINOMA LEFT BREAST   Operative Findings:  SENTINEL LYMPH NODE LEFT AXILLA  SILK ON PRIMARY  LYMPH NODE; LEFT BREAST  SILK AT MEDIAL MARGIN; RIGHT BREAST  SILK AT  MEDIAL MARGIN  Operation Performed:  SIMPLE MASTECTOMY AXILLARY SENTINEL LYMPH NODE  BIOPSY, POSSIBLE FROZEN SECTION, POSSIBLE AXILLARY LYMPH NODE  DISSECTION    Source:  1: SE NTINEL LYMPH NODE LEFT AXILLA, SILK ON PRIMARY LYMPH NODE (A)  2: LEFT BREAST (B)  3: RIGHT BREAST (C)    Gross Description  1. \"SARAH MANNT, SENTINEL LYMPH NODE LEFT AXILLA\" Received fresh are  four nodes from 0.4 to 1.8 cm. The largest is tagged with a suture  designating \"primary lymph node. \"  Cassette summary:  \"A\" one node,  \"B\" one node, \"C\" one node, \"D-E\" largest primary node, 5FS.   2.  \"SARAH VARSHA, LEFT BREAST\" The product of a left simple mastectomy,  49.0 x 35.0 x 9.0 cm and is surmounted by a 45.0 x 39.0 cm ellipse of  tan skin with an eccentrically located non-retracted nipple. Sectioning reveals a 3.9 x 2.5 x 2.0 cm circumscribed focally  hemorrhagic tan-pink mass lesion within the upper outer quadrant that  is approximately 9.0 cm from the deep margin and at least 12.0 cm from  the remaining margins. Surrounding the lesion there is fat necrosis  consistent with a previous biopsy. Superior to the mass, there is a  1.0 cm rubbery pink-tan node, also within the upper outer  quadrant. Additional lymph nodes are identified, 0.4 cm and 0.8 cm with no  obvious tumor involvement. Cassette summary:  \"A\" nipple and skin,  \"B\" deep margin inked black perpendicular, inferior anterior margin  inked red perpendicular, \"C-E\" tumor, \"F\" node superior to tumor and  superior anterior margin perpendicular, \"G\" uninvolved upper inner  quadrant, \"H\" uninvolved lower inner quadrant, \"I\" uninvolved lower  outer quadrant, \"J\" two nodes. 3. \"SARAH VARSHA, RIGHT BREAST\" Product of a right simple mastectomy,  43.0 x 41.0 x 7.0 cm and is surmounted by a 43.0 x 39.0 cm ellipse of  tan-brown skin with an eccentrically located non-retracted nipple. The specimen is oriented with a suture designating medial.  Sectioning  reveals markedly fatty cut surfaces with no discrete masses. There  are no lymph nodes. Cassette summary:  \"A\" nipple and deep margin,  \"B-D\" upper outer quadrant, \"E-F\" lower outer quadrant, \"G-I\" upper  inner quadrant, \"J-K\" lower inner quadrant. tm      Intr aoperative Diagnosis  1. FSDX:  Lymph nodes, no neoplasm detected. Dee Toledo, 1138,  2/12/2019)      Microscopic Description  1-2.   INVASIVE BREAST CARCINOMA         PROCEDURE:     Mastectomy  SPECIMEN LATERALITY AND TUMOR SITE:     Left, not specified further    TUMOR SIZE (LARGEST INVASIVE COMPONENT):     3.9 x 2.5 x 2.0 cm  HISTOLOGIC TYPE OF INVASIVE CARCINOMA:     Ductal; some features  suggest neuroendocrine differentiation; however, control-reactive  immunostain for synaptophysin is negative  HISTOLOGIC GRADE (YOLETTE)       - GLANDULAR/TUBULAR DIFFERENTIATION SCORE:     3  - NUCLEAR PLEOMORPHISM SCORE:     2  - MITOTIC RATE SCORE:     3  - OVERALL GRADE (FROM YOLETTE TOTAL SCORE):     Score 8 = grade 3    TUMOR FOCALITY:       DUCTAL CARCINOMA IN SITU:     Present, few peritumoral foci  TUMOR EXTENSION       - SKIN:     Negative  - NIPPLE:     Negative  - SKELETAL MUSCLE:     N/A    MARGINS - INVASIVE CARCINOMA -  - SITE(S) OF ALL POSITIVE MARGINS: N/A  - IF ALL NEGATI VE, SITE(S) AND DISTANCE TO CLOSEST: 5 cm or more  MARGINS - DCIS -  - SITE(S) OF ALL POSITIVE MARGINS: N/A  - IF ALL NEGATIVE, SITE(S) AND DISTANCE TO CLOSEST:     5 cm or more  LYMPHOVASCULAR INVASION:     Negative    REGIONAL LYMPH NODES -       - NUMBER EXAMINED (SENTINEL AND NON-SENTINEL):     4+3=7  - NUMBER OF SENTINEL:     4  - NUMBER WITH MACROMETASTASES (>2MM):     0  - NUMBER WITH MICROMETASTASES (>0.2MM-2MM AND / OR >200 CELLS):     0;  confirms frozen section diagnoses  - NUMBER WITH ISOLATED TUMOR CELLS (<0.2 MM AND  <200 CELLS):     N/A  - SIZE OF LARGEST METASTATIC DEPOSIT:     N/A   - EXTRANODAL EXTENSION:     N/A  TREATMENT EFFECT: RESPONSE TO PRE-SURGICAL (NEOADJUVANT) THERAPY (IF  APPLICABLE)       - IN BREAST TISSUES:     N/A  - IN LYMPH NODES:     N/A    PATHOLOGIC STAGE CLASSIFICATION (pTNM):     pT2  pN0    CAP InvasiveBreast 4100 in conjunction with AJCC 8 ed. BIOMARKER TESTING  BREAST CARCINOMA       Biomarker studies were performed  on prior breast biopsy from January, 2019 (VS ), with triple negative results. Therefore, per  recommendations, those studies are repeated on a larger tumor specimen  now.   ESTROGEN RECEPTOR*:   -POSITIVE (PERCENT =>1% OF POSITIVE TUMOR CELL NUCLEI):     N/A  ---AVERAGE INTENSITY OF POSITIVE STAINING:     N/A  -NEGATIVE (PERCENT <1% OF POSITIVE TUMOR CELL NUCLEI):     0%  ---AVERAGE INTENSITY OF ANY POSITIVE STAINING: N/A  ---INTERNAL CONTROL PRESENT AND STAIN AS EXPECTED:     Yes  ---INTERNAL CONTROL CELLS ABSENT (COMMENT):     N/A  ---INTERNAL CONTROL CELLS PRESENT BUT DO NOT STAIN (COMMENT):     N/A    PROGESTERONE RECEPTOR*:  ---AVERAGE INTENSITY OF POSITIVE STAINING:     N/A  -NEGATIVE (PERCENT <1% OF POSITIVE TUMOR CELL NUCLEI):     0%  ---AVERAGE INTENSITY OF ANY POSITIVE STAINING: N/A  ---INTERNAL CONTROL PRESENT AND STAIN AS EXPECTED:     Yes  ---INTERNAL CONTROL CELLS ABSENT (COMMENT):     N/A  ---INTERNAL CONTROL CELLS PRESENT BUT DO NOT STAIN (COMMENT):     N/A     HER2*:  HER2 PROTEIN EXPRESSION  (IMMUNOHISTOCHEMISTRY):     N/A  HER2 GENE AMPLIFICATION (INSITU HYBRIDIZATION):     Pending (see  addendum report)         TIME SPECIMEN REMOVED:     February 12, 2019, 1205  TIME SPECIMEN PLACED IN FORMALIN:     February 12, 2019, 1205  COLD ISCHEMIA TIME MEETS CAP / ASCO REQUIREMENTS (LESS THAN 1 HOUR):      Yes  FIXATION TIME (CAP / ASCO GUIDELINES 6-72 HOURS):     29.5 hours  FIXATIVE:     10% BUFFERED FORMALIN    *Information on biomarker regents:  Estrogen Receptor: If performed at [place] [address] laboratory: Immunostain  clone SP1 with indirect biotin streptavidin detection system, vendor  Flora Jang (FDA cleared); evaluated by manual morphometry  (negative=less than 1% tumor cell nuclear staining; otherwise  positive); external control is reactive. Progesterone Receptor: If performed at [place] [address] laboratory: Immunostain  clone 1E2 with indirect biotin streptavidin detection system, vendor  Flora Jang (FDA cleared); evaluated by m anual morphometry  (negative=less than 1% tumor cell nuclear staining; otherwise  positive); external control is reactive. HER 2 insitu hybridization: FDA cleared, vendor [facility]. [facility] BreastBiomarkers 1201 in conjunction with AJCC 8 ed. 3.  Microscopic examination performed.        CBC   Result Value Ref Range    WBC 18.7 (H) 3.5 - 11.3 k/uL    RBC 3.17 (L) 3.95 - 5.11 m/uL    Hemoglobin 9.1 (L) 11.9 - 15.1 g/dL    Hematocrit 28.1 (L) 36.3 - 47.1 %    MCV 88.6 82.6 - 102.9 fL    MCH 28.7 25.2 - 33.5 pg    MCHC 32.4 28.4 - 34.8 g/dL    RDW 15.8 (H) 11.8 - 14.4 %    Platelets 460 366 - 096 k/uL    MPV 10.5 8.1 - 13.5 fL    NRBC Automated 0.0 0.0 per 100 WBC   Basic Metabolic Panel w/ Reflex to MG   Result Value Ref Range    Glucose 136 (H) 70 - 99 mg/dL    BUN 19 6 - 20 mg/dL    CREATININE 1.19 (H) 0.50 - 0.90 mg/dL    Bun/Cre Ratio NOT REPORTED 9 - 20    Calcium 8.4 (L) 8.6 - 10.4 mg/dL    Sodium 137 135 - 144 mmol/L    Potassium 5.0 3.7 - 5.3 mmol/L    Chloride 104 98 - 107 mmol/L    CO2 21 20 - 31 mmol/L    Anion Gap 12 9 - 17 mmol/L    GFR Non-African American 47 (L) >60 mL/min    GFR  56 (L) >60 mL/min    GFR Comment          GFR Staging NOT REPORTED    Magnesium   Result Value Ref Range    Magnesium 2.0 1.6 - 2.6 mg/dL   CBC   Result Value Ref Range    WBC 17.2 (H) 3.5 - 11.3 k/uL    RBC 2.47 (L) 3.95 - 5.11 m/uL    Hemoglobin 7.1 (L) 11.9 - 15.1 g/dL    Hematocrit 22.8 (L) 36.3 - 47.1 %    MCV 92.3 82.6 - 102.9 fL    MCH 28.7 25.2 - 33.5 pg    MCHC 31.1 28.4 - 34.8 g/dL    RDW 16.3 (H) 11.8 - 14.4 %    Platelets 829 839 - 660 k/uL    MPV 10.3 8.1 - 13.5 fL    NRBC Automated 0.0 0.0 per 100 WBC   BASIC METABOLIC PANEL   Result Value Ref Range    Glucose 108 (H) 70 - 99 mg/dL    BUN 31 (H) 6 - 20 mg/dL    CREATININE 1.07 (H) 0.50 - 0.90 mg/dL    Bun/Cre Ratio NOT REPORTED 9 - 20    Calcium 8.2 (L) 8.6 - 10.4 mg/dL    Sodium 136 135 - 144 mmol/L    Potassium 4.7 3.7 - 5.3 mmol/L    Chloride 104 98 - 107 mmol/L    CO2 23 20 - 31 mmol/L    Anion Gap 9 9 - 17 mmol/L    GFR Non-African American 53 (L) >60 mL/min    GFR African American >60 >60 mL/min    GFR Comment          GFR Staging NOT REPORTED    HEMOGLOBIN AND HEMATOCRIT, BLOOD   Result Value Ref Range    Hemoglobin 7.2 (L) 11.9 - 15.1 g/dL    Hematocrit 22.0 (L) 36.3 - 47.1 % Creatinine W/GFR Point of Care   Result Value Ref Range    POC Creatinine 0.90 0.51 - 1.19 mg/dL    GFR Comment >60 >60 mL/min    GFR Non-African American >60 >60 mL/min    GFR Comment         Lactic Acid, POC   Result Value Ref Range    POC Lactic Acid 0.59 0.56 - 1.39 mmol/L   POCT Glucose   Result Value Ref Range    POC Glucose 87 74 - 100 mg/dL         Impression:  Invasive ductal carcinoma of left breast, triple negative, pT2,p N0, M0  Status post bilateral mastectomy and left sentinel lymph node biopsy  Personal history of gynecological malignancy  (uterus/cervical cancer) status post surgery in 1992  Family history of malignancy  Chronic back pain  Hypertension  Obesity      Plan:  I had a detailed discussion with the patient and personally went over the results of her lab workup imaging studies and other relevant clinical data. Toxicity check performed. Labs are adequate for treatment. We will proceed with cycle #1. Reiterated treatment plan. Reviewed potential side effects. Patient has had chemo teaching. Patient was referred to lymphedema clinic earlier  Patient was given a prescription for Percocet due to pain after port placement which is helping her. NCCN guidelines were reviewed and discussed with the patient. The diagnosis and care plan were discussed with the patient in detail. I discussed the natural history of the disease, prognosis, risks and goals of therapy and answered all the patients questions to the best of my ability. Patient expressed understanding and was in agreement. Benjaminnn Lipoma      I spent more than 25 minutes examining, evaluating, reviewing data, counseling the patient and coordinating care.   Greater than 50% of time was spent face-to-face with the patient  This note is created with the assistance of a speech recognition program.  While intending to generate a document that actually reflects the content of the visit, the document can still have some errors including those of syntax and sound a like substitutions which may escape proof reading. It such instances, actual meaning can be extrapolated by contextual diversion.

## 2019-04-15 ENCOUNTER — TELEPHONE (OUTPATIENT)
Dept: ONCOLOGY | Age: 59
End: 2019-04-15

## 2019-04-15 NOTE — TELEPHONE ENCOUNTER
RECEIVED FAXED DURABLE MEDICAL EQUIPMENT PHYSICIAN ORDER FOR MASTECTOMY BRAS AND BREAST PROSTHESIS. PLACED IN MD RACK IN TRIAGE.   UPON COMPLETION FAX BACK TO THAT SPECIAL WOMAN @ [phone]

## 2019-04-16 ENCOUNTER — TELEPHONE (OUTPATIENT)
Dept: ONCOLOGY | Age: 59
End: 2019-04-16

## 2019-04-16 NOTE — TELEPHONE ENCOUNTER
Pt meeting with sahil This week at [facility] court office. I am following at a distance as per patient's wishes. I will wait for her to call me, or if someone asks me to call patient. She is following with Dr. Nicci Silveira at [phone] mastectomy bra and prosthetics ordered.

## 2019-04-16 NOTE — TELEPHONE ENCOUNTER
SW reviewed patient's psychosocial form and she has no needs at this time. [facility] Medicaid and SSA benefits in place. Priscilla Rosa.  Yoana Diaz

## 2019-04-18 ENCOUNTER — HOSPITAL ENCOUNTER (OUTPATIENT)
Facility: MEDICAL CENTER | Age: 59
End: 2019-04-18
Payer: COMMERCIAL

## 2019-04-18 ENCOUNTER — HOSPITAL ENCOUNTER (OUTPATIENT)
Dept: PHYSICAL THERAPY | Facility: CLINIC | Age: 59
Setting detail: THERAPIES SERIES
Discharge: HOME | End: 2019-04-18
Payer: COMMERCIAL

## 2019-04-18 PROCEDURE — 97110 THERAPEUTIC EXERCISES: CPT

## 2019-04-18 PROCEDURE — 97161 PT EVAL LOW COMPLEX 20 MIN: CPT

## 2019-04-18 NOTE — CONSULTS
[] [facility] [address]  [address]  [phone]  F: [phone] [x] [address]   [address]  P: [phone]  F: [phone] [] [address]  [address] [facility]  P: [phone]  F: [phone] [] [address]  [facility]   [address]   [place]: [phone]  F: [phone]     Physical Therapy Upper Extremity Evaluation    Date:  2019  Patient: Jignesh Lomax  : 1960  MRN: [id number]  Physician: Dr Gissell Donovan. [facility]  Insurance: [facility]  Medical Diagnosis: L breast cancer  S/P B mastectomy with L sentinel lymph node    Rehab Codes: M25.612,R 53.83,M54.5,M25.561,M25.562  Onset Date: 19   Next 's appt:     Subjective:   CC:No shld pain, no heaviness in UE's . Does have back and multiple joint pain from arthritis pain level varies. Does c/o fatigue from chemo session last week. Was able to walk 5 blocks - now able to only do one block. HPI: (onset date) :  Type of Cancer- invasive ductal grade 3    T2,N0,M0  Triple neg. Location L  breast   Lymph Node Involvement- neg    Chemotherapy-   Ac followed by Taxol regimen-every 2 weeks.    Radiation-no   Surgery- B mastectomy with L sentinel node biopsy  19, port 3/27/19    PMHx: [] Unremarkable [] Diabetes [] HTN  [] Pacemaker   [] MI/Heart Problems [] Cancer [] Arthritis [x] Other:chronic back pain back surgery 2018,  cervical cancer 89              [x] Refer to full medical chart  In EPIC   Tests: [] X-Ray: [] MRI:  [] Other:    Medications: [x] Refer to full medical record [] None [] Other:  Allergies:      [x] Refer to full medical record [] None [] Other:    Function:  Hand Dominance  [x] Right  [] Left  Working:  [] Normal Duty  [] Light Duty  [] Off D/T Condition  [] Retired    [] Not Employed [x]  Disability -back and arthritis      Job/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 One step into home. Grab bars in place. Pain:  [x] Yes  [] No Location: back and knees Pain Rating: (0-10 scale) 5/10  Pain altered Tx:  [] Yes  [x] No  Action:  Symptoms:  [] Improving [] Worsening [x] Same          Objective:     ROM  °A/P END FEEL STRENGTH    Left Right  Left Right   Sit Shld Flex        Sit Shld Abd        Sit Shld IR        Shoulder Flex 130AA 165AA  5 5   Ext        ABD 105AA 148AA  5 5   ER @  90 44AA WNL      IR 50AA WNL      Elbow Flex. 5 5   Elbow Ext.    5 5   Wrist Flex. Wrist Ext.     WNL WNL       OBSERVATION No Deficit Deficit Not Tested Comments   Forward Head [] [x] []    Rounded Shoulders [] [x] []    NEUROLOGICAL       Cervical ROM/Quadrant [x] [] []              Girth measurement Location Right (cm) Left (cm)     (if > 2 cm difference refer to Lymphedema Specialist)     Met Heads 18.8 19   Ulnar Styloid Process 15.6 16   10 cm distal to Lat. Epicondyle     Antecubital Fossa S-shirt on 28 27    10 cm proximal to Lat.  Epicondyle     Axilla (around arm/deltoid)-shirt on 45.5 45   Total     Axilla to Axilla     Stemmers Sign (middle finger or base of second toe) (edema concerns)   [x] Negative, able to pull up skin  [] Positive, unable to pull up skin    Incision- well healed, ,mobile           TEST INITIAL DATE  4/18/19 RE-EVAL DATE     VISIT# RE-EVAL DATE     VISIT# DISCHARGE DATE: VISIT#   Blood Pressure       Resting Heart Rate              Fatigue          /10       /10       /10          /10   PRESENT 3      WORST 3-5      AVERAGE              Pain          /10          /10           /10          /10   PRESENT       5      WORST.    10+      AVERAGE    5-7             Distress           /10          /10            /10            /10   PRESENT  3      WORST  9      AVERAGE 3-5      Distress (week)          6   /10           /10             /10              /10          TUG TEST seconds              seconds              seconds              seconds          6 MIN WALK Baseline/end of test Baseline/end of test Baseline/end of test Baseline/end of test   HR (bpm) Requested to do at next session      SpO2       dyspnea       fatigue       Distance (ft)              30 sec sit to stand 10      Functional Test  UEFS   60//80  25 % DEFICIT        Assessment:  Problems:    [x] ? Pain: BACK AND KNEES  [x] ? ROM: L SHLD   [] ? Strength:  [x] ? Function: UEFS 25% DEFICIT,   [x] Other:[address] 3-5/10,  [] Incisions:    STG: (to be met in 6 treatments)  1. ? Pain:  7/10 MAX   2. ? ROM: 15 DEGREES  3. ? Strength: tolerate ex program  4. ? Function: lessen fatigue, improve use of B UE's  5. Independent with Home Exercise Programs  6. Education on signs/ symptoms, precautions regarding lymphedema=met  7. Instructed on breast care exercise.-met     LTG: (to be met in 12 treatments)  1. manageable back and knee pain  2. L shld ROM equal to R  3. Fatigue average 2 or less  4. Normal ADL's with B UE:s  5. Able to walk 5 blocks as she did before chemo                     Patient goals: to be able to deal with pain    Rehab Potential:  [x] Good  [] Fair  [] Poor   Suggested Professional Referral:  [x] No  [] Yes:  Barriers to Goal Achievement:  [x] No  [] Yes:  Domestic Concerns:  [x] No  [] Yes:    Pt. Education:  [x] Plans/Goals, Risks/Benefits discussed  [x] Home exercise program  Method of Education: [x] Verbal-  Keep active -  [x] Demo  [x] Written - See below  Comprehension of Education:  [x] Verbalizes understanding. [x] Demonstrates understanding. [] Needs Review. [] Demonstrates/verbalizes understanding of HEP/Ed previously given.     Treatment Plan:  [x] Therapeutic Exercise  [] Modalities:  [] Dry Needling  [] Therapeutic Activity  [] Ultrasound  [] Electrical Stimulation  [] Gait Training   [] Massage       [] Lumbar/Cervical Traction  [] Neuromuscular Re-education [] Cold/hotpack [] Iontophoresis: 4 mg/mL  [x] Instruction in HEP             Dexamethasone Sodium  [] Manual Therapy             Phosphate  mAmin  [] Aquatic Therapy                   [] Vasocompression/    [x] Other: aquatics for LE/back ex's once she feels ready            Game Ready   []  Medication allergies reviewed for use of    Dexamethasone Sodium Phosphate 4mg/ml     with iontophoresis treatments. Pt is not allergic.        Frequency: 1 x/every other week for 12 visits      Todays Treatment:   [x]  Lymphedema education sheet issued/and Lyphedema ACS booklet       [x]  Issued Exercises after Breast Cancer (ACS) pamphlet and reviewed exercises     []  Additional Concerns/Red Flags       Modalities: none    Precautions: B mastectomy    EXERCISES   REPS   Sitting    Scapular squeezes    Shoulder shrugs    Shoulder rolls    Diaphragmatic breathing         Supine    PROM L SHLD X   ER STRETCH  X   AA FLEXION- USE OF R UE X               Side bending with arms overhead    Cane flexion    butterflys (supine elbow Er)    Table flexion slides      Standing    Corner stretch X   Arm flexion and ABD wall slides X                    Specific Instructions for next treatment: 6 min walk test, UE ex's, conditioning ex's      Evaluation Complexity:  History (Personal factors, comorbidities) [] 0 [x] 1-2 [] 3+   Exam (limitations, restrictions) [] 1-2 [x] 3 [] 4+   Clinical presentation (progression) [x] Stable [] Evolving  [] Unstable   Decision Making [x] Low [] Moderate [] High    [x] Low Complexity [] Moderate Complexity [] High Complexity                Treatment Charges: Mins Units   [x] Evaluation       [x]  Low       []  Moderate       []  High 20 1   []  Modalities     [x]  Ther Exercise 25 2   []  Manual Therapy     []  Ther Activities     []  Aquatics     []  Vasocompression     []  Other       TOTAL TREATMENT TIME: 45    Time in: 130P    Time Out: 230P    Electronically signed by: Rylie Pleitez PT        Physician Signature:________________________________Date:__________________  By signing above or cosigning this note, I have reviewed this plan of care and certify a need for medically necessary rehabilitation services.      *PLEASE SIGN ABOVE AND FAX BACK ALL PAGES*

## 2019-04-19 DIAGNOSIS — Z76.0 MEDICATION REFILL: ICD-10-CM

## 2019-04-19 DIAGNOSIS — M54.42 CHRONIC LEFT-SIDED LOW BACK PAIN WITH LEFT-SIDED SCIATICA: ICD-10-CM

## 2019-04-19 DIAGNOSIS — G89.29 CHRONIC LEFT-SIDED LOW BACK PAIN WITH LEFT-SIDED SCIATICA: ICD-10-CM

## 2019-04-22 RX ORDER — NAPROXEN 500 MG/1
TABLET ORAL
Qty: 60 TABLET | Refills: 0 | Status: SHIPPED | OUTPATIENT
Start: 2019-04-22 | End: 2019-06-20 | Stop reason: SDUPTHER

## 2019-04-22 NOTE — FLOWSHEET NOTE
[facility] Fall Risk Assessment    Patient Name:  Arsenio Tello  : 1960        Risk Factor Scale  Score   History of Falls [] Yes  [x] No - did have fall due to back pain 25  0 0   Secondary Diagnosis [] Yes  [x] No 15  0 0   Ambulatory Aid [] Furniture  [] Crutches/cane/walker  [x] None/bedrest/wheelchair/nurse 30  15  0 0   IV/Heparin Lock [] Yes  [x] No 20  0 0   Gait/Transferring [] Impaired  [x] Weak  [] Normal/bedrest/immobile 20  10  0 10   Mental Status [] Forgets limitations  [x] Oriented to own ability 15  0 0      Total:10     Based on the Assessment score: check the appropriate box.     [x]  No intervention needed   Low =   Score of 0-24    []  Use standard prevention interventions Moderate =  Score of 24-44   [] Give patient handout and discuss fall prevention strategies   [] Establish goal of education for patient/family RE: fall prevention strategies    []  Use high risk prevention interventions High = Score of 45 and higher   [] Give patient handout and discuss fall prevention strategies   [] Establish goal of education for patient/family Re: fall prevention strategies   [] Discuss lifeline / other resources    Electronically signed by:   Leroy Donaldson PT  Date: 19

## 2019-04-22 NOTE — TELEPHONE ENCOUNTER
Last visit: 08/22/2018  Last Med refill:   Does patient have enough medication for 72 hours:     Next Visit Date:  Future Appointments   Date Time Provider Nola Olmos   4/25/2019 11:00 AM [facility] STA CHAIR 04 [facility] STA MED None   4/25/2019 11:40 AM Isaiah Escalante MD [facility]   5/2/2019  1:30 PM Ky Faustinog, PT [facility]   5/9/2019 11:00 AM [facility] STA CHAIR 12 [facility] STA MED None       Health Maintenance   Topic Date Due    Hepatitis C screen  1960    HIV screen  11/19/1975    Shingles Vaccine (1 of 2) 11/19/2010    Low dose CT lung screening  11/19/2015    Flu vaccine (Season Ended) 09/01/2019    Potassium monitoring  04/11/2020    Creatinine monitoring  04/11/2020    Diabetes screen  01/04/2021    Lipid screen  07/26/2022    Cervical cancer screen  09/22/2022    DTaP/Tdap/Td vaccine (2 - Td) 08/14/2027    Colon cancer screen colonoscopy  10/13/2027    Pneumococcal 0-64 years Vaccine  Completed       Hemoglobin A1C (%)   Date Value   01/04/2018 5.5   10/23/2014 5.9             ( goal A1C is < 7)   Microalb/Crt.  Ratio (mcg/mg creat)   Date Value   09/29/2015 25     LDL Cholesterol (mg/dL)   Date Value   07/26/2017 96   01/24/2012 143 (H)       (goal LDL is <100)   AST (U/L)   Date Value   04/11/2019 8     ALT (U/L)   Date Value   04/11/2019 7     BUN (mg/dL)   Date Value   04/11/2019 15     BP Readings from Last 3 Encounters:   04/11/19 132/76   03/27/19 (!) 112/56   03/18/19 (!) 147/93          (goal 120/80)    All Future Testing planned in [facility]  Lab Frequency Next Occurrence   US GUIDED NEEDLE PLACEMENT Once 04/23/2019   CBC Auto Differential     Comprehensive Metabolic Panel                 Patient Active Problem List:     Idiopathic angioedema     HTN (hypertension)     Back pain     Morbid obesity with BMI of 45.0-49.9, adult ([address]     Morbid obesity due to excess calories (HCC)     Smoking     Need for vaccination     Carcinoma of upper-outer quadrant of left breast in female, estrogen receptor negative ([facility]     Status post mastectomy, bilateral     S/P mastectomy, bilateral     Malignant neoplasm of upper-outer quadrant of left breast in female, estrogen receptor negative ([facility]

## 2019-04-25 ENCOUNTER — TELEPHONE (OUTPATIENT)
Dept: ONCOLOGY | Age: 59
End: 2019-04-25

## 2019-04-25 ENCOUNTER — OFFICE VISIT (OUTPATIENT)
Dept: ONCOLOGY | Age: 59
End: 2019-04-25
Payer: COMMERCIAL

## 2019-04-25 ENCOUNTER — HOSPITAL ENCOUNTER (OUTPATIENT)
Dept: INFUSION THERAPY | Facility: MEDICAL CENTER | Age: 59
Discharge: HOME | End: 2019-04-25
Payer: COMMERCIAL

## 2019-04-25 VITALS
DIASTOLIC BLOOD PRESSURE: 82 MMHG | BODY MASS INDEX: 42.96 KG/M2 | WEIGHT: 274.3 LBS | TEMPERATURE: 98 F | SYSTOLIC BLOOD PRESSURE: 116 MMHG | RESPIRATION RATE: 18 BRPM | HEART RATE: 79 BPM

## 2019-04-25 DIAGNOSIS — C50.412 MALIGNANT NEOPLASM OF UPPER-OUTER QUADRANT OF LEFT BREAST IN FEMALE, ESTROGEN RECEPTOR NEGATIVE (HCC): Primary | ICD-10-CM

## 2019-04-25 DIAGNOSIS — Z17.1 MALIGNANT NEOPLASM OF UPPER-OUTER QUADRANT OF LEFT BREAST IN FEMALE, ESTROGEN RECEPTOR NEGATIVE (HCC): Primary | ICD-10-CM

## 2019-04-25 LAB
ABSOLUTE EOS #: 0.19 K/UL (ref 0–0.4)
ABSOLUTE IMMATURE GRANULOCYTE: ABNORMAL K/UL (ref 0–0.3)
ABSOLUTE LYMPH #: 1.54 K/UL (ref 1–4.8)
ABSOLUTE MONO #: 0.86 K/UL (ref 0.2–0.8)
ALBUMIN SERPL-MCNC: 3.7 G/DL (ref 3.5–5.2)
ALBUMIN/GLOBULIN RATIO: ABNORMAL (ref 1–2.5)
ALP BLD-CCNC: 107 U/L (ref 35–104)
ALT SERPL-CCNC: 8 U/L (ref 5–33)
ANION GAP SERPL CALCULATED.3IONS-SCNC: 11 MMOL/L (ref 9–17)
AST SERPL-CCNC: 8 U/L
BASOPHILS # BLD: 1 %
BASOPHILS ABSOLUTE: 0.1 K/UL (ref 0–0.2)
BILIRUB SERPL-MCNC: <0.1 MG/DL (ref 0.3–1.2)
BUN BLDV-MCNC: 15 MG/DL (ref 6–20)
BUN/CREAT BLD: 19 (ref 9–20)
CALCIUM SERPL-MCNC: 8.7 MG/DL (ref 8.6–10.4)
CHLORIDE BLD-SCNC: 102 MMOL/L (ref 98–107)
CO2: 24 MMOL/L (ref 20–31)
CREAT SERPL-MCNC: 0.81 MG/DL (ref 0.5–0.9)
DIFFERENTIAL TYPE: ABNORMAL
EOSINOPHILS RELATIVE PERCENT: 2 % (ref 1–4)
GFR AFRICAN AMERICAN: >60 ML/MIN
GFR NON-AFRICAN AMERICAN: >60 ML/MIN
GFR SERPL CREATININE-BSD FRML MDRD: ABNORMAL ML/MIN/{1.73_M2}
GFR SERPL CREATININE-BSD FRML MDRD: ABNORMAL ML/MIN/{1.73_M2}
GLUCOSE BLD-MCNC: 121 MG/DL (ref 70–99)
HCT VFR BLD CALC: 31.8 % (ref 36.3–47.1)
HEMOGLOBIN: 9.5 G/DL (ref 11.9–15.1)
IMMATURE GRANULOCYTES: ABNORMAL %
LYMPHOCYTES # BLD: 16 % (ref 24–44)
MCH RBC QN AUTO: 24.7 PG (ref 25.2–33.5)
MCHC RBC AUTO-ENTMCNC: 29.9 G/DL (ref 28.4–34.8)
MCV RBC AUTO: 82.6 FL (ref 82.6–102.9)
MONOCYTES # BLD: 9 % (ref 1–7)
NRBC AUTOMATED: 0.3 PER 100 WBC
PDW BLD-RTO: 17.1 % (ref 11.8–14.4)
PLATELET # BLD: 225 K/UL (ref 138–453)
PLATELET ESTIMATE: ABNORMAL
PMV BLD AUTO: 9.6 FL (ref 8.1–13.5)
POTASSIUM SERPL-SCNC: 4 MMOL/L (ref 3.7–5.3)
RBC # BLD: 3.85 M/UL (ref 3.95–5.11)
RBC # BLD: ABNORMAL 10*6/UL
SEG NEUTROPHILS: 72 % (ref 36–66)
SEGMENTED NEUTROPHILS ABSOLUTE COUNT: 6.91 K/UL (ref 1.8–7.7)
SODIUM BLD-SCNC: 137 MMOL/L (ref 135–144)
TOTAL PROTEIN: 6.6 G/DL (ref 6.4–8.3)
WBC # BLD: 9.6 K/UL (ref 3.5–11.3)
WBC # BLD: ABNORMAL 10*3/UL

## 2019-04-25 PROCEDURE — 96409 CHEMO IV PUSH SNGL DRUG: CPT

## 2019-04-25 PROCEDURE — 2580000003 HC RX 258: Performed by: INTERNAL MEDICINE

## 2019-04-25 PROCEDURE — 6360000002 HC RX W HCPCS: Performed by: INTERNAL MEDICINE

## 2019-04-25 PROCEDURE — 96375 TX/PRO/DX INJ NEW DRUG ADDON: CPT

## 2019-04-25 PROCEDURE — 36591 DRAW BLOOD OFF VENOUS DEVICE: CPT

## 2019-04-25 PROCEDURE — 99214 OFFICE O/P EST MOD 30 MIN: CPT | Performed by: INTERNAL MEDICINE

## 2019-04-25 PROCEDURE — 85025 COMPLETE CBC W/AUTO DIFF WBC: CPT

## 2019-04-25 PROCEDURE — 3017F COLORECTAL CA SCREEN DOC REV: CPT | Performed by: INTERNAL MEDICINE

## 2019-04-25 PROCEDURE — 96413 CHEMO IV INFUSION 1 HR: CPT

## 2019-04-25 PROCEDURE — 96411 CHEMO IV PUSH ADDL DRUG: CPT

## 2019-04-25 PROCEDURE — 96377 APPLICATON ON-BODY INJECTOR: CPT

## 2019-04-25 PROCEDURE — 96367 TX/PROPH/DG ADDL SEQ IV INF: CPT

## 2019-04-25 PROCEDURE — 99211 OFF/OP EST MAY X REQ PHY/QHP: CPT

## 2019-04-25 PROCEDURE — 80053 COMPREHEN METABOLIC PANEL: CPT

## 2019-04-25 PROCEDURE — G8427 DOCREV CUR MEDS BY ELIG CLIN: HCPCS | Performed by: INTERNAL MEDICINE

## 2019-04-25 PROCEDURE — 4004F PT TOBACCO SCREEN RCVD TLK: CPT | Performed by: INTERNAL MEDICINE

## 2019-04-25 PROCEDURE — 96374 THER/PROPH/DIAG INJ IV PUSH: CPT

## 2019-04-25 PROCEDURE — G8417 CALC BMI ABV UP PARAM F/U: HCPCS | Performed by: INTERNAL MEDICINE

## 2019-04-25 RX ORDER — SODIUM CHLORIDE 9 MG/ML
INJECTION, SOLUTION INTRAVENOUS CONTINUOUS
Status: CANCELLED | OUTPATIENT
Start: 2019-04-25

## 2019-04-25 RX ORDER — FUROSEMIDE 20 MG/1
20 TABLET ORAL DAILY PRN
Qty: 21 TABLET | Refills: 1 | Status: SHIPPED | OUTPATIENT
Start: 2019-04-25 | End: 2019-05-23 | Stop reason: SDUPTHER

## 2019-04-25 RX ORDER — SODIUM CHLORIDE 0.9 % (FLUSH) 0.9 %
5 SYRINGE (ML) INJECTION PRN
Status: CANCELLED | OUTPATIENT
Start: 2019-04-25

## 2019-04-25 RX ORDER — PALONOSETRON 0.05 MG/ML
0.25 INJECTION, SOLUTION INTRAVENOUS ONCE
Status: COMPLETED | OUTPATIENT
Start: 2019-04-25 | End: 2019-04-25

## 2019-04-25 RX ORDER — OXYCODONE HYDROCHLORIDE AND ACETAMINOPHEN 5; 325 MG/1; MG/1
1 TABLET ORAL EVERY 6 HOURS PRN
Qty: 40 TABLET | Refills: 0 | Status: SHIPPED | OUTPATIENT
Start: 2019-04-25 | End: 2019-05-09 | Stop reason: SDUPTHER

## 2019-04-25 RX ORDER — SODIUM CHLORIDE 9 MG/ML
INJECTION, SOLUTION INTRAVENOUS ONCE
Status: CANCELLED | OUTPATIENT
Start: 2019-04-25

## 2019-04-25 RX ORDER — SODIUM CHLORIDE 0.9 % (FLUSH) 0.9 %
10 SYRINGE (ML) INJECTION PRN
Status: DISCONTINUED | OUTPATIENT
Start: 2019-04-25 | End: 2019-04-26 | Stop reason: HOSPADM

## 2019-04-25 RX ORDER — SODIUM CHLORIDE 9 MG/ML
INJECTION, SOLUTION INTRAVENOUS ONCE
Status: COMPLETED | OUTPATIENT
Start: 2019-04-25 | End: 2019-04-25

## 2019-04-25 RX ORDER — METHYLPREDNISOLONE SODIUM SUCCINATE 125 MG/2ML
125 INJECTION, POWDER, LYOPHILIZED, FOR SOLUTION INTRAMUSCULAR; INTRAVENOUS ONCE
Status: CANCELLED | OUTPATIENT
Start: 2019-04-25

## 2019-04-25 RX ORDER — 0.9 % SODIUM CHLORIDE 0.9 %
10 VIAL (ML) INJECTION ONCE
Status: CANCELLED | OUTPATIENT
Start: 2019-04-25

## 2019-04-25 RX ORDER — DEXAMETHASONE SODIUM PHOSPHATE 10 MG/ML
10 INJECTION INTRAMUSCULAR; INTRAVENOUS ONCE
Status: COMPLETED | OUTPATIENT
Start: 2019-04-25 | End: 2019-04-25

## 2019-04-25 RX ORDER — DIPHENHYDRAMINE HYDROCHLORIDE 50 MG/ML
50 INJECTION INTRAMUSCULAR; INTRAVENOUS ONCE
Status: CANCELLED | OUTPATIENT
Start: 2019-04-25

## 2019-04-25 RX ORDER — HEPARIN SODIUM (PORCINE) LOCK FLUSH IV SOLN 100 UNIT/ML 100 UNIT/ML
500 SOLUTION INTRAVENOUS PRN
Status: CANCELLED | OUTPATIENT
Start: 2019-04-25

## 2019-04-25 RX ORDER — SODIUM CHLORIDE 0.9 % (FLUSH) 0.9 %
10 SYRINGE (ML) INJECTION PRN
Status: CANCELLED | OUTPATIENT
Start: 2019-04-25

## 2019-04-25 RX ORDER — PALONOSETRON 0.05 MG/ML
0.25 INJECTION, SOLUTION INTRAVENOUS ONCE
Status: CANCELLED | OUTPATIENT
Start: 2019-04-25

## 2019-04-25 RX ORDER — HEPARIN SODIUM (PORCINE) LOCK FLUSH IV SOLN 100 UNIT/ML 100 UNIT/ML
500 SOLUTION INTRAVENOUS PRN
Status: DISCONTINUED | OUTPATIENT
Start: 2019-04-25 | End: 2019-04-26 | Stop reason: HOSPADM

## 2019-04-25 RX ORDER — DOXORUBICIN HYDROCHLORIDE 2 MG/ML
60 INJECTION, SOLUTION INTRAVENOUS ONCE
Status: CANCELLED | OUTPATIENT
Start: 2019-04-25

## 2019-04-25 RX ORDER — DOXORUBICIN HYDROCHLORIDE 2 MG/ML
60 INJECTION, SOLUTION INTRAVENOUS ONCE
Status: COMPLETED | OUTPATIENT
Start: 2019-04-25 | End: 2019-04-25

## 2019-04-25 RX ADMIN — PALONOSETRON HYDROCHLORIDE 0.25 MG: 0.25 INJECTION, SOLUTION INTRAVENOUS at 14:40

## 2019-04-25 RX ADMIN — Medication 500 UNITS: at 17:03

## 2019-04-25 RX ADMIN — CYCLOPHOSPHAMIDE 1400 MG: 2 INJECTION, POWDER, FOR SOLUTION INTRAVENOUS; ORAL at 15:37

## 2019-04-25 RX ADMIN — DEXAMETHASONE SODIUM PHOSPHATE 10 MG: 10 INJECTION INTRAMUSCULAR; INTRAVENOUS at 14:40

## 2019-04-25 RX ADMIN — PEGFILGRASTIM 6 MG: KIT SUBCUTANEOUS at 16:28

## 2019-04-25 RX ADMIN — Medication 10 ML: at 17:03

## 2019-04-25 RX ADMIN — SODIUM CHLORIDE 150 MG: 900 INJECTION, SOLUTION INTRAVENOUS at 14:44

## 2019-04-25 RX ADMIN — DOXORUBICIN HYDROCHLORIDE 140 MG: 2 INJECTION, SOLUTION INTRAVENOUS at 15:30

## 2019-04-25 RX ADMIN — SODIUM CHLORIDE: 9 INJECTION, SOLUTION INTRAVENOUS at 13:00

## 2019-04-25 NOTE — PROGRESS NOTES
Doris Brown                                                                                                                  4/25/2019  MRN:   [id number]  YOB: 1960  PCP:                           Ines Schmid MD  Referring Physician: No ref. provider found  Treating Physician Name: Betty Grider MD      Reason for visit:  Chief Complaint   Patient presents with    Follow-up     review status of disease    Fatigue    Numbness    Pain     joints, bones. sometimes sharp    Other     only does water therapy    Constipation     discuss medication     Current problems:   IDC of Left Breast cancer, Triple negative, pT2,pN0,M0    Active and recent treatments:  Bilateral Mastectomy with left sentinel lymph node    Summary of Case/History:    Doris Brown a 62 y. o.female invasive carcinoma of the breast.     Patient initially felt a lump in her left breast for 2-3 months. She underwent screening mammogram which showed 2 cm mass in upper outer left breast.  Right breast do not have any concerning findings. Ultrasound of left breast conformed mass measuring 1.9 cm in the upper outer left breast.  Patient underwent biopsy on 1/23/19 which revealed invasive ductal carcinoma, grade 3, ER/N[place] negative. HER-2 results are pending.     Patient has history of uterus/cervical cancer diagnosed in 1992. Patient states she underwent surgery for the cervical cancer. She does not recall getting any radiation therapy or chemotherapy. Patient states she has been interested in getting bilateral breast reduction due to cosmetic reasons however now with a diagnosis of breast cancer she wishes to proceed with bilateral mastectomy.     Patient has history of breast cancer in her mother as well as 3 paternal aunts. Patient's mother also had cervical and uterine cancer.     Patient underwent bilateral mastectomy with left sentinel lymph node biopsy.   Pathological stage was T2, N0, M0    Started chemotherapy for triple negative breast cancer using AC ordered by T on 4/11/19    Interim History:    Patient presents to the clinic for a follow-up visit and to discuss further treatment plan. Patient has been tolerating treatment without unexpected or severe side effects. Denies hospitalization earlier visit. Does complain of joint aches. Which is controlled with her current pain medication regimen. Patient was in the ER with her daughter past night. Patient daughter required blood transfusion. She came back from the ER early morning and feels tired. Weight is stable. Appetite is stable. She has started to lose hair. Patient's leg swelling has worsened and she attributes it to standing for long time with her daughter while in the ER. During this visit patient's allergy, social, medical, surgical history and medications were reviewed and updated.       Past Medical History:   Past Medical History:   Diagnosis Date    Arthritis     GENERALIZED ALL OVER RHEUMATOID AND OSTEO    Cancer ([facility] [address] 1992    CERVICAL, UTERINE    Cancer of adrenal cortex ([facility] [address] 01/2019    LEFT BREAST    Chronic back pain     scioliosis,  02/2019 WEARING A BACK BRACE    Degenerative disorder of bone     ALL OVER    Depression     Difficult intravenous access     HANDS AR BEST SITE    GERD (gastroesophageal reflux disease)     Hyperlipidemia     HX OF NO MEDS IN YEARS    Hypertension 2007    Pain management clinic    Insomnia     Obesity     BMI -  43    Sleep apnea 2016    NO LONGER USES MACHINE    Snores     has been told by family that she stops breathing    Use of cane as ambulatory aid     Wears glasses     READING    Wears glasses     \"CHEATERS\"       Past Surgical History:     Past Surgical History:   Procedure Laterality Date    BREAST SURGERY Left 1990    CYST    BUNIONECTOMY Bilateral     CERVIX SURGERY  1992    COLONOSCOPY  10/13/2017    FOOT SURGERY Left 2014    PINS IN ALL TOES    HAMMER TOE SURGERY Bilateral     Not on file     Emotionally abused: Not on file     Physically abused: Not on file     Forced sexual activity: Not on file   Other Topics Concern    Not on file   Social History Narrative    Not on file      Family History   Problem Relation Age of Onset    High Blood Pressure Mother     Cancer Mother         cervical and breast    Diabetes Father     Heart Attack Father     Heart Disease Maternal Aunt         times 2      Current Medications:     Current Outpatient Medications   Medication Sig Dispense Refill    naproxen (NAPROSYN) 500 MG tablet TAKE 1 TABLET TWICE A DAY WITH MEALS 60 tablet 0    prochlorperazine (COMPAZINE) 10 MG tablet Take 1 tablet by mouth every 6 hours as needed (CHEMO INDUCED NAUSEA) 120 tablet 3    ondansetron (ZOFRAN-ODT) 8 MG TBDP disintegrating tablet Place 1 tablet under the tongue every 8 hours as needed for Nausea or Vomiting (CHEMO INDUCED NAUSEA) 30 tablet 2    omeprazole (PRILOSEC) 20 MG delayed release capsule Take 1 capsule by mouth daily 30 capsule 3    lidocaine-prilocaine (EMLA) 2.5-2.5 % cream Apply topically as needed PRIOR TO MEDIPORT ACCESS, NO MORE THAN TWICE PER DAY. 1 Tube 3    oxyCODONE-acetaminophen (PERCOCET) 5-325 MG per tablet Take 1 tablet by mouth every 6 hours as needed for Pain for up to 14 days.  Take lowest dose possible to manage pain 40 tablet 0    spironolactone (ALDACTONE) 25 MG tablet Take 1 tablet by mouth daily 30 tablet 1    amLODIPine (NORVASC) 10 MG tablet TAKE 1 TABLET EVERY DAY 30 tablet 1    docusate sodium (COLACE) 100 MG capsule Take 1 capsule by mouth 2 times daily as needed for Constipation 60 capsule 0    Gauze Pads & Dressings (GAUZE DRESSING) 4\"X4\" PADS 1 each by Does not apply route daily 10 each 2    Gauze Pads & Dressings (ABDOMINAL PAD) 8\"X10\" PADS 1 Units by Does not apply route daily 10 each 2    Elastic Bandages & Supports (ABDOMINAL BINDER/ELASTIC XL) MISC Please give pt 1 XL abdominal binder, wear daily for pupils equal and reactive, extraocular eye movements intact  Mouth - mucous membranes moist, pharynx normal without lesions  Neck - supple, no significant adenopathy  Lymphatics - no palpable lymphadenopathy, no hepatosplenomegaly  Chest - clear to auscultation, no wheezes, rales or rhonchi, symmetric air entry  Heart - normal rate, regular rhythm, normal S1, S2, no murmurs  Abdomen - soft, nontender, nondistended, no masses or organomegaly  Neurological - alert, oriented, normal speech, no focal findings or movement disorder noted  Extremities - positive pitting edema  Skin - normal coloration and turgor, no rashes, no suspicious skin lesions noted   Breast - examination consistent with bilateral mastectomy      DATA:    Results for orders placed or performed during the hospital encounter of 02/12/19   Anaerobic and Aerobic Culture   Result Value Ref Range    Specimen Description . AXILLA SWAB     Special Requests SKIN ABSCESS LEFT AXILLA     Direct Exam NO NEUTROPHILS SEEN     Direct Exam NO BACTERIA SEEN     Culture NORMAL YOANA     Culture ANAEROCOCCUS PREVOTII  LIGHT GROWTH   (A)    Hemoglobin and hematocrit, blood   Result Value Ref Range    POC Hemoglobin 14.8 12.0 - 16.0 g/dL    POC Hematocrit 43 36 - 46 %   SODIUM (POC)   Result Value Ref Range    POC Sodium 143 138 - 146 mmol/L   POTASSIUM (POC)   Result Value Ref Range    POC Potassium 4.3 3.5 - 4.5 mmol/L   CHLORIDE (POC)   Result Value Ref Range    POC Chloride 108 (H) 98 - 107 mmol/L   CALCIUM, IONIC (POC)   Result Value Ref Range    POC Ionized Calcium 1.29 1.15 - 1.33 mmol/L   Surgical Pathology   Result Value Ref Range    Comment SPECIMEN RECEIVED    Surgical Pathology   Result Value Ref Range    Surgical Pathology Report       [id number]  [facility]  [facility]  ANATOMIC PATHOLOGY  [address] [place] [address].   [place], [address]  [phone]  Fax: [phone]  [address] [facility]    Patient Name: Arian Magana January, 2019 ([place] ), with triple negative results. Therefore, per  recommendations, those studies are repeated on a larger tumor specimen  now. ESTROGEN RECEPTOR*:   -POSITIVE (PERCENT =>1% OF POSITIVE TUMOR CELL NUCLEI):     N/A  ---AVERAGE INTENSITY OF POSITIVE STAINING:     N/A  -NEGATIVE (PERCENT <1% OF POSITIVE TUMOR CELL NUCLEI):     0%  ---AVERAGE INTENSITY OF ANY POSITIVE STAINING: N/A  ---INTERNAL CONTROL PRESENT AND STAIN AS EXPECTED:     Yes  ---INTERNAL CONTROL CELLS ABSENT (COMMENT):     N/A  ---INTERNAL CONTROL CELLS PRESENT BUT DO NOT STAIN (COMMENT):     N/A    PROGESTERONE RECEPTOR*:  ---AVERAGE INTENSITY OF POSITIVE STAINING:     N/A  -NEGATIVE (PERCENT <1% OF POSITIVE TUMOR CELL NUCLEI):     0%  ---AVERAGE INTENSITY OF ANY POSITIVE STAINING: N/A  ---INTERNAL CONTROL PRESENT AND STAIN AS EXPECTED:     Yes  ---INTERNAL CONTROL CELLS ABSENT (COMMENT):     N/A  ---INTERNAL CONTROL CELLS PRESENT BUT DO NOT STAIN (COMMENT):     N/A     HER2*:  HER2 PROTEIN EXPRESSION  (IMMUNOHISTOCHEMISTRY):     N/A  HER2 GENE AMPLIFICATION (INSITU HYBRIDIZATION):     Pending (see  addendum report)         TIME SPECIMEN REMOVED:     February 12, 2019, 1205  TIME SPECIMEN PLACED IN FORMALIN:     February 12, 2019, 1205  COLD ISCHEMIA TIME MEETS CAP / ASCO REQUIREMENTS (LESS THAN 1 HOUR):      Yes  FIXATION TIME (CAP / ASCO GUIDELINES 6-72 HOURS):     29.5 hours  FIXATIVE:     10% BUFFERED FORMALIN    *Information on biomarker regents:  Estrogen Receptor: If performed at [place] [address] laboratory: Immunostain  clone [id number] with indirect biotin streptavidin detection system, vendor  Flora Jang (FDA cleared); evaluated by manual morphometry  (negative=less than 1% tumor cell nuclear staining; otherwise  positive); external control is reactive. Progesterone Receptor:   If performed at [place] [address] laboratory: Immunostain  clone [id number] with indirect biotin streptavidin detection system, christopheror  Flora Jang (FDA cleared); evaluated by m kena davis  (negative=less than 1% tumor cell nuclear staining; otherwise  positive); external control is reactive. HER 2 insitu hybridization: FDA cleared, vendor [facility] [employer]. CAP BreastBiomarkers 1201 in conjunction with AJCC 8 ed. 3.  Microscopic examination performed.        CBC   Result Value Ref Range    WBC 18.7 (H) 3.5 - 11.3 k/uL    RBC 3.17 (L) 3.95 - 5.11 m/uL    Hemoglobin 9.1 (L) 11.9 - 15.1 g/dL    Hematocrit 28.1 (L) 36.3 - 47.1 %    MCV 88.6 82.6 - 102.9 fL    MCH 28.7 25.2 - 33.5 pg    MCHC 32.4 28.4 - 34.8 g/dL    RDW 15.8 (H) 11.8 - 14.4 %    Platelets 666 454 - 995 k/uL    MPV 10.5 8.1 - 13.5 fL    NRBC Automated 0.0 0.0 per 100 WBC   Basic Metabolic Panel w/ Reflex to MG   Result Value Ref Range    Glucose 136 (H) 70 - 99 mg/dL    BUN 19 6 - 20 mg/dL    CREATININE 1.19 (H) 0.50 - 0.90 mg/dL    Bun/Cre Ratio NOT REPORTED 9 - 20    Calcium 8.4 (L) 8.6 - 10.4 mg/dL    Sodium 137 135 - 144 mmol/L    Potassium 5.0 3.7 - 5.3 mmol/L    Chloride 104 98 - 107 mmol/L    CO2 21 20 - 31 mmol/L    Anion Gap 12 9 - 17 mmol/L    GFR Non-African American 47 (L) >60 mL/min    GFR  56 (L) >60 mL/min    GFR Comment          GFR Staging NOT REPORTED    Magnesium   Result Value Ref Range    Magnesium 2.0 1.6 - 2.6 mg/dL   CBC   Result Value Ref Range    WBC 17.2 (H) 3.5 - 11.3 k/uL    RBC 2.47 (L) 3.95 - 5.11 m/uL    Hemoglobin 7.1 (L) 11.9 - 15.1 g/dL    Hematocrit 22.8 (L) 36.3 - 47.1 %    MCV 92.3 82.6 - 102.9 fL    MCH 28.7 25.2 - 33.5 pg    MCHC 31.1 28.4 - 34.8 g/dL    RDW 16.3 (H) 11.8 - 14.4 %    Platelets 525 392 - 647 k/uL    MPV 10.3 8.1 - 13.5 fL    NRBC Automated 0.0 0.0 per 100 WBC   BASIC METABOLIC PANEL   Result Value Ref Range    Glucose 108 (H) 70 - 99 mg/dL    BUN 31 (H) 6 - 20 mg/dL    CREATININE 1.07 (H) 0.50 - 0.90 mg/dL    Bun/Cre Ratio NOT REPORTED 9 - 20    Calcium 8.2 (L) 8.6 - 10.4 mg/dL    Sodium 136 135 - 144 mmol/L Potassium 4.7 3.7 - 5.3 mmol/L    Chloride 104 98 - 107 mmol/L    CO2 23 20 - 31 mmol/L    Anion Gap 9 9 - 17 mmol/L    GFR Non-African American 53 (L) >60 mL/min    GFR African American >60 >60 mL/min    GFR Comment          GFR Staging NOT REPORTED    HEMOGLOBIN AND HEMATOCRIT, BLOOD   Result Value Ref Range    Hemoglobin 7.2 (L) 11.9 - 15.1 g/dL    Hematocrit 22.0 (L) 36.3 - 47.1 %   Creatinine W/GFR Point of Care   Result Value Ref Range    POC Creatinine 0.90 0.51 - 1.19 mg/dL    GFR Comment >60 >60 mL/min    GFR Non-African American >60 >60 mL/min    GFR Comment         Lactic Acid, POC   Result Value Ref Range    POC Lactic Acid 0.59 0.56 - 1.39 mmol/L   POCT Glucose   Result Value Ref Range    POC Glucose 87 74 - 100 mg/dL         Impression:  Invasive ductal carcinoma of left breast, triple negative, pT2,p N0, M0  Status post bilateral mastectomy and left sentinel lymph node biopsy  Personal history of gynecological malignancy  (uterus/cervical cancer) status post surgery in 1992  Cancer related pain  Leg swelling  Family history of malignancy  Chronic back pain  Hypertension  Obesity      Plan:  Personally reviewed the results of clinical data with the patient  Toxicity check performed. Patient is tolerating treatment without unexpected or severe side effects. Continue current pain medication regimen. Patient was given a Percocet refill  Patient was also given a prescription for Lasix 20 mg when necessary for leg swelling  Data dated potential side effects. We will see patient back in office in 2 weeks for next treatment. Labs are adequate for treatment. We will proceed with cycle #2. NCCN guidelines were reviewed and discussed with the patient. The diagnosis and care plan were discussed with the patient in detail. I discussed the natural history of the disease, prognosis, risks and goals of therapy and answered all the patients questions to the best of my ability.   Patient expressed understanding

## 2019-04-25 NOTE — PROGRESS NOTES
Connor Mendoza arrive ambulatory for cycle 2 day 1 treatment and physician visit. Denies complaints or concerns. Vitals as charted. Port accessed; specimen sent. Physician met with patient; ok to proceed with treatment; labs reviewed. Patient premedicated. Adriamycin IVP completed through freeflowing normal saline line. Blood return noted pre; intra; and post IVP. Line flushed with normal saline. Cytoxan infused with no sign of adverse reaction; line flushed with normal saline. Neulasta OBI placed to patient abdomen per her request.  She is provided with card for time of infusion and when to remove OBI. Further is flashing green light prior to discharge. Patient verbalizes understanding of use of OBI as she has had previously. Port flushed and heparinized with intact rosas needle removed per protocol. Patient ambulate off unit per self at discharge; AVS was provided by front office staff.

## 2019-05-06 ENCOUNTER — HOSPITAL ENCOUNTER (OUTPATIENT)
Facility: MEDICAL CENTER | Age: 59
End: 2019-05-06
Payer: COMMERCIAL

## 2019-05-06 ENCOUNTER — HOSPITAL ENCOUNTER (OUTPATIENT)
Facility: MEDICAL CENTER | Age: 59
End: 2019-05-06

## 2019-05-08 ENCOUNTER — TELEPHONE (OUTPATIENT)
Dept: ONCOLOGY | Age: 59
End: 2019-05-08

## 2019-05-08 NOTE — TELEPHONE ENCOUNTER
received referral stating patient was having issues setting up ride to her appointment tomorrow. Patient needs to go to [facility] for treatment due to [facility] being closed.  called patient to confirm need. [facility] provided confirmation number for her previously scheduled ride to [place].  called insurance provider to change ride details. Initial request denied.   forwarded to member services who overran denial.  called patient back to confirm ride details     Ride info:  Eboni Rabago [phone]   ~ 9:30a  Confirmation #[id number]  Call [facility] when ready to go home

## 2019-05-09 ENCOUNTER — HOSPITAL ENCOUNTER (OUTPATIENT)
Dept: INFUSION THERAPY | Age: 59
Discharge: HOME | End: 2019-05-09
Payer: COMMERCIAL

## 2019-05-09 ENCOUNTER — OFFICE VISIT (OUTPATIENT)
Dept: ONCOLOGY | Age: 59
End: 2019-05-09
Payer: COMMERCIAL

## 2019-05-09 VITALS
BODY MASS INDEX: 43.63 KG/M2 | RESPIRATION RATE: 16 BRPM | HEART RATE: 63 BPM | SYSTOLIC BLOOD PRESSURE: 115 MMHG | WEIGHT: 278.6 LBS | TEMPERATURE: 97.6 F | DIASTOLIC BLOOD PRESSURE: 73 MMHG

## 2019-05-09 DIAGNOSIS — Z17.1 MALIGNANT NEOPLASM OF UPPER-OUTER QUADRANT OF LEFT BREAST IN FEMALE, ESTROGEN RECEPTOR NEGATIVE (HCC): Primary | ICD-10-CM

## 2019-05-09 DIAGNOSIS — C50.412 MALIGNANT NEOPLASM OF UPPER-OUTER QUADRANT OF LEFT BREAST IN FEMALE, ESTROGEN RECEPTOR NEGATIVE (HCC): Primary | ICD-10-CM

## 2019-05-09 LAB
ABSOLUTE EOS #: 0.3 K/UL (ref 0–0.4)
ABSOLUTE IMMATURE GRANULOCYTE: ABNORMAL K/UL (ref 0–0.3)
ABSOLUTE LYMPH #: 1.68 K/UL (ref 1–4.8)
ABSOLUTE MONO #: 0.69 K/UL (ref 0.1–0.8)
ALBUMIN SERPL-MCNC: 3.3 G/DL (ref 3.5–5.2)
ALBUMIN/GLOBULIN RATIO: 1.1 (ref 1–2.5)
ALP BLD-CCNC: 97 U/L (ref 35–104)
ALT SERPL-CCNC: 12 U/L (ref 5–33)
ANION GAP SERPL CALCULATED.3IONS-SCNC: 10 MMOL/L (ref 9–17)
AST SERPL-CCNC: 10 U/L
BASOPHILS # BLD: 0 % (ref 0–2)
BASOPHILS ABSOLUTE: 0 K/UL (ref 0–0.2)
BILIRUB SERPL-MCNC: <0.1 MG/DL (ref 0.3–1.2)
BUN BLDV-MCNC: 11 MG/DL (ref 6–20)
BUN/CREAT BLD: ABNORMAL (ref 9–20)
CALCIUM SERPL-MCNC: 9 MG/DL (ref 8.6–10.4)
CHLORIDE BLD-SCNC: 103 MMOL/L (ref 98–107)
CO2: 24 MMOL/L (ref 20–31)
CREAT SERPL-MCNC: 0.6 MG/DL (ref 0.5–0.9)
DIFFERENTIAL TYPE: ABNORMAL
EOSINOPHILS RELATIVE PERCENT: 3 % (ref 1–4)
GFR AFRICAN AMERICAN: >60 ML/MIN
GFR NON-AFRICAN AMERICAN: >60 ML/MIN
GFR SERPL CREATININE-BSD FRML MDRD: ABNORMAL ML/MIN/{1.73_M2}
GFR SERPL CREATININE-BSD FRML MDRD: ABNORMAL ML/MIN/{1.73_M2}
GLUCOSE BLD-MCNC: 110 MG/DL (ref 70–99)
HCT VFR BLD CALC: 29.7 % (ref 36–46)
HEMOGLOBIN: 9.7 G/DL (ref 12–16)
IMMATURE GRANULOCYTES: ABNORMAL %
LYMPHOCYTES # BLD: 17 % (ref 24–44)
MCH RBC QN AUTO: 25.6 PG (ref 26–34)
MCHC RBC AUTO-ENTMCNC: 32.6 G/DL (ref 31–37)
MCV RBC AUTO: 78.7 FL (ref 80–100)
MONOCYTES # BLD: 7 % (ref 1–7)
MORPHOLOGY: NORMAL
NRBC AUTOMATED: ABNORMAL PER 100 WBC
PDW BLD-RTO: 18.9 % (ref 12.5–15.4)
PLATELET # BLD: 163 K/UL (ref 140–450)
PLATELET ESTIMATE: ABNORMAL
PMV BLD AUTO: 7.5 FL (ref 6–12)
POTASSIUM SERPL-SCNC: 4.3 MMOL/L (ref 3.7–5.3)
RBC # BLD: 3.77 M/UL (ref 4–5.2)
RBC # BLD: ABNORMAL 10*6/UL
SEG NEUTROPHILS: 73 % (ref 36–66)
SEGMENTED NEUTROPHILS ABSOLUTE COUNT: 7.23 K/UL (ref 1.8–7.7)
SODIUM BLD-SCNC: 137 MMOL/L (ref 135–144)
TOTAL PROTEIN: 6.2 G/DL (ref 6.4–8.3)
WBC # BLD: 9.9 K/UL (ref 3.5–11)
WBC # BLD: ABNORMAL 10*3/UL

## 2019-05-09 PROCEDURE — 96411 CHEMO IV PUSH ADDL DRUG: CPT

## 2019-05-09 PROCEDURE — 3017F COLORECTAL CA SCREEN DOC REV: CPT | Performed by: INTERNAL MEDICINE

## 2019-05-09 PROCEDURE — 96413 CHEMO IV INFUSION 1 HR: CPT

## 2019-05-09 PROCEDURE — 4004F PT TOBACCO SCREEN RCVD TLK: CPT | Performed by: INTERNAL MEDICINE

## 2019-05-09 PROCEDURE — 96409 CHEMO IV PUSH SNGL DRUG: CPT

## 2019-05-09 PROCEDURE — 6360000002 HC RX W HCPCS: Performed by: INTERNAL MEDICINE

## 2019-05-09 PROCEDURE — G8417 CALC BMI ABV UP PARAM F/U: HCPCS | Performed by: INTERNAL MEDICINE

## 2019-05-09 PROCEDURE — 99214 OFFICE O/P EST MOD 30 MIN: CPT | Performed by: INTERNAL MEDICINE

## 2019-05-09 PROCEDURE — 85025 COMPLETE CBC W/AUTO DIFF WBC: CPT

## 2019-05-09 PROCEDURE — 96377 APPLICATON ON-BODY INJECTOR: CPT

## 2019-05-09 PROCEDURE — G8427 DOCREV CUR MEDS BY ELIG CLIN: HCPCS | Performed by: INTERNAL MEDICINE

## 2019-05-09 PROCEDURE — 80053 COMPREHEN METABOLIC PANEL: CPT

## 2019-05-09 PROCEDURE — 96367 TX/PROPH/DG ADDL SEQ IV INF: CPT

## 2019-05-09 PROCEDURE — 2580000003 HC RX 258: Performed by: INTERNAL MEDICINE

## 2019-05-09 PROCEDURE — 36591 DRAW BLOOD OFF VENOUS DEVICE: CPT

## 2019-05-09 PROCEDURE — 96375 TX/PRO/DX INJ NEW DRUG ADDON: CPT

## 2019-05-09 RX ORDER — HEPARIN SODIUM (PORCINE) LOCK FLUSH IV SOLN 100 UNIT/ML 100 UNIT/ML
500 SOLUTION INTRAVENOUS PRN
Status: CANCELLED | OUTPATIENT
Start: 2019-05-09

## 2019-05-09 RX ORDER — SODIUM CHLORIDE 0.9 % (FLUSH) 0.9 %
10 SYRINGE (ML) INJECTION PRN
Status: DISCONTINUED | OUTPATIENT
Start: 2019-05-09 | End: 2019-05-10 | Stop reason: HOSPADM

## 2019-05-09 RX ORDER — SODIUM CHLORIDE 9 MG/ML
INJECTION, SOLUTION INTRAVENOUS CONTINUOUS
Status: CANCELLED | OUTPATIENT
Start: 2019-05-09

## 2019-05-09 RX ORDER — OXYCODONE HYDROCHLORIDE AND ACETAMINOPHEN 5; 325 MG/1; MG/1
1 TABLET ORAL EVERY 6 HOURS PRN
Qty: 56 TABLET | Refills: 0 | Status: SHIPPED | OUTPATIENT
Start: 2019-05-09 | End: 2019-05-23 | Stop reason: SDUPTHER

## 2019-05-09 RX ORDER — DEXAMETHASONE SODIUM PHOSPHATE 10 MG/ML
10 INJECTION INTRAMUSCULAR; INTRAVENOUS ONCE
Status: COMPLETED | OUTPATIENT
Start: 2019-05-09 | End: 2019-05-09

## 2019-05-09 RX ORDER — DOXORUBICIN HYDROCHLORIDE 2 MG/ML
60 INJECTION, SOLUTION INTRAVENOUS ONCE
Status: CANCELLED | OUTPATIENT
Start: 2019-05-09

## 2019-05-09 RX ORDER — SODIUM CHLORIDE 0.9 % (FLUSH) 0.9 %
10 SYRINGE (ML) INJECTION PRN
Status: CANCELLED | OUTPATIENT
Start: 2019-05-09

## 2019-05-09 RX ORDER — SODIUM CHLORIDE 9 MG/ML
INJECTION, SOLUTION INTRAVENOUS ONCE
Status: CANCELLED | OUTPATIENT
Start: 2019-05-09

## 2019-05-09 RX ORDER — METHYLPREDNISOLONE SODIUM SUCCINATE 125 MG/2ML
125 INJECTION, POWDER, LYOPHILIZED, FOR SOLUTION INTRAMUSCULAR; INTRAVENOUS ONCE
Status: CANCELLED | OUTPATIENT
Start: 2019-05-09

## 2019-05-09 RX ORDER — SODIUM CHLORIDE 0.9 % (FLUSH) 0.9 %
5 SYRINGE (ML) INJECTION PRN
Status: CANCELLED | OUTPATIENT
Start: 2019-05-09

## 2019-05-09 RX ORDER — DOXORUBICIN HYDROCHLORIDE 2 MG/ML
60 INJECTION, SOLUTION INTRAVENOUS ONCE
Status: COMPLETED | OUTPATIENT
Start: 2019-05-09 | End: 2019-05-09

## 2019-05-09 RX ORDER — SODIUM CHLORIDE 9 MG/ML
INJECTION, SOLUTION INTRAVENOUS ONCE
Status: COMPLETED | OUTPATIENT
Start: 2019-05-09 | End: 2019-05-09

## 2019-05-09 RX ORDER — DIPHENHYDRAMINE HYDROCHLORIDE 50 MG/ML
50 INJECTION INTRAMUSCULAR; INTRAVENOUS ONCE
Status: CANCELLED | OUTPATIENT
Start: 2019-05-09

## 2019-05-09 RX ORDER — PALONOSETRON 0.05 MG/ML
0.25 INJECTION, SOLUTION INTRAVENOUS ONCE
Status: COMPLETED | OUTPATIENT
Start: 2019-05-09 | End: 2019-05-09

## 2019-05-09 RX ORDER — PALONOSETRON 0.05 MG/ML
0.25 INJECTION, SOLUTION INTRAVENOUS ONCE
Status: CANCELLED | OUTPATIENT
Start: 2019-05-09

## 2019-05-09 RX ORDER — HEPARIN SODIUM (PORCINE) LOCK FLUSH IV SOLN 100 UNIT/ML 100 UNIT/ML
500 SOLUTION INTRAVENOUS PRN
Status: DISCONTINUED | OUTPATIENT
Start: 2019-05-09 | End: 2019-05-10 | Stop reason: HOSPADM

## 2019-05-09 RX ORDER — 0.9 % SODIUM CHLORIDE 0.9 %
10 VIAL (ML) INJECTION ONCE
Status: CANCELLED | OUTPATIENT
Start: 2019-05-09

## 2019-05-09 RX ADMIN — Medication 10 ML: at 10:39

## 2019-05-09 RX ADMIN — DOXORUBICIN HYDROCHLORIDE 140 MG: 2 INJECTION, SOLUTION INTRAVENOUS at 13:02

## 2019-05-09 RX ADMIN — DEXAMETHASONE SODIUM PHOSPHATE 10 MG: 10 INJECTION INTRAMUSCULAR; INTRAVENOUS at 12:44

## 2019-05-09 RX ADMIN — SODIUM CHLORIDE 150 MG: 900 INJECTION, SOLUTION INTRAVENOUS at 12:03

## 2019-05-09 RX ADMIN — PALONOSETRON HYDROCHLORIDE 0.25 MG: 0.25 INJECTION, SOLUTION INTRAVENOUS at 12:43

## 2019-05-09 RX ADMIN — Medication 500 UNITS: at 14:20

## 2019-05-09 RX ADMIN — PEGFILGRASTIM 6 MG: KIT SUBCUTANEOUS at 14:20

## 2019-05-09 RX ADMIN — SODIUM CHLORIDE: 9 INJECTION, SOLUTION INTRAVENOUS at 11:52

## 2019-05-09 RX ADMIN — Medication 10 ML: at 10:38

## 2019-05-09 RX ADMIN — CYCLOPHOSPHAMIDE 1400 MG: 2 INJECTION, POWDER, FOR SOLUTION INTRAVENOUS; ORAL at 13:09

## 2019-05-09 RX ADMIN — Medication 10 ML: at 14:20

## 2019-05-09 NOTE — PROGRESS NOTES
Sofía Stanley                                                                                                                  5/9/2019  MRN:   [id number]  YOB: 1960  PCP:                           Dario Garcia MD  Referring Physician: No ref. provider found  Treating Physician Name: Rush Ramires MD      Reason for visit:  Chief Complaint   Patient presents with    Follow-up     REVIEW STATUS OF DISEASE      Current problems:   IDC of Left Breast cancer, Triple negative, pT2,pN0,M0    Active and recent treatments:  Bilateral Mastectomy with left sentinel lymph node    Summary of Case/History:    Sofía Stanley a 62 y. o.female invasive carcinoma of the breast.     Patient initially felt a lump in her left breast for 2-3 months. She underwent screening mammogram which showed 2 cm mass in upper outer left breast.  Right breast do not have any concerning findings. Ultrasound of left breast conformed mass measuring 1.9 cm in the upper outer left breast.  Patient underwent biopsy on 1/23/19 which revealed invasive ductal carcinoma, grade 3, ER/[place] negative. HER-2 results are pending.     Patient has history of uterus/cervical cancer diagnosed in 1992. Patient states she underwent surgery for the cervical cancer. She does not recall getting any radiation therapy or chemotherapy. Patient states she has been interested in getting bilateral breast reduction due to cosmetic reasons however now with a diagnosis of breast cancer she wishes to proceed with bilateral mastectomy.     Patient has history of breast cancer in her mother as well as 3 paternal aunts. Patient's mother also had cervical and uterine cancer.     Patient underwent bilateral mastectomy with left sentinel lymph node biopsy.   Pathological stage was T2, N0, M0    Started chemotherapy for triple negative breast cancer using AC ordered by T on 4/11/19    Interim History:    Patient presents to the clinic for follow-up visit and to discuss further treatment plan. Since last office visit patient denies unexpected or severe side effects. Does complain of fatigue which lasted longer. Complains of nausea on and off. Patient continues to lose hair. Like swelling has not much improved. She is on Lasix 20 mg daily. Appetite is stable. During this visit patient's allergy, social, medical, surgical history and medications were reviewed and updated.       Past Medical History:   Past Medical History:   Diagnosis Date    Arthritis     GENERALIZED ALL OVER RHEUMATOID AND OSTEO    Cancer ([address] 1992    CERVICAL, UTERINE    Cancer of adrenal cortex ([address] 01/2019    LEFT BREAST    Chronic back pain     scioliosis,  02/2019 WEARING A BACK BRACE    Degenerative disorder of bone     ALL OVER    Depression     Difficult intravenous access     HANDS AR BEST SITE    GERD (gastroesophageal reflux disease)     Hyperlipidemia     HX OF NO MEDS IN YEARS    Hypertension 2007    Pain management clinic    Insomnia     Obesity     BMI -  43    Sleep apnea 2016    NO LONGER USES MACHINE    Snores     has been told by family that she stops breathing    Use of cane as ambulatory aid     Wears glasses     READING    Wears glasses     \"CHEATERS\"       Past Surgical History:     Past Surgical History:   Procedure Laterality Date    BREAST SURGERY Left 1990    CYST    BUNIONECTOMY Bilateral     CERVIX SURGERY  1992    COLONOSCOPY  10/13/2017    FOOT SURGERY Left 2014    PINS IN ALL TOES    HAMMER TOE SURGERY Bilateral     HYSTERECTOMY  1992    UTERUS, CERVIX     MASTECTOMY Bilateral 2/12/2019    TOTAL MASTECTOMY performed by Clarice Poole DO at [facility], BILATERAL Bilateral 02/12/2019     TOTAL MASTECTOMY,  AXILLARY SENTINEL LYMPH NODE BIOPSY, FROZEN SECTION     OH COLSC FLX W/REMOVAL LESION BY HOT BX FORCEPS N/A 10/13/2017    COLONOSCOPY WITH BIOPSY AND POLYPECTOMY performed by Tyler Jovel IV, DO at [facility] Medications:     Current Outpatient Medications   Medication Sig Dispense Refill    oxyCODONE-acetaminophen (PERCOCET) 5-325 MG per tablet Take 1 tablet by mouth every 6 hours as needed for Pain for up to 14 days. Take lowest dose possible to manage pain 40 tablet 0    furosemide (LASIX) 20 MG tablet Take 1 tablet by mouth daily as needed (leg swelling) 21 tablet 1    naproxen (NAPROSYN) 500 MG tablet TAKE 1 TABLET TWICE A DAY WITH MEALS 60 tablet 0    prochlorperazine (COMPAZINE) 10 MG tablet Take 1 tablet by mouth every 6 hours as needed (CHEMO INDUCED NAUSEA) 120 tablet 3    ondansetron (ZOFRAN-ODT) 8 MG TBDP disintegrating tablet Place 1 tablet under the tongue every 8 hours as needed for Nausea or Vomiting (CHEMO INDUCED NAUSEA) 30 tablet 2    omeprazole (PRILOSEC) 20 MG delayed release capsule Take 1 capsule by mouth daily 30 capsule 3    lidocaine-prilocaine (EMLA) 2.5-2.5 % cream Apply topically as needed PRIOR TO MEDIPORT ACCESS, NO MORE THAN TWICE PER DAY. 1 Tube 3    spironolactone (ALDACTONE) 25 MG tablet Take 1 tablet by mouth daily 30 tablet 1    amLODIPine (NORVASC) 10 MG tablet TAKE 1 TABLET EVERY DAY 30 tablet 1    docusate sodium (COLACE) 100 MG capsule Take 1 capsule by mouth 2 times daily as needed for Constipation 60 capsule 0    Gauze Pads & Dressings (GAUZE DRESSING) 4\"X4\" PADS 1 each by Does not apply route daily 10 each 2    Gauze Pads & Dressings (ABDOMINAL PAD) 8\"X10\" PADS 1 Units by Does not apply route daily 10 each 2    Elastic Bandages & Supports (ABDOMINAL BINDER/ELASTIC XL) MISC Please give pt 1 XL abdominal binder, wear daily for support 1 each 1    EPINEPHrine (EPIPEN) 0.3 MG/0.3ML SOAJ injection Use as directed for allergic reaction (Patient taking differently: 0.3 mg as needed Use as directed for allergic reaction) 1 each 1     No current facility-administered medications for this visit.         Allergies:   Lisinopril; Bee venom; and Sulfa antibiotics    Review of skin lesions noted   Breast - examination consistent with bilateral mastectomy      DATA:    Results for orders placed or performed during the hospital encounter of 02/12/19   Anaerobic and Aerobic Culture   Result Value Ref Range    Specimen Description . AXILLA SWAB     Special Requests SKIN ABSCESS LEFT AXILLA     Direct Exam NO NEUTROPHILS SEEN     Direct Exam NO BACTERIA SEEN     Culture NORMAL YOANA     Culture ANAEROCOCCUS PREVOTII  LIGHT GROWTH   (A)    Hemoglobin and hematocrit, blood   Result Value Ref Range    POC Hemoglobin 14.8 12.0 - 16.0 g/dL    POC Hematocrit 43 36 - 46 %   SODIUM (POC)   Result Value Ref Range    POC Sodium 143 138 - 146 mmol/L   POTASSIUM (POC)   Result Value Ref Range    POC Potassium 4.3 3.5 - 4.5 mmol/L   CHLORIDE (POC)   Result Value Ref Range    POC Chloride 108 (H) 98 - 107 mmol/L   CALCIUM, IONIC (POC)   Result Value Ref Range    POC Ionized Calcium 1.29 1.15 - 1.33 mmol/L   Surgical Pathology   Result Value Ref Range    Comment SPECIMEN RECEIVED    Surgical Pathology   Result Value Ref Range    Surgical Pathology Report       [id number]  [facility]  ANATOMIC PATHOLOGY  [address] [place] [address]. [place], [address]  [phone]  Fax: [phone]  0 [facility]    Patient Name: Brendan Roa  MR#: [id number]  Specimen #[id number]    Procedures/Addenda  MOLECULAR PATHOLOGY REPORT     Date Ordered:     2/18/2019     Status:  Signed Out       Date Complete:     2/18/2019     By:  Cielo Romero M.D. Date Reported:     2/20/2019       INTERPRETATION    LEFT BREAST CARCINOMA:      -  NEGATIVE FOR HER2 (ERBB2) GENE AMPLIFICATION BY FISH. -  HER2: CEP (D17Z1) RATIO, 1.0.      RESULTS-COMMENTS  THE PATIENT'S CARCINOMA IS EVALUATED FOR HER2 (ERBB2) GENE  AMPLIFICATION BY FISH ANALYSIS.     REFERENCE RANGES    This test was interpreted according to the American Society of  Clinical Oncology/College of American from the deep margin and at least 12.0 cm from  the remaining margins. Surrounding the lesion there is fat necrosis  consistent with a previous biopsy. Superior to the mass, there is a  1.0 cm rubbery pink-tan node, also within the upper outer  quadrant. Additional lymph nodes are identified, 0.4 cm and 0.8 cm with no  obvious tumor involvement. Cassette summary:  \"A\" nipple and skin,  \"B\" deep margin inked black perpendicular, inferior anterior margin  inked red perpendicular, \"C-E\" tumor, \"F\" node superior to tumor and  superior anterior margin perpendicular, \"G\" uninvolved upper inner  quadrant, \"H\" uninvolved lower inner quadrant, \"I\" uninvolved lower  outer quadrant, \"J\" two nodes. 3. \"SARAH VARSHA, RIGHT BREAST\" Product of a right simple mastectomy,  43.0 x 41.0 x 7.0 cm and is surmounted by a 43.0 x 39.0 cm ellipse of  tan-brown skin with an eccentrically located non-retracted nipple. The specimen is oriented with a suture designating medial.  Sectioning  reveals markedly fatty cut surfaces with no discrete masses. There  are no lymph nodes. Cassette summary:  \"A\" nipple and deep margin,  \"B-D\" upper outer quadrant, \"E-F\" lower outer quadrant, \"G-I\" upper  inner quadrant, \"J-K\" lower inner quadrant. tm      Intr aoperative Diagnosis  1. FSDX:  Lymph nodes, no neoplasm detected. Alin Bradley, [id number],  2/12/2019)      Microscopic Description  1-2.   INVASIVE BREAST CARCINOMA         PROCEDURE:     Mastectomy  SPECIMEN LATERALITY AND TUMOR SITE:     Left, not specified further    TUMOR SIZE (LARGEST INVASIVE COMPONENT):     3.9 x 2.5 x 2.0 cm  HISTOLOGIC TYPE OF INVASIVE CARCINOMA:     Ductal; some features  suggest neuroendocrine differentiation; however, control-reactive  immunostain for synaptophysin is negative  HISTOLOGIC GRADE (YOLETTE)       - GLANDULAR/TUBULAR DIFFERENTIATION SCORE:     3  - NUCLEAR PLEOMORPHISM SCORE:     2  - MITOTIC RATE SCORE:     3  - OVERALL GRADE (FROM YOLETTE TOTAL SCORE):     Score 8 = grade 3    TUMOR FOCALITY:       DUCTAL CARCINOMA IN SITU:     Present, few peritumoral foci  TUMOR EXTENSION       - SKIN:     Negative  - NIPPLE:     Negative  - SKELETAL MUSCLE:     N/A    MARGINS - INVASIVE CARCINOMA -  - SITE(S) OF ALL POSITIVE MARGINS: N/A  - IF ALL NEGATI VE, SITE(S) AND DISTANCE TO CLOSEST: 5 cm or more  MARGINS - DCIS -  - SITE(S) OF ALL POSITIVE MARGINS: N/A  - IF ALL NEGATIVE, SITE(S) AND DISTANCE TO CLOSEST:     5 cm or more  LYMPHOVASCULAR INVASION:     Negative    REGIONAL LYMPH NODES -       - NUMBER EXAMINED (SENTINEL AND NON-SENTINEL):     4+3=7  - NUMBER OF SENTINEL:     4  - NUMBER WITH MACROMETASTASES (>2MM):     0  - NUMBER WITH MICROMETASTASES (>0.2MM-2MM AND / OR >200 CELLS):     0;  confirms frozen section diagnoses  - NUMBER WITH ISOLATED TUMOR CELLS (<0.2 MM AND  <200 CELLS):     N/A  - SIZE OF LARGEST METASTATIC DEPOSIT:     N/A   - EXTRANODAL EXTENSION:     N/A  TREATMENT EFFECT: RESPONSE TO PRE-SURGICAL (NEOADJUVANT) THERAPY (IF  APPLICABLE)       - IN BREAST TISSUES:     N/A  - IN LYMPH NODES:     N/A    PATHOLOGIC STAGE CLASSIFICATION (pTNM):     pT2  pN0    CAP InvasiveBreast 4100 in conjunction with AJCC 8 ed. BIOMARKER TESTING  BREAST CARCINOMA       Biomarker studies were performed  on prior breast biopsy from January, 2019 (VS ), with triple negative results. Therefore, per  recommendations, those studies are repeated on a larger tumor specimen  now.   ESTROGEN RECEPTOR*:   -POSITIVE (PERCENT =>1% OF POSITIVE TUMOR CELL NUCLEI):     N/A  ---AVERAGE INTENSITY OF POSITIVE STAINING:     N/A  -NEGATIVE (PERCENT <1% OF POSITIVE TUMOR CELL NUCLEI):     0%  ---AVERAGE INTENSITY OF ANY POSITIVE STAINING: N/A  ---INTERNAL CONTROL PRESENT AND STAIN AS EXPECTED:     Yes  ---INTERNAL CONTROL CELLS ABSENT (COMMENT):     N/A  ---INTERNAL CONTROL CELLS PRESENT BUT DO NOT STAIN (COMMENT):     N/A    PROGESTERONE RECEPTOR*:  ---AVERAGE INTENSITY OF POSITIVE STAINING:     N/A  -NEGATIVE (PERCENT <1% OF POSITIVE TUMOR CELL NUCLEI):     0%  ---AVERAGE INTENSITY OF ANY POSITIVE STAINING: N/A  ---INTERNAL CONTROL PRESENT AND STAIN AS EXPECTED:     Yes  ---INTERNAL CONTROL CELLS ABSENT (COMMENT):     N/A  ---INTERNAL CONTROL CELLS PRESENT BUT DO NOT STAIN (COMMENT):     N/A     HER2*:  HER2 PROTEIN EXPRESSION  (IMMUNOHISTOCHEMISTRY):     N/A  HER2 GENE AMPLIFICATION (INSITU HYBRIDIZATION):     Pending (see  addendum report)         TIME SPECIMEN REMOVED:     February 12, 2019, 1205  TIME SPECIMEN PLACED IN FORMALIN:     February 12, 2019, 1205  COLD ISCHEMIA TIME MEETS CAP / ASCO REQUIREMENTS (LESS THAN 1 HOUR):      Yes  FIXATION TIME (CAP / ASCO GUIDELINES 6-72 HOURS):     29.5 hours  FIXATIVE:     10% BUFFERED FORMALIN    *Information on biomarker regents:  Estrogen Receptor: If performed at [facility] laboratory: Immunostain  clone SP1 with indirect biotin streptavidin detection system, vendor  Flora Jang (FDA cleared); evaluated by manual morphometry  (negative=less than 1% tumor cell nuclear staining; otherwise  positive); external control is reactive. Progesterone Receptor: If performed at [facility] laboratory: Immunostain  clone [id number] with indirect biotin streptavidin detection system, vendor  Flora Jang (FDA cleared); evaluated by m anual morphometry  (negative=less than 1% tumor cell nuclear staining; otherwise  positive); external control is reactive. HER 2 insitu hybridization: FDA cleared, vendor [facility] [employer]. [place] [facility] 1201 in conjunction with AJCC 8 ed. 3.  Microscopic examination performed.            Impression:  Invasive ductal carcinoma of left breast, triple negative, pT2,p N0, M0  Status post bilateral mastectomy and left sentinel lymph node biopsy  Personal history of gynecological malignancy  (uterus/cervical cancer) status post surgery in 1992  Cancer related pain  Leg swelling  Anemia secondary to

## 2019-05-10 RX ORDER — AMLODIPINE BESYLATE 10 MG/1
10 TABLET ORAL DAILY
Qty: 30 TABLET | Refills: 5 | Status: SHIPPED | OUTPATIENT
Start: 2019-05-10 | End: 2019-06-20 | Stop reason: SDUPTHER

## 2019-05-10 NOTE — TELEPHONE ENCOUNTER
Please Approve or Refuse.   Send to Pharmacy per Pt's Request:     I advised pt to make apt, but she is currently going through cancer treatments and would like you to just refill it     Next Visit Date:  Visit date not found   Last Visit Date: 8/22/2018    Hemoglobin A1C (%)   Date Value   01/04/2018 5.5   10/23/2014 5.9             ( goal A1C is < 7)   BP Readings from Last 3 Encounters:   05/09/19 115/73   04/25/19 116/82   04/11/19 132/76          (goal 120/80)  BUN   Date Value Ref Range Status   05/09/2019 11 6 - 20 mg/dL Final     CREATININE   Date Value Ref Range Status   05/09/2019 0.60 0.50 - 0.90 mg/dL Final     Potassium   Date Value Ref Range Status   05/09/2019 4.3 3.7 - 5.3 mmol/L Final

## 2019-05-21 ENCOUNTER — TELEPHONE (OUTPATIENT)
Dept: ONCOLOGY | Age: 59
End: 2019-05-21

## 2019-05-23 ENCOUNTER — HOSPITAL ENCOUNTER (OUTPATIENT)
Dept: INFUSION THERAPY | Age: 59
Discharge: HOME | End: 2019-05-23
Payer: COMMERCIAL

## 2019-05-23 ENCOUNTER — OFFICE VISIT (OUTPATIENT)
Dept: ONCOLOGY | Age: 59
End: 2019-05-23
Payer: COMMERCIAL

## 2019-05-23 VITALS
WEIGHT: 269.1 LBS | TEMPERATURE: 98.5 F | BODY MASS INDEX: 42.15 KG/M2 | SYSTOLIC BLOOD PRESSURE: 134 MMHG | HEART RATE: 109 BPM | RESPIRATION RATE: 16 BRPM | DIASTOLIC BLOOD PRESSURE: 83 MMHG

## 2019-05-23 DIAGNOSIS — C50.412 MALIGNANT NEOPLASM OF UPPER-OUTER QUADRANT OF LEFT BREAST IN FEMALE, ESTROGEN RECEPTOR NEGATIVE (HCC): Primary | ICD-10-CM

## 2019-05-23 DIAGNOSIS — Z17.1 MALIGNANT NEOPLASM OF UPPER-OUTER QUADRANT OF LEFT BREAST IN FEMALE, ESTROGEN RECEPTOR NEGATIVE (HCC): Primary | ICD-10-CM

## 2019-05-23 DIAGNOSIS — G62.9 NEUROPATHY: ICD-10-CM

## 2019-05-23 LAB
ABSOLUTE EOS #: 0 K/UL (ref 0–0.4)
ABSOLUTE IMMATURE GRANULOCYTE: ABNORMAL K/UL (ref 0–0.3)
ABSOLUTE LYMPH #: 1.13 K/UL (ref 1–4.8)
ABSOLUTE MONO #: 1.46 K/UL (ref 0.1–1.2)
ALBUMIN SERPL-MCNC: 3.7 G/DL (ref 3.5–5.2)
ALBUMIN/GLOBULIN RATIO: 1.3 (ref 1–2.5)
ALP BLD-CCNC: 97 U/L (ref 35–104)
ALT SERPL-CCNC: 10 U/L (ref 5–33)
ANION GAP SERPL CALCULATED.3IONS-SCNC: 12 MMOL/L (ref 9–17)
AST SERPL-CCNC: 8 U/L
BASOPHILS # BLD: 1 % (ref 0–2)
BASOPHILS ABSOLUTE: 0.08 K/UL (ref 0–0.2)
BILIRUB SERPL-MCNC: <0.1 MG/DL (ref 0.3–1.2)
BUN BLDV-MCNC: 8 MG/DL (ref 6–20)
BUN/CREAT BLD: ABNORMAL (ref 9–20)
CALCIUM SERPL-MCNC: 9.3 MG/DL (ref 8.6–10.4)
CHLORIDE BLD-SCNC: 104 MMOL/L (ref 98–107)
CO2: 24 MMOL/L (ref 20–31)
CREAT SERPL-MCNC: 0.58 MG/DL (ref 0.5–0.9)
DIFFERENTIAL TYPE: ABNORMAL
EOSINOPHILS RELATIVE PERCENT: 0 % (ref 1–4)
GFR AFRICAN AMERICAN: >60 ML/MIN
GFR NON-AFRICAN AMERICAN: >60 ML/MIN
GFR SERPL CREATININE-BSD FRML MDRD: ABNORMAL ML/MIN/{1.73_M2}
GFR SERPL CREATININE-BSD FRML MDRD: ABNORMAL ML/MIN/{1.73_M2}
GLUCOSE BLD-MCNC: 109 MG/DL (ref 70–99)
HCT VFR BLD CALC: 30.4 % (ref 36–46)
HEMOGLOBIN: 10 G/DL (ref 12–16)
IMMATURE GRANULOCYTES: ABNORMAL %
LYMPHOCYTES # BLD: 14 % (ref 24–44)
MCH RBC QN AUTO: 25.5 PG (ref 26–34)
MCHC RBC AUTO-ENTMCNC: 32.9 G/DL (ref 31–37)
MCV RBC AUTO: 77.6 FL (ref 80–100)
MONOCYTES # BLD: 18 % (ref 2–11)
MORPHOLOGY: ABNORMAL
MORPHOLOGY: ABNORMAL
NRBC AUTOMATED: ABNORMAL PER 100 WBC
PDW BLD-RTO: 21.2 % (ref 12.5–15.4)
PLATELET # BLD: 227 K/UL (ref 140–450)
PLATELET ESTIMATE: ABNORMAL
PMV BLD AUTO: 7.3 FL (ref 6–12)
POTASSIUM SERPL-SCNC: 4.1 MMOL/L (ref 3.7–5.3)
RBC # BLD: 3.92 M/UL (ref 4–5.2)
RBC # BLD: ABNORMAL 10*6/UL
SEG NEUTROPHILS: 67 % (ref 36–66)
SEGMENTED NEUTROPHILS ABSOLUTE COUNT: 5.43 K/UL (ref 1.8–7.7)
SODIUM BLD-SCNC: 140 MMOL/L (ref 135–144)
TOTAL PROTEIN: 6.5 G/DL (ref 6.4–8.3)
WBC # BLD: 8.1 K/UL (ref 3.5–11)
WBC # BLD: ABNORMAL 10*3/UL

## 2019-05-23 PROCEDURE — 4004F PT TOBACCO SCREEN RCVD TLK: CPT | Performed by: INTERNAL MEDICINE

## 2019-05-23 PROCEDURE — G8427 DOCREV CUR MEDS BY ELIG CLIN: HCPCS | Performed by: INTERNAL MEDICINE

## 2019-05-23 PROCEDURE — 36591 DRAW BLOOD OFF VENOUS DEVICE: CPT

## 2019-05-23 PROCEDURE — 6360000002 HC RX W HCPCS: Performed by: INTERNAL MEDICINE

## 2019-05-23 PROCEDURE — 85025 COMPLETE CBC W/AUTO DIFF WBC: CPT

## 2019-05-23 PROCEDURE — 3017F COLORECTAL CA SCREEN DOC REV: CPT | Performed by: INTERNAL MEDICINE

## 2019-05-23 PROCEDURE — 80053 COMPREHEN METABOLIC PANEL: CPT

## 2019-05-23 PROCEDURE — 96413 CHEMO IV INFUSION 1 HR: CPT

## 2019-05-23 PROCEDURE — 99214 OFFICE O/P EST MOD 30 MIN: CPT | Performed by: INTERNAL MEDICINE

## 2019-05-23 PROCEDURE — 96375 TX/PRO/DX INJ NEW DRUG ADDON: CPT

## 2019-05-23 PROCEDURE — 2580000003 HC RX 258: Performed by: INTERNAL MEDICINE

## 2019-05-23 PROCEDURE — 96367 TX/PROPH/DG ADDL SEQ IV INF: CPT

## 2019-05-23 PROCEDURE — 96411 CHEMO IV PUSH ADDL DRUG: CPT

## 2019-05-23 PROCEDURE — 96409 CHEMO IV PUSH SNGL DRUG: CPT

## 2019-05-23 PROCEDURE — 96377 APPLICATON ON-BODY INJECTOR: CPT

## 2019-05-23 PROCEDURE — G8417 CALC BMI ABV UP PARAM F/U: HCPCS | Performed by: INTERNAL MEDICINE

## 2019-05-23 RX ORDER — PALONOSETRON 0.05 MG/ML
0.25 INJECTION, SOLUTION INTRAVENOUS ONCE
Status: CANCELLED | OUTPATIENT
Start: 2019-05-23

## 2019-05-23 RX ORDER — DOXORUBICIN HYDROCHLORIDE 2 MG/ML
60 INJECTION, SOLUTION INTRAVENOUS ONCE
Status: COMPLETED | OUTPATIENT
Start: 2019-05-23 | End: 2019-05-23

## 2019-05-23 RX ORDER — FUROSEMIDE 20 MG/1
20 TABLET ORAL DAILY PRN
Qty: 30 TABLET | Refills: 1 | Status: SHIPPED | OUTPATIENT
Start: 2019-05-23 | End: 2019-06-20 | Stop reason: SDUPTHER

## 2019-05-23 RX ORDER — SODIUM CHLORIDE 9 MG/ML
INJECTION, SOLUTION INTRAVENOUS ONCE
Status: CANCELLED | OUTPATIENT
Start: 2019-05-23

## 2019-05-23 RX ORDER — PALONOSETRON 0.05 MG/ML
0.25 INJECTION, SOLUTION INTRAVENOUS ONCE
Status: COMPLETED | OUTPATIENT
Start: 2019-05-23 | End: 2019-05-23

## 2019-05-23 RX ORDER — GABAPENTIN 300 MG/1
300 CAPSULE ORAL 2 TIMES DAILY
Qty: 60 CAPSULE | Refills: 0 | Status: SHIPPED | OUTPATIENT
Start: 2019-05-23 | End: 2019-06-20 | Stop reason: SDUPTHER

## 2019-05-23 RX ORDER — DIPHENHYDRAMINE HYDROCHLORIDE 50 MG/ML
50 INJECTION INTRAMUSCULAR; INTRAVENOUS ONCE
Status: CANCELLED | OUTPATIENT
Start: 2019-05-23

## 2019-05-23 RX ORDER — SODIUM CHLORIDE 9 MG/ML
INJECTION, SOLUTION INTRAVENOUS ONCE
Status: COMPLETED | OUTPATIENT
Start: 2019-05-23 | End: 2019-05-23

## 2019-05-23 RX ORDER — HEPARIN SODIUM (PORCINE) LOCK FLUSH IV SOLN 100 UNIT/ML 100 UNIT/ML
500 SOLUTION INTRAVENOUS PRN
Status: CANCELLED | OUTPATIENT
Start: 2019-05-23

## 2019-05-23 RX ORDER — OXYCODONE HYDROCHLORIDE AND ACETAMINOPHEN 5; 325 MG/1; MG/1
1 TABLET ORAL EVERY 6 HOURS PRN
Qty: 60 TABLET | Refills: 0 | Status: SHIPPED | OUTPATIENT
Start: 2019-05-23 | End: 2019-06-06 | Stop reason: SDUPTHER

## 2019-05-23 RX ORDER — METHYLPREDNISOLONE SODIUM SUCCINATE 125 MG/2ML
125 INJECTION, POWDER, LYOPHILIZED, FOR SOLUTION INTRAMUSCULAR; INTRAVENOUS ONCE
Status: CANCELLED | OUTPATIENT
Start: 2019-05-23

## 2019-05-23 RX ORDER — SODIUM CHLORIDE 0.9 % (FLUSH) 0.9 %
10 SYRINGE (ML) INJECTION PRN
Status: DISCONTINUED | OUTPATIENT
Start: 2019-05-23 | End: 2019-05-24 | Stop reason: HOSPADM

## 2019-05-23 RX ORDER — HEPARIN SODIUM (PORCINE) LOCK FLUSH IV SOLN 100 UNIT/ML 100 UNIT/ML
500 SOLUTION INTRAVENOUS PRN
Status: DISCONTINUED | OUTPATIENT
Start: 2019-05-23 | End: 2019-05-24 | Stop reason: HOSPADM

## 2019-05-23 RX ORDER — DOXORUBICIN HYDROCHLORIDE 2 MG/ML
60 INJECTION, SOLUTION INTRAVENOUS ONCE
Status: CANCELLED | OUTPATIENT
Start: 2019-05-23

## 2019-05-23 RX ORDER — DEXAMETHASONE SODIUM PHOSPHATE 10 MG/ML
10 INJECTION INTRAMUSCULAR; INTRAVENOUS ONCE
Status: COMPLETED | OUTPATIENT
Start: 2019-05-23 | End: 2019-05-23

## 2019-05-23 RX ORDER — SODIUM CHLORIDE 9 MG/ML
INJECTION, SOLUTION INTRAVENOUS CONTINUOUS
Status: CANCELLED | OUTPATIENT
Start: 2019-05-23

## 2019-05-23 RX ORDER — SODIUM CHLORIDE 0.9 % (FLUSH) 0.9 %
10 SYRINGE (ML) INJECTION PRN
Status: CANCELLED | OUTPATIENT
Start: 2019-05-23

## 2019-05-23 RX ORDER — 0.9 % SODIUM CHLORIDE 0.9 %
10 VIAL (ML) INJECTION ONCE
Status: CANCELLED | OUTPATIENT
Start: 2019-05-23

## 2019-05-23 RX ORDER — SODIUM CHLORIDE 0.9 % (FLUSH) 0.9 %
5 SYRINGE (ML) INJECTION PRN
Status: CANCELLED | OUTPATIENT
Start: 2019-05-23

## 2019-05-23 RX ORDER — GABAPENTIN 300 MG/1
300 CAPSULE ORAL 2 TIMES DAILY
Qty: 60 CAPSULE | Refills: 0 | Status: SHIPPED | OUTPATIENT
Start: 2019-05-23 | End: 2019-05-23 | Stop reason: SDUPTHER

## 2019-05-23 RX ADMIN — SODIUM CHLORIDE 150 MG: 900 INJECTION, SOLUTION INTRAVENOUS at 11:30

## 2019-05-23 RX ADMIN — PEGFILGRASTIM 6 MG: KIT SUBCUTANEOUS at 13:37

## 2019-05-23 RX ADMIN — SODIUM CHLORIDE: 9 INJECTION, SOLUTION INTRAVENOUS at 10:55

## 2019-05-23 RX ADMIN — Medication 10 ML: at 13:36

## 2019-05-23 RX ADMIN — DOXORUBICIN HYDROCHLORIDE 140 MG: 2 INJECTION, SOLUTION INTRAVENOUS at 12:19

## 2019-05-23 RX ADMIN — PALONOSETRON HYDROCHLORIDE 0.25 MG: 0.25 INJECTION, SOLUTION INTRAVENOUS at 11:17

## 2019-05-23 RX ADMIN — Medication 500 UNITS: at 13:36

## 2019-05-23 RX ADMIN — CYCLOPHOSPHAMIDE 1400 MG: 2 INJECTION, POWDER, FOR SOLUTION INTRAVENOUS; ORAL at 12:27

## 2019-05-23 RX ADMIN — DEXAMETHASONE SODIUM PHOSPHATE 10 MG: 10 INJECTION INTRAMUSCULAR; INTRAVENOUS at 11:18

## 2019-05-23 NOTE — PROGRESS NOTES
Brent Villafuerte                                                                                                                  5/23/2019  MRN:   [id number]  YOB: 1960  PCP:                           Yogesh Herrera MD  Referring Physician: No ref. provider found  Treating Physician Name: Manuel Casey MD      Reason for visit:  Chief Complaint   Patient presents with    Pain     generalized     Peripheral Neuropathy     bilateral feet     Fatigue    Nausea     Current problems:   IDC of Left Breast cancer, Triple negative, pT2,pN0,M0    Active and recent treatments:  Bilateral Mastectomy with left sentinel lymph node    Summary of Case/History:    Brent Villafuerte a 62 y. o.female invasive carcinoma of the breast.     Patient initially felt a lump in her left breast for 2-3 months. She underwent screening mammogram which showed 2 cm mass in upper outer left breast.  Right breast do not have any concerning findings. Ultrasound of left breast conformed mass measuring 1.9 cm in the upper outer left breast.  Patient underwent biopsy on 1/23/19 which revealed invasive ductal carcinoma, grade 3, ER/WV negative. HER-2 results are pending.     Patient has history of uterus/cervical cancer diagnosed in 1992. Patient states she underwent surgery for the cervical cancer. She does not recall getting any radiation therapy or chemotherapy. Patient states she has been interested in getting bilateral breast reduction due to cosmetic reasons however now with a diagnosis of breast cancer she wishes to proceed with bilateral mastectomy.     Patient has history of breast cancer in her mother as well as 3 paternal aunts. Patient's mother also had cervical and uterine cancer.     Patient underwent bilateral mastectomy with left sentinel lymph node biopsy.   Pathological stage was T2, N0, M0    Started chemotherapy for triple negative breast cancer using AC ordered by T on 4/11/19    Interim History:    Patient presents to the clinic for a follow-up visit and for toxicity check. Patient complains of swelling of feet. Complains of discolorization of her palms. Complains of discolorization of her nails. Complains of fatigue. Denies hospitalization or ER visit. Nausea and vomiting is controlled. Pain medication is helping her deal with the pain secondary to Neulasta. Appetite is stable. During this visit patient's allergy, social, medical, surgical history and medications were reviewed and updated.       Past Medical History:   Past Medical History:   Diagnosis Date    Arthritis     GENERALIZED ALL OVER RHEUMATOID AND OSTEO    Cancer ([facility] [address] 1992    CERVICAL, UTERINE    Cancer of adrenal cortex ([facility] [address] 01/2019    LEFT BREAST    Chronic back pain     scioliosis,  02/2019 WEARING A BACK BRACE    Degenerative disorder of bone     ALL OVER    Depression     Difficult intravenous access     HANDS AR BEST SITE    GERD (gastroesophageal reflux disease)     Hyperlipidemia     HX OF NO MEDS IN YEARS    Hypertension 2007    Pain management clinic    Insomnia     Obesity     BMI -  43    Sleep apnea 2016    NO LONGER USES MACHINE    Snores     has been told by family that she stops breathing    Use of cane as ambulatory aid     Wears glasses     READING    Wears glasses     \"CHEATERS\"       Past Surgical History:     Past Surgical History:   Procedure Laterality Date    BREAST SURGERY Left 1990    CYST    BUNIONECTOMY Bilateral     CERVIX SURGERY  1992    COLONOSCOPY  10/13/2017    FOOT SURGERY Left 2014    PINS IN ALL TOES    HAMMER TOE SURGERY Bilateral     HYSTERECTOMY  1992    UTERUS, CERVIX     MASTECTOMY Bilateral 2/12/2019    TOTAL MASTECTOMY performed by Joanne Pagan [facility], BILATERAL Bilateral 02/12/2019     TOTAL MASTECTOMY,  AXILLARY SENTINEL LYMPH NODE BIOPSY, FROZEN SECTION     OH COLSC FLX W/REMOVAL LESION BY HOT BX FORCEPS N/A 10/13/2017    COLONOSCOPY WITH BIOPSY AND POLYPECTOMY performed by Jennifer Jovel IV, DO at [facility] TUNNELED VENOUS PORT PLACEMENT  03/27/2019    X-RAY FOOT 3+VW      both       Patient Family Social History:     Social History     Socioeconomic History    Marital status: Single     Spouse name: Not on file    Number of children: Not on file    Years of education: Not on file    Highest education level: Not on file   Occupational History    Not on file   Social Needs    Financial resource strain: Not on file    Food insecurity:     Worry: Not on file     Inability: Not on file    Transportation needs:     Medical: Not on file     Non-medical: Not on file   Tobacco Use    Smoking status: Current Some Day Smoker     Packs/day: 0.00     Years: 41.00     Pack years: 0.00     Types: Cigarettes    Smokeless tobacco: Never Used    Tobacco comment: STATES SMOKES A PACK A WEEK   Substance and Sexual Activity    Alcohol use: No    Drug use: Yes     Frequency: 7.0 times per week     Types: Marijuana     Comment: LAST USE 02/10/2018, AND PAIN PILLS     Sexual activity: Not Currently   Lifestyle    Physical activity:     Days per week: Not on file     Minutes per session: Not on file    Stress: Not on file   Relationships    Social connections:     Talks on phone: Not on file     Gets together: Not on file     Attends Spiritism service: Not on file     Active member of club or organization: Not on file     Attends meetings of clubs or organizations: Not on file     Relationship status: Not on file    Intimate partner violence:     Fear of current or ex partner: Not on file     Emotionally abused: Not on file     Physically abused: Not on file     Forced sexual activity: Not on file   Other Topics Concern    Not on file   Social History Narrative    Not on file      Family History   Problem Relation Age of Onset    High Blood Pressure Mother     Cancer Mother         cervical and breast    Diabetes Father     Heart Attack Father     Heart Disease Maternal Aunt         times 2      Current Medications:     Current Outpatient Medications   Medication Sig Dispense Refill    amLODIPine (NORVASC) 10 MG tablet Take 1 tablet by mouth daily 30 tablet 5    Magic Mouthwash (MIRACLE MOUTHWASH) Swish and spit 10 mLs 4 times daily as needed (compression) Mix equal amounts of benadryl, mylanta and viscous lidocaine, use cherry flavor as needed. 1 Bottle 3    oxyCODONE-acetaminophen (PERCOCET) 5-325 MG per tablet Take 1 tablet by mouth every 6 hours as needed for Pain for up to 14 days. Take lowest dose possible to manage pain 56 tablet 0    furosemide (LASIX) 20 MG tablet Take 1 tablet by mouth daily as needed (leg swelling) 21 tablet 1    naproxen (NAPROSYN) 500 MG tablet TAKE 1 TABLET TWICE A DAY WITH MEALS 60 tablet 0    prochlorperazine (COMPAZINE) 10 MG tablet Take 1 tablet by mouth every 6 hours as needed (CHEMO INDUCED NAUSEA) 120 tablet 3    ondansetron (ZOFRAN-ODT) 8 MG TBDP disintegrating tablet Place 1 tablet under the tongue every 8 hours as needed for Nausea or Vomiting (CHEMO INDUCED NAUSEA) 30 tablet 2    omeprazole (PRILOSEC) 20 MG delayed release capsule Take 1 capsule by mouth daily 30 capsule 3    lidocaine-prilocaine (EMLA) 2.5-2.5 % cream Apply topically as needed PRIOR TO MEDIPORT ACCESS, NO MORE THAN TWICE PER DAY.  1 Tube 3    spironolactone (ALDACTONE) 25 MG tablet Take 1 tablet by mouth daily 30 tablet 1    amLODIPine (NORVASC) 10 MG tablet TAKE 1 TABLET EVERY DAY 30 tablet 1    docusate sodium (COLACE) 100 MG capsule Take 1 capsule by mouth 2 times daily as needed for Constipation 60 capsule 0    Gauze Pads & Dressings (GAUZE DRESSING) 4\"X4\" PADS 1 each by Does not apply route daily 10 each 2    Gauze Pads & Dressings (ABDOMINAL PAD) 8\"X10\" PADS 1 Units by Does not apply route daily 10 each 2    Elastic Bandages & Supports (ABDOMINAL BINDER/ELASTIC XL) MISC Please give pt 1 XL abdominal binder, wear daily for support 1 each 1    EPINEPHrine (EPIPEN) 0.3 MG/0.3ML SOAJ injection Use as directed for allergic reaction (Patient taking differently: 0.3 mg as needed Use as directed for allergic reaction) 1 each 1     No current facility-administered medications for this visit. Allergies:   Lisinopril; Bee venom; and Sulfa antibiotics    Review of Systems:    Constitutional: No fever or chills. No night sweats, no weight loss   Eyes: No eye discharge, double vision, or eye pain   HEENT: negative for sore mouth, sore throat, hoarseness and voice change   Respiratory: negative for cough , sputum, dyspnea, wheezing, hemoptysis, chest pain   Cardiovascular: negative for chest pain, dyspnea, palpitations, orthopnea, PND   Gastrointestinal: negative for nausea, vomiting, diarrhea, constipation, abdominal pain, Dysphagia, hematemesis and hematochezia   Genitourinary: negative for frequency, dysuria, nocturia, urinary incontinence, and hematuria   Integument: negative for rash, skin lesions, bruises.    Hematologic/Lymphatic: negative for easy bruising, bleeding, lymphadenopathy, or petechiae   Endocrine: negative for heat or cold intolerance,weight changes, change in bowel habits and hair loss   Musculoskeletal: negative for myalgias, arthralgias, pain, joint swelling,and bone pain   Neurological: negative for headaches, dizziness, seizures, weakness, numbness        Physical Exam:    Vitals: /83   Pulse 109   Temp 98.5 °F (36.9 °C)   Resp 16   Wt 269 lb 1.6 oz (122.1 kg)   LMP 02/11/1992   BMI 42.15 kg/m²   General appearance - well appearing, no in pain or distress  Mental status - AAO X3  Eyes - pupils equal and reactive, extraocular eye movements intact  Mouth - mucous membranes moist, pharynx normal without lesions  Neck - supple, no significant adenopathy  Lymphatics - no palpable lymphadenopathy, no hepatosplenomegaly  Chest - clear to auscultation, no wheezes, rales or rhonchi, symmetric air entry  Heart - normal rate, regular rhythm, normal S1, S2, no murmurs  Abdomen - soft, nontender, nondistended, no masses or organomegaly  Neurological - alert, oriented, normal speech, no focal findings or movement disorder noted  Extremities - positive pitting edema  Skin - normal coloration and turgor, no rashes, no suspicious skin lesions noted   Breast - examination consistent with bilateral mastectomy      DATA:    Results for orders placed or performed during the hospital encounter of 02/12/19   Anaerobic and Aerobic Culture   Result Value Ref Range    Specimen Description . AXILLA SWAB     Special Requests SKIN ABSCESS LEFT AXILLA     Direct Exam NO NEUTROPHILS SEEN     Direct Exam NO BACTERIA SEEN     Culture NORMAL YOANA     Culture ANAEROCOCCUS PREVOTII  LIGHT GROWTH   (A)    Hemoglobin and hematocrit, blood   Result Value Ref Range    POC Hemoglobin 14.8 12.0 - 16.0 g/dL    POC Hematocrit 43 36 - 46 %   SODIUM (POC)   Result Value Ref Range    POC Sodium 143 138 - 146 mmol/L   POTASSIUM (POC)   Result Value Ref Range    POC Potassium 4.3 3.5 - 4.5 mmol/L   CHLORIDE (POC)   Result Value Ref Range    POC Chloride 108 (H) 98 - 107 mmol/L   CALCIUM, IONIC (POC)   Result Value Ref Range    POC Ionized Calcium 1.29 1.15 - 1.33 mmol/L   Surgical Pathology   Result Value Ref Range    Comment SPECIMEN RECEIVED    Surgical Pathology   Result Value Ref Range    Surgical Pathology Report       [id number]  [employer]  [facility]  ANATOMIC PATHOLOGY  [address] [place] [address]. [facility], [address]  [phone]  Fax: [phone]  [facility]    Patient Name: Diana Rojas  MR#: [id number]  Specimen #[id number]    Procedures/Addenda  MOLECULAR PATHOLOGY REPORT     Date Ordered:     2/18/2019     Status:  Signed Out       Date Complete:     2/18/2019     By:  Guevara Fuller M.D.        Date Reported:     2/20/2019       INTERPRETATION    LEFT BREAST CARCINOMA:      -  NEGATIVE FOR HER2 (ERBB2) GENE AMPLIFICATION BY FISH. -  HER2: CEP (D17Z1) RATIO, 1.0.      RESULTS-COMMENTS  THE PATIENT'S CARCINOMA IS EVALUATED FOR HER2 (ERBB2) GENE  AMPLIFICATION BY FISH ANALYSIS. REFERENCE RANGES    This test was interpreted according to the American Society of  Clinical Oncology/College of American Pathologists (ASCO/CAP) 2013  Guideline Recommendations for neoplasm fixed in formalin for 6-72  hours (Arch Pathol Lab Med. doi:  10. 5858/arpa. 9382-7168-PA). According to the 2013 ASCO/CAP Guidelines, a non-amplified result  indicates an ERBB2/CEP17 ratio less than 2.0 with an average ERBB2  copy number less than 4.0; an amplified result indicates either (1) an  ERBB2/CEP17 ratio of 2.0 or greater or (2) an average ERBB2 copy  number of at least 6.0 with an ERBB2/CEP17 ratio less than 2.0; and an  equivocal result indicates an ERBB2/CEP17 ratio less than 2.0 with an  average ERBB2 copy number of at least 4.0 but less than 6.0. Fluorescence in-situ hybridization (fish) analysis is performed with a  probe specific for HER2 (ERBB2) and a probe, D17Z1, for the  pericentromeric region of chromosome 17 (PathiConcludeion, InCast). External  and internal controls perform appropriately. In this assay, the ratio  of HER2 signals to chromosome 17 signals is calculated. Sixty nuclei  are examined, and the ratio of HER2 signals to chromosome 17 signals  determined. Number of Tumor Cells Counted:               (30+30)  Numb er of Observers:                     Two  Average Number of HER2 Signals/Nucleus:          1.7  Average Number of CEP 17 Signals/Nucleus:     1.7  Ratio of average HER2/CEP 17 (D17Z1):          1.0       Specimen:                         Left breast  Fixative:                              10% Buffered formalin  Duration of fixation:                    29.5 hours  Cold ischemia time <1 hour               Yes  Meets ASCO/CAP guidelines for analysis: Yes  Joint recommendation of the College of American Pathologists and  American Society of Clinical Oncology to optimize accuracy and  consistency of HER2 (ERBB2) testing is for prompt appropriate  sectioning of specimens with cold ischemia time < 1 hour and fixation  in 10% buffered formalin for 6-72 hours. The PathVysion kit is  designed to detect amplification of the HER2 (ERBB2) gene via  fluorescence in situ hybridization (FISH). This test is approved by  the U.S. Food and Drug Administration. Bee Serna M.D. Final Diagnosis  1. LYMPH NODES, EXCISION (LEFT SENTINEL):       - NO NEOPLASM DETECTED. 2.  BREAST WITH AXILLARY LYMPH NODES, MASTECTOMY (LEFT):       - INVASIVE DUCTAL CARCINOMA, 3.9 CM. - EXCISION MARGINS NEGATIVE FOR NEOPLASM.       - LYMPH NODES NEGATIVE FOR NEOPLASM. - NEGATIVE ESTROGEN RECEPTOR.       - NEGATIVE PROGESTERONE RECEPTOR.       - HER2 GENE AMPLIFICATION ASSESSMENT PENDING. 3.  BREAST, MASTECTOMY (RIGHT):       - FIBROCYSTIC CHANGE.       - NO ATYPIA OR NEOPLASIA DETECTED. Adilene Mayo M.D.  **Electronically Signed Out**         [facility]/2/15/2019  Clinical Information  Pre-op Diagnosis:  INVASIVE DUCTAL CARCINOMA LEFT BREAST   Operative Findings:  SENTINEL LYMPH NODE LEFT AXILLA  SILK ON PRIMARY  LYMPH NODE; LEFT BREAST  SILK AT MEDIAL MARGIN; RIGHT BREAST  SILK AT  MEDIAL MARGIN  Operation Performed:  SIMPLE MASTECTOMY AXILLARY SENTINEL LYMPH NODE  BIOPSY, POSSIBLE FROZEN SECTION, POSSIBLE AXILLARY LYMPH NODE  DISSECTION    Source:  1: SE NTINEL LYMPH NODE LEFT AXILLA, SILK ON PRIMARY LYMPH NODE (A)  2: LEFT BREAST (B)  3: RIGHT BREAST (C)    Gross Description  1. \"SARAH MADDOX, SENTINEL LYMPH NODE LEFT AXILLA\" Received fresh are  four nodes from 0.4 to 1.8 cm. The largest is tagged with a suture  designating \"primary lymph node. \"  Cassette summary:  \"A\" one node,  \"B\" one node, \"C\" one node, \"D-E\" largest primary node, 5FS.   2.  \"SARAH VARSHA, LEFT BREAST\" The product of a left simple mastectomy,  49.0 x 35.0 x 9.0 cm and is surmounted by a 45.0 x 39.0 cm ellipse of  tan skin with an eccentrically located non-retracted nipple. Sectioning reveals a 3.9 x 2.5 x 2.0 cm circumscribed focally  hemorrhagic tan-pink mass lesion within the upper outer quadrant that  is approximately 9.0 cm from the deep margin and at least 12.0 cm from  the remaining margins. Surrounding the lesion there is fat necrosis  consistent with a previous biopsy. Superior to the mass, there is a  1.0 cm rubbery pink-tan node, also within the upper outer  quadrant. Additional lymph nodes are identified, 0.4 cm and 0.8 cm with no  obvious tumor involvement. Cassette summary:  \"A\" nipple and skin,  \"B\" deep margin inked black perpendicular, inferior anterior margin  inked red perpendicular, \"C-E\" tumor, \"F\" node superior to tumor and  superior anterior margin perpendicular, \"G\" uninvolved upper inner  quadrant, \"H\" uninvolved lower inner quadrant, \"I\" uninvolved lower  outer quadrant, \"J\" two nodes. 3. \"SARAH MANNT, RIGHT BREAST\" Product of a right simple mastectomy,  43.0 x 41.0 x 7.0 cm and is surmounted by a 43.0 x 39.0 cm ellipse of  tan-brown skin with an eccentrically located non-retracted nipple. The specimen is oriented with a suture designating medial.  Sectioning  reveals markedly fatty cut surfaces with no discrete masses. There  are no lymph nodes. Cassette summary:  \"A\" nipple and deep margin,  \"B-D\" upper outer quadrant, \"E-F\" lower outer quadrant, \"G-I\" upper  inner quadrant, \"J-K\" lower inner quadrant. tm      Intr aoperative Diagnosis  1. FSDX:  Lymph nodes, no neoplasm detected. Terrence Daily, 1138,  2/12/2019)      Microscopic Description  1-2.   INVASIVE BREAST CARCINOMA         PROCEDURE:     Mastectomy  SPECIMEN LATERALITY AND TUMOR SITE:     Left, not specified further    TUMOR SIZE (LARGEST INVASIVE COMPONENT):     3.9 x 2.5 x 2.0 cm  HISTOLOGIC TYPE OF INVASIVE CARCINOMA:     Ductal; some features  suggest neuroendocrine differentiation; however, control-reactive  immunostain for synaptophysin is negative  HISTOLOGIC GRADE (YOLETTE)       - GLANDULAR/TUBULAR DIFFERENTIATION SCORE:     3  - NUCLEAR PLEOMORPHISM SCORE:     2  - MITOTIC RATE SCORE:     3  - OVERALL GRADE (FROM YOLETTE TOTAL SCORE):     Score 8 = grade 3    TUMOR FOCALITY:       DUCTAL CARCINOMA IN SITU:     Present, few peritumoral foci  TUMOR EXTENSION       - SKIN:     Negative  - NIPPLE:     Negative  - SKELETAL MUSCLE:     N/A    MARGINS - INVASIVE CARCINOMA -  - SITE(S) OF ALL POSITIVE MARGINS: N/A  - IF ALL NEGATI VE, SITE(S) AND DISTANCE TO CLOSEST: 5 cm or more  MARGINS - DCIS -  - SITE(S) OF ALL POSITIVE MARGINS: N/A  - IF ALL NEGATIVE, SITE(S) AND DISTANCE TO CLOSEST:     5 cm or more  LYMPHOVASCULAR INVASION:     Negative    REGIONAL LYMPH NODES -       - NUMBER EXAMINED (SENTINEL AND NON-SENTINEL):     4+3=7  - NUMBER OF SENTINEL:     4  - NUMBER WITH MACROMETASTASES (>2MM):     0  - NUMBER WITH MICROMETASTASES (>0.2MM-2MM AND / OR >200 CELLS):     0;  confirms frozen section diagnoses  - NUMBER WITH ISOLATED TUMOR CELLS (<0.2 MM AND  <200 CELLS):     N/A  - SIZE OF LARGEST METASTATIC DEPOSIT:     N/A   - EXTRANODAL EXTENSION:     N/A  TREATMENT EFFECT: RESPONSE TO PRE-SURGICAL (NEOADJUVANT) THERAPY (IF  APPLICABLE)       - IN BREAST TISSUES:     N/A  - IN LYMPH NODES:     N/A    PATHOLOGIC STAGE CLASSIFICATION (pTNM):     pT2  pN0    CAP InvasiveBreast 4100 in conjunction with AJCC 8 ed. BIOMARKER TESTING  BREAST CARCINOMA       Biomarker studies were performed  on prior breast biopsy from January, 2019 (VS ), with triple negative results. Therefore, per  recommendations, those studies are repeated on a larger tumor specimen  now.   ESTROGEN RECEPTOR*:   -POSITIVE (PERCENT =>1% OF POSITIVE TUMOR CELL NUCLEI):     N/A  ---AVERAGE INTENSITY OF POSITIVE STAINING: N/A  -NEGATIVE (PERCENT <1% OF POSITIVE TUMOR CELL NUCLEI):     0%  ---AVERAGE INTENSITY OF ANY POSITIVE STAINING: N/A  ---INTERNAL CONTROL PRESENT AND STAIN AS EXPECTED:     Yes  ---INTERNAL CONTROL CELLS ABSENT (COMMENT):     N/A  ---INTERNAL CONTROL CELLS PRESENT BUT DO NOT STAIN (COMMENT):     N/A    PROGESTERONE RECEPTOR*:  ---AVERAGE INTENSITY OF POSITIVE STAINING:     N/A  -NEGATIVE (PERCENT <1% OF POSITIVE TUMOR CELL NUCLEI):     0%  ---AVERAGE INTENSITY OF ANY POSITIVE STAINING: N/A  ---INTERNAL CONTROL PRESENT AND STAIN AS EXPECTED:     Yes  ---INTERNAL CONTROL CELLS ABSENT (COMMENT):     N/A  ---INTERNAL CONTROL CELLS PRESENT BUT DO NOT STAIN (COMMENT):     N/A     HER2*:  HER2 PROTEIN EXPRESSION  (IMMUNOHISTOCHEMISTRY):     N/A  HER2 GENE AMPLIFICATION (INSITU HYBRIDIZATION):     Pending (see  addendum report)         TIME SPECIMEN REMOVED:     February 12, 2019, 1205  TIME SPECIMEN PLACED IN FORMALIN:     February 12, 2019, 1205  COLD ISCHEMIA TIME MEETS CAP / ASCO REQUIREMENTS (LESS THAN 1 HOUR):      Yes  FIXATION TIME (CAP / ASCO GUIDELINES 6-72 HOURS):     29.5 hours  FIXATIVE:     10% BUFFERED FORMALIN    *Information on biomarker regents:  Estrogen Receptor: If performed at [facility] laboratory: Immunostain  clone SP1 with indirect biotin streptavidin detection system, vendor  Flora Jang (FDA cleared); evaluated by manual morphometry  (negative=less than 1% tumor cell nuclear staining; otherwise  positive); external control is reactive. Progesterone Receptor: If performed at [facility] laboratory: Immunostain  clone 1E2 with indirect biotin streptavidin detection system, christopheror  Flora Jang (FDA cleared); evaluated by m anual morphometry  (negative=less than 1% tumor cell nuclear staining; otherwise  positive); external control is reactive. HER 2 insitu hybridization: FDA cleared, vendor [facility] [employer]. [facility] BreastBiomarkers 1201 in conjunction with AJCC 8 ed.       3. Microscopic examination performed. Impression:  Invasive ductal carcinoma of left breast, triple negative, pT2,p N0, M0  Status post bilateral mastectomy and left sentinel lymph node biopsy  Personal history of gynecological malignancy  (uterus/cervical cancer) status post surgery in 1992  Cancer related pain  Leg swelling  Anemia secondary to chemotherapy  Family history of malignancy  Chronic back pain  Hypertension  Obesity      Plan:  I had a detailed discussion with the patient reports he went over results of her lab work-up and other relevant clinical data  Toxicity check performed. Patient is tolerating treatment without unexpectedside effects  Labs are adequate for treatment. We will proceed with treatment today  Continue Lasix as needed for leg swelling. NCCN guidelines were reviewed and discussed with the patient. The diagnosis and care plan were discussed with the patient in detail. I discussed the natural history of the disease, prognosis, risks and goals of therapy and answered all the patients questions to the best of my ability. Patient expressed understanding and was in agreement. Puja Melo      I spent more than 25 minutes examining, evaluating, reviewing data, counseling the patient and coordinating care. Greater than 50% of time was spent face-to-face with the patient  This note is created with the assistance of a speech recognition program.  While intending to generate a document that actually reflects the content of the visit, the document can still have some errors including those of syntax and sound a like substitutions which may escape proof reading. It such instances, actual meaning can be extrapolated by contextual diversion.

## 2019-05-24 ENCOUNTER — HOSPITAL ENCOUNTER (OUTPATIENT)
Dept: INFUSION THERAPY | Facility: MEDICAL CENTER | Age: 59
Discharge: HOME | End: 2019-05-24
Payer: COMMERCIAL

## 2019-05-24 VITALS
TEMPERATURE: 98.3 F | HEART RATE: 73 BPM | DIASTOLIC BLOOD PRESSURE: 79 MMHG | RESPIRATION RATE: 18 BRPM | SYSTOLIC BLOOD PRESSURE: 132 MMHG

## 2019-05-24 DIAGNOSIS — Z17.1 MALIGNANT NEOPLASM OF UPPER-OUTER QUADRANT OF LEFT BREAST IN FEMALE, ESTROGEN RECEPTOR NEGATIVE (HCC): ICD-10-CM

## 2019-05-24 DIAGNOSIS — C50.412 MALIGNANT NEOPLASM OF UPPER-OUTER QUADRANT OF LEFT BREAST IN FEMALE, ESTROGEN RECEPTOR NEGATIVE (HCC): ICD-10-CM

## 2019-05-24 PROCEDURE — 6360000002 HC RX W HCPCS: Performed by: INTERNAL MEDICINE

## 2019-05-24 PROCEDURE — 96372 THER/PROPH/DIAG INJ SC/IM: CPT

## 2019-05-24 RX ADMIN — PEGFILGRASTIM 6 MG: KIT SUBCUTANEOUS at 16:27

## 2019-05-24 NOTE — PROGRESS NOTES
Pt arrives per ambulatory per self for neulasta injection, due to states thinks she rubbed it off her abd in her sleep. Device returned per pt and given to Josef Ledesma, pharmacist.   Pt tolerated injection well and bandaid to site and no reactions or complaints and no bleeding at site. Pt discharged per ambulatory per self.

## 2019-05-24 NOTE — PROGRESS NOTES
On body injector fell off and pt was unable to go to [facility]. Arrives at [facility], 5/24/19 injection credited. No regular neulasta injections available, so the on body product was drawn up in a regular syringe and given subQ.
Pt here for C4D. 1. Pt seen by Dr. Nhung Collier prior to treatment. Labs results reviewed. Pt was treated without incident and d/c'd in stable condition. Pt will return on 6/6 for C1D1 Taxol.
done

## 2019-06-05 ENCOUNTER — HOSPITAL ENCOUNTER (OUTPATIENT)
Facility: MEDICAL CENTER | Age: 59
End: 2019-06-05
Payer: COMMERCIAL

## 2019-06-06 ENCOUNTER — OFFICE VISIT (OUTPATIENT)
Dept: ONCOLOGY | Age: 59
End: 2019-06-06
Payer: COMMERCIAL

## 2019-06-06 ENCOUNTER — HOSPITAL ENCOUNTER (OUTPATIENT)
Dept: INFUSION THERAPY | Facility: MEDICAL CENTER | Age: 59
Discharge: HOME | End: 2019-06-06
Payer: COMMERCIAL

## 2019-06-06 ENCOUNTER — TELEPHONE (OUTPATIENT)
Dept: ONCOLOGY | Age: 59
End: 2019-06-06

## 2019-06-06 VITALS
HEART RATE: 81 BPM | WEIGHT: 269.18 LBS | BODY MASS INDEX: 42.16 KG/M2 | TEMPERATURE: 98.5 F | RESPIRATION RATE: 18 BRPM | DIASTOLIC BLOOD PRESSURE: 69 MMHG | SYSTOLIC BLOOD PRESSURE: 109 MMHG

## 2019-06-06 VITALS
TEMPERATURE: 98.5 F | DIASTOLIC BLOOD PRESSURE: 69 MMHG | SYSTOLIC BLOOD PRESSURE: 109 MMHG | WEIGHT: 275.1 LBS | RESPIRATION RATE: 18 BRPM | HEART RATE: 81 BPM | BODY MASS INDEX: 43.09 KG/M2

## 2019-06-06 DIAGNOSIS — Z17.1 MALIGNANT NEOPLASM OF UPPER-OUTER QUADRANT OF LEFT BREAST IN FEMALE, ESTROGEN RECEPTOR NEGATIVE (HCC): Primary | ICD-10-CM

## 2019-06-06 DIAGNOSIS — Z17.1 MALIGNANT NEOPLASM OF UPPER-OUTER QUADRANT OF LEFT BREAST IN FEMALE, ESTROGEN RECEPTOR NEGATIVE (HCC): ICD-10-CM

## 2019-06-06 DIAGNOSIS — C50.412 MALIGNANT NEOPLASM OF UPPER-OUTER QUADRANT OF LEFT BREAST IN FEMALE, ESTROGEN RECEPTOR NEGATIVE (HCC): ICD-10-CM

## 2019-06-06 DIAGNOSIS — C50.412 MALIGNANT NEOPLASM OF UPPER-OUTER QUADRANT OF LEFT BREAST IN FEMALE, ESTROGEN RECEPTOR NEGATIVE (HCC): Primary | ICD-10-CM

## 2019-06-06 DIAGNOSIS — G62.9 NEUROPATHY: ICD-10-CM

## 2019-06-06 LAB
ABSOLUTE EOS #: 0 K/UL (ref 0–0.44)
ABSOLUTE IMMATURE GRANULOCYTE: 0.21 K/UL (ref 0–0.3)
ABSOLUTE LYMPH #: 1.14 K/UL (ref 1.1–3.7)
ABSOLUTE MONO #: 1.49 K/UL (ref 0.1–1.2)
ALBUMIN SERPL-MCNC: 3.6 G/DL (ref 3.5–5.2)
ALBUMIN/GLOBULIN RATIO: ABNORMAL (ref 1–2.5)
ALP BLD-CCNC: 87 U/L (ref 35–104)
ALT SERPL-CCNC: 8 U/L (ref 5–33)
ANION GAP SERPL CALCULATED.3IONS-SCNC: 10 MMOL/L (ref 9–17)
AST SERPL-CCNC: 7 U/L
BASOPHILS # BLD: 1 % (ref 0–2)
BASOPHILS ABSOLUTE: 0.07 K/UL (ref 0–0.2)
BILIRUB SERPL-MCNC: <0.1 MG/DL (ref 0.3–1.2)
BUN BLDV-MCNC: 8 MG/DL (ref 6–20)
BUN/CREAT BLD: 12 (ref 9–20)
CALCIUM SERPL-MCNC: 8.9 MG/DL (ref 8.6–10.4)
CHLORIDE BLD-SCNC: 103 MMOL/L (ref 98–107)
CO2: 27 MMOL/L (ref 20–31)
CREAT SERPL-MCNC: 0.65 MG/DL (ref 0.5–0.9)
DIFFERENTIAL TYPE: ABNORMAL
EOSINOPHILS RELATIVE PERCENT: 0 % (ref 1–4)
GFR AFRICAN AMERICAN: >60 ML/MIN
GFR NON-AFRICAN AMERICAN: >60 ML/MIN
GFR SERPL CREATININE-BSD FRML MDRD: ABNORMAL ML/MIN/{1.73_M2}
GFR SERPL CREATININE-BSD FRML MDRD: ABNORMAL ML/MIN/{1.73_M2}
GLUCOSE BLD-MCNC: 105 MG/DL (ref 70–99)
HCT VFR BLD CALC: 28.7 % (ref 36.3–47.1)
HEMOGLOBIN: 8.9 G/DL (ref 11.9–15.1)
IMMATURE GRANULOCYTES: 3 %
LYMPHOCYTES # BLD: 16 % (ref 24–43)
MCH RBC QN AUTO: 24.9 PG (ref 25.2–33.5)
MCHC RBC AUTO-ENTMCNC: 31 G/DL (ref 28.4–34.8)
MCV RBC AUTO: 80.2 FL (ref 82.6–102.9)
MONOCYTES # BLD: 21 % (ref 3–12)
MORPHOLOGY: ABNORMAL
NRBC AUTOMATED: 0.3 PER 100 WBC
PDW BLD-RTO: 24.1 % (ref 11.8–14.4)
PLATELET # BLD: 146 K/UL (ref 138–453)
PLATELET ESTIMATE: ABNORMAL
PMV BLD AUTO: 9.7 FL (ref 8.1–13.5)
POTASSIUM SERPL-SCNC: 4 MMOL/L (ref 3.7–5.3)
RBC # BLD: 3.58 M/UL (ref 3.95–5.11)
RBC # BLD: ABNORMAL 10*6/UL
SEG NEUTROPHILS: 59 % (ref 36–65)
SEGMENTED NEUTROPHILS ABSOLUTE COUNT: 4.19 K/UL (ref 1.5–8.1)
SODIUM BLD-SCNC: 140 MMOL/L (ref 135–144)
TOTAL PROTEIN: 6.2 G/DL (ref 6.4–8.3)
WBC # BLD: 7.1 K/UL (ref 3.5–11.3)
WBC # BLD: ABNORMAL 10*3/UL

## 2019-06-06 PROCEDURE — 6360000002 HC RX W HCPCS: Performed by: INTERNAL MEDICINE

## 2019-06-06 PROCEDURE — 4004F PT TOBACCO SCREEN RCVD TLK: CPT | Performed by: INTERNAL MEDICINE

## 2019-06-06 PROCEDURE — 3017F COLORECTAL CA SCREEN DOC REV: CPT | Performed by: INTERNAL MEDICINE

## 2019-06-06 PROCEDURE — 2580000003 HC RX 258: Performed by: INTERNAL MEDICINE

## 2019-06-06 PROCEDURE — 2500000003 HC RX 250 WO HCPCS: Performed by: INTERNAL MEDICINE

## 2019-06-06 PROCEDURE — 85025 COMPLETE CBC W/AUTO DIFF WBC: CPT

## 2019-06-06 PROCEDURE — G8427 DOCREV CUR MEDS BY ELIG CLIN: HCPCS | Performed by: INTERNAL MEDICINE

## 2019-06-06 PROCEDURE — 96375 TX/PRO/DX INJ NEW DRUG ADDON: CPT

## 2019-06-06 PROCEDURE — G8417 CALC BMI ABV UP PARAM F/U: HCPCS | Performed by: INTERNAL MEDICINE

## 2019-06-06 PROCEDURE — 96413 CHEMO IV INFUSION 1 HR: CPT

## 2019-06-06 PROCEDURE — 80053 COMPREHEN METABOLIC PANEL: CPT

## 2019-06-06 PROCEDURE — 99211 OFF/OP EST MAY X REQ PHY/QHP: CPT

## 2019-06-06 PROCEDURE — 99214 OFFICE O/P EST MOD 30 MIN: CPT | Performed by: INTERNAL MEDICINE

## 2019-06-06 PROCEDURE — 36591 DRAW BLOOD OFF VENOUS DEVICE: CPT

## 2019-06-06 RX ORDER — DEXAMETHASONE SODIUM PHOSPHATE 10 MG/ML
10 INJECTION INTRAMUSCULAR; INTRAVENOUS ONCE
Status: COMPLETED | OUTPATIENT
Start: 2019-06-06 | End: 2019-06-06

## 2019-06-06 RX ORDER — OXYCODONE HYDROCHLORIDE AND ACETAMINOPHEN 5; 325 MG/1; MG/1
1 TABLET ORAL EVERY 6 HOURS PRN
Qty: 60 TABLET | Refills: 0 | Status: CANCELLED | OUTPATIENT
Start: 2019-06-06 | End: 2019-06-21

## 2019-06-06 RX ORDER — SODIUM CHLORIDE 0.9 % (FLUSH) 0.9 %
10 SYRINGE (ML) INJECTION PRN
Status: CANCELLED | OUTPATIENT
Start: 2019-06-06

## 2019-06-06 RX ORDER — DIPHENHYDRAMINE HYDROCHLORIDE 50 MG/ML
50 INJECTION INTRAMUSCULAR; INTRAVENOUS ONCE
Status: COMPLETED | OUTPATIENT
Start: 2019-06-06 | End: 2019-06-06

## 2019-06-06 RX ORDER — HEPARIN SODIUM (PORCINE) LOCK FLUSH IV SOLN 100 UNIT/ML 100 UNIT/ML
500 SOLUTION INTRAVENOUS PRN
Status: DISCONTINUED | OUTPATIENT
Start: 2019-06-06 | End: 2019-06-07 | Stop reason: HOSPADM

## 2019-06-06 RX ORDER — METHYLPREDNISOLONE SODIUM SUCCINATE 125 MG/2ML
125 INJECTION, POWDER, LYOPHILIZED, FOR SOLUTION INTRAMUSCULAR; INTRAVENOUS ONCE
Status: CANCELLED | OUTPATIENT
Start: 2019-06-06

## 2019-06-06 RX ORDER — MEPERIDINE HYDROCHLORIDE 50 MG/ML
12.5 INJECTION INTRAMUSCULAR; INTRAVENOUS; SUBCUTANEOUS ONCE
Status: CANCELLED | OUTPATIENT
Start: 2019-06-06

## 2019-06-06 RX ORDER — 0.9 % SODIUM CHLORIDE 0.9 %
10 VIAL (ML) INJECTION ONCE
Status: CANCELLED | OUTPATIENT
Start: 2019-06-06

## 2019-06-06 RX ORDER — SODIUM CHLORIDE 9 MG/ML
INJECTION, SOLUTION INTRAVENOUS ONCE
Status: CANCELLED | OUTPATIENT
Start: 2019-06-06

## 2019-06-06 RX ORDER — ONDANSETRON 2 MG/ML
8 INJECTION INTRAMUSCULAR; INTRAVENOUS ONCE
Status: CANCELLED | OUTPATIENT
Start: 2019-06-06

## 2019-06-06 RX ORDER — HEPARIN SODIUM (PORCINE) LOCK FLUSH IV SOLN 100 UNIT/ML 100 UNIT/ML
500 SOLUTION INTRAVENOUS PRN
Status: CANCELLED | OUTPATIENT
Start: 2019-06-06

## 2019-06-06 RX ORDER — 0.9 % SODIUM CHLORIDE 0.9 %
10 VIAL (ML) INJECTION ONCE
Status: COMPLETED | OUTPATIENT
Start: 2019-06-06 | End: 2019-06-06

## 2019-06-06 RX ORDER — SODIUM CHLORIDE 9 MG/ML
INJECTION, SOLUTION INTRAVENOUS ONCE
Status: DISCONTINUED | OUTPATIENT
Start: 2019-06-06 | End: 2019-06-07 | Stop reason: HOSPADM

## 2019-06-06 RX ORDER — SODIUM CHLORIDE 0.9 % (FLUSH) 0.9 %
10 SYRINGE (ML) INJECTION PRN
Status: DISCONTINUED | OUTPATIENT
Start: 2019-06-06 | End: 2019-06-07 | Stop reason: HOSPADM

## 2019-06-06 RX ORDER — DIPHENHYDRAMINE HYDROCHLORIDE 50 MG/ML
50 INJECTION INTRAMUSCULAR; INTRAVENOUS ONCE
Status: CANCELLED | OUTPATIENT
Start: 2019-06-06

## 2019-06-06 RX ORDER — SODIUM CHLORIDE 9 MG/ML
INJECTION, SOLUTION INTRAVENOUS CONTINUOUS
Status: CANCELLED | OUTPATIENT
Start: 2019-06-06

## 2019-06-06 RX ORDER — ONDANSETRON 2 MG/ML
8 INJECTION INTRAMUSCULAR; INTRAVENOUS ONCE
Status: COMPLETED | OUTPATIENT
Start: 2019-06-06 | End: 2019-06-06

## 2019-06-06 RX ORDER — OXYCODONE HYDROCHLORIDE AND ACETAMINOPHEN 5; 325 MG/1; MG/1
1 TABLET ORAL EVERY 6 HOURS PRN
Qty: 60 TABLET | Refills: 0 | Status: SHIPPED | OUTPATIENT
Start: 2019-06-06 | End: 2019-06-20 | Stop reason: SDUPTHER

## 2019-06-06 RX ORDER — SODIUM CHLORIDE 0.9 % (FLUSH) 0.9 %
5 SYRINGE (ML) INJECTION PRN
Status: CANCELLED | OUTPATIENT
Start: 2019-06-06

## 2019-06-06 RX ADMIN — SODIUM CHLORIDE, PRESERVATIVE FREE 500 UNITS: 5 INJECTION INTRAVENOUS at 16:38

## 2019-06-06 RX ADMIN — ONDANSETRON HYDROCHLORIDE 8 MG: 2 INJECTION, SOLUTION INTRAMUSCULAR; INTRAVENOUS at 15:04

## 2019-06-06 RX ADMIN — FAMOTIDINE 20 MG: 10 INJECTION, SOLUTION INTRAVENOUS at 15:04

## 2019-06-06 RX ADMIN — PACLITAXEL 192 MG: 6 INJECTION, SOLUTION INTRAVENOUS at 15:17

## 2019-06-06 RX ADMIN — DIPHENHYDRAMINE HYDROCHLORIDE 50 MG: 50 INJECTION INTRAMUSCULAR; INTRAVENOUS at 15:05

## 2019-06-06 RX ADMIN — SODIUM CHLORIDE: 9 INJECTION, SOLUTION INTRAVENOUS at 14:30

## 2019-06-06 RX ADMIN — Medication 10 ML: at 16:35

## 2019-06-06 RX ADMIN — DEXAMETHASONE SODIUM PHOSPHATE 10 MG: 10 INJECTION INTRAMUSCULAR; INTRAVENOUS at 15:05

## 2019-06-06 NOTE — TELEPHONE ENCOUNTER
Nessa Leger MD VISIT & TX  DR MACE INTO SEE PATIENT  ORDERS RECEIVED  RV 2 WEEKS  MD VISIT 6/20/19 @11:40AM  [email or web address]  SCRIPT FOR COMPRESSION STOCKINGS GIVEN TO PATIENT  AVS PRINTED AND GIVEN TO PATIENT WITH INSTRUCTIONS  PATIENT REMAINS IN [address] [place] [address]

## 2019-06-06 NOTE — PROGRESS NOTES
Sofía Lilly presents for C1 D1 weekly Taxol. Questions answered. No complaints or problems since last chemo infusion. Port flushed, heparinized and deaccessed. Discharged ambulatory in stable condition.

## 2019-06-06 NOTE — PROGRESS NOTES
John Short                                                                                                                  6/6/2019  MRN:   [id number]  YOB: 1960  PCP:                           Stevenson Lomeli MD  Referring Physician: No ref. provider found  Treating Physician Name: Alida Bonner MD      Reason for visit:  Chief Complaint   Patient presents with    Follow-up     review status of disease   Toxicity check  Bilateral lower extremity swelling    Current problems:   IDC of Left Breast cancer, Triple negative, pT2,pN0,M0    Active and recent treatments:  Bilateral Mastectomy with left sentinel lymph node    Summary of Case/History:    John Short a 62 y. o.female invasive carcinoma of the breast.     Patient initially felt a lump in her left breast for 2-3 months. She underwent screening mammogram which showed 2 cm mass in upper outer left breast.  Right breast do not have any concerning findings. Ultrasound of left breast conformed mass measuring 1.9 cm in the upper outer left breast.  Patient underwent biopsy on 1/23/19 which revealed invasive ductal carcinoma, grade 3, ER/VT negative. HER-2 results are pending.     Patient has history of uterus/cervical cancer diagnosed in 1992. Patient states she underwent surgery for the cervical cancer. She does not recall getting any radiation therapy or chemotherapy. Patient states she has been interested in getting bilateral breast reduction due to cosmetic reasons however now with a diagnosis of breast cancer she wishes to proceed with bilateral mastectomy.     Patient has history of breast cancer in her mother as well as 3 paternal aunts. Patient's mother also had cervical and uterine cancer.     Patient underwent bilateral mastectomy with left sentinel lymph node biopsy.   Pathological stage was T2, N0, M0    Started chemotherapy for triple negative breast cancer using AC ordered by T on 4/11/19    Interim History:    Patient presents to the clinic for a follow-up visit and to discuss further treatment plan. Patient is complaining of neck swelling did swelling is better when patient raises her legs. Also complains of discomfort in the feet at times. Complains of being tired. Denies any fevers. Denies any chills. Complains of discolorization of nails. Patient is scheduled to be started on Taxol today. During this visit patient's allergy, social, medical, surgical history and medications were reviewed and updated.       Past Medical History:   Past Medical History:   Diagnosis Date    Arthritis     GENERALIZED ALL OVER RHEUMATOID AND OSTEO    Cancer ([address] 1992    CERVICAL, UTERINE    Cancer of adrenal cortex ([address] 01/2019    LEFT BREAST    Chronic back pain     scioliosis,  02/2019 WEARING A BACK BRACE    Degenerative disorder of bone     ALL OVER    Depression     Difficult intravenous access     HANDS AR BEST SITE    GERD (gastroesophageal reflux disease)     Hyperlipidemia     HX OF NO MEDS IN YEARS    Hypertension 2007    Pain management clinic    Insomnia     Obesity     BMI -  43    Sleep apnea 2016    NO LONGER USES MACHINE    Snores     has been told by family that she stops breathing    Use of cane as ambulatory aid     Wears glasses     READING    Wears glasses     \"CHEATERS\"       Past Surgical History:     Past Surgical History:   Procedure Laterality Date    BREAST SURGERY Left 1990    CYST    BUNIONECTOMY Bilateral     CERVIX SURGERY  1992    COLONOSCOPY  10/13/2017    FOOT SURGERY Left 2014    PINS IN ALL TOES    HAMMER TOE SURGERY Bilateral     HYSTERECTOMY  1992    UTERUS, CERVIX     MASTECTOMY Bilateral 2/12/2019    TOTAL MASTECTOMY performed by Marianna Link DO at [facility], BILATERAL Bilateral 02/12/2019     TOTAL MASTECTOMY,  AXILLARY SENTINEL LYMPH NODE BIOPSY, FROZEN SECTION     NV COLSC FLX W/REMOVAL LESION BY HOT BX FORCEPS N/A 10/13/2017    COLONOSCOPY WITH BIOPSY AND POLYPECTOMY performed by Chauncey Jovel IV, DO at [facility] [place] TUNNELED VENOUS PORT PLACEMENT  03/27/2019    X-RAY FOOT 3+VW      both       Patient Family Social History:     Social History     Socioeconomic History    Marital status: Single     Spouse name: Not on file    Number of children: Not on file    Years of education: Not on file    Highest education level: Not on file   Occupational History    Not on file   Social Needs    Financial resource strain: Not on file    Food insecurity:     Worry: Not on file     Inability: Not on file    Transportation needs:     Medical: Not on file     Non-medical: Not on file   Tobacco Use    Smoking status: Current Some Day Smoker     Packs/day: 0.00     Years: 41.00     Pack years: 0.00     Types: Cigarettes    Smokeless tobacco: Never Used    Tobacco comment: STATES SMOKES A PACK A WEEK   Substance and Sexual Activity    Alcohol use: No    Drug use: Yes     Frequency: 7.0 times per week     Types: Marijuana     Comment: LAST USE 02/10/2018, AND PAIN PILLS     Sexual activity: Not Currently   Lifestyle    Physical activity:     Days per week: Not on file     Minutes per session: Not on file    Stress: Not on file   Relationships    Social connections:     Talks on phone: Not on file     Gets together: Not on file     Attends Gnosticism service: Not on file     Active member of club or organization: Not on file     Attends meetings of clubs or organizations: Not on file     Relationship status: Not on file    Intimate partner violence:     Fear of current or ex partner: Not on file     Emotionally abused: Not on file     Physically abused: Not on file     Forced sexual activity: Not on file   Other Topics Concern    Not on file   Social History Narrative    Not on file      Family History   Problem Relation Age of Onset    High Blood Pressure Mother     Cancer Mother         cervical and breast    Diabetes Father     Heart Attack Father     Heart Disease Maternal Aunt         times 2      Current Medications:     Current Outpatient Medications   Medication Sig Dispense Refill    oxyCODONE-acetaminophen (PERCOCET) 5-325 MG per tablet Take 1 tablet by mouth every 6 hours as needed for Pain for up to 15 days. Take lowest dose possible to manage pain 60 tablet 0    furosemide (LASIX) 20 MG tablet Take 1 tablet by mouth daily as needed (leg swelling) 30 tablet 1    gabapentin (NEURONTIN) 300 MG capsule Take 1 capsule by mouth 2 times daily for 30 days. 60 capsule 0    amLODIPine (NORVASC) 10 MG tablet Take 1 tablet by mouth daily 30 tablet 5    Magic Mouthwash (MIRACLE MOUTHWASH) Swish and spit 10 mLs 4 times daily as needed (compression) Mix equal amounts of benadryl, mylanta and viscous lidocaine, use cherry flavor as needed. 1 Bottle 3    naproxen (NAPROSYN) 500 MG tablet TAKE 1 TABLET TWICE A DAY WITH MEALS 60 tablet 0    prochlorperazine (COMPAZINE) 10 MG tablet Take 1 tablet by mouth every 6 hours as needed (CHEMO INDUCED NAUSEA) 120 tablet 3    ondansetron (ZOFRAN-ODT) 8 MG TBDP disintegrating tablet Place 1 tablet under the tongue every 8 hours as needed for Nausea or Vomiting (CHEMO INDUCED NAUSEA) 30 tablet 2    omeprazole (PRILOSEC) 20 MG delayed release capsule Take 1 capsule by mouth daily 30 capsule 3    lidocaine-prilocaine (EMLA) 2.5-2.5 % cream Apply topically as needed PRIOR TO MEDIPORT ACCESS, NO MORE THAN TWICE PER DAY.  1 Tube 3    spironolactone (ALDACTONE) 25 MG tablet Take 1 tablet by mouth daily 30 tablet 1    amLODIPine (NORVASC) 10 MG tablet TAKE 1 TABLET EVERY DAY 30 tablet 1    docusate sodium (COLACE) 100 MG capsule Take 1 capsule by mouth 2 times daily as needed for Constipation 60 capsule 0    Gauze Pads & Dressings (GAUZE DRESSING) 4\"X4\" PADS 1 each by Does not apply route daily 10 each 2    Gauze Pads & Dressings (ABDOMINAL PAD) 8\"X10\" PADS 1 Units by Does not apply route palpable lymphadenopathy, no hepatosplenomegaly  Chest - clear to auscultation, no wheezes, rales or rhonchi, symmetric air entry  Heart - normal rate, regular rhythm, normal S1, S2, no murmurs  Abdomen - soft, nontender, nondistended, no masses or organomegaly  Neurological - alert, oriented, normal speech, no focal findings or movement disorder noted  Extremities - positive pitting edema  Skin - normal coloration and turgor, no rashes, no suspicious skin lesions noted   Breast - examination consistent with bilateral mastectomy      DATA:    Results for orders placed or performed during the hospital encounter of 02/12/19   Anaerobic and Aerobic Culture   Result Value Ref Range    Specimen Description . AXILLA SWAB     Special Requests SKIN ABSCESS LEFT AXILLA     Direct Exam NO NEUTROPHILS SEEN     Direct Exam NO BACTERIA SEEN     Culture NORMAL YOANA     Culture ANAEROCOCCUS PREVOTII  LIGHT GROWTH   (A)    Hemoglobin and hematocrit, blood   Result Value Ref Range    POC Hemoglobin 14.8 12.0 - 16.0 g/dL    POC Hematocrit 43 36 - 46 %   SODIUM (POC)   Result Value Ref Range    POC Sodium 143 138 - 146 mmol/L   POTASSIUM (POC)   Result Value Ref Range    POC Potassium 4.3 3.5 - 4.5 mmol/L   CHLORIDE (POC)   Result Value Ref Range    POC Chloride 108 (H) 98 - 107 mmol/L   CALCIUM, IONIC (POC)   Result Value Ref Range    POC Ionized Calcium 1.29 1.15 - 1.33 mmol/L   Surgical Pathology   Result Value Ref Range    Comment SPECIMEN RECEIVED    Surgical Pathology   Result Value Ref Range    Surgical Pathology Report       [id number]  [employer]  [facility]  ANATOMIC PATHOLOGY  [address] [place] [address].   [place], [address]  [phone]  Fax: [phone]  [facility]    Patient Name: Gaurav Tim  MR#: [id number]  Specimen #[id number]    Procedures/Addenda  MOLECULAR PATHOLOGY REPORT     Date Ordered:     2/18/2019     Status:  Signed Out       Date Complete:     2/18/2019 By:  Mel Velasco M.D. Date Reported:     2/20/2019       INTERPRETATION    LEFT BREAST CARCINOMA:      -  NEGATIVE FOR HER2 (ERBB2) GENE AMPLIFICATION BY FISH. -  HER2: CEP (D17Z1) RATIO, 1.0.      RESULTS-COMMENTS  THE PATIENT'S CARCINOMA IS EVALUATED FOR HER2 (ERBB2) GENE  AMPLIFICATION BY FISH ANALYSIS. REFERENCE RANGES    This test was interpreted according to the American Society of  Clinical Oncology/College of American Pathologists (ASCO/CAP) 2013  Guideline Recommendations for neoplasm fixed in formalin for 6-72  hours (Arch Pathol Lab Med. doi:  10. 5858/arpa. 3412-4322-RU). According to the 2013 ASCO/CAP Guidelines, a non-amplified result  indicates an ERBB2/CEP17 ratio less than 2.0 with an average ERBB2  copy number less than 4.0; an amplified result indicates either (1) an  ERBB2/CEP17 ratio of 2.0 or greater or (2) an average ERBB2 copy  number of at least 6.0 with an ERBB2/CEP17 ratio less than 2.0; and an  equivocal result indicates an ERBB2/CEP17 ratio less than 2.0 with an  average ERBB2 copy number of at least 4.0 but less than 6.0. Fluorescence in-situ hybridization (fish) analysis is performed with a  probe specific for HER2 (ERBB2) and a probe, D17Z1, for the  pericentromeric region of chromosome 17 ([employer]). External  and internal controls perform appropriately. In this assay, the ratio  of HER2 signals to chromosome 17 signals is calculated. Sixty nuclei  are examined, and the ratio of HER2 signals to chromosome 17 signals  determined. Number of Tumor Cells Counted:               (30+30)  Numb er of Observers:                     Two  Average Number of HER2 Signals/Nucleus:          1.7  Average Number of CEP 17 Signals/Nucleus:     1.7  Ratio of average HER2/CEP 17 (D17Z1):          1.0       Specimen:                         Left breast  Fixative:                              10% Buffered formalin  Duration of fixation:                    29.5 specified further    TUMOR SIZE (LARGEST INVASIVE COMPONENT):     3.9 x 2.5 x 2.0 cm  HISTOLOGIC TYPE OF INVASIVE CARCINOMA:     Ductal; some features  suggest neuroendocrine differentiation; however, control-reactive  immunostain for synaptophysin is negative  HISTOLOGIC GRADE (YOLETTE)       - GLANDULAR/TUBULAR DIFFERENTIATION SCORE:     3  - NUCLEAR PLEOMORPHISM SCORE:     2  - MITOTIC RATE SCORE:     3  - OVERALL GRADE (FROM YOLETTE TOTAL SCORE):     Score 8 = grade 3    TUMOR FOCALITY:       DUCTAL CARCINOMA IN SITU:     Present, few peritumoral foci  TUMOR EXTENSION       - SKIN:     Negative  - NIPPLE:     Negative  - SKELETAL MUSCLE:     N/A    MARGINS - INVASIVE CARCINOMA -  - SITE(S) OF ALL POSITIVE MARGINS: N/A  - IF ALL NEGATI VE, SITE(S) AND DISTANCE TO CLOSEST: 5 cm or more  MARGINS - DCIS -  - SITE(S) OF ALL POSITIVE MARGINS: N/A  - IF ALL NEGATIVE, SITE(S) AND DISTANCE TO CLOSEST:     5 cm or more  LYMPHOVASCULAR INVASION:     Negative    REGIONAL LYMPH NODES -       - NUMBER EXAMINED (SENTINEL AND NON-SENTINEL):     4+3=7  - NUMBER OF SENTINEL:     4  - NUMBER WITH MACROMETASTASES (>2MM):     0  - NUMBER WITH MICROMETASTASES (>0.2MM-2MM AND / OR >200 CELLS):     0;  confirms frozen section diagnoses  - NUMBER WITH ISOLATED TUMOR CELLS (<0.2 MM AND  <200 CELLS):     N/A  - SIZE OF LARGEST METASTATIC DEPOSIT:     N/A   - EXTRANODAL EXTENSION:     N/A  TREATMENT EFFECT: RESPONSE TO PRE-SURGICAL (NEOADJUVANT) THERAPY (IF  APPLICABLE)       - IN BREAST TISSUES:     N/A  - IN LYMPH NODES:     N/A    PATHOLOGIC STAGE CLASSIFICATION (pTNM):     pT2  pN0    CAP InvasiveBreast 4100 in conjunction with AJCC 8 ed. BIOMARKER TESTING  BREAST CARCINOMA       Biomarker studies were performed  on prior breast biopsy from January, 2019 (VS ), with triple negative results. Therefore, per  recommendations, those studies are repeated on a larger tumor specimen  now.   ESTROGEN RECEPTOR*:   -POSITIVE (PERCENT =>1% OF POSITIVE TUMOR CELL NUCLEI):     N/A  ---AVERAGE INTENSITY OF POSITIVE STAINING:     N/A  -NEGATIVE (PERCENT <1% OF POSITIVE TUMOR CELL NUCLEI):     0%  ---AVERAGE INTENSITY OF ANY POSITIVE STAINING: N/A  ---INTERNAL CONTROL PRESENT AND STAIN AS EXPECTED:     Yes  ---INTERNAL CONTROL CELLS ABSENT (COMMENT):     N/A  ---INTERNAL CONTROL CELLS PRESENT BUT DO NOT STAIN (COMMENT):     N/A    PROGESTERONE RECEPTOR*:  ---AVERAGE INTENSITY OF POSITIVE STAINING:     N/A  -NEGATIVE (PERCENT <1% OF POSITIVE TUMOR CELL NUCLEI):     0%  ---AVERAGE INTENSITY OF ANY POSITIVE STAINING: N/A  ---INTERNAL CONTROL PRESENT AND STAIN AS EXPECTED:     Yes  ---INTERNAL CONTROL CELLS ABSENT (COMMENT):     N/A  ---INTERNAL CONTROL CELLS PRESENT BUT DO NOT STAIN (COMMENT):     N/A     HER2*:  HER2 PROTEIN EXPRESSION  (IMMUNOHISTOCHEMISTRY):     N/A  HER2 GENE AMPLIFICATION (INSITU HYBRIDIZATION):     Pending (see  addendum report)         TIME SPECIMEN REMOVED:     February 12, 2019, 1205  TIME SPECIMEN PLACED IN FORMALIN:     February 12, 2019, 1205  COLD ISCHEMIA TIME MEETS CAP / ASCO REQUIREMENTS (LESS THAN 1 HOUR):      Yes  FIXATION TIME (CAP / ASCO GUIDELINES 6-72 HOURS):     29.5 hours  FIXATIVE:     10% BUFFERED FORMALIN    *Information on biomarker regents:  Estrogen Receptor: If performed at [facility] laboratory: Immunostain  clone SP1 with indirect biotin streptavidin detection system, vendor  Flora Jang (FDA cleared); evaluated by manual morphometry  (negative=less than 1% tumor cell nuclear staining; otherwise  positive); external control is reactive. Progesterone Receptor: If performed at [facility] laboratory: Immunostain  clone 1E2 with indirect biotin streptavidin detection system, vendor  Flora Jang (FDA cleared); evaluated by m anual morphometry  (negative=less than 1% tumor cell nuclear staining; otherwise  positive); external control is reactive.   HER 2 insitu hybridization: FDA cleared, vendor [facility] [employer]. CAP BreastBiomarkers 1201 in conjunction with AJCC 8 ed. 3.  Microscopic examination performed. Impression:  Invasive ductal carcinoma of left breast, triple negative, pT2,p N0, M0  Status post bilateral mastectomy and left sentinel lymph node biopsy  Personal history of gynecological malignancy  (uterus/cervical cancer) status post surgery in 1992  Cancer related pain  Leg swelling  Anemia secondary to chemotherapy  Family history of malignancy  Chronic back pain  Hypertension  Obesity      Plan:  Reviewed results of lab workup and other relevant clinical data  Toxicity check performed. Patient is tolerating treatment without unexpected or severe side effects  Labs are adequate for treatment. We will proceed with first weekly treatment of Taxol. Reviewed potential side effects. Reviewed risk-benefit profile  We will give prescription for compression stocking. On 2 new close monitoring for toxicity. NCCN guidelines were reviewed and discussed with the patient. The diagnosis and care plan were discussed with the patient in detail. I discussed the natural history of the disease, prognosis, risks and goals of therapy and answered all the patients questions to the best of my ability. Patient expressed understanding and was in agreement. Christa Birmingham      I spent more than 25 minutes examining, evaluating, reviewing data, counseling the patient and coordinating care. Greater than 50% of time was spent face-to-face with the patient  This note is created with the assistance of a speech recognition program.  While intending to generate a document that actually reflects the content of the visit, the document can still have some errors including those of syntax and sound a like substitutions which may escape proof reading. It such instances, actual meaning can be extrapolated by contextual diversion.

## 2019-06-12 RX ORDER — SODIUM CHLORIDE 9 MG/ML
INJECTION, SOLUTION INTRAVENOUS CONTINUOUS
Status: CANCELLED | OUTPATIENT
Start: 2019-06-13

## 2019-06-12 RX ORDER — 0.9 % SODIUM CHLORIDE 0.9 %
10 VIAL (ML) INJECTION ONCE
Status: CANCELLED | OUTPATIENT
Start: 2019-06-13

## 2019-06-12 RX ORDER — MEPERIDINE HYDROCHLORIDE 50 MG/ML
12.5 INJECTION INTRAMUSCULAR; INTRAVENOUS; SUBCUTANEOUS ONCE
Status: CANCELLED | OUTPATIENT
Start: 2019-06-13

## 2019-06-12 RX ORDER — METHYLPREDNISOLONE SODIUM SUCCINATE 125 MG/2ML
125 INJECTION, POWDER, LYOPHILIZED, FOR SOLUTION INTRAMUSCULAR; INTRAVENOUS ONCE
Status: CANCELLED | OUTPATIENT
Start: 2019-06-13

## 2019-06-12 RX ORDER — DIPHENHYDRAMINE HYDROCHLORIDE 50 MG/ML
50 INJECTION INTRAMUSCULAR; INTRAVENOUS ONCE
Status: CANCELLED | OUTPATIENT
Start: 2019-06-13

## 2019-06-12 RX ORDER — SODIUM CHLORIDE 0.9 % (FLUSH) 0.9 %
5 SYRINGE (ML) INJECTION PRN
Status: CANCELLED | OUTPATIENT
Start: 2019-06-13

## 2019-06-13 ENCOUNTER — HOSPITAL ENCOUNTER (OUTPATIENT)
Dept: INFUSION THERAPY | Facility: MEDICAL CENTER | Age: 59
Discharge: HOME | End: 2019-06-13
Payer: COMMERCIAL

## 2019-06-13 VITALS
SYSTOLIC BLOOD PRESSURE: 119 MMHG | HEART RATE: 71 BPM | TEMPERATURE: 98.2 F | RESPIRATION RATE: 18 BRPM | DIASTOLIC BLOOD PRESSURE: 61 MMHG

## 2019-06-13 DIAGNOSIS — C50.412 MALIGNANT NEOPLASM OF UPPER-OUTER QUADRANT OF LEFT BREAST IN FEMALE, ESTROGEN RECEPTOR NEGATIVE (HCC): Primary | ICD-10-CM

## 2019-06-13 DIAGNOSIS — Z17.1 MALIGNANT NEOPLASM OF UPPER-OUTER QUADRANT OF LEFT BREAST IN FEMALE, ESTROGEN RECEPTOR NEGATIVE (HCC): Primary | ICD-10-CM

## 2019-06-13 LAB
ABSOLUTE EOS #: 0.07 K/UL (ref 0–0.44)
ABSOLUTE IMMATURE GRANULOCYTE: 0.06 K/UL (ref 0–0.3)
ABSOLUTE LYMPH #: 1.04 K/UL (ref 1.1–3.7)
ABSOLUTE MONO #: 0.97 K/UL (ref 0.1–1.2)
ALBUMIN SERPL-MCNC: 3.5 G/DL (ref 3.5–5.2)
ALBUMIN/GLOBULIN RATIO: ABNORMAL (ref 1–2.5)
ALP BLD-CCNC: 72 U/L (ref 35–104)
ALT SERPL-CCNC: 8 U/L (ref 5–33)
ANION GAP SERPL CALCULATED.3IONS-SCNC: 9 MMOL/L (ref 9–17)
AST SERPL-CCNC: 9 U/L
BASOPHILS # BLD: 1 % (ref 0–2)
BASOPHILS ABSOLUTE: 0.09 K/UL (ref 0–0.2)
BILIRUB SERPL-MCNC: <0.1 MG/DL (ref 0.3–1.2)
BUN BLDV-MCNC: 13 MG/DL (ref 6–20)
BUN/CREAT BLD: 23 (ref 9–20)
CALCIUM SERPL-MCNC: 8.8 MG/DL (ref 8.6–10.4)
CHLORIDE BLD-SCNC: 106 MMOL/L (ref 98–107)
CO2: 24 MMOL/L (ref 20–31)
CREAT SERPL-MCNC: 0.57 MG/DL (ref 0.5–0.9)
DIFFERENTIAL TYPE: ABNORMAL
EOSINOPHILS RELATIVE PERCENT: 1 % (ref 1–4)
GFR AFRICAN AMERICAN: >60 ML/MIN
GFR NON-AFRICAN AMERICAN: >60 ML/MIN
GFR SERPL CREATININE-BSD FRML MDRD: ABNORMAL ML/MIN/{1.73_M2}
GFR SERPL CREATININE-BSD FRML MDRD: ABNORMAL ML/MIN/{1.73_M2}
GLUCOSE BLD-MCNC: 104 MG/DL (ref 70–99)
HCT VFR BLD CALC: 28.2 % (ref 36.3–47.1)
HEMOGLOBIN: 8.6 G/DL (ref 11.9–15.1)
IMMATURE GRANULOCYTES: 1 %
LYMPHOCYTES # BLD: 14 % (ref 24–43)
MCH RBC QN AUTO: 24.8 PG (ref 25.2–33.5)
MCHC RBC AUTO-ENTMCNC: 30.5 G/DL (ref 28.4–34.8)
MCV RBC AUTO: 81.3 FL (ref 82.6–102.9)
MONOCYTES # BLD: 13 % (ref 3–12)
NRBC AUTOMATED: 0 PER 100 WBC
PDW BLD-RTO: 26 % (ref 11.8–14.4)
PLATELET # BLD: 255 K/UL (ref 138–453)
PLATELET ESTIMATE: ABNORMAL
PMV BLD AUTO: 9.6 FL (ref 8.1–13.5)
POTASSIUM SERPL-SCNC: 4 MMOL/L (ref 3.7–5.3)
RBC # BLD: 3.47 M/UL (ref 3.95–5.11)
RBC # BLD: ABNORMAL 10*6/UL
SEG NEUTROPHILS: 70 % (ref 36–65)
SEGMENTED NEUTROPHILS ABSOLUTE COUNT: 5.17 K/UL (ref 1.5–8.1)
SODIUM BLD-SCNC: 139 MMOL/L (ref 135–144)
TOTAL PROTEIN: 6.1 G/DL (ref 6.4–8.3)
WBC # BLD: 7.4 K/UL (ref 3.5–11.3)
WBC # BLD: ABNORMAL 10*3/UL

## 2019-06-13 PROCEDURE — 6360000002 HC RX W HCPCS: Performed by: INTERNAL MEDICINE

## 2019-06-13 PROCEDURE — 2580000003 HC RX 258: Performed by: INTERNAL MEDICINE

## 2019-06-13 PROCEDURE — 2500000003 HC RX 250 WO HCPCS: Performed by: INTERNAL MEDICINE

## 2019-06-13 PROCEDURE — 80053 COMPREHEN METABOLIC PANEL: CPT

## 2019-06-13 PROCEDURE — 96374 THER/PROPH/DIAG INJ IV PUSH: CPT

## 2019-06-13 PROCEDURE — 96413 CHEMO IV INFUSION 1 HR: CPT

## 2019-06-13 PROCEDURE — 96375 TX/PRO/DX INJ NEW DRUG ADDON: CPT

## 2019-06-13 PROCEDURE — 85025 COMPLETE CBC W/AUTO DIFF WBC: CPT

## 2019-06-13 PROCEDURE — 36591 DRAW BLOOD OFF VENOUS DEVICE: CPT

## 2019-06-13 RX ORDER — ONDANSETRON 2 MG/ML
8 INJECTION INTRAMUSCULAR; INTRAVENOUS ONCE
Status: COMPLETED | OUTPATIENT
Start: 2019-06-13 | End: 2019-06-13

## 2019-06-13 RX ORDER — SODIUM CHLORIDE 0.9 % (FLUSH) 0.9 %
10 SYRINGE (ML) INJECTION PRN
Status: DISCONTINUED | OUTPATIENT
Start: 2019-06-13 | End: 2019-06-14 | Stop reason: HOSPADM

## 2019-06-13 RX ORDER — SODIUM CHLORIDE 9 MG/ML
INJECTION, SOLUTION INTRAVENOUS ONCE
Status: COMPLETED | OUTPATIENT
Start: 2019-06-13 | End: 2019-06-13

## 2019-06-13 RX ORDER — DIPHENHYDRAMINE HYDROCHLORIDE 50 MG/ML
50 INJECTION INTRAMUSCULAR; INTRAVENOUS ONCE
Status: COMPLETED | OUTPATIENT
Start: 2019-06-13 | End: 2019-06-13

## 2019-06-13 RX ORDER — 0.9 % SODIUM CHLORIDE 0.9 %
10 VIAL (ML) INJECTION ONCE
Status: COMPLETED | OUTPATIENT
Start: 2019-06-13 | End: 2019-06-13

## 2019-06-13 RX ORDER — DEXAMETHASONE SODIUM PHOSPHATE 10 MG/ML
10 INJECTION INTRAMUSCULAR; INTRAVENOUS ONCE
Status: COMPLETED | OUTPATIENT
Start: 2019-06-13 | End: 2019-06-13

## 2019-06-13 RX ORDER — HEPARIN SODIUM (PORCINE) LOCK FLUSH IV SOLN 100 UNIT/ML 100 UNIT/ML
500 SOLUTION INTRAVENOUS PRN
Status: DISCONTINUED | OUTPATIENT
Start: 2019-06-13 | End: 2019-06-14 | Stop reason: HOSPADM

## 2019-06-13 RX ADMIN — SODIUM CHLORIDE, PRESERVATIVE FREE 500 UNITS: 5 INJECTION INTRAVENOUS at 13:45

## 2019-06-13 RX ADMIN — SODIUM CHLORIDE: 9 INJECTION, SOLUTION INTRAVENOUS at 10:40

## 2019-06-13 RX ADMIN — DEXAMETHASONE SODIUM PHOSPHATE 10 MG: 10 INJECTION INTRAMUSCULAR; INTRAVENOUS at 11:36

## 2019-06-13 RX ADMIN — Medication 10 ML: at 13:45

## 2019-06-13 RX ADMIN — ONDANSETRON HYDROCHLORIDE 8 MG: 2 INJECTION, SOLUTION INTRAMUSCULAR; INTRAVENOUS at 11:33

## 2019-06-13 RX ADMIN — Medication 10 ML: at 11:32

## 2019-06-13 RX ADMIN — DIPHENHYDRAMINE HYDROCHLORIDE 50 MG: 50 INJECTION, SOLUTION INTRAMUSCULAR; INTRAVENOUS at 11:33

## 2019-06-13 RX ADMIN — PACLITAXEL 192 MG: 6 INJECTION, SOLUTION INTRAVENOUS at 12:11

## 2019-06-13 RX ADMIN — FAMOTIDINE 20 MG: 10 INJECTION, SOLUTION INTRAVENOUS at 11:33

## 2019-06-13 ASSESSMENT — PAIN DESCRIPTION - LOCATION: LOCATION: GENERALIZED

## 2019-06-13 ASSESSMENT — PAIN DESCRIPTION - PAIN TYPE: TYPE: CHRONIC PAIN

## 2019-06-13 ASSESSMENT — PAIN SCALES - GENERAL: PAINLEVEL_OUTOF10: 5

## 2019-06-13 NOTE — PROGRESS NOTES
Patient arrive ambulatory per self for cycle 2 day 1 taxol treatment. Patient states continues to have some lower extremity edema and continues to have some neuropathy. Denies other complaints or concerns. Vitals as charted. Port accessed; specimen sent. Labs reviewed. Patient premedicated. Taxol infusion begin slowly with no adverse reaction; then increased to infuse over 1 hour. Line flushed with normal saline. Port flushed and heparinized with intact rosas needle removed per protocol. Patient ambulate off unit per self at discharge.

## 2019-06-18 ENCOUNTER — HOSPITAL ENCOUNTER (OUTPATIENT)
Facility: MEDICAL CENTER | Age: 59
End: 2019-06-18
Payer: COMMERCIAL

## 2019-06-19 DIAGNOSIS — Z17.1 MALIGNANT NEOPLASM OF UPPER-OUTER QUADRANT OF LEFT BREAST IN FEMALE, ESTROGEN RECEPTOR NEGATIVE (HCC): Primary | ICD-10-CM

## 2019-06-19 DIAGNOSIS — C50.412 MALIGNANT NEOPLASM OF UPPER-OUTER QUADRANT OF LEFT BREAST IN FEMALE, ESTROGEN RECEPTOR NEGATIVE (HCC): Primary | ICD-10-CM

## 2019-06-20 ENCOUNTER — HOSPITAL ENCOUNTER (OUTPATIENT)
Dept: INFUSION THERAPY | Facility: MEDICAL CENTER | Age: 59
Discharge: HOME | End: 2019-06-20
Payer: COMMERCIAL

## 2019-06-20 ENCOUNTER — OFFICE VISIT (OUTPATIENT)
Dept: ONCOLOGY | Age: 59
End: 2019-06-20
Payer: COMMERCIAL

## 2019-06-20 ENCOUNTER — TELEPHONE (OUTPATIENT)
Dept: ONCOLOGY | Age: 59
End: 2019-06-20

## 2019-06-20 VITALS
SYSTOLIC BLOOD PRESSURE: 128 MMHG | TEMPERATURE: 98.5 F | WEIGHT: 269.6 LBS | HEART RATE: 80 BPM | BODY MASS INDEX: 42.23 KG/M2 | DIASTOLIC BLOOD PRESSURE: 70 MMHG | RESPIRATION RATE: 16 BRPM

## 2019-06-20 VITALS — BODY MASS INDEX: 42.07 KG/M2 | WEIGHT: 268.6 LBS

## 2019-06-20 DIAGNOSIS — G89.29 CHRONIC LEFT-SIDED LOW BACK PAIN WITH LEFT-SIDED SCIATICA: ICD-10-CM

## 2019-06-20 DIAGNOSIS — M54.42 CHRONIC LEFT-SIDED LOW BACK PAIN WITH LEFT-SIDED SCIATICA: ICD-10-CM

## 2019-06-20 DIAGNOSIS — Z17.1 MALIGNANT NEOPLASM OF UPPER-OUTER QUADRANT OF LEFT BREAST IN FEMALE, ESTROGEN RECEPTOR NEGATIVE (HCC): Primary | ICD-10-CM

## 2019-06-20 DIAGNOSIS — C50.412 MALIGNANT NEOPLASM OF UPPER-OUTER QUADRANT OF LEFT BREAST IN FEMALE, ESTROGEN RECEPTOR NEGATIVE (HCC): Primary | ICD-10-CM

## 2019-06-20 DIAGNOSIS — G62.9 NEUROPATHY: ICD-10-CM

## 2019-06-20 DIAGNOSIS — Z76.0 MEDICATION REFILL: ICD-10-CM

## 2019-06-20 LAB
ABSOLUTE EOS #: 0.12 K/UL (ref 0–0.44)
ABSOLUTE IMMATURE GRANULOCYTE: 0 K/UL (ref 0–0.3)
ABSOLUTE LYMPH #: 1.12 K/UL (ref 1.1–3.7)
ABSOLUTE MONO #: 0.87 K/UL (ref 0.1–1.2)
ALBUMIN SERPL-MCNC: 3.8 G/DL (ref 3.5–5.2)
ALBUMIN/GLOBULIN RATIO: ABNORMAL (ref 1–2.5)
ALP BLD-CCNC: 74 U/L (ref 35–104)
ALT SERPL-CCNC: 9 U/L (ref 5–33)
ANION GAP SERPL CALCULATED.3IONS-SCNC: 9 MMOL/L (ref 9–17)
AST SERPL-CCNC: 11 U/L
BASOPHILS # BLD: 1 % (ref 0–2)
BASOPHILS ABSOLUTE: 0.06 K/UL (ref 0–0.2)
BILIRUB SERPL-MCNC: 0.12 MG/DL (ref 0.3–1.2)
BUN BLDV-MCNC: 11 MG/DL (ref 6–20)
BUN/CREAT BLD: 21 (ref 9–20)
CALCIUM SERPL-MCNC: 9.2 MG/DL (ref 8.6–10.4)
CHLORIDE BLD-SCNC: 105 MMOL/L (ref 98–107)
CO2: 25 MMOL/L (ref 20–31)
CREAT SERPL-MCNC: 0.52 MG/DL (ref 0.5–0.9)
DIFFERENTIAL TYPE: ABNORMAL
EOSINOPHILS RELATIVE PERCENT: 2 % (ref 1–4)
GFR AFRICAN AMERICAN: >60 ML/MIN
GFR NON-AFRICAN AMERICAN: >60 ML/MIN
GFR SERPL CREATININE-BSD FRML MDRD: ABNORMAL ML/MIN/{1.73_M2}
GFR SERPL CREATININE-BSD FRML MDRD: ABNORMAL ML/MIN/{1.73_M2}
GLUCOSE BLD-MCNC: 98 MG/DL (ref 70–99)
HCT VFR BLD CALC: 29 % (ref 36.3–47.1)
HEMOGLOBIN: 8.9 G/DL (ref 11.9–15.1)
IMMATURE GRANULOCYTES: 0 %
LYMPHOCYTES # BLD: 18 % (ref 24–43)
MCH RBC QN AUTO: 24.8 PG (ref 25.2–33.5)
MCHC RBC AUTO-ENTMCNC: 30.7 G/DL (ref 28.4–34.8)
MCV RBC AUTO: 80.8 FL (ref 82.6–102.9)
MONOCYTES # BLD: 14 % (ref 3–12)
MORPHOLOGY: ABNORMAL
NRBC AUTOMATED: 0 PER 100 WBC
PDW BLD-RTO: 26.9 % (ref 11.8–14.4)
PLATELET # BLD: 217 K/UL (ref 138–453)
PLATELET ESTIMATE: ABNORMAL
PMV BLD AUTO: 9.6 FL (ref 8.1–13.5)
POTASSIUM SERPL-SCNC: 4 MMOL/L (ref 3.7–5.3)
RBC # BLD: 3.59 M/UL (ref 3.95–5.11)
RBC # BLD: ABNORMAL 10*6/UL
SEG NEUTROPHILS: 65 % (ref 36–65)
SEGMENTED NEUTROPHILS ABSOLUTE COUNT: 4.03 K/UL (ref 1.5–8.1)
SODIUM BLD-SCNC: 139 MMOL/L (ref 135–144)
TOTAL PROTEIN: 6.4 G/DL (ref 6.4–8.3)
WBC # BLD: 6.2 K/UL (ref 3.5–11.3)
WBC # BLD: ABNORMAL 10*3/UL

## 2019-06-20 PROCEDURE — 85025 COMPLETE CBC W/AUTO DIFF WBC: CPT

## 2019-06-20 PROCEDURE — 99214 OFFICE O/P EST MOD 30 MIN: CPT | Performed by: INTERNAL MEDICINE

## 2019-06-20 PROCEDURE — 2580000003 HC RX 258: Performed by: INTERNAL MEDICINE

## 2019-06-20 PROCEDURE — 36591 DRAW BLOOD OFF VENOUS DEVICE: CPT

## 2019-06-20 PROCEDURE — 3017F COLORECTAL CA SCREEN DOC REV: CPT | Performed by: INTERNAL MEDICINE

## 2019-06-20 PROCEDURE — 96376 TX/PRO/DX INJ SAME DRUG ADON: CPT

## 2019-06-20 PROCEDURE — G8428 CUR MEDS NOT DOCUMENT: HCPCS | Performed by: INTERNAL MEDICINE

## 2019-06-20 PROCEDURE — 99211 OFF/OP EST MAY X REQ PHY/QHP: CPT | Performed by: INTERNAL MEDICINE

## 2019-06-20 PROCEDURE — 80053 COMPREHEN METABOLIC PANEL: CPT

## 2019-06-20 PROCEDURE — 6360000002 HC RX W HCPCS: Performed by: INTERNAL MEDICINE

## 2019-06-20 PROCEDURE — 4004F PT TOBACCO SCREEN RCVD TLK: CPT | Performed by: INTERNAL MEDICINE

## 2019-06-20 PROCEDURE — G8417 CALC BMI ABV UP PARAM F/U: HCPCS | Performed by: INTERNAL MEDICINE

## 2019-06-20 PROCEDURE — 2500000003 HC RX 250 WO HCPCS: Performed by: INTERNAL MEDICINE

## 2019-06-20 PROCEDURE — 96413 CHEMO IV INFUSION 1 HR: CPT

## 2019-06-20 PROCEDURE — 96375 TX/PRO/DX INJ NEW DRUG ADDON: CPT

## 2019-06-20 RX ORDER — MEPERIDINE HYDROCHLORIDE 50 MG/ML
12.5 INJECTION INTRAMUSCULAR; INTRAVENOUS; SUBCUTANEOUS ONCE
Status: CANCELLED | OUTPATIENT
Start: 2019-06-27

## 2019-06-20 RX ORDER — SODIUM CHLORIDE 9 MG/ML
INJECTION, SOLUTION INTRAVENOUS CONTINUOUS
Status: CANCELLED | OUTPATIENT
Start: 2019-06-27

## 2019-06-20 RX ORDER — SODIUM CHLORIDE 9 MG/ML
INJECTION, SOLUTION INTRAVENOUS ONCE
Status: CANCELLED | OUTPATIENT
Start: 2019-06-27

## 2019-06-20 RX ORDER — DEXAMETHASONE SODIUM PHOSPHATE 10 MG/ML
10 INJECTION INTRAMUSCULAR; INTRAVENOUS ONCE
Status: COMPLETED | OUTPATIENT
Start: 2019-06-20 | End: 2019-06-20

## 2019-06-20 RX ORDER — DIPHENHYDRAMINE HYDROCHLORIDE 50 MG/ML
50 INJECTION INTRAMUSCULAR; INTRAVENOUS ONCE
Status: CANCELLED | OUTPATIENT
Start: 2019-06-27

## 2019-06-20 RX ORDER — MEPERIDINE HYDROCHLORIDE 50 MG/ML
12.5 INJECTION INTRAMUSCULAR; INTRAVENOUS; SUBCUTANEOUS ONCE
Status: CANCELLED | OUTPATIENT
Start: 2019-06-20

## 2019-06-20 RX ORDER — SODIUM CHLORIDE 0.9 % (FLUSH) 0.9 %
10 SYRINGE (ML) INJECTION PRN
Status: CANCELLED | OUTPATIENT
Start: 2019-06-27

## 2019-06-20 RX ORDER — DIPHENHYDRAMINE HYDROCHLORIDE 50 MG/ML
50 INJECTION INTRAMUSCULAR; INTRAVENOUS ONCE
Status: CANCELLED | OUTPATIENT
Start: 2019-06-20

## 2019-06-20 RX ORDER — SODIUM CHLORIDE 9 MG/ML
INJECTION, SOLUTION INTRAVENOUS CONTINUOUS
Status: CANCELLED | OUTPATIENT
Start: 2019-06-20

## 2019-06-20 RX ORDER — 0.9 % SODIUM CHLORIDE 0.9 %
10 VIAL (ML) INJECTION ONCE
Status: CANCELLED | OUTPATIENT
Start: 2019-06-27

## 2019-06-20 RX ORDER — ONDANSETRON 2 MG/ML
8 INJECTION INTRAMUSCULAR; INTRAVENOUS ONCE
Status: COMPLETED | OUTPATIENT
Start: 2019-06-20 | End: 2019-06-20

## 2019-06-20 RX ORDER — METHYLPREDNISOLONE SODIUM SUCCINATE 125 MG/2ML
125 INJECTION, POWDER, LYOPHILIZED, FOR SOLUTION INTRAMUSCULAR; INTRAVENOUS ONCE
Status: CANCELLED | OUTPATIENT
Start: 2019-06-20

## 2019-06-20 RX ORDER — 0.9 % SODIUM CHLORIDE 0.9 %
10 VIAL (ML) INJECTION ONCE
Status: DISCONTINUED | OUTPATIENT
Start: 2019-06-20 | End: 2019-06-21 | Stop reason: HOSPADM

## 2019-06-20 RX ORDER — HEPARIN SODIUM (PORCINE) LOCK FLUSH IV SOLN 100 UNIT/ML 100 UNIT/ML
500 SOLUTION INTRAVENOUS PRN
Status: DISCONTINUED | OUTPATIENT
Start: 2019-06-20 | End: 2019-06-21 | Stop reason: HOSPADM

## 2019-06-20 RX ORDER — NAPROXEN 500 MG/1
500 TABLET ORAL DAILY PRN
Qty: 30 TABLET | Refills: 0 | Status: SHIPPED | OUTPATIENT
Start: 2019-06-20 | End: 2020-03-20

## 2019-06-20 RX ORDER — DIPHENHYDRAMINE HYDROCHLORIDE 50 MG/ML
50 INJECTION INTRAMUSCULAR; INTRAVENOUS ONCE
Status: COMPLETED | OUTPATIENT
Start: 2019-06-20 | End: 2019-06-20

## 2019-06-20 RX ORDER — SODIUM CHLORIDE 9 MG/ML
INJECTION, SOLUTION INTRAVENOUS ONCE
Status: COMPLETED | OUTPATIENT
Start: 2019-06-20 | End: 2019-06-20

## 2019-06-20 RX ORDER — SODIUM CHLORIDE 0.9 % (FLUSH) 0.9 %
5 SYRINGE (ML) INJECTION PRN
Status: CANCELLED | OUTPATIENT
Start: 2019-06-27

## 2019-06-20 RX ORDER — FUROSEMIDE 20 MG/1
20 TABLET ORAL DAILY PRN
Qty: 30 TABLET | Refills: 1 | Status: SHIPPED | OUTPATIENT
Start: 2019-06-20 | End: 2019-07-18 | Stop reason: SDUPTHER

## 2019-06-20 RX ORDER — SODIUM CHLORIDE 0.9 % (FLUSH) 0.9 %
5 SYRINGE (ML) INJECTION PRN
Status: DISCONTINUED | OUTPATIENT
Start: 2019-06-20 | End: 2019-06-21 | Stop reason: HOSPADM

## 2019-06-20 RX ORDER — OXYCODONE HYDROCHLORIDE AND ACETAMINOPHEN 5; 325 MG/1; MG/1
1 TABLET ORAL EVERY 6 HOURS PRN
Qty: 60 TABLET | Refills: 0 | Status: SHIPPED | OUTPATIENT
Start: 2019-06-20 | End: 2019-07-01 | Stop reason: SDUPTHER

## 2019-06-20 RX ORDER — HEPARIN SODIUM (PORCINE) LOCK FLUSH IV SOLN 100 UNIT/ML 100 UNIT/ML
500 SOLUTION INTRAVENOUS PRN
Status: CANCELLED | OUTPATIENT
Start: 2019-06-27

## 2019-06-20 RX ORDER — GABAPENTIN 300 MG/1
300 CAPSULE ORAL 3 TIMES DAILY
Qty: 90 CAPSULE | Refills: 0 | Status: SHIPPED | OUTPATIENT
Start: 2019-06-20 | End: 2019-07-18 | Stop reason: SDUPTHER

## 2019-06-20 RX ORDER — SODIUM CHLORIDE 0.9 % (FLUSH) 0.9 %
10 SYRINGE (ML) INJECTION PRN
Status: DISCONTINUED | OUTPATIENT
Start: 2019-06-20 | End: 2019-06-21 | Stop reason: HOSPADM

## 2019-06-20 RX ORDER — METHYLPREDNISOLONE SODIUM SUCCINATE 125 MG/2ML
125 INJECTION, POWDER, LYOPHILIZED, FOR SOLUTION INTRAMUSCULAR; INTRAVENOUS ONCE
Status: CANCELLED | OUTPATIENT
Start: 2019-06-27

## 2019-06-20 RX ORDER — 0.9 % SODIUM CHLORIDE 0.9 %
10 VIAL (ML) INJECTION ONCE
Status: CANCELLED | OUTPATIENT
Start: 2019-06-20

## 2019-06-20 RX ORDER — ONDANSETRON 2 MG/ML
8 INJECTION INTRAMUSCULAR; INTRAVENOUS ONCE
Status: CANCELLED | OUTPATIENT
Start: 2019-06-27

## 2019-06-20 RX ADMIN — Medication 10 ML: at 14:52

## 2019-06-20 RX ADMIN — DEXAMETHASONE SODIUM PHOSPHATE 10 MG: 10 INJECTION INTRAMUSCULAR; INTRAVENOUS at 12:52

## 2019-06-20 RX ADMIN — ONDANSETRON HYDROCHLORIDE 8 MG: 2 INJECTION, SOLUTION INTRAMUSCULAR; INTRAVENOUS at 12:43

## 2019-06-20 RX ADMIN — DIPHENHYDRAMINE HYDROCHLORIDE 50 MG: 50 INJECTION, SOLUTION INTRAMUSCULAR; INTRAVENOUS at 13:00

## 2019-06-20 RX ADMIN — FAMOTIDINE 20 MG: 10 INJECTION, SOLUTION INTRAVENOUS at 12:46

## 2019-06-20 RX ADMIN — PACLITAXEL 192 MG: 6 INJECTION, SOLUTION INTRAVENOUS at 13:27

## 2019-06-20 RX ADMIN — SODIUM CHLORIDE: 9 INJECTION, SOLUTION INTRAVENOUS at 12:39

## 2019-06-20 RX ADMIN — Medication 10 ML: at 11:15

## 2019-06-20 RX ADMIN — Medication 500 UNITS: at 14:52

## 2019-06-20 NOTE — TELEPHONE ENCOUNTER
Shade Israel FOR MD VISIT & TX  DR MACE IN TO SEE PATIENT  ORDERS RECEIVED  PROCEED W/TX  RV ON 7/1/19 @ 11AM  TX ON 7/5/19 @11AM DUE TO HOLIDAY  AVS PRINTED AND GIVEN TO PATIENT WITH INSTRUCTIONS  PATIENT REMAINS IN [address] [place] [address]

## 2019-06-20 NOTE — PROGRESS NOTES
reaction) 1 each 1     No current facility-administered medications for this visit. Facility-Administered Medications Ordered in Other Visits   Medication Dose Route Frequency Provider Last Rate Last Dose    0.9 % sodium chloride infusion   Intravenous Once [facility], [facility]        sodium chloride flush 0.9 % injection 5 mL  5 mL Intravenous PRN [facility], [facility]        sodium chloride flush 0.9 % injection 10 mL  10 mL Intravenous PRN [facility], [facility]   10 mL at 06/20/19 1115    heparin flush 100 UNIT/ML injection 500 Units  500 Units Intercatheter PRN [facility], [facility]           Allergies:   Lisinopril; Bee venom; and Sulfa antibiotics    Review of Systems:    Constitutional: No fever or chills. No night sweats, no weight loss  +fatigue; dysgeusia  Eyes: No eye discharge, double vision, or eye pain   HEENT: negative for sore mouth, sore throat, hoarseness and voice change   Respiratory: negative for cough, sputum, dyspnea, wheezing, hemoptysis, chest pain   Cardiovascular: negative for chest pain, dyspnea, palpitations, orthopnea, PND   Gastrointestinal: negative for nausea, vomiting, diarrhea, constipation, abdominal pain, Dysphagia, hematemesis and hematochezia   Genitourinary: negative for frequency, dysuria, nocturia, urinary incontinence, and hematuria   Integument: negative for rash, skin lesions, bruises.    Hematologic/Lymphatic: negative for easy bruising, bleeding, lymphadenopathy, or petechiae   Endocrine: negative for heat or cold intolerance,weight changes, change in bowel habits and hair loss   Musculoskeletal: negative for myalgias, pain, joint swelling; +arhtralgias and bone pain   Neurological: negative for headaches, dizziness, seizures, weakness, numbness        Physical Exam:    Vitals: /70 (Site: Left Lower Arm, Position: Sitting, Cuff Size: Medium Adult)   Pulse 80   Temp 98.5 °F (36.9 °C) (Oral)   Resp 16   Wt 269 lb 9.6 oz (122.3 kg)   LMP 02/11/1992   BMI 42.23 kg/m² General appearance - well appearing, no in pain or distress  Mental status - AAO X3  Eyes - pupils equal and reactive, extraocular eye movements intact  Mouth - mucous membranes moist, pharynx normal without lesions  Neck - supple, no significant adenopathy  Lymphatics - no palpable lymphadenopathy, no hepatosplenomegaly  Chest - clear to auscultation, no wheezes, rales or rhonchi, symmetric air entry  Heart - normal rate, regular rhythm, normal S1, S2, no murmurs  Abdomen - soft, nontender, nondistended, no masses or organomegaly  Neurological - alert, oriented, normal speech, no focal findings or movement disorder noted; +neuropathy - improved  Extremities - positive pitting edema  Skin - normal coloration and turgor, no rashes, no suspicious skin lesions noted; +nail changes  Breast - examination consistent with bilateral mastectomy      DATA:    Results for orders placed or performed during the hospital encounter of 02/12/19   Anaerobic and Aerobic Culture   Result Value Ref Range    Specimen Description . AXILLA SWAB     Special Requests SKIN ABSCESS LEFT AXILLA     Direct Exam NO NEUTROPHILS SEEN     Direct Exam NO BACTERIA SEEN     Culture NORMAL YOANA     Culture ANAEROCOCCUS PREVOTII  LIGHT GROWTH   (A)    Hemoglobin and hematocrit, blood   Result Value Ref Range    POC Hemoglobin 14.8 12.0 - 16.0 g/dL    POC Hematocrit 43 36 - 46 %   SODIUM (POC)   Result Value Ref Range    POC Sodium 143 138 - 146 mmol/L   POTASSIUM (POC)   Result Value Ref Range    POC Potassium 4.3 3.5 - 4.5 mmol/L   CHLORIDE (POC)   Result Value Ref Range    POC Chloride 108 (H) 98 - 107 mmol/L   CALCIUM, IONIC (POC)   Result Value Ref Range    POC Ionized Calcium 1.29 1.15 - 1.33 mmol/L   Surgical Pathology   Result Value Ref Range    Comment SPECIMEN RECEIVED    Surgical Pathology   Result Value Ref Range    Surgical Pathology Report       [id number]  [employer]  CONSULTING PATHOLOGISTS [place]  ANATOMIC PATHOLOGY  1238 NODE  DISSECTION    Source:  1: SE NTINEL LYMPH NODE LEFT AXILLA, SILK ON PRIMARY LYMPH NODE (A)  2: LEFT BREAST (B)  3: RIGHT BREAST (C)    Gross Description  1. \"SARAH MANNT, SENTINEL LYMPH NODE LEFT AXILLA\" Received fresh are  four nodes from 0.4 to 1.8 cm. The largest is tagged with a suture  designating \"primary lymph node. \"  Cassette summary:  \"A\" one node,  \"B\" one node, \"C\" one node, \"D-E\" largest primary node, 5FS. 2.  \"SARAH VARSHA, LEFT BREAST\" The product of a left simple mastectomy,  49.0 x 35.0 x 9.0 cm and is surmounted by a 45.0 x 39.0 cm ellipse of  tan skin with an eccentrically located non-retracted nipple. Sectioning reveals a 3.9 x 2.5 x 2.0 cm circumscribed focally  hemorrhagic tan-pink mass lesion within the upper outer quadrant that  is approximately 9.0 cm from the deep margin and at least 12.0 cm from  the remaining margins. Surrounding the lesion there is fat necrosis  consistent with a previous biopsy. Superior to the mass, there is a  1.0 cm rubbery pink-tan node, also within the upper outer  quadrant. Additional lymph nodes are identified, 0.4 cm and 0.8 cm with no  obvious tumor involvement. Cassette summary:  \"A\" nipple and skin,  \"B\" deep margin inked black perpendicular, inferior anterior margin  inked red perpendicular, \"C-E\" tumor, \"F\" node superior to tumor and  superior anterior margin perpendicular, \"G\" uninvolved upper inner  quadrant, \"H\" uninvolved lower inner quadrant, \"I\" uninvolved lower  outer quadrant, \"J\" two nodes. 3. \"SARAH VARSHA, RIGHT BREAST\" Product of a right simple mastectomy,  43.0 x 41.0 x 7.0 cm and is surmounted by a 43.0 x 39.0 cm ellipse of  tan-brown skin with an eccentrically located non-retracted nipple. The specimen is oriented with a suture designating medial.  Sectioning  reveals markedly fatty cut surfaces with no discrete masses. There  are no lymph nodes.   Cassette summary:  \"A\" nipple and deep margin,  \"B-D\" upper outer quadrant, \"E-F\" lower

## 2019-06-20 NOTE — PROGRESS NOTES
Rosalee Dubose here per ambulatory for MD visit and cycle 3 day 1. Accessed port per policy. Sent blood to lab, reviewed lab work. Ordered premedications prior to MD seeing pt. Completed premedications. Taxol stated at 50 ml/hre X 15 minutes. Increased rate to 285, completed without difficulty. Pt back next week for cycle 4.

## 2019-06-27 ENCOUNTER — HOSPITAL ENCOUNTER (OUTPATIENT)
Dept: INFUSION THERAPY | Facility: MEDICAL CENTER | Age: 59
Discharge: HOME | End: 2019-06-27
Payer: COMMERCIAL

## 2019-06-27 VITALS
HEART RATE: 83 BPM | SYSTOLIC BLOOD PRESSURE: 122 MMHG | TEMPERATURE: 98.2 F | DIASTOLIC BLOOD PRESSURE: 77 MMHG | RESPIRATION RATE: 20 BRPM

## 2019-06-27 DIAGNOSIS — C50.412 MALIGNANT NEOPLASM OF UPPER-OUTER QUADRANT OF LEFT BREAST IN FEMALE, ESTROGEN RECEPTOR NEGATIVE (HCC): Primary | ICD-10-CM

## 2019-06-27 DIAGNOSIS — Z17.1 MALIGNANT NEOPLASM OF UPPER-OUTER QUADRANT OF LEFT BREAST IN FEMALE, ESTROGEN RECEPTOR NEGATIVE (HCC): Primary | ICD-10-CM

## 2019-06-27 LAB
ABSOLUTE EOS #: 0.17 K/UL (ref 0–0.4)
ABSOLUTE IMMATURE GRANULOCYTE: 0.06 K/UL (ref 0–0.3)
ABSOLUTE LYMPH #: 0.7 K/UL (ref 1–4.8)
ABSOLUTE MONO #: 0.99 K/UL (ref 0.2–0.8)
ALBUMIN SERPL-MCNC: 3.7 G/DL (ref 3.5–5.2)
ALBUMIN/GLOBULIN RATIO: ABNORMAL (ref 1–2.5)
ALP BLD-CCNC: 83 U/L (ref 35–104)
ALT SERPL-CCNC: 12 U/L (ref 5–33)
ANION GAP SERPL CALCULATED.3IONS-SCNC: 10 MMOL/L (ref 9–17)
AST SERPL-CCNC: 12 U/L
BASOPHILS # BLD: 1 %
BASOPHILS ABSOLUTE: 0.06 K/UL (ref 0–0.2)
BILIRUB SERPL-MCNC: <0.1 MG/DL (ref 0.3–1.2)
BUN BLDV-MCNC: 11 MG/DL (ref 6–20)
BUN/CREAT BLD: 16 (ref 9–20)
CALCIUM SERPL-MCNC: 9.2 MG/DL (ref 8.6–10.4)
CHLORIDE BLD-SCNC: 103 MMOL/L (ref 98–107)
CO2: 26 MMOL/L (ref 20–31)
CREAT SERPL-MCNC: 0.69 MG/DL (ref 0.5–0.9)
DIFFERENTIAL TYPE: ABNORMAL
EOSINOPHILS RELATIVE PERCENT: 3 % (ref 1–4)
GFR AFRICAN AMERICAN: >60 ML/MIN
GFR NON-AFRICAN AMERICAN: >60 ML/MIN
GFR SERPL CREATININE-BSD FRML MDRD: ABNORMAL ML/MIN/{1.73_M2}
GFR SERPL CREATININE-BSD FRML MDRD: ABNORMAL ML/MIN/{1.73_M2}
GLUCOSE BLD-MCNC: 115 MG/DL (ref 70–99)
HCT VFR BLD CALC: 30.2 % (ref 36.3–47.1)
HEMOGLOBIN: 9.2 G/DL (ref 11.9–15.1)
IMMATURE GRANULOCYTES: 1 %
LYMPHOCYTES # BLD: 12 % (ref 24–44)
MCH RBC QN AUTO: 25.4 PG (ref 25.2–33.5)
MCHC RBC AUTO-ENTMCNC: 30.5 G/DL (ref 28.4–34.8)
MCV RBC AUTO: 83.4 FL (ref 82.6–102.9)
MONOCYTES # BLD: 17 % (ref 1–7)
MORPHOLOGY: ABNORMAL
NRBC AUTOMATED: 0 PER 100 WBC
PDW BLD-RTO: 28.9 % (ref 11.8–14.4)
PLATELET # BLD: 194 K/UL (ref 138–453)
PLATELET ESTIMATE: ABNORMAL
PMV BLD AUTO: 9.2 FL (ref 8.1–13.5)
POTASSIUM SERPL-SCNC: 4 MMOL/L (ref 3.7–5.3)
RBC # BLD: 3.62 M/UL (ref 3.95–5.11)
RBC # BLD: ABNORMAL 10*6/UL
SEG NEUTROPHILS: 66 % (ref 36–66)
SEGMENTED NEUTROPHILS ABSOLUTE COUNT: 3.82 K/UL (ref 1.8–7.7)
SODIUM BLD-SCNC: 139 MMOL/L (ref 135–144)
TOTAL PROTEIN: 6.2 G/DL (ref 6.4–8.3)
WBC # BLD: 5.8 K/UL (ref 3.5–11.3)
WBC # BLD: ABNORMAL 10*3/UL

## 2019-06-27 PROCEDURE — 96413 CHEMO IV INFUSION 1 HR: CPT

## 2019-06-27 PROCEDURE — 96374 THER/PROPH/DIAG INJ IV PUSH: CPT

## 2019-06-27 PROCEDURE — 80053 COMPREHEN METABOLIC PANEL: CPT

## 2019-06-27 PROCEDURE — 96375 TX/PRO/DX INJ NEW DRUG ADDON: CPT

## 2019-06-27 PROCEDURE — 2500000003 HC RX 250 WO HCPCS: Performed by: INTERNAL MEDICINE

## 2019-06-27 PROCEDURE — 36591 DRAW BLOOD OFF VENOUS DEVICE: CPT

## 2019-06-27 PROCEDURE — 6360000002 HC RX W HCPCS: Performed by: INTERNAL MEDICINE

## 2019-06-27 PROCEDURE — 85025 COMPLETE CBC W/AUTO DIFF WBC: CPT

## 2019-06-27 PROCEDURE — 2580000003 HC RX 258: Performed by: INTERNAL MEDICINE

## 2019-06-27 RX ORDER — SODIUM CHLORIDE 9 MG/ML
INJECTION, SOLUTION INTRAVENOUS ONCE
Status: DISCONTINUED | OUTPATIENT
Start: 2019-06-27 | End: 2019-06-28 | Stop reason: HOSPADM

## 2019-06-27 RX ORDER — 0.9 % SODIUM CHLORIDE 0.9 %
10 VIAL (ML) INJECTION ONCE
Status: DISCONTINUED | OUTPATIENT
Start: 2019-06-27 | End: 2019-06-28 | Stop reason: HOSPADM

## 2019-06-27 RX ORDER — DIPHENHYDRAMINE HYDROCHLORIDE 50 MG/ML
50 INJECTION INTRAMUSCULAR; INTRAVENOUS ONCE
Status: COMPLETED | OUTPATIENT
Start: 2019-06-27 | End: 2019-06-27

## 2019-06-27 RX ORDER — SODIUM CHLORIDE 0.9 % (FLUSH) 0.9 %
10 SYRINGE (ML) INJECTION PRN
Status: DISCONTINUED | OUTPATIENT
Start: 2019-06-27 | End: 2019-06-28 | Stop reason: HOSPADM

## 2019-06-27 RX ORDER — HEPARIN SODIUM (PORCINE) LOCK FLUSH IV SOLN 100 UNIT/ML 100 UNIT/ML
500 SOLUTION INTRAVENOUS PRN
Status: DISCONTINUED | OUTPATIENT
Start: 2019-06-27 | End: 2019-06-28 | Stop reason: HOSPADM

## 2019-06-27 RX ORDER — ONDANSETRON 2 MG/ML
8 INJECTION INTRAMUSCULAR; INTRAVENOUS ONCE
Status: COMPLETED | OUTPATIENT
Start: 2019-06-27 | End: 2019-06-27

## 2019-06-27 RX ORDER — DEXAMETHASONE SODIUM PHOSPHATE 10 MG/ML
10 INJECTION INTRAMUSCULAR; INTRAVENOUS ONCE
Status: COMPLETED | OUTPATIENT
Start: 2019-06-27 | End: 2019-06-27

## 2019-06-27 RX ADMIN — ONDANSETRON HYDROCHLORIDE 8 MG: 2 INJECTION, SOLUTION INTRAMUSCULAR; INTRAVENOUS at 12:23

## 2019-06-27 RX ADMIN — Medication 500 UNITS: at 14:20

## 2019-06-27 RX ADMIN — PACLITAXEL 192 MG: 6 INJECTION, SOLUTION INTRAVENOUS at 12:53

## 2019-06-27 RX ADMIN — DIPHENHYDRAMINE HYDROCHLORIDE 50 MG: 50 INJECTION, SOLUTION INTRAMUSCULAR; INTRAVENOUS at 12:20

## 2019-06-27 RX ADMIN — FAMOTIDINE 20 MG: 10 INJECTION, SOLUTION INTRAVENOUS at 12:00

## 2019-06-27 RX ADMIN — DEXAMETHASONE SODIUM PHOSPHATE 10 MG: 10 INJECTION INTRAMUSCULAR; INTRAVENOUS at 11:58

## 2019-06-27 RX ADMIN — SODIUM CHLORIDE: 9 INJECTION, SOLUTION INTRAVENOUS at 11:57

## 2019-06-27 NOTE — PROGRESS NOTES
Patient here for labs and chemo. Feels well today. Vitals obtained. Port accessed per policy and labs drawn and sent. Labs reviewed. Premeds given per [facility]. Chemo hung per MAR. Infusing. No complaints. Completed. No complaints. Port flushed per policy and gripper removed intact, band aid to site. Has a return visit scheduled. Discharged ambulatory per self.

## 2019-07-01 ENCOUNTER — OFFICE VISIT (OUTPATIENT)
Dept: ONCOLOGY | Age: 59
End: 2019-07-01
Payer: COMMERCIAL

## 2019-07-01 ENCOUNTER — HOSPITAL ENCOUNTER (OUTPATIENT)
Facility: MEDICAL CENTER | Age: 59
Discharge: HOME | End: 2019-07-01
Payer: COMMERCIAL

## 2019-07-01 ENCOUNTER — HOSPITAL ENCOUNTER (OUTPATIENT)
Dept: GENERAL RADIOLOGY | Age: 59
Discharge: HOME | End: 2019-07-03
Payer: COMMERCIAL

## 2019-07-01 ENCOUNTER — HOSPITAL ENCOUNTER (OUTPATIENT)
Dept: INFUSION THERAPY | Facility: MEDICAL CENTER | Age: 59
Discharge: HOME | End: 2019-07-01
Payer: COMMERCIAL

## 2019-07-01 ENCOUNTER — HOSPITAL ENCOUNTER (OUTPATIENT)
Facility: MEDICAL CENTER | Age: 59
End: 2019-07-01
Payer: COMMERCIAL

## 2019-07-01 ENCOUNTER — HOSPITAL ENCOUNTER (OUTPATIENT)
Age: 59
Discharge: HOME | End: 2019-07-01
Payer: COMMERCIAL

## 2019-07-01 ENCOUNTER — HOSPITAL ENCOUNTER (OUTPATIENT)
Age: 59
Discharge: HOME | End: 2019-07-03
Payer: COMMERCIAL

## 2019-07-01 ENCOUNTER — TELEPHONE (OUTPATIENT)
Dept: ONCOLOGY | Age: 59
End: 2019-07-01

## 2019-07-01 VITALS
DIASTOLIC BLOOD PRESSURE: 85 MMHG | RESPIRATION RATE: 18 BRPM | HEART RATE: 80 BPM | SYSTOLIC BLOOD PRESSURE: 120 MMHG | TEMPERATURE: 97.7 F | BODY MASS INDEX: 41.04 KG/M2 | WEIGHT: 262 LBS

## 2019-07-01 DIAGNOSIS — G62.9 NEUROPATHY: ICD-10-CM

## 2019-07-01 DIAGNOSIS — Z17.1 MALIGNANT NEOPLASM OF UPPER-OUTER QUADRANT OF LEFT BREAST IN FEMALE, ESTROGEN RECEPTOR NEGATIVE (HCC): Primary | ICD-10-CM

## 2019-07-01 DIAGNOSIS — C50.412 MALIGNANT NEOPLASM OF UPPER-OUTER QUADRANT OF LEFT BREAST IN FEMALE, ESTROGEN RECEPTOR NEGATIVE (HCC): Primary | ICD-10-CM

## 2019-07-01 DIAGNOSIS — R53.83 OTHER FATIGUE: ICD-10-CM

## 2019-07-01 LAB
-: ABNORMAL
ABSOLUTE EOS #: 0.1 K/UL (ref 0–0.4)
ABSOLUTE IMMATURE GRANULOCYTE: 0 K/UL (ref 0–0.3)
ABSOLUTE LYMPH #: 1.01 K/UL (ref 1–4.8)
ABSOLUTE MONO #: 0.19 K/UL (ref 0.2–0.8)
ALBUMIN SERPL-MCNC: 3.8 G/DL (ref 3.5–5.2)
ALBUMIN/GLOBULIN RATIO: ABNORMAL (ref 1–2.5)
ALP BLD-CCNC: 86 U/L (ref 35–104)
ALT SERPL-CCNC: 13 U/L (ref 5–33)
AMORPHOUS: ABNORMAL
ANION GAP SERPL CALCULATED.3IONS-SCNC: 12 MMOL/L (ref 9–17)
AST SERPL-CCNC: 11 U/L
BACTERIA: ABNORMAL
BASOPHILS # BLD: 0 %
BASOPHILS ABSOLUTE: 0 K/UL (ref 0–0.2)
BILIRUB SERPL-MCNC: 0.32 MG/DL (ref 0.3–1.2)
BILIRUBIN URINE: NEGATIVE
BUN BLDV-MCNC: 15 MG/DL (ref 6–20)
BUN/CREAT BLD: 21 (ref 9–20)
CALCIUM SERPL-MCNC: 9.4 MG/DL (ref 8.6–10.4)
CASTS UA: ABNORMAL /LPF
CASTS UA: ABNORMAL /LPF
CHLORIDE BLD-SCNC: 104 MMOL/L (ref 98–107)
CO2: 23 MMOL/L (ref 20–31)
COLOR: YELLOW
COMMENT UA: ABNORMAL
CREAT SERPL-MCNC: 0.72 MG/DL (ref 0.5–0.9)
CRYSTALS, UA: ABNORMAL /HPF
DIFFERENTIAL TYPE: ABNORMAL
DIRECT EXAM: NORMAL
EOSINOPHILS RELATIVE PERCENT: 2 % (ref 1–4)
EPITHELIAL CELLS UA: ABNORMAL /HPF (ref 0–5)
GFR AFRICAN AMERICAN: >60 ML/MIN
GFR NON-AFRICAN AMERICAN: >60 ML/MIN
GFR SERPL CREATININE-BSD FRML MDRD: ABNORMAL ML/MIN/{1.73_M2}
GFR SERPL CREATININE-BSD FRML MDRD: ABNORMAL ML/MIN/{1.73_M2}
GLUCOSE BLD-MCNC: 90 MG/DL (ref 70–99)
GLUCOSE URINE: NEGATIVE
HCT VFR BLD CALC: 35 % (ref 36.3–47.1)
HEMOGLOBIN: 10.7 G/DL (ref 11.9–15.1)
IMMATURE GRANULOCYTES: 0 %
KETONES, URINE: NEGATIVE
LEUKOCYTE ESTERASE, URINE: NEGATIVE
LYMPHOCYTES # BLD: 21 % (ref 24–44)
Lab: NORMAL
MCH RBC QN AUTO: 25.7 PG (ref 25.2–33.5)
MCHC RBC AUTO-ENTMCNC: 30.6 G/DL (ref 28.4–34.8)
MCV RBC AUTO: 83.9 FL (ref 82.6–102.9)
MONOCYTES # BLD: 4 % (ref 1–7)
MORPHOLOGY: ABNORMAL
MUCUS: ABNORMAL
NITRITE, URINE: NEGATIVE
NRBC AUTOMATED: 0 PER 100 WBC
OTHER OBSERVATIONS UA: ABNORMAL
PDW BLD-RTO: 28.1 % (ref 11.8–14.4)
PH UA: 5.5 (ref 5–8)
PLATELET # BLD: 196 K/UL (ref 138–453)
PLATELET ESTIMATE: ABNORMAL
PMV BLD AUTO: 9.6 FL (ref 8.1–13.5)
POTASSIUM SERPL-SCNC: 3.9 MMOL/L (ref 3.7–5.3)
PROTEIN UA: ABNORMAL
RBC # BLD: 4.17 M/UL (ref 3.95–5.11)
RBC # BLD: ABNORMAL 10*6/UL
RBC UA: ABNORMAL /HPF (ref 0–2)
RENAL EPITHELIAL, UA: ABNORMAL /HPF
SEG NEUTROPHILS: 73 % (ref 36–66)
SEGMENTED NEUTROPHILS ABSOLUTE COUNT: 3.5 K/UL (ref 1.8–7.7)
SODIUM BLD-SCNC: 139 MMOL/L (ref 135–144)
SPECIFIC GRAVITY UA: 1.02 (ref 1–1.03)
SPECIMEN DESCRIPTION: NORMAL
TOTAL PROTEIN: 6.7 G/DL (ref 6.4–8.3)
TRICHOMONAS: ABNORMAL
TURBIDITY: ABNORMAL
URINE HGB: NEGATIVE
UROBILINOGEN, URINE: NORMAL
WBC # BLD: 4.8 K/UL (ref 3.5–11.3)
WBC # BLD: ABNORMAL 10*3/UL
WBC UA: ABNORMAL /HPF (ref 0–5)
YEAST: ABNORMAL

## 2019-07-01 PROCEDURE — 99214 OFFICE O/P EST MOD 30 MIN: CPT | Performed by: INTERNAL MEDICINE

## 2019-07-01 PROCEDURE — 87804 INFLUENZA ASSAY W/OPTIC: CPT

## 2019-07-01 PROCEDURE — 80053 COMPREHEN METABOLIC PANEL: CPT

## 2019-07-01 PROCEDURE — 6360000002 HC RX W HCPCS: Performed by: INTERNAL MEDICINE

## 2019-07-01 PROCEDURE — 85025 COMPLETE CBC W/AUTO DIFF WBC: CPT

## 2019-07-01 PROCEDURE — 81001 URINALYSIS AUTO W/SCOPE: CPT

## 2019-07-01 PROCEDURE — G8417 CALC BMI ABV UP PARAM F/U: HCPCS | Performed by: INTERNAL MEDICINE

## 2019-07-01 PROCEDURE — 96360 HYDRATION IV INFUSION INIT: CPT

## 2019-07-01 PROCEDURE — G8427 DOCREV CUR MEDS BY ELIG CLIN: HCPCS | Performed by: INTERNAL MEDICINE

## 2019-07-01 PROCEDURE — 4004F PT TOBACCO SCREEN RCVD TLK: CPT | Performed by: INTERNAL MEDICINE

## 2019-07-01 PROCEDURE — 36415 COLL VENOUS BLD VENIPUNCTURE: CPT

## 2019-07-01 PROCEDURE — 2580000003 HC RX 258: Performed by: INTERNAL MEDICINE

## 2019-07-01 PROCEDURE — 99212 OFFICE O/P EST SF 10 MIN: CPT | Performed by: INTERNAL MEDICINE

## 2019-07-01 PROCEDURE — 71046 X-RAY EXAM CHEST 2 VIEWS: CPT

## 2019-07-01 PROCEDURE — 3017F COLORECTAL CA SCREEN DOC REV: CPT | Performed by: INTERNAL MEDICINE

## 2019-07-01 PROCEDURE — 96361 HYDRATE IV INFUSION ADD-ON: CPT

## 2019-07-01 RX ORDER — SODIUM CHLORIDE 0.9 % (FLUSH) 0.9 %
10 SYRINGE (ML) INJECTION PRN
Status: DISCONTINUED | OUTPATIENT
Start: 2019-07-01 | End: 2019-07-02 | Stop reason: HOSPADM

## 2019-07-01 RX ORDER — HEPARIN SODIUM (PORCINE) LOCK FLUSH IV SOLN 100 UNIT/ML 100 UNIT/ML
500 SOLUTION INTRAVENOUS PRN
Status: CANCELLED | OUTPATIENT
Start: 2019-07-01

## 2019-07-01 RX ORDER — OXYCODONE HYDROCHLORIDE AND ACETAMINOPHEN 5; 325 MG/1; MG/1
1 TABLET ORAL EVERY 6 HOURS PRN
Qty: 60 TABLET | Refills: 0 | Status: SHIPPED | OUTPATIENT
Start: 2019-07-03 | End: 2019-07-18 | Stop reason: SDUPTHER

## 2019-07-01 RX ORDER — SODIUM CHLORIDE 0.9 % (FLUSH) 0.9 %
10 SYRINGE (ML) INJECTION PRN
Status: CANCELLED | OUTPATIENT
Start: 2019-07-01

## 2019-07-01 RX ORDER — HEPARIN SODIUM (PORCINE) LOCK FLUSH IV SOLN 100 UNIT/ML 100 UNIT/ML
500 SOLUTION INTRAVENOUS PRN
Status: DISCONTINUED | OUTPATIENT
Start: 2019-07-01 | End: 2019-07-02 | Stop reason: HOSPADM

## 2019-07-01 RX ORDER — SODIUM CHLORIDE 0.9 % (FLUSH) 0.9 %
5 SYRINGE (ML) INJECTION PRN
Status: CANCELLED | OUTPATIENT
Start: 2019-07-01

## 2019-07-01 RX ADMIN — SODIUM CHLORIDE, PRESERVATIVE FREE 500 UNITS: 5 INJECTION INTRAVENOUS at 15:59

## 2019-07-01 RX ADMIN — SODIUM CHLORIDE: 9 INJECTION, SOLUTION INTRAVENOUS at 14:22

## 2019-07-01 NOTE — TELEPHONE ENCOUNTER
Ngozi Marin MD VISIT  DR MACE IN TO SEE PATIENT  ORDERS RECEIVED  CHEST X-RAY LABS TODAY  SCRIPT SENT TO PATIENT'S PHARMACY  HYDRATION TODAY PROCEED W/ [address] ON 7/5/19  RV 7/18/19  MD VISIT 7/18/19 @ 11:40AM [address] @ 11AM  AVS PRINTED AND GIVEN TO PATIENT W/ INSTRUCTIONS  PATIENT TO REGISTRATION FOR CHEST X-RAY LABS CDP CMP RAPID INFLUENZA A & B  TO RETURN BACK TO TX ROOM FOR HYDRATION AFTER TESTING IS COMPLETED

## 2019-07-01 NOTE — PROGRESS NOTES
Patient here for hydration following MD visit. Complains of fatigue today,  Port accessed per policy. Hydration bag hung per MAR. Infusing. No complaints. Sleeping on and off. Completed. Tolerated well. Port flushed per policy and gripper removed intact, band aid to site. Has a return visit scheduled. Discharged ambulatory per self.

## 2019-07-01 NOTE — PROGRESS NOTES
Michael Hammond                                                                                                                  7/1/2019  MRN:   [id number]  YOB: 1960  PCP:                           Urmila Schwartz MD  Referring Physician: No ref. provider found  Treating Physician Name: [facility], MD      Reason for visit:  Chief Complaint   Patient presents with    Follow-up     review status of disease    Congestion     x3 days of congestion, nasal congestion, and cough. Afebrile per patient. Current problems:   IDC of Left Breast cancer, Triple negative, pT2,pN0,M0    Active and recent treatments:  Bilateral Mastectomy with left sentinel lymph node    Summary of Case/History:    Michael Hammond a 62 y. o.female invasive carcinoma of the breast.     Patient initially felt a lump in her left breast for 2-3 months. She underwent screening mammogram which showed 2 cm mass in upper outer left breast.  Right breast do not have any concerning findings. Ultrasound of left breast conformed mass measuring 1.9 cm in the upper outer left breast.  Patient underwent biopsy on 1/23/19 which revealed invasive ductal carcinoma, grade 3, ER/NJ negative. HER-2 results are pending.     Patient has history of uterus/cervical cancer diagnosed in 1992. Patient states she underwent surgery for the cervical cancer. She does not recall getting any radiation therapy or chemotherapy. Patient states she has been interested in getting bilateral breast reduction due to cosmetic reasons however now with a diagnosis of breast cancer she wishes to proceed with bilateral mastectomy.     Patient has history of breast cancer in her mother as well as 3 paternal aunts. Patient's mother also had cervical and uterine cancer.     Patient underwent bilateral mastectomy with left sentinel lymph node biopsy.   Pathological stage was T2, N0, M0    Started chemotherapy for triple negative breast cancer using AC ordered by T on 4/11/19    Interim History:    Patient presents to the clinic for an unscheduled visit. She has developed URI with productive cough, sinus drainage and congestions, aches and pains, runny eyes. She denies any fever, rash, nausea, vomiting, abdominal pain, dysuria. She has been working on maintaining hydration but her appetite is poor and she has lost weight. Her leg swelling has resolved and her neuropathy is stable. During this visit patient's allergy, social, medical, surgical history and medications were reviewed and updated.       Past Medical History:   Past Medical History:   Diagnosis Date    Arthritis     GENERALIZED ALL OVER RHEUMATOID AND OSTEO    Cancer ([facility] [address] 1992    CERVICAL, UTERINE    Cancer of adrenal cortex ([facility] [address] 01/2019    LEFT BREAST    Chronic back pain     scioliosis,  02/2019 WEARING A BACK BRACE    Degenerative disorder of bone     ALL OVER    Depression     Difficult intravenous access     HANDS AR BEST SITE    GERD (gastroesophageal reflux disease)     Hyperlipidemia     HX OF NO MEDS IN YEARS    Hypertension 2007    Pain management clinic    Insomnia     Obesity     BMI -  43    Sleep apnea 2016    NO LONGER USES MACHINE    Snores     has been told by family that she stops breathing    Use of cane as ambulatory aid     Wears glasses     READING    Wears glasses     \"CHEATERS\"       Past Surgical History:     Past Surgical History:   Procedure Laterality Date    BREAST SURGERY Left 1990    CYST    BUNIONECTOMY Bilateral     CERVIX SURGERY  1992    COLONOSCOPY  10/13/2017    FOOT SURGERY Left 2014    PINS IN ALL TOES    HAMMER TOE SURGERY Bilateral     HYSTERECTOMY  1992    UTERUS, CERVIX     MASTECTOMY Bilateral 2/12/2019    TOTAL MASTECTOMY performed by Rush Carbajal DO at [facility], BILATERAL Bilateral 02/12/2019     TOTAL MASTECTOMY,  AXILLARY SENTINEL LYMPH NODE BIOPSY, FROZEN SECTION     NJ COLSC FLX W/REMOVAL LESION BY HOT BX FORCEPS N/A 10/13/2017    COLONOSCOPY WITH BIOPSY AND POLYPECTOMY performed by Isaías Jovel IV, DO at [facility] TUNNELED VENOUS PORT PLACEMENT  03/27/2019    X-RAY FOOT 3+VW      both       Patient Family Social History:     Social History     Socioeconomic History    Marital status: Single     Spouse name: Not on file    Number of children: Not on file    Years of education: Not on file    Highest education level: Not on file   Occupational History    Not on file   Social Needs    Financial resource strain: Not on file    Food insecurity:     Worry: Not on file     Inability: Not on file    Transportation needs:     Medical: Not on file     Non-medical: Not on file   Tobacco Use    Smoking status: Current Some Day Smoker     Packs/day: 0.00     Years: 41.00     Pack years: 0.00     Types: Cigarettes    Smokeless tobacco: Never Used    Tobacco comment: STATES SMOKES A PACK A WEEK   Substance and Sexual Activity    Alcohol use: No    Drug use: Yes     Frequency: 7.0 times per week     Types: Marijuana     Comment: LAST USE 02/10/2018, AND PAIN PILLS     Sexual activity: Not Currently   Lifestyle    Physical activity:     Days per week: Not on file     Minutes per session: Not on file    Stress: Not on file   Relationships    Social connections:     Talks on phone: Not on file     Gets together: Not on file     Attends Jew service: Not on file     Active member of club or organization: Not on file     Attends meetings of clubs or organizations: Not on file     Relationship status: Not on file    Intimate partner violence:     Fear of current or ex partner: Not on file     Emotionally abused: Not on file     Physically abused: Not on file     Forced sexual activity: Not on file   Other Topics Concern    Not on file   Social History Narrative    Not on file      Family History   Problem Relation Age of Onset    High Blood Pressure Mother     Cancer Mother 2/20/2019       INTERPRETATION    LEFT BREAST CARCINOMA:      -  NEGATIVE FOR HER2 (ERBB2) GENE AMPLIFICATION BY FISH. -  HER2: CEP (D17Z1) RATIO, 1.0.      RESULTS-COMMENTS  THE PATIENT'S CARCINOMA IS EVALUATED FOR HER2 (ERBB2) GENE  AMPLIFICATION BY FISH ANALYSIS. REFERENCE RANGES    This test was interpreted according to the American Society of  Clinical Oncology/College of American Pathologists (ASCO/CAP) 2013  Guideline Recommendations for neoplasm fixed in formalin for 6-72  hours (Arch Pathol Lab Med. doi:  10. 5858/arpa. 1336-8275-CQ). According to the 2013 ASCO/CAP Guidelines, a non-amplified result  indicates an ERBB2/CEP17 ratio less than 2.0 with an average ERBB2  copy number less than 4.0; an amplified result indicates either (1) an  ERBB2/CEP17 ratio of 2.0 or greater or (2) an average ERBB2 copy  number of at least 6.0 with an ERBB2/CEP17 ratio less than 2.0; and an  equivocal result indicates an ERBB2/CEP17 ratio less than 2.0 with an  average ERBB2 copy number of at least 4.0 but less than 6.0. Fluorescence in-situ hybridization (fish) analysis is performed with a  probe specific for HER2 (ERBB2) and a probe, D17Z1, for the  pericentromeric region of chromosome 17 ([employer]). External  and internal controls perform appropriately. In this assay, the ratio  of HER2 signals to chromosome 17 signals is calculated. Sixty nuclei  are examined, and the ratio of HER2 signals to chromosome 17 signals  determined. Number of Tumor Cells Counted:               (30+30)  Numb er of Observers:                     Two  Average Number of HER2 Signals/Nucleus:          1.7  Average Number of CEP 17 Signals/Nucleus:     1.7  Ratio of average HER2/CEP 17 (D17Z1):          1.0       Specimen:                         Left breast  Fixative:                              10% Buffered formalin  Duration of fixation:                    29.5 hours  Cold ischemia time <1 hour N/A  ---AVERAGE INTENSITY OF POSITIVE STAINING:     N/A  -NEGATIVE (PERCENT <1% OF POSITIVE TUMOR CELL NUCLEI):     0%  ---AVERAGE INTENSITY OF ANY POSITIVE STAINING: N/A  ---INTERNAL CONTROL PRESENT AND STAIN AS EXPECTED:     Yes  ---INTERNAL CONTROL CELLS ABSENT (COMMENT):     N/A  ---INTERNAL CONTROL CELLS PRESENT BUT DO NOT STAIN (COMMENT):     N/A    PROGESTERONE RECEPTOR*:  ---AVERAGE INTENSITY OF POSITIVE STAINING:     N/A  -NEGATIVE (PERCENT <1% OF POSITIVE TUMOR CELL NUCLEI):     0%  ---AVERAGE INTENSITY OF ANY POSITIVE STAINING: N/A  ---INTERNAL CONTROL PRESENT AND STAIN AS EXPECTED:     Yes  ---INTERNAL CONTROL CELLS ABSENT (COMMENT):     N/A  ---INTERNAL CONTROL CELLS PRESENT BUT DO NOT STAIN (COMMENT):     N/A     HER2*:  HER2 PROTEIN EXPRESSION  (IMMUNOHISTOCHEMISTRY):     N/A  HER2 GENE AMPLIFICATION (INSITU HYBRIDIZATION):     Pending (see  addendum report)         TIME SPECIMEN REMOVED:     February 12, 2019, 1205  TIME SPECIMEN PLACED IN FORMALIN:     February 12, 2019, 1205  COLD ISCHEMIA TIME MEETS CAP / ASCO REQUIREMENTS (LESS THAN 1 HOUR):      Yes  FIXATION TIME (CAP / ASCO GUIDELINES 6-72 HOURS):     29.5 hours  FIXATIVE:     10% BUFFERED FORMALIN    *Information on biomarker regents:  Estrogen Receptor: If performed at [facility] laboratory: Immunostain  clone SP1 with indirect biotin streptavidin detection system, vendor  [employer] (FDA cleared); evaluated by manual morphometry  (negative=less than 1% tumor cell nuclear staining; otherwise  positive); external control is reactive. Progesterone Receptor: If performed at [facility] laboratory: Immunostain  clone [id number] with indirect biotin streptavidin detection system, vendor  Flora Jang (FDA cleared); evaluated by m anual morphometry  (negative=less than 1% tumor cell nuclear staining; otherwise  positive); external control is reactive. HER 2 insitu hybridization: FDA cleared, vendor [facility] [employer].     [facility] in conjunction with AJCC 8 ed. 3.  Microscopic examination performed. Impression:  Invasive ductal carcinoma of left breast, triple negative, pT2,p N0, M0  Status post bilateral mastectomy and left sentinel lymph node biopsy  Personal history of gynecological malignancy (uterus/cervical cancer) status post surgery in 1992  Cancer related pain  Leg swelling  Anemia secondary to chemotherapy  Family history of malignancy  Chronic back pain  Hypertension  Obesity      Plan:  I had a detailed discussion with the patient reports he went over results of her lab work-up and other relevant clinical data. I will obtain CBC CMP. I will also obtain chest x-ray since patient is complaining of cough and symptoms of respiratory tract infection. We will check for influenza screen. Patient has decreased oral intake. We will schedule IV fluid hydration for the patient  Patient is scheduled for chemotherapy on Friday due to Fourth of July holiday. Reiterated treatment plan. Continue weekly Taxol. We anticipate to treat patient with total of 12 weeks of weekly Taxol. Monitor for toxicity  NCCN guidelines were reviewed and discussed with the patient. The diagnosis and care plan were discussed with the patient in detail. I discussed the natural history of the disease, prognosis, risks and goals of therapy and answered all the patients questions to the best of my ability. Patient expressed understanding and was in agreement. Stephanie Dears      I spent more than 25 minutes examining, evaluating, reviewing data, counseling the patient and coordinating care.   Greater than 50% of time was spent face-to-face with the patient  This note is created with the assistance of a speech recognition program.  While intending to generate a document that actually reflects the content of the visit, the document can still have some errors including those of syntax and sound a like substitutions which may escape proof reading. It such instances, actual meaning can be extrapolated by contextual diversion.

## 2019-07-05 ENCOUNTER — HOSPITAL ENCOUNTER (OUTPATIENT)
Dept: INFUSION THERAPY | Facility: MEDICAL CENTER | Age: 59
Discharge: HOME | End: 2019-07-05
Payer: COMMERCIAL

## 2019-07-05 VITALS
HEART RATE: 91 BPM | DIASTOLIC BLOOD PRESSURE: 61 MMHG | SYSTOLIC BLOOD PRESSURE: 103 MMHG | TEMPERATURE: 97.4 F | RESPIRATION RATE: 18 BRPM

## 2019-07-05 DIAGNOSIS — Z17.1 MALIGNANT NEOPLASM OF UPPER-OUTER QUADRANT OF LEFT BREAST IN FEMALE, ESTROGEN RECEPTOR NEGATIVE (HCC): Primary | ICD-10-CM

## 2019-07-05 DIAGNOSIS — C50.412 MALIGNANT NEOPLASM OF UPPER-OUTER QUADRANT OF LEFT BREAST IN FEMALE, ESTROGEN RECEPTOR NEGATIVE (HCC): Primary | ICD-10-CM

## 2019-07-05 LAB
ABSOLUTE EOS #: 0.22 K/UL (ref 0–0.44)
ABSOLUTE IMMATURE GRANULOCYTE: 0 K/UL (ref 0–0.3)
ABSOLUTE LYMPH #: 1.37 K/UL (ref 1.1–3.7)
ABSOLUTE MONO #: 0.65 K/UL (ref 0.1–1.2)
ALBUMIN SERPL-MCNC: 3.7 G/DL (ref 3.5–5.2)
ALBUMIN/GLOBULIN RATIO: ABNORMAL (ref 1–2.5)
ALP BLD-CCNC: 88 U/L (ref 35–104)
ALT SERPL-CCNC: 12 U/L (ref 5–33)
ANION GAP SERPL CALCULATED.3IONS-SCNC: 10 MMOL/L (ref 9–17)
AST SERPL-CCNC: 14 U/L
BASOPHILS # BLD: 1 % (ref 0–2)
BASOPHILS ABSOLUTE: 0.07 K/UL (ref 0–0.2)
BILIRUB SERPL-MCNC: <0.1 MG/DL (ref 0.3–1.2)
BUN BLDV-MCNC: 17 MG/DL (ref 6–20)
BUN/CREAT BLD: 22 (ref 9–20)
CALCIUM SERPL-MCNC: 8.9 MG/DL (ref 8.6–10.4)
CHLORIDE BLD-SCNC: 104 MMOL/L (ref 98–107)
CO2: 25 MMOL/L (ref 20–31)
CREAT SERPL-MCNC: 0.78 MG/DL (ref 0.5–0.9)
DIFFERENTIAL TYPE: ABNORMAL
EOSINOPHILS RELATIVE PERCENT: 3 % (ref 1–4)
GFR AFRICAN AMERICAN: >60 ML/MIN
GFR NON-AFRICAN AMERICAN: >60 ML/MIN
GFR SERPL CREATININE-BSD FRML MDRD: ABNORMAL ML/MIN/{1.73_M2}
GFR SERPL CREATININE-BSD FRML MDRD: ABNORMAL ML/MIN/{1.73_M2}
GLUCOSE BLD-MCNC: 112 MG/DL (ref 70–99)
HCT VFR BLD CALC: 28 % (ref 36.3–47.1)
HEMOGLOBIN: 8.5 G/DL (ref 11.9–15.1)
IMMATURE GRANULOCYTES: 0 %
LYMPHOCYTES # BLD: 19 % (ref 24–43)
MCH RBC QN AUTO: 26 PG (ref 25.2–33.5)
MCHC RBC AUTO-ENTMCNC: 30.4 G/DL (ref 28.4–34.8)
MCV RBC AUTO: 85.6 FL (ref 82.6–102.9)
MONOCYTES # BLD: 9 % (ref 3–12)
MORPHOLOGY: ABNORMAL
NRBC AUTOMATED: 0 PER 100 WBC
PDW BLD-RTO: 28.7 % (ref 11.8–14.4)
PLATELET # BLD: 214 K/UL (ref 138–453)
PLATELET ESTIMATE: ABNORMAL
PMV BLD AUTO: 9.3 FL (ref 8.1–13.5)
POTASSIUM SERPL-SCNC: 4.6 MMOL/L (ref 3.7–5.3)
RBC # BLD: 3.27 M/UL (ref 3.95–5.11)
RBC # BLD: ABNORMAL 10*6/UL
SEG NEUTROPHILS: 68 % (ref 36–65)
SEGMENTED NEUTROPHILS ABSOLUTE COUNT: 4.89 K/UL (ref 1.5–8.1)
SODIUM BLD-SCNC: 139 MMOL/L (ref 135–144)
TOTAL PROTEIN: 6.2 G/DL (ref 6.4–8.3)
WBC # BLD: 7.2 K/UL (ref 3.5–11.3)
WBC # BLD: ABNORMAL 10*3/UL

## 2019-07-05 PROCEDURE — 2500000003 HC RX 250 WO HCPCS: Performed by: INTERNAL MEDICINE

## 2019-07-05 PROCEDURE — 85025 COMPLETE CBC W/AUTO DIFF WBC: CPT

## 2019-07-05 PROCEDURE — 80053 COMPREHEN METABOLIC PANEL: CPT

## 2019-07-05 PROCEDURE — 6360000002 HC RX W HCPCS: Performed by: INTERNAL MEDICINE

## 2019-07-05 PROCEDURE — 96413 CHEMO IV INFUSION 1 HR: CPT

## 2019-07-05 PROCEDURE — 96375 TX/PRO/DX INJ NEW DRUG ADDON: CPT

## 2019-07-05 PROCEDURE — 2580000003 HC RX 258: Performed by: INTERNAL MEDICINE

## 2019-07-05 PROCEDURE — 36591 DRAW BLOOD OFF VENOUS DEVICE: CPT

## 2019-07-05 RX ORDER — HEPARIN SODIUM (PORCINE) LOCK FLUSH IV SOLN 100 UNIT/ML 100 UNIT/ML
500 SOLUTION INTRAVENOUS PRN
Status: DISCONTINUED | OUTPATIENT
Start: 2019-07-05 | End: 2019-07-06 | Stop reason: HOSPADM

## 2019-07-05 RX ORDER — DEXAMETHASONE SODIUM PHOSPHATE 10 MG/ML
10 INJECTION INTRAMUSCULAR; INTRAVENOUS ONCE
Status: COMPLETED | OUTPATIENT
Start: 2019-07-05 | End: 2019-07-05

## 2019-07-05 RX ORDER — SODIUM CHLORIDE 0.9 % (FLUSH) 0.9 %
5 SYRINGE (ML) INJECTION PRN
Status: CANCELLED | OUTPATIENT
Start: 2019-07-05

## 2019-07-05 RX ORDER — SODIUM CHLORIDE 9 MG/ML
INJECTION, SOLUTION INTRAVENOUS CONTINUOUS
Status: CANCELLED | OUTPATIENT
Start: 2019-07-05

## 2019-07-05 RX ORDER — SODIUM CHLORIDE 0.9 % (FLUSH) 0.9 %
10 SYRINGE (ML) INJECTION PRN
Status: DISCONTINUED | OUTPATIENT
Start: 2019-07-05 | End: 2019-07-06 | Stop reason: HOSPADM

## 2019-07-05 RX ORDER — SODIUM CHLORIDE 9 MG/ML
INJECTION, SOLUTION INTRAVENOUS ONCE
Status: COMPLETED | OUTPATIENT
Start: 2019-07-05 | End: 2019-07-05

## 2019-07-05 RX ORDER — 0.9 % SODIUM CHLORIDE 0.9 %
10 VIAL (ML) INJECTION ONCE
Status: CANCELLED | OUTPATIENT
Start: 2019-07-05

## 2019-07-05 RX ORDER — METHYLPREDNISOLONE SODIUM SUCCINATE 125 MG/2ML
125 INJECTION, POWDER, LYOPHILIZED, FOR SOLUTION INTRAMUSCULAR; INTRAVENOUS ONCE
Status: CANCELLED | OUTPATIENT
Start: 2019-07-05

## 2019-07-05 RX ORDER — DIPHENHYDRAMINE HYDROCHLORIDE 50 MG/ML
50 INJECTION INTRAMUSCULAR; INTRAVENOUS ONCE
Status: CANCELLED | OUTPATIENT
Start: 2019-07-05

## 2019-07-05 RX ORDER — MEPERIDINE HYDROCHLORIDE 50 MG/ML
12.5 INJECTION INTRAMUSCULAR; INTRAVENOUS; SUBCUTANEOUS ONCE
Status: CANCELLED | OUTPATIENT
Start: 2019-07-05

## 2019-07-05 RX ORDER — DIPHENHYDRAMINE HYDROCHLORIDE 50 MG/ML
50 INJECTION INTRAMUSCULAR; INTRAVENOUS ONCE
Status: COMPLETED | OUTPATIENT
Start: 2019-07-05 | End: 2019-07-05

## 2019-07-05 RX ORDER — ONDANSETRON 2 MG/ML
8 INJECTION INTRAMUSCULAR; INTRAVENOUS ONCE
Status: COMPLETED | OUTPATIENT
Start: 2019-07-05 | End: 2019-07-05

## 2019-07-05 RX ADMIN — FAMOTIDINE 20 MG: 10 INJECTION, SOLUTION INTRAVENOUS at 12:09

## 2019-07-05 RX ADMIN — Medication 500 UNITS: at 14:28

## 2019-07-05 RX ADMIN — Medication 10 ML: at 14:28

## 2019-07-05 RX ADMIN — SODIUM CHLORIDE: 9 INJECTION, SOLUTION INTRAVENOUS at 11:13

## 2019-07-05 RX ADMIN — DIPHENHYDRAMINE HYDROCHLORIDE 50 MG: 50 INJECTION, SOLUTION INTRAMUSCULAR; INTRAVENOUS at 12:04

## 2019-07-05 RX ADMIN — DEXAMETHASONE SODIUM PHOSPHATE 10 MG: 10 INJECTION INTRAMUSCULAR; INTRAVENOUS at 12:12

## 2019-07-05 RX ADMIN — ONDANSETRON HYDROCHLORIDE 8 MG: 2 INJECTION, SOLUTION INTRAMUSCULAR; INTRAVENOUS at 12:06

## 2019-07-05 RX ADMIN — PACLITAXEL 192 MG: 6 INJECTION, SOLUTION INTRAVENOUS at 12:47

## 2019-07-05 ASSESSMENT — PAIN DESCRIPTION - PAIN TYPE: TYPE: CHRONIC PAIN

## 2019-07-05 ASSESSMENT — PAIN SCALES - GENERAL: PAINLEVEL_OUTOF10: 6

## 2019-07-05 ASSESSMENT — PAIN DESCRIPTION - LOCATION: LOCATION: GENERALIZED

## 2019-07-05 NOTE — PROGRESS NOTES
1103:  Pt arrives ambulatory for RN draw and C5D1. Orders reviewed. VS obtained. Pt feels better since she had IV hydration this past Mon 7/1/19 and had seen Dr. Alen Parker. Toxicity assessment completed. 1113:  Pt's port accessed per policy as charted in LDA's, labs drawn and IVF's hung as per [facility] for med line. Lab results reviewed. Okay to proceed with today's treatment as planned. Pt pre-medicated as per MAR. Taxol hung and titrated as per MAR. Pt tolerated Taxol well without complaints. No adverse reactions noted. Pt's port flushed and heparinized as per MAR. Pt's port de-accessed as charted in LDA's. Appointment calendar given to the patient. RTC on 7/11/19. Discharged to home.

## 2019-07-08 ENCOUNTER — HOSPITAL ENCOUNTER (OUTPATIENT)
Facility: MEDICAL CENTER | Age: 59
End: 2019-07-08
Payer: COMMERCIAL

## 2019-07-10 RX ORDER — 0.9 % SODIUM CHLORIDE 0.9 %
10 VIAL (ML) INJECTION ONCE
Status: CANCELLED | OUTPATIENT
Start: 2019-07-11

## 2019-07-10 RX ORDER — SODIUM CHLORIDE 9 MG/ML
INJECTION, SOLUTION INTRAVENOUS ONCE
Status: CANCELLED | OUTPATIENT
Start: 2019-07-11

## 2019-07-10 RX ORDER — DIPHENHYDRAMINE HYDROCHLORIDE 50 MG/ML
50 INJECTION INTRAMUSCULAR; INTRAVENOUS ONCE
Status: CANCELLED | OUTPATIENT
Start: 2019-07-11

## 2019-07-10 RX ORDER — METHYLPREDNISOLONE SODIUM SUCCINATE 125 MG/2ML
125 INJECTION, POWDER, LYOPHILIZED, FOR SOLUTION INTRAMUSCULAR; INTRAVENOUS ONCE
Status: CANCELLED | OUTPATIENT
Start: 2019-07-11

## 2019-07-10 RX ORDER — MEPERIDINE HYDROCHLORIDE 50 MG/ML
12.5 INJECTION INTRAMUSCULAR; INTRAVENOUS; SUBCUTANEOUS ONCE
Status: CANCELLED | OUTPATIENT
Start: 2019-07-11

## 2019-07-10 RX ORDER — SODIUM CHLORIDE 0.9 % (FLUSH) 0.9 %
5 SYRINGE (ML) INJECTION PRN
Status: CANCELLED | OUTPATIENT
Start: 2019-07-11

## 2019-07-10 RX ORDER — ONDANSETRON 2 MG/ML
8 INJECTION INTRAMUSCULAR; INTRAVENOUS ONCE
Status: CANCELLED | OUTPATIENT
Start: 2019-07-11

## 2019-07-10 RX ORDER — SODIUM CHLORIDE 9 MG/ML
INJECTION, SOLUTION INTRAVENOUS CONTINUOUS
Status: CANCELLED | OUTPATIENT
Start: 2019-07-11

## 2019-07-10 RX ORDER — HEPARIN SODIUM (PORCINE) LOCK FLUSH IV SOLN 100 UNIT/ML 100 UNIT/ML
500 SOLUTION INTRAVENOUS PRN
Status: CANCELLED | OUTPATIENT
Start: 2019-07-11

## 2019-07-10 RX ORDER — SODIUM CHLORIDE 0.9 % (FLUSH) 0.9 %
10 SYRINGE (ML) INJECTION PRN
Status: CANCELLED | OUTPATIENT
Start: 2019-07-11

## 2019-07-11 ENCOUNTER — HOSPITAL ENCOUNTER (OUTPATIENT)
Dept: INFUSION THERAPY | Facility: MEDICAL CENTER | Age: 59
Discharge: HOME | End: 2019-07-11
Payer: COMMERCIAL

## 2019-07-11 VITALS
DIASTOLIC BLOOD PRESSURE: 68 MMHG | SYSTOLIC BLOOD PRESSURE: 112 MMHG | HEART RATE: 79 BPM | RESPIRATION RATE: 18 BRPM | TEMPERATURE: 98.3 F

## 2019-07-11 DIAGNOSIS — Z17.1 MALIGNANT NEOPLASM OF UPPER-OUTER QUADRANT OF LEFT BREAST IN FEMALE, ESTROGEN RECEPTOR NEGATIVE (HCC): Primary | ICD-10-CM

## 2019-07-11 DIAGNOSIS — C50.412 MALIGNANT NEOPLASM OF UPPER-OUTER QUADRANT OF LEFT BREAST IN FEMALE, ESTROGEN RECEPTOR NEGATIVE (HCC): Primary | ICD-10-CM

## 2019-07-11 LAB
ABSOLUTE EOS #: 0.14 K/UL (ref 0–0.44)
ABSOLUTE IMMATURE GRANULOCYTE: 0 K/UL (ref 0–0.3)
ABSOLUTE LYMPH #: 0.99 K/UL (ref 1.1–3.7)
ABSOLUTE MONO #: 0.52 K/UL (ref 0.1–1.2)
ALBUMIN SERPL-MCNC: 3.8 G/DL (ref 3.5–5.2)
ALBUMIN/GLOBULIN RATIO: ABNORMAL (ref 1–2.5)
ALP BLD-CCNC: 86 U/L (ref 35–104)
ALT SERPL-CCNC: 13 U/L (ref 5–33)
ANION GAP SERPL CALCULATED.3IONS-SCNC: 10 MMOL/L (ref 9–17)
AST SERPL-CCNC: 11 U/L
BASOPHILS # BLD: 1 % (ref 0–2)
BASOPHILS ABSOLUTE: 0.05 K/UL (ref 0–0.2)
BILIRUB SERPL-MCNC: 0.22 MG/DL (ref 0.3–1.2)
BUN BLDV-MCNC: 17 MG/DL (ref 6–20)
BUN/CREAT BLD: 20 (ref 9–20)
CALCIUM SERPL-MCNC: 9.3 MG/DL (ref 8.6–10.4)
CHLORIDE BLD-SCNC: 104 MMOL/L (ref 98–107)
CO2: 25 MMOL/L (ref 20–31)
CREAT SERPL-MCNC: 0.86 MG/DL (ref 0.5–0.9)
DIFFERENTIAL TYPE: ABNORMAL
EOSINOPHILS RELATIVE PERCENT: 3 % (ref 1–4)
GFR AFRICAN AMERICAN: >60 ML/MIN
GFR NON-AFRICAN AMERICAN: >60 ML/MIN
GFR SERPL CREATININE-BSD FRML MDRD: ABNORMAL ML/MIN/{1.73_M2}
GFR SERPL CREATININE-BSD FRML MDRD: ABNORMAL ML/MIN/{1.73_M2}
GLUCOSE BLD-MCNC: 112 MG/DL (ref 70–99)
HCT VFR BLD CALC: 30.3 % (ref 36.3–47.1)
HEMOGLOBIN: 9.4 G/DL (ref 11.9–15.1)
IMMATURE GRANULOCYTES: 0 %
LYMPHOCYTES # BLD: 21 % (ref 24–43)
MCH RBC QN AUTO: 26.9 PG (ref 25.2–33.5)
MCHC RBC AUTO-ENTMCNC: 31 G/DL (ref 28.4–34.8)
MCV RBC AUTO: 86.8 FL (ref 82.6–102.9)
MONOCYTES # BLD: 11 % (ref 3–12)
NRBC AUTOMATED: 0 PER 100 WBC
PDW BLD-RTO: ABNORMAL % (ref 11.8–14.4)
PLATELET # BLD: 245 K/UL (ref 138–453)
PLATELET ESTIMATE: ABNORMAL
PMV BLD AUTO: 9.3 FL (ref 8.1–13.5)
POTASSIUM SERPL-SCNC: 4 MMOL/L (ref 3.7–5.3)
RBC # BLD: 3.49 M/UL (ref 3.95–5.11)
RBC # BLD: ABNORMAL 10*6/UL
SEG NEUTROPHILS: 64 % (ref 36–65)
SEGMENTED NEUTROPHILS ABSOLUTE COUNT: 3 K/UL (ref 1.5–8.1)
SODIUM BLD-SCNC: 139 MMOL/L (ref 135–144)
TOTAL PROTEIN: 6.5 G/DL (ref 6.4–8.3)
WBC # BLD: 4.7 K/UL (ref 3.5–11.3)
WBC # BLD: ABNORMAL 10*3/UL

## 2019-07-11 PROCEDURE — 96413 CHEMO IV INFUSION 1 HR: CPT

## 2019-07-11 PROCEDURE — 6360000002 HC RX W HCPCS: Performed by: INTERNAL MEDICINE

## 2019-07-11 PROCEDURE — 85025 COMPLETE CBC W/AUTO DIFF WBC: CPT

## 2019-07-11 PROCEDURE — 2580000003 HC RX 258: Performed by: INTERNAL MEDICINE

## 2019-07-11 PROCEDURE — 2500000003 HC RX 250 WO HCPCS: Performed by: INTERNAL MEDICINE

## 2019-07-11 PROCEDURE — 96375 TX/PRO/DX INJ NEW DRUG ADDON: CPT

## 2019-07-11 PROCEDURE — 36591 DRAW BLOOD OFF VENOUS DEVICE: CPT

## 2019-07-11 PROCEDURE — 80053 COMPREHEN METABOLIC PANEL: CPT

## 2019-07-11 RX ORDER — ONDANSETRON 2 MG/ML
8 INJECTION INTRAMUSCULAR; INTRAVENOUS ONCE
Status: COMPLETED | OUTPATIENT
Start: 2019-07-11 | End: 2019-07-11

## 2019-07-11 RX ORDER — SODIUM CHLORIDE 9 MG/ML
INJECTION, SOLUTION INTRAVENOUS ONCE
Status: COMPLETED | OUTPATIENT
Start: 2019-07-11 | End: 2019-07-11

## 2019-07-11 RX ORDER — DIPHENHYDRAMINE HYDROCHLORIDE 50 MG/ML
50 INJECTION INTRAMUSCULAR; INTRAVENOUS ONCE
Status: COMPLETED | OUTPATIENT
Start: 2019-07-11 | End: 2019-07-11

## 2019-07-11 RX ORDER — 0.9 % SODIUM CHLORIDE 0.9 %
10 VIAL (ML) INJECTION ONCE
Status: COMPLETED | OUTPATIENT
Start: 2019-07-11 | End: 2019-07-11

## 2019-07-11 RX ORDER — SODIUM CHLORIDE 0.9 % (FLUSH) 0.9 %
10 SYRINGE (ML) INJECTION PRN
Status: DISCONTINUED | OUTPATIENT
Start: 2019-07-11 | End: 2019-07-12 | Stop reason: HOSPADM

## 2019-07-11 RX ORDER — HEPARIN SODIUM (PORCINE) LOCK FLUSH IV SOLN 100 UNIT/ML 100 UNIT/ML
500 SOLUTION INTRAVENOUS PRN
Status: DISCONTINUED | OUTPATIENT
Start: 2019-07-11 | End: 2019-07-12 | Stop reason: HOSPADM

## 2019-07-11 RX ORDER — DEXAMETHASONE SODIUM PHOSPHATE 10 MG/ML
10 INJECTION INTRAMUSCULAR; INTRAVENOUS ONCE
Status: COMPLETED | OUTPATIENT
Start: 2019-07-11 | End: 2019-07-11

## 2019-07-11 RX ADMIN — ONDANSETRON HYDROCHLORIDE 8 MG: 2 INJECTION, SOLUTION INTRAMUSCULAR; INTRAVENOUS at 12:55

## 2019-07-11 RX ADMIN — Medication 10 ML: at 15:07

## 2019-07-11 RX ADMIN — DEXAMETHASONE SODIUM PHOSPHATE 10 MG: 10 INJECTION INTRAMUSCULAR; INTRAVENOUS at 13:00

## 2019-07-11 RX ADMIN — DIPHENHYDRAMINE HYDROCHLORIDE 50 MG: 50 INJECTION, SOLUTION INTRAMUSCULAR; INTRAVENOUS at 13:10

## 2019-07-11 RX ADMIN — SODIUM CHLORIDE: 9 INJECTION, SOLUTION INTRAVENOUS at 12:54

## 2019-07-11 RX ADMIN — PACLITAXEL 100 MG: 6 INJECTION, SOLUTION INTRAVENOUS at 13:46

## 2019-07-11 RX ADMIN — Medication 500 UNITS: at 15:07

## 2019-07-11 RX ADMIN — Medication 10 ML: at 11:20

## 2019-07-11 RX ADMIN — Medication 10 ML: at 13:09

## 2019-07-11 RX ADMIN — FAMOTIDINE 20 MG: 10 INJECTION, SOLUTION INTRAVENOUS at 13:05

## 2019-07-11 ASSESSMENT — PAIN SCALES - GENERAL: PAINLEVEL_OUTOF10: 7

## 2019-07-11 ASSESSMENT — PAIN DESCRIPTION - LOCATION: LOCATION: OTHER (COMMENT)

## 2019-07-11 ASSESSMENT — PAIN DESCRIPTION - PAIN TYPE: TYPE: CHRONIC PAIN

## 2019-07-11 NOTE — PROGRESS NOTES
Pt arrives per ambulatory per self and labs and orders reviewed and pt tolerated premeds and chemo well. NS flushing line before and between and after meds and chemo. NO reactions or complaints and blood return present throughout infusions. Taxol started at 100 ml /hr for 12 min and then to max rate. Pt discharged per ambulatory with AVS from last md visit with next appts.

## 2019-07-12 ENCOUNTER — HOSPITAL ENCOUNTER (OUTPATIENT)
Facility: MEDICAL CENTER | Age: 59
End: 2019-07-12
Payer: COMMERCIAL

## 2019-07-18 ENCOUNTER — HOSPITAL ENCOUNTER (OUTPATIENT)
Facility: MEDICAL CENTER | Age: 59
End: 2019-07-18
Payer: COMMERCIAL

## 2019-07-18 ENCOUNTER — OFFICE VISIT (OUTPATIENT)
Dept: ONCOLOGY | Age: 59
End: 2019-07-18
Payer: COMMERCIAL

## 2019-07-18 ENCOUNTER — TELEPHONE (OUTPATIENT)
Dept: ONCOLOGY | Age: 59
End: 2019-07-18

## 2019-07-18 ENCOUNTER — HOSPITAL ENCOUNTER (OUTPATIENT)
Dept: INFUSION THERAPY | Facility: MEDICAL CENTER | Age: 59
Discharge: HOME | End: 2019-07-18
Payer: COMMERCIAL

## 2019-07-18 VITALS
HEART RATE: 66 BPM | DIASTOLIC BLOOD PRESSURE: 79 MMHG | SYSTOLIC BLOOD PRESSURE: 106 MMHG | RESPIRATION RATE: 19 BRPM | BODY MASS INDEX: 40.58 KG/M2 | TEMPERATURE: 98.6 F | WEIGHT: 259.1 LBS

## 2019-07-18 DIAGNOSIS — G62.9 NEUROPATHY: ICD-10-CM

## 2019-07-18 DIAGNOSIS — C50.412 MALIGNANT NEOPLASM OF UPPER-OUTER QUADRANT OF LEFT BREAST IN FEMALE, ESTROGEN RECEPTOR NEGATIVE (HCC): Primary | ICD-10-CM

## 2019-07-18 DIAGNOSIS — Z17.1 MALIGNANT NEOPLASM OF UPPER-OUTER QUADRANT OF LEFT BREAST IN FEMALE, ESTROGEN RECEPTOR NEGATIVE (HCC): Primary | ICD-10-CM

## 2019-07-18 LAB
ABSOLUTE EOS #: 0.06 K/UL (ref 0–0.4)
ABSOLUTE IMMATURE GRANULOCYTE: 0.06 K/UL (ref 0–0.3)
ABSOLUTE LYMPH #: 1.02 K/UL (ref 1–4.8)
ABSOLUTE MONO #: 0.66 K/UL (ref 0.2–0.8)
ALBUMIN SERPL-MCNC: 3.8 G/DL (ref 3.5–5.2)
ALBUMIN/GLOBULIN RATIO: ABNORMAL (ref 1–2.5)
ALP BLD-CCNC: 85 U/L (ref 35–104)
ALT SERPL-CCNC: 16 U/L (ref 5–33)
ANION GAP SERPL CALCULATED.3IONS-SCNC: 11 MMOL/L (ref 9–17)
AST SERPL-CCNC: 14 U/L
BASOPHILS # BLD: 0 %
BASOPHILS ABSOLUTE: 0 K/UL (ref 0–0.2)
BILIRUB SERPL-MCNC: 0.17 MG/DL (ref 0.3–1.2)
BUN BLDV-MCNC: 10 MG/DL (ref 6–20)
BUN/CREAT BLD: 12 (ref 9–20)
CALCIUM SERPL-MCNC: 9.5 MG/DL (ref 8.6–10.4)
CHLORIDE BLD-SCNC: 104 MMOL/L (ref 98–107)
CO2: 24 MMOL/L (ref 20–31)
CREAT SERPL-MCNC: 0.82 MG/DL (ref 0.5–0.9)
DIFFERENTIAL TYPE: ABNORMAL
EOSINOPHILS RELATIVE PERCENT: 1 % (ref 1–4)
GFR AFRICAN AMERICAN: >60 ML/MIN
GFR NON-AFRICAN AMERICAN: >60 ML/MIN
GFR SERPL CREATININE-BSD FRML MDRD: ABNORMAL ML/MIN/{1.73_M2}
GFR SERPL CREATININE-BSD FRML MDRD: ABNORMAL ML/MIN/{1.73_M2}
GLUCOSE BLD-MCNC: 104 MG/DL (ref 70–99)
HCT VFR BLD CALC: 30.9 % (ref 36.3–47.1)
HEMOGLOBIN: 9.6 G/DL (ref 11.9–15.1)
IMMATURE GRANULOCYTES: 1 %
LYMPHOCYTES # BLD: 17 % (ref 24–44)
MCH RBC QN AUTO: 27.7 PG (ref 25.2–33.5)
MCHC RBC AUTO-ENTMCNC: 31.1 G/DL (ref 28.4–34.8)
MCV RBC AUTO: 89 FL (ref 82.6–102.9)
MONOCYTES # BLD: 11 % (ref 1–7)
MORPHOLOGY: ABNORMAL
MORPHOLOGY: ABNORMAL
NRBC AUTOMATED: 0.3 PER 100 WBC
PDW BLD-RTO: 26.9 % (ref 11.8–14.4)
PLATELET # BLD: 250 K/UL (ref 138–453)
PLATELET ESTIMATE: ABNORMAL
PMV BLD AUTO: 9.6 FL (ref 8.1–13.5)
POTASSIUM SERPL-SCNC: 4 MMOL/L (ref 3.7–5.3)
RBC # BLD: 3.47 M/UL (ref 3.95–5.11)
RBC # BLD: ABNORMAL 10*6/UL
SEG NEUTROPHILS: 70 % (ref 36–66)
SEGMENTED NEUTROPHILS ABSOLUTE COUNT: 4.2 K/UL (ref 1.8–7.7)
SODIUM BLD-SCNC: 139 MMOL/L (ref 135–144)
TOTAL PROTEIN: 6.3 G/DL (ref 6.4–8.3)
WBC # BLD: 6 K/UL (ref 3.5–11.3)
WBC # BLD: ABNORMAL 10*3/UL

## 2019-07-18 PROCEDURE — 36591 DRAW BLOOD OFF VENOUS DEVICE: CPT

## 2019-07-18 PROCEDURE — 99211 OFF/OP EST MAY X REQ PHY/QHP: CPT

## 2019-07-18 PROCEDURE — 99214 OFFICE O/P EST MOD 30 MIN: CPT | Performed by: INTERNAL MEDICINE

## 2019-07-18 PROCEDURE — 96375 TX/PRO/DX INJ NEW DRUG ADDON: CPT

## 2019-07-18 PROCEDURE — G8427 DOCREV CUR MEDS BY ELIG CLIN: HCPCS | Performed by: INTERNAL MEDICINE

## 2019-07-18 PROCEDURE — 3017F COLORECTAL CA SCREEN DOC REV: CPT | Performed by: INTERNAL MEDICINE

## 2019-07-18 PROCEDURE — 80053 COMPREHEN METABOLIC PANEL: CPT

## 2019-07-18 PROCEDURE — 2500000003 HC RX 250 WO HCPCS: Performed by: INTERNAL MEDICINE

## 2019-07-18 PROCEDURE — G8417 CALC BMI ABV UP PARAM F/U: HCPCS | Performed by: INTERNAL MEDICINE

## 2019-07-18 PROCEDURE — 96413 CHEMO IV INFUSION 1 HR: CPT

## 2019-07-18 PROCEDURE — 96417 CHEMO IV INFUS EACH ADDL SEQ: CPT

## 2019-07-18 PROCEDURE — 85025 COMPLETE CBC W/AUTO DIFF WBC: CPT

## 2019-07-18 PROCEDURE — 4004F PT TOBACCO SCREEN RCVD TLK: CPT | Performed by: INTERNAL MEDICINE

## 2019-07-18 PROCEDURE — 2580000003 HC RX 258: Performed by: INTERNAL MEDICINE

## 2019-07-18 PROCEDURE — 6360000002 HC RX W HCPCS: Performed by: INTERNAL MEDICINE

## 2019-07-18 RX ORDER — DIPHENHYDRAMINE HYDROCHLORIDE 50 MG/ML
50 INJECTION INTRAMUSCULAR; INTRAVENOUS ONCE
Status: COMPLETED | OUTPATIENT
Start: 2019-07-18 | End: 2019-07-18

## 2019-07-18 RX ORDER — OXYCODONE HYDROCHLORIDE AND ACETAMINOPHEN 5; 325 MG/1; MG/1
1 TABLET ORAL EVERY 6 HOURS PRN
Qty: 60 TABLET | Refills: 0 | Status: SHIPPED | OUTPATIENT
Start: 2019-07-18 | End: 2019-08-02

## 2019-07-18 RX ORDER — LIDOCAINE AND PRILOCAINE 25; 25 MG/G; MG/G
CREAM TOPICAL
Qty: 1 TUBE | Refills: 3 | Status: SHIPPED | OUTPATIENT
Start: 2019-07-18 | End: 2020-03-20

## 2019-07-18 RX ORDER — 0.9 % SODIUM CHLORIDE 0.9 %
10 VIAL (ML) INJECTION ONCE
Status: COMPLETED | OUTPATIENT
Start: 2019-07-18 | End: 2019-07-18

## 2019-07-18 RX ORDER — HEPARIN SODIUM (PORCINE) LOCK FLUSH IV SOLN 100 UNIT/ML 100 UNIT/ML
500 SOLUTION INTRAVENOUS PRN
Status: DISCONTINUED | OUTPATIENT
Start: 2019-07-18 | End: 2019-07-19 | Stop reason: HOSPADM

## 2019-07-18 RX ORDER — ONDANSETRON 2 MG/ML
8 INJECTION INTRAMUSCULAR; INTRAVENOUS ONCE
Status: CANCELLED | OUTPATIENT
Start: 2019-07-18

## 2019-07-18 RX ORDER — 0.9 % SODIUM CHLORIDE 0.9 %
10 VIAL (ML) INJECTION ONCE
Status: CANCELLED | OUTPATIENT
Start: 2019-07-18

## 2019-07-18 RX ORDER — DEXAMETHASONE SODIUM PHOSPHATE 10 MG/ML
10 INJECTION INTRAMUSCULAR; INTRAVENOUS ONCE
Status: COMPLETED | OUTPATIENT
Start: 2019-07-18 | End: 2019-07-18

## 2019-07-18 RX ORDER — SODIUM CHLORIDE 9 MG/ML
INJECTION, SOLUTION INTRAVENOUS ONCE
Status: COMPLETED | OUTPATIENT
Start: 2019-07-18 | End: 2019-07-18

## 2019-07-18 RX ORDER — MEPERIDINE HYDROCHLORIDE 50 MG/ML
12.5 INJECTION INTRAMUSCULAR; INTRAVENOUS; SUBCUTANEOUS ONCE
Status: CANCELLED | OUTPATIENT
Start: 2019-07-18

## 2019-07-18 RX ORDER — SODIUM CHLORIDE 0.9 % (FLUSH) 0.9 %
10 SYRINGE (ML) INJECTION PRN
Status: CANCELLED | OUTPATIENT
Start: 2019-07-18

## 2019-07-18 RX ORDER — SODIUM CHLORIDE 0.9 % (FLUSH) 0.9 %
10 SYRINGE (ML) INJECTION PRN
Status: DISCONTINUED | OUTPATIENT
Start: 2019-07-18 | End: 2019-07-19 | Stop reason: HOSPADM

## 2019-07-18 RX ORDER — METHYLPREDNISOLONE SODIUM SUCCINATE 125 MG/2ML
125 INJECTION, POWDER, LYOPHILIZED, FOR SOLUTION INTRAMUSCULAR; INTRAVENOUS ONCE
Status: CANCELLED | OUTPATIENT
Start: 2019-07-18

## 2019-07-18 RX ORDER — GABAPENTIN 300 MG/1
300 CAPSULE ORAL 3 TIMES DAILY
Qty: 90 CAPSULE | Refills: 0 | Status: SHIPPED | OUTPATIENT
Start: 2019-07-18 | End: 2019-08-15 | Stop reason: SDUPTHER

## 2019-07-18 RX ORDER — DIPHENHYDRAMINE HYDROCHLORIDE 50 MG/ML
50 INJECTION INTRAMUSCULAR; INTRAVENOUS ONCE
Status: CANCELLED | OUTPATIENT
Start: 2019-07-18

## 2019-07-18 RX ORDER — ONDANSETRON 2 MG/ML
8 INJECTION INTRAMUSCULAR; INTRAVENOUS ONCE
Status: COMPLETED | OUTPATIENT
Start: 2019-07-18 | End: 2019-07-18

## 2019-07-18 RX ORDER — HEPARIN SODIUM (PORCINE) LOCK FLUSH IV SOLN 100 UNIT/ML 100 UNIT/ML
500 SOLUTION INTRAVENOUS PRN
Status: CANCELLED | OUTPATIENT
Start: 2019-07-18

## 2019-07-18 RX ORDER — SODIUM CHLORIDE 9 MG/ML
INJECTION, SOLUTION INTRAVENOUS CONTINUOUS
Status: CANCELLED | OUTPATIENT
Start: 2019-07-18

## 2019-07-18 RX ORDER — SODIUM CHLORIDE 0.9 % (FLUSH) 0.9 %
5 SYRINGE (ML) INJECTION PRN
Status: CANCELLED | OUTPATIENT
Start: 2019-07-18

## 2019-07-18 RX ORDER — SODIUM CHLORIDE 9 MG/ML
INJECTION, SOLUTION INTRAVENOUS ONCE
Status: CANCELLED | OUTPATIENT
Start: 2019-07-18

## 2019-07-18 RX ORDER — FUROSEMIDE 20 MG/1
20 TABLET ORAL DAILY PRN
Qty: 30 TABLET | Refills: 1 | Status: SHIPPED | OUTPATIENT
Start: 2019-07-18 | End: 2019-08-15 | Stop reason: SDUPTHER

## 2019-07-18 RX ADMIN — Medication 500 UNITS: at 14:29

## 2019-07-18 RX ADMIN — DIPHENHYDRAMINE HYDROCHLORIDE 50 MG: 50 INJECTION, SOLUTION INTRAMUSCULAR; INTRAVENOUS at 12:31

## 2019-07-18 RX ADMIN — SODIUM CHLORIDE: 9 INJECTION, SOLUTION INTRAVENOUS at 12:29

## 2019-07-18 RX ADMIN — PACLITAXEL 192 MG: 6 INJECTION, SOLUTION INTRAVENOUS at 12:59

## 2019-07-18 RX ADMIN — Medication 10 ML: at 14:29

## 2019-07-18 RX ADMIN — ONDANSETRON 8 MG: 2 INJECTION INTRAMUSCULAR; INTRAVENOUS at 12:29

## 2019-07-18 RX ADMIN — DEXAMETHASONE SODIUM PHOSPHATE 10 MG: 10 INJECTION INTRAMUSCULAR; INTRAVENOUS at 12:31

## 2019-07-18 RX ADMIN — FAMOTIDINE 20 MG: 10 INJECTION INTRAVENOUS at 12:31

## 2019-07-18 NOTE — PROGRESS NOTES
[facility] arrives ambulatory alone  Order for C7 D1 reviewed  C/o hacky cough with clear sputum production  Denies fever  Rt chest port accessed per protocol with #20 G 3/4 L Rosas needle  Good blood return noted and blood work obtained and sent to lab  Lab results reviewed  Dr Branden Ortega vs and examined  Order to proceed with chemotherapy  See MAR for pre medications  Taxol initiated slowly for first 15 minutes and no reaction.   Rate increased to infuse over one hour  Tolerated well  Iv line flushed for thirty minutes post Taxol and rosas needle flushed and removed with needle intact  Band aid applied  Discharged per ambulatory

## 2019-07-18 NOTE — PROGRESS NOTES
presents to the clinic for a follow-up visit and for toxicity check and to review results of her blood work-up. Patient has been tolerating treatment without any unexpected or severe side effects. Neuropathy is stable. Continues to complain of cough but denies fever or discolorized phlegm. Leg swelling is stable. Does complain of fatigue however she is able to carry out activities of daily life without any assistance. She is trying to keep her self hydrated in the warm weather. Nausea is controlled. During this visit patient's allergy, social, medical, surgical history and medications were reviewed and updated.       Past Medical History:   Past Medical History:   Diagnosis Date    Arthritis     GENERALIZED ALL OVER RHEUMATOID AND OSTEO    Cancer ([address] 1992    CERVICAL, UTERINE    Cancer of adrenal cortex ([facility] [address] 01/2019    LEFT BREAST    Chronic back pain     scioliosis,  02/2019 WEARING A BACK BRACE    Degenerative disorder of bone     ALL OVER    Depression     Difficult intravenous access     HANDS AR BEST SITE    GERD (gastroesophageal reflux disease)     Hyperlipidemia     HX OF NO MEDS IN YEARS    Hypertension 2007    Pain management clinic    Insomnia     Obesity     BMI -  43    Sleep apnea 2016    NO LONGER USES MACHINE    Snores     has been told by family that she stops breathing    Use of cane as ambulatory aid     Wears glasses     READING    Wears glasses     \"CHEATERS\"       Past Surgical History:     Past Surgical History:   Procedure Laterality Date    BREAST SURGERY Left 1990    CYST    BUNIONECTOMY Bilateral     CERVIX SURGERY  1992    COLONOSCOPY  10/13/2017    FOOT SURGERY Left 2014    PINS IN ALL TOES    HAMMER TOE SURGERY Bilateral     HYSTERECTOMY  1992    UTERUS, CERVIX     MASTECTOMY Bilateral 2/12/2019    TOTAL MASTECTOMY performed by Sherri Wilcox DO at [place] [address] Bilateral 02/12/2019    [facility],  [address] [place] [address] LYMPH NODE BIOPSY, FROZEN SECTION     CO COLSC FLX W/REMOVAL LESION BY HOT BX FORCEPS N/A 10/13/2017    COLONOSCOPY WITH BIOPSY AND POLYPECTOMY performed by Debbi Prader Canos IV,  at [address]     TUNNELED VENOUS PORT PLACEMENT  03/27/2019    X-RAY FOOT 3+VW      both       Patient Family Social History:     Social History     Socioeconomic History    Marital status: Single     Spouse name: Not on file    Number of children: Not on file    Years of education: Not on file    Highest education level: Not on file   Occupational History    Not on file   Social Needs    Financial resource strain: Not on file    Food insecurity:     Worry: Not on file     Inability: Not on file    Transportation needs:     Medical: Not on file     Non-medical: Not on file   Tobacco Use    Smoking status: Current Some Day Smoker     Packs/day: 0.00     Years: 41.00     Pack years: 0.00     Types: Cigarettes    Smokeless tobacco: Never Used    Tobacco comment: STATES SMOKES A PACK A WEEK   Substance and Sexual Activity    Alcohol use: No    Drug use: Yes     Frequency: 7.0 times per week     Types: Marijuana     Comment: LAST USE 02/10/2018, AND PAIN PILLS     Sexual activity: Not Currently   Lifestyle    Physical activity:     Days per week: Not on file     Minutes per session: Not on file    Stress: Not on file   Relationships    Social connections:     Talks on phone: Not on file     Gets together: Not on file     Attends [employer] service: Not on file     Active member of club or organization: Not on file     Attends meetings of clubs or organizations: Not on file     Relationship status: Not on file    Intimate partner violence:     Fear of current or ex partner: Not on file     Emotionally abused: Not on file     Physically abused: Not on file     Forced sexual activity: Not on file   Other Topics Concern    Not on file   Social History Narrative    Not on file      Family History Musculoskeletal: negative for myalgias, pain, joint swelling; +arhtralgias and bone pain   Neurological: negative for headaches, dizziness, seizures, weakness, numbness; neuropathy is stable        Physical Exam:    Vitals: /79 (Site: Right Lower Arm, Position: Sitting, Cuff Size: Medium Adult)   Pulse 66   Temp 98.6 °F (37 °C) (Oral)   Resp 19   Wt 259 lb 1.6 oz (117.5 kg)   LMP 02/11/1992   BMI 40.58 kg/m²   General appearance - well appearing, no in pain or distress  Mental status - AAO X3  Eyes - pupils equal and reactive, extraocular eye movements intact  Mouth - mucous membranes moist, pharynx normal without lesions  Neck - supple, no significant adenopathy  Lymphatics - no palpable lymphadenopathy, no hepatosplenomegaly  Chest - clear to auscultation, no wheezes, rales or rhonchi, symmetric air entry  Heart - normal rate, regular rhythm, normal S1, S2, no murmurs  Abdomen - soft, nontender, nondistended, no masses or organomegaly  Neurological - alert, oriented, normal speech, no focal findings or movement disorder noted; +neuropathy - stable  Extremities - positive pitting edema - resolved  Skin - normal coloration and turgor, no rashes, no suspicious skin lesions noted; +nail changes      DATA:    Results for orders placed or performed during the hospital encounter of 02/12/19   Anaerobic and Aerobic Culture   Result Value Ref Range    Specimen Description . AXILLA SWAB     Special Requests SKIN ABSCESS LEFT AXILLA     Direct Exam NO NEUTROPHILS SEEN     Direct Exam NO BACTERIA SEEN     Culture NORMAL YOANA     Culture ANAEROCOCCUS PREVOTII  LIGHT GROWTH   (A)    Hemoglobin and hematocrit, blood   Result Value Ref Range    POC Hemoglobin 14.8 12.0 - 16.0 g/dL    POC Hematocrit 43 36 - 46 %   SODIUM (POC)   Result Value Ref Range    POC Sodium 143 138 - 146 mmol/L   POTASSIUM (POC)   Result Value Ref Range    POC Potassium 4.3 3.5 - 4.5 mmol/L   CHLORIDE (POC)   Result Value Ref Range    POC

## 2019-07-25 ENCOUNTER — HOSPITAL ENCOUNTER (OUTPATIENT)
Dept: INFUSION THERAPY | Facility: MEDICAL CENTER | Age: 59
Discharge: HOME | End: 2019-07-25
Payer: COMMERCIAL

## 2019-07-25 VITALS
DIASTOLIC BLOOD PRESSURE: 74 MMHG | RESPIRATION RATE: 18 BRPM | TEMPERATURE: 98.3 F | HEART RATE: 68 BPM | SYSTOLIC BLOOD PRESSURE: 120 MMHG

## 2019-07-25 DIAGNOSIS — C50.412 MALIGNANT NEOPLASM OF UPPER-OUTER QUADRANT OF LEFT BREAST IN FEMALE, ESTROGEN RECEPTOR NEGATIVE (HCC): Primary | ICD-10-CM

## 2019-07-25 DIAGNOSIS — Z17.1 MALIGNANT NEOPLASM OF UPPER-OUTER QUADRANT OF LEFT BREAST IN FEMALE, ESTROGEN RECEPTOR NEGATIVE (HCC): Primary | ICD-10-CM

## 2019-07-25 LAB
ABSOLUTE EOS #: 0.06 K/UL (ref 0–0.44)
ABSOLUTE IMMATURE GRANULOCYTE: 0.06 K/UL (ref 0–0.3)
ABSOLUTE LYMPH #: 1.2 K/UL (ref 1.1–3.7)
ABSOLUTE MONO #: 0.51 K/UL (ref 0.1–1.2)
ALBUMIN SERPL-MCNC: 3.9 G/DL (ref 3.5–5.2)
ALBUMIN/GLOBULIN RATIO: ABNORMAL (ref 1–2.5)
ALP BLD-CCNC: 85 U/L (ref 35–104)
ALT SERPL-CCNC: 15 U/L (ref 5–33)
ANION GAP SERPL CALCULATED.3IONS-SCNC: 13 MMOL/L (ref 9–17)
AST SERPL-CCNC: 12 U/L
BASOPHILS # BLD: 1 % (ref 0–2)
BASOPHILS ABSOLUTE: 0.06 K/UL (ref 0–0.2)
BILIRUB SERPL-MCNC: <0.1 MG/DL (ref 0.3–1.2)
BUN BLDV-MCNC: 10 MG/DL (ref 6–20)
BUN/CREAT BLD: 15 (ref 9–20)
CALCIUM SERPL-MCNC: 9.3 MG/DL (ref 8.6–10.4)
CHLORIDE BLD-SCNC: 103 MMOL/L (ref 98–107)
CO2: 23 MMOL/L (ref 20–31)
CREAT SERPL-MCNC: 0.66 MG/DL (ref 0.5–0.9)
DIFFERENTIAL TYPE: ABNORMAL
EOSINOPHILS RELATIVE PERCENT: 1 % (ref 1–4)
GFR AFRICAN AMERICAN: >60 ML/MIN
GFR NON-AFRICAN AMERICAN: >60 ML/MIN
GFR SERPL CREATININE-BSD FRML MDRD: ABNORMAL ML/MIN/{1.73_M2}
GFR SERPL CREATININE-BSD FRML MDRD: ABNORMAL ML/MIN/{1.73_M2}
GLUCOSE BLD-MCNC: 123 MG/DL (ref 70–99)
HCT VFR BLD CALC: 30.8 % (ref 36.3–47.1)
HEMOGLOBIN: 9.4 G/DL (ref 11.9–15.1)
IMMATURE GRANULOCYTES: 1 %
LYMPHOCYTES # BLD: 21 % (ref 24–43)
MCH RBC QN AUTO: 28.2 PG (ref 25.2–33.5)
MCHC RBC AUTO-ENTMCNC: 30.5 G/DL (ref 28.4–34.8)
MCV RBC AUTO: 92.5 FL (ref 82.6–102.9)
MONOCYTES # BLD: 9 % (ref 3–12)
MORPHOLOGY: ABNORMAL
NRBC AUTOMATED: 0 PER 100 WBC
PDW BLD-RTO: 24.2 % (ref 11.8–14.4)
PLATELET # BLD: 242 K/UL (ref 138–453)
PLATELET ESTIMATE: ABNORMAL
PMV BLD AUTO: 9.3 FL (ref 8.1–13.5)
POTASSIUM SERPL-SCNC: 4 MMOL/L (ref 3.7–5.3)
RBC # BLD: 3.33 M/UL (ref 3.95–5.11)
RBC # BLD: ABNORMAL 10*6/UL
SEG NEUTROPHILS: 67 % (ref 36–65)
SEGMENTED NEUTROPHILS ABSOLUTE COUNT: 3.81 K/UL (ref 1.5–8.1)
SODIUM BLD-SCNC: 139 MMOL/L (ref 135–144)
TOTAL PROTEIN: 6.3 G/DL (ref 6.4–8.3)
WBC # BLD: 5.7 K/UL (ref 3.5–11.3)
WBC # BLD: ABNORMAL 10*3/UL

## 2019-07-25 PROCEDURE — 2500000003 HC RX 250 WO HCPCS: Performed by: INTERNAL MEDICINE

## 2019-07-25 PROCEDURE — 2580000003 HC RX 258: Performed by: INTERNAL MEDICINE

## 2019-07-25 PROCEDURE — 96413 CHEMO IV INFUSION 1 HR: CPT

## 2019-07-25 PROCEDURE — 6360000002 HC RX W HCPCS: Performed by: INTERNAL MEDICINE

## 2019-07-25 PROCEDURE — 36591 DRAW BLOOD OFF VENOUS DEVICE: CPT

## 2019-07-25 PROCEDURE — 96375 TX/PRO/DX INJ NEW DRUG ADDON: CPT

## 2019-07-25 PROCEDURE — 80053 COMPREHEN METABOLIC PANEL: CPT

## 2019-07-25 PROCEDURE — 85025 COMPLETE CBC W/AUTO DIFF WBC: CPT

## 2019-07-25 RX ORDER — SODIUM CHLORIDE 0.9 % (FLUSH) 0.9 %
10 SYRINGE (ML) INJECTION PRN
Status: DISCONTINUED | OUTPATIENT
Start: 2019-07-25 | End: 2019-07-26 | Stop reason: HOSPADM

## 2019-07-25 RX ORDER — MEPERIDINE HYDROCHLORIDE 50 MG/ML
12.5 INJECTION INTRAMUSCULAR; INTRAVENOUS; SUBCUTANEOUS ONCE
Status: CANCELLED | OUTPATIENT
Start: 2019-08-01

## 2019-07-25 RX ORDER — SODIUM CHLORIDE 9 MG/ML
INJECTION, SOLUTION INTRAVENOUS ONCE
Status: CANCELLED | OUTPATIENT
Start: 2019-08-01

## 2019-07-25 RX ORDER — SODIUM CHLORIDE 0.9 % (FLUSH) 0.9 %
5 SYRINGE (ML) INJECTION PRN
Status: CANCELLED | OUTPATIENT
Start: 2019-07-25

## 2019-07-25 RX ORDER — DIPHENHYDRAMINE HYDROCHLORIDE 50 MG/ML
50 INJECTION INTRAMUSCULAR; INTRAVENOUS ONCE
Status: COMPLETED | OUTPATIENT
Start: 2019-07-25 | End: 2019-07-25

## 2019-07-25 RX ORDER — MEPERIDINE HYDROCHLORIDE 50 MG/ML
12.5 INJECTION INTRAMUSCULAR; INTRAVENOUS; SUBCUTANEOUS ONCE
Status: CANCELLED | OUTPATIENT
Start: 2019-07-25

## 2019-07-25 RX ORDER — SODIUM CHLORIDE 9 MG/ML
INJECTION, SOLUTION INTRAVENOUS CONTINUOUS
Status: CANCELLED | OUTPATIENT
Start: 2019-07-25

## 2019-07-25 RX ORDER — SODIUM CHLORIDE 0.9 % (FLUSH) 0.9 %
10 SYRINGE (ML) INJECTION PRN
Status: CANCELLED | OUTPATIENT
Start: 2019-08-01

## 2019-07-25 RX ORDER — HEPARIN SODIUM (PORCINE) LOCK FLUSH IV SOLN 100 UNIT/ML 100 UNIT/ML
500 SOLUTION INTRAVENOUS PRN
Status: DISCONTINUED | OUTPATIENT
Start: 2019-07-25 | End: 2019-07-26 | Stop reason: HOSPADM

## 2019-07-25 RX ORDER — METHYLPREDNISOLONE SODIUM SUCCINATE 125 MG/2ML
125 INJECTION, POWDER, LYOPHILIZED, FOR SOLUTION INTRAMUSCULAR; INTRAVENOUS ONCE
Status: CANCELLED | OUTPATIENT
Start: 2019-08-01

## 2019-07-25 RX ORDER — SODIUM CHLORIDE 0.9 % (FLUSH) 0.9 %
5 SYRINGE (ML) INJECTION PRN
Status: CANCELLED | OUTPATIENT
Start: 2019-08-01

## 2019-07-25 RX ORDER — EPINEPHRINE 1 MG/ML
0.3 INJECTION INTRAMUSCULAR; INTRAVENOUS; SUBCUTANEOUS PRN
Status: CANCELLED | OUTPATIENT
Start: 2019-08-01

## 2019-07-25 RX ORDER — SODIUM CHLORIDE 9 MG/ML
INJECTION, SOLUTION INTRAVENOUS CONTINUOUS
Status: CANCELLED | OUTPATIENT
Start: 2019-08-01

## 2019-07-25 RX ORDER — METHYLPREDNISOLONE SODIUM SUCCINATE 125 MG/2ML
125 INJECTION, POWDER, LYOPHILIZED, FOR SOLUTION INTRAMUSCULAR; INTRAVENOUS ONCE
Status: CANCELLED | OUTPATIENT
Start: 2019-07-25

## 2019-07-25 RX ORDER — HEPARIN SODIUM (PORCINE) LOCK FLUSH IV SOLN 100 UNIT/ML 100 UNIT/ML
500 SOLUTION INTRAVENOUS PRN
Status: CANCELLED | OUTPATIENT
Start: 2019-08-01

## 2019-07-25 RX ORDER — EPINEPHRINE 1 MG/ML
0.3 INJECTION INTRAMUSCULAR; INTRAVENOUS; SUBCUTANEOUS PRN
Status: CANCELLED | OUTPATIENT
Start: 2019-07-25

## 2019-07-25 RX ORDER — SODIUM CHLORIDE 9 MG/ML
INJECTION, SOLUTION INTRAVENOUS ONCE
Status: COMPLETED | OUTPATIENT
Start: 2019-07-25 | End: 2019-07-25

## 2019-07-25 RX ORDER — DIPHENHYDRAMINE HYDROCHLORIDE 50 MG/ML
50 INJECTION INTRAMUSCULAR; INTRAVENOUS ONCE
Status: CANCELLED | OUTPATIENT
Start: 2019-08-01

## 2019-07-25 RX ORDER — WATER 10 ML/10ML
2.2 INJECTION INTRAMUSCULAR; INTRAVENOUS; SUBCUTANEOUS ONCE
Status: CANCELLED | OUTPATIENT
Start: 2019-07-25

## 2019-07-25 RX ORDER — SODIUM CHLORIDE 0.9 % (FLUSH) 0.9 %
10 SYRINGE (ML) INJECTION PRN
Status: CANCELLED | OUTPATIENT
Start: 2019-07-25

## 2019-07-25 RX ORDER — DIPHENHYDRAMINE HYDROCHLORIDE 50 MG/ML
50 INJECTION INTRAMUSCULAR; INTRAVENOUS ONCE
Status: CANCELLED | OUTPATIENT
Start: 2019-07-25

## 2019-07-25 RX ORDER — DEXAMETHASONE SODIUM PHOSPHATE 10 MG/ML
10 INJECTION INTRAMUSCULAR; INTRAVENOUS ONCE
Status: COMPLETED | OUTPATIENT
Start: 2019-07-25 | End: 2019-07-25

## 2019-07-25 RX ORDER — HEPARIN SODIUM (PORCINE) LOCK FLUSH IV SOLN 100 UNIT/ML 100 UNIT/ML
500 SOLUTION INTRAVENOUS PRN
Status: CANCELLED | OUTPATIENT
Start: 2019-07-25

## 2019-07-25 RX ORDER — 0.9 % SODIUM CHLORIDE 0.9 %
10 VIAL (ML) INJECTION ONCE
Status: COMPLETED | OUTPATIENT
Start: 2019-07-25 | End: 2019-07-25

## 2019-07-25 RX ORDER — ONDANSETRON 2 MG/ML
8 INJECTION INTRAMUSCULAR; INTRAVENOUS ONCE
Status: CANCELLED | OUTPATIENT
Start: 2019-08-01

## 2019-07-25 RX ORDER — WATER 10 ML/10ML
2.2 INJECTION INTRAMUSCULAR; INTRAVENOUS; SUBCUTANEOUS ONCE
Status: CANCELLED | OUTPATIENT
Start: 2019-08-01

## 2019-07-25 RX ORDER — ONDANSETRON 2 MG/ML
8 INJECTION INTRAMUSCULAR; INTRAVENOUS ONCE
Status: COMPLETED | OUTPATIENT
Start: 2019-07-25 | End: 2019-07-25

## 2019-07-25 RX ADMIN — DEXAMETHASONE SODIUM PHOSPHATE 10 MG: 10 INJECTION INTRAMUSCULAR; INTRAVENOUS at 13:57

## 2019-07-25 RX ADMIN — SODIUM CHLORIDE: 9 INJECTION, SOLUTION INTRAVENOUS at 13:40

## 2019-07-25 RX ADMIN — DIPHENHYDRAMINE HYDROCHLORIDE 50 MG: 50 INJECTION, SOLUTION INTRAMUSCULAR; INTRAVENOUS at 13:55

## 2019-07-25 RX ADMIN — Medication 10 ML: at 16:00

## 2019-07-25 RX ADMIN — PACLITAXEL 192 MG: 6 INJECTION, SOLUTION INTRAVENOUS at 14:24

## 2019-07-25 RX ADMIN — Medication 10 ML: at 13:55

## 2019-07-25 RX ADMIN — FAMOTIDINE 20 MG: 10 INJECTION INTRAVENOUS at 13:55

## 2019-07-25 RX ADMIN — ONDANSETRON HYDROCHLORIDE 8 MG: 2 INJECTION, SOLUTION INTRAMUSCULAR; INTRAVENOUS at 13:55

## 2019-07-25 RX ADMIN — Medication 500 UNITS: at 16:00

## 2019-07-25 ASSESSMENT — PAIN DESCRIPTION - LOCATION: LOCATION: GENERALIZED

## 2019-07-25 ASSESSMENT — PAIN SCALES - GENERAL: PAINLEVEL_OUTOF10: 8

## 2019-07-25 ASSESSMENT — PAIN DESCRIPTION - PAIN TYPE: TYPE: CHRONIC PAIN

## 2019-07-25 NOTE — PROGRESS NOTES
Patient arrive ambulatory for cycle 8 day 1 treatment. Patient states she is tired but denies other complaints or concerns. Vitals as charted. Port accessed; specimen sent. Labs reviewed; request chemo from pharmacy. Delay in obtaining medications as physician has not signed order. When medications available; patient is pre-medicated. Taxol infusion begin slowly with no adverse reaction; increased to infuse at hourly rate. Completed with no adverse reaction; line flushed. Port flushed and heparinized with intact rosas needle removed per protocol. Patient ambulate off unit per self at discharge.

## 2019-07-26 ENCOUNTER — HOSPITAL ENCOUNTER (OUTPATIENT)
Facility: MEDICAL CENTER | Age: 59
End: 2019-07-26
Payer: COMMERCIAL

## 2019-08-01 ENCOUNTER — OFFICE VISIT (OUTPATIENT)
Dept: ONCOLOGY | Age: 59
End: 2019-08-01
Payer: COMMERCIAL

## 2019-08-01 ENCOUNTER — TELEPHONE (OUTPATIENT)
Dept: ONCOLOGY | Age: 59
End: 2019-08-01

## 2019-08-01 ENCOUNTER — HOSPITAL ENCOUNTER (OUTPATIENT)
Dept: INFUSION THERAPY | Facility: MEDICAL CENTER | Age: 59
Discharge: HOME | End: 2019-08-01
Payer: COMMERCIAL

## 2019-08-01 VITALS
DIASTOLIC BLOOD PRESSURE: 75 MMHG | BODY MASS INDEX: 41.41 KG/M2 | WEIGHT: 264.4 LBS | SYSTOLIC BLOOD PRESSURE: 113 MMHG | TEMPERATURE: 97.6 F | RESPIRATION RATE: 18 BRPM | HEART RATE: 80 BPM

## 2019-08-01 VITALS
SYSTOLIC BLOOD PRESSURE: 113 MMHG | HEART RATE: 80 BPM | TEMPERATURE: 97.6 F | RESPIRATION RATE: 18 BRPM | DIASTOLIC BLOOD PRESSURE: 75 MMHG

## 2019-08-01 DIAGNOSIS — C50.412 MALIGNANT NEOPLASM OF UPPER-OUTER QUADRANT OF LEFT BREAST IN FEMALE, ESTROGEN RECEPTOR NEGATIVE (HCC): Primary | ICD-10-CM

## 2019-08-01 DIAGNOSIS — Z17.1 MALIGNANT NEOPLASM OF UPPER-OUTER QUADRANT OF LEFT BREAST IN FEMALE, ESTROGEN RECEPTOR NEGATIVE (HCC): Primary | ICD-10-CM

## 2019-08-01 DIAGNOSIS — Z76.0 MEDICATION REFILL: ICD-10-CM

## 2019-08-01 LAB
ABSOLUTE EOS #: 0.05 K/UL (ref 0–0.4)
ABSOLUTE IMMATURE GRANULOCYTE: 0.05 K/UL (ref 0–0.3)
ABSOLUTE LYMPH #: 1.24 K/UL (ref 1–4.8)
ABSOLUTE MONO #: 0.65 K/UL (ref 0.2–0.8)
ALBUMIN SERPL-MCNC: 3.8 G/DL (ref 3.5–5.2)
ALBUMIN/GLOBULIN RATIO: ABNORMAL (ref 1–2.5)
ALP BLD-CCNC: 84 U/L (ref 35–104)
ALT SERPL-CCNC: 15 U/L (ref 5–33)
ANION GAP SERPL CALCULATED.3IONS-SCNC: 12 MMOL/L (ref 9–17)
AST SERPL-CCNC: 13 U/L
BASOPHILS # BLD: 1 %
BASOPHILS ABSOLUTE: 0.05 K/UL (ref 0–0.2)
BILIRUB SERPL-MCNC: 0.1 MG/DL (ref 0.3–1.2)
BUN BLDV-MCNC: 15 MG/DL (ref 6–20)
BUN/CREAT BLD: 21 (ref 9–20)
CALCIUM SERPL-MCNC: 9.2 MG/DL (ref 8.6–10.4)
CHLORIDE BLD-SCNC: 104 MMOL/L (ref 98–107)
CO2: 24 MMOL/L (ref 20–31)
CREAT SERPL-MCNC: 0.7 MG/DL (ref 0.5–0.9)
DIFFERENTIAL TYPE: ABNORMAL
EOSINOPHILS RELATIVE PERCENT: 1 % (ref 1–4)
GFR AFRICAN AMERICAN: >60 ML/MIN
GFR NON-AFRICAN AMERICAN: >60 ML/MIN
GFR SERPL CREATININE-BSD FRML MDRD: ABNORMAL ML/MIN/{1.73_M2}
GFR SERPL CREATININE-BSD FRML MDRD: ABNORMAL ML/MIN/{1.73_M2}
GLUCOSE BLD-MCNC: 94 MG/DL (ref 70–99)
HCT VFR BLD CALC: 32.1 % (ref 36.3–47.1)
HEMOGLOBIN: 9.8 G/DL (ref 11.9–15.1)
IMMATURE GRANULOCYTES: 1 %
LYMPHOCYTES # BLD: 23 % (ref 24–44)
MCH RBC QN AUTO: 28.7 PG (ref 25.2–33.5)
MCHC RBC AUTO-ENTMCNC: 30.5 G/DL (ref 28.4–34.8)
MCV RBC AUTO: 94.1 FL (ref 82.6–102.9)
MONOCYTES # BLD: 12 % (ref 1–7)
MORPHOLOGY: ABNORMAL
NRBC AUTOMATED: 0.4 PER 100 WBC
PDW BLD-RTO: 22.6 % (ref 11.8–14.4)
PLATELET # BLD: 265 K/UL (ref 138–453)
PLATELET ESTIMATE: ABNORMAL
PMV BLD AUTO: 9.3 FL (ref 8.1–13.5)
POTASSIUM SERPL-SCNC: 3.8 MMOL/L (ref 3.7–5.3)
RBC # BLD: 3.41 M/UL (ref 3.95–5.11)
RBC # BLD: ABNORMAL 10*6/UL
SEG NEUTROPHILS: 62 % (ref 36–66)
SEGMENTED NEUTROPHILS ABSOLUTE COUNT: 3.36 K/UL (ref 1.8–7.7)
SODIUM BLD-SCNC: 140 MMOL/L (ref 135–144)
TOTAL PROTEIN: 6.3 G/DL (ref 6.4–8.3)
WBC # BLD: 5.4 K/UL (ref 3.5–11.3)
WBC # BLD: ABNORMAL 10*3/UL

## 2019-08-01 PROCEDURE — G8427 DOCREV CUR MEDS BY ELIG CLIN: HCPCS | Performed by: INTERNAL MEDICINE

## 2019-08-01 PROCEDURE — 85025 COMPLETE CBC W/AUTO DIFF WBC: CPT

## 2019-08-01 PROCEDURE — 2580000003 HC RX 258: Performed by: INTERNAL MEDICINE

## 2019-08-01 PROCEDURE — 96375 TX/PRO/DX INJ NEW DRUG ADDON: CPT

## 2019-08-01 PROCEDURE — 99214 OFFICE O/P EST MOD 30 MIN: CPT | Performed by: INTERNAL MEDICINE

## 2019-08-01 PROCEDURE — 3017F COLORECTAL CA SCREEN DOC REV: CPT | Performed by: INTERNAL MEDICINE

## 2019-08-01 PROCEDURE — 96413 CHEMO IV INFUSION 1 HR: CPT

## 2019-08-01 PROCEDURE — 36591 DRAW BLOOD OFF VENOUS DEVICE: CPT

## 2019-08-01 PROCEDURE — G8417 CALC BMI ABV UP PARAM F/U: HCPCS | Performed by: INTERNAL MEDICINE

## 2019-08-01 PROCEDURE — 2500000003 HC RX 250 WO HCPCS: Performed by: INTERNAL MEDICINE

## 2019-08-01 PROCEDURE — 80053 COMPREHEN METABOLIC PANEL: CPT

## 2019-08-01 PROCEDURE — 99212 OFFICE O/P EST SF 10 MIN: CPT

## 2019-08-01 PROCEDURE — 4004F PT TOBACCO SCREEN RCVD TLK: CPT | Performed by: INTERNAL MEDICINE

## 2019-08-01 PROCEDURE — 6360000002 HC RX W HCPCS: Performed by: INTERNAL MEDICINE

## 2019-08-01 RX ORDER — MEPERIDINE HYDROCHLORIDE 50 MG/ML
12.5 INJECTION INTRAMUSCULAR; INTRAVENOUS; SUBCUTANEOUS ONCE
Status: CANCELLED | OUTPATIENT
Start: 2019-08-08

## 2019-08-01 RX ORDER — SODIUM CHLORIDE 9 MG/ML
INJECTION, SOLUTION INTRAVENOUS ONCE
Status: COMPLETED | OUTPATIENT
Start: 2019-08-01 | End: 2019-08-01

## 2019-08-01 RX ORDER — DEXAMETHASONE SODIUM PHOSPHATE 10 MG/ML
10 INJECTION INTRAMUSCULAR; INTRAVENOUS ONCE
Status: COMPLETED | OUTPATIENT
Start: 2019-08-01 | End: 2019-08-01

## 2019-08-01 RX ORDER — DIPHENHYDRAMINE HYDROCHLORIDE 50 MG/ML
50 INJECTION INTRAMUSCULAR; INTRAVENOUS ONCE
Status: COMPLETED | OUTPATIENT
Start: 2019-08-01 | End: 2019-08-01

## 2019-08-01 RX ORDER — OXYCODONE AND ACETAMINOPHEN 10; 325 MG/1; MG/1
1 TABLET ORAL EVERY 6 HOURS PRN
Qty: 120 TABLET | Refills: 0 | Status: SHIPPED | OUTPATIENT
Start: 2019-08-01 | End: 2019-08-27 | Stop reason: SDUPTHER

## 2019-08-01 RX ORDER — DIPHENHYDRAMINE HYDROCHLORIDE 50 MG/ML
50 INJECTION INTRAMUSCULAR; INTRAVENOUS ONCE
Status: CANCELLED | OUTPATIENT
Start: 2019-08-08

## 2019-08-01 RX ORDER — AMLODIPINE BESYLATE 10 MG/1
TABLET ORAL
Qty: 90 TABLET | Refills: 1 | Status: SHIPPED | OUTPATIENT
Start: 2019-08-01 | End: 2019-08-28 | Stop reason: SDUPTHER

## 2019-08-01 RX ORDER — METHYLPREDNISOLONE SODIUM SUCCINATE 125 MG/2ML
125 INJECTION, POWDER, LYOPHILIZED, FOR SOLUTION INTRAMUSCULAR; INTRAVENOUS ONCE
Status: CANCELLED | OUTPATIENT
Start: 2019-08-08

## 2019-08-01 RX ORDER — GUAIFENESIN 600 MG/1
600 TABLET, EXTENDED RELEASE ORAL 2 TIMES DAILY
Qty: 30 TABLET | Refills: 0 | Status: SHIPPED | OUTPATIENT
Start: 2019-08-01 | End: 2019-08-16

## 2019-08-01 RX ORDER — SODIUM CHLORIDE 9 MG/ML
INJECTION, SOLUTION INTRAVENOUS CONTINUOUS
Status: CANCELLED | OUTPATIENT
Start: 2019-08-08

## 2019-08-01 RX ORDER — ONDANSETRON 2 MG/ML
8 INJECTION INTRAMUSCULAR; INTRAVENOUS ONCE
Status: COMPLETED | OUTPATIENT
Start: 2019-08-01 | End: 2019-08-01

## 2019-08-01 RX ORDER — ONDANSETRON 2 MG/ML
8 INJECTION INTRAMUSCULAR; INTRAVENOUS ONCE
Status: CANCELLED | OUTPATIENT
Start: 2019-08-08

## 2019-08-01 RX ORDER — 0.9 % SODIUM CHLORIDE 0.9 %
10 VIAL (ML) INJECTION ONCE
Status: CANCELLED | OUTPATIENT
Start: 2019-08-08

## 2019-08-01 RX ORDER — 0.9 % SODIUM CHLORIDE 0.9 %
10 VIAL (ML) INJECTION ONCE
Status: COMPLETED | OUTPATIENT
Start: 2019-08-01 | End: 2019-08-01

## 2019-08-01 RX ORDER — SODIUM CHLORIDE 9 MG/ML
INJECTION, SOLUTION INTRAVENOUS ONCE
Status: CANCELLED | OUTPATIENT
Start: 2019-08-08

## 2019-08-01 RX ORDER — DOCUSATE SODIUM 100 MG/1
100 CAPSULE, LIQUID FILLED ORAL 2 TIMES DAILY PRN
Qty: 60 CAPSULE | Refills: 0 | Status: SHIPPED | OUTPATIENT
Start: 2019-08-01 | End: 2020-03-20

## 2019-08-01 RX ORDER — HEPARIN SODIUM (PORCINE) LOCK FLUSH IV SOLN 100 UNIT/ML 100 UNIT/ML
500 SOLUTION INTRAVENOUS PRN
Status: CANCELLED | OUTPATIENT
Start: 2019-08-08

## 2019-08-01 RX ORDER — HEPARIN SODIUM (PORCINE) LOCK FLUSH IV SOLN 100 UNIT/ML 100 UNIT/ML
500 SOLUTION INTRAVENOUS PRN
Status: DISCONTINUED | OUTPATIENT
Start: 2019-08-01 | End: 2019-08-02 | Stop reason: HOSPADM

## 2019-08-01 RX ORDER — SODIUM CHLORIDE 0.9 % (FLUSH) 0.9 %
10 SYRINGE (ML) INJECTION PRN
Status: CANCELLED | OUTPATIENT
Start: 2019-08-08

## 2019-08-01 RX ORDER — SODIUM CHLORIDE 0.9 % (FLUSH) 0.9 %
10 SYRINGE (ML) INJECTION PRN
Status: DISCONTINUED | OUTPATIENT
Start: 2019-08-01 | End: 2019-08-02 | Stop reason: HOSPADM

## 2019-08-01 RX ORDER — SODIUM CHLORIDE 0.9 % (FLUSH) 0.9 %
5 SYRINGE (ML) INJECTION PRN
Status: CANCELLED | OUTPATIENT
Start: 2019-08-08

## 2019-08-01 RX ADMIN — PACLITAXEL 192 MG: 6 INJECTION, SOLUTION INTRAVENOUS at 13:20

## 2019-08-01 RX ADMIN — ONDANSETRON 8 MG: 2 INJECTION INTRAMUSCULAR; INTRAVENOUS at 12:35

## 2019-08-01 RX ADMIN — Medication 10 ML: at 11:15

## 2019-08-01 RX ADMIN — DIPHENHYDRAMINE HYDROCHLORIDE 50 MG: 50 INJECTION, SOLUTION INTRAMUSCULAR; INTRAVENOUS at 12:45

## 2019-08-01 RX ADMIN — Medication 10 ML: at 12:47

## 2019-08-01 RX ADMIN — DEXAMETHASONE SODIUM PHOSPHATE 10 MG: 10 INJECTION INTRAMUSCULAR; INTRAVENOUS at 12:40

## 2019-08-01 RX ADMIN — Medication 10 ML: at 14:42

## 2019-08-01 RX ADMIN — Medication 500 UNITS: at 14:42

## 2019-08-01 RX ADMIN — SODIUM CHLORIDE: 9 INJECTION, SOLUTION INTRAVENOUS at 12:34

## 2019-08-01 RX ADMIN — FAMOTIDINE 20 MG: 10 INJECTION INTRAVENOUS at 12:43

## 2019-08-01 ASSESSMENT — PAIN DESCRIPTION - PAIN TYPE: TYPE: CHRONIC PAIN

## 2019-08-01 ASSESSMENT — PAIN SCALES - GENERAL: PAINLEVEL_OUTOF10: 8

## 2019-08-01 ASSESSMENT — PAIN DESCRIPTION - LOCATION: LOCATION: OTHER (COMMENT)

## 2019-08-01 NOTE — PROGRESS NOTES
region of chromosome 17 (PathVysion, Vysis). External  and internal controls perform appropriately. In this assay, the ratio  of HER2 signals to chromosome 17 signals is calculated. Sixty nuclei  are examined, and the ratio of HER2 signals to chromosome 17 signals  determined. Number of Tumor Cells Counted:               (30+30)  Numb er of Observers: Two  Average Number of HER2 Signals/Nucleus:          1.7  Average Number of CEP 17 Signals/Nucleus:     1.7  Ratio of average HER2/CEP 17 (D17Z1):          1.0       Specimen:                         Left breast  Fixative:                              10% Buffered formalin  Duration of fixation:                    29.5 hours  Cold ischemia time <1 hour               Yes  Meets ASCO/CAP guidelines for analysis:            Yes  Joint recommendation of the College of American Pathologists and  American Society of Clinical Oncology to optimize accuracy and  consistency of HER2 (ERBB2) testing is for prompt appropriate  sectioning of specimens with cold ischemia time < 1 hour and fixation  in 10% buffered formalin for 6-72 hours. The PathVysion kit is  designed to detect amplification of the HER2 (ERBB2) gene via  fluorescence in situ hybridization (FISH). This test is approved by  the U.S. Food and Drug Administration. Wilma Adam M.D. Final Diagnosis  1. LYMPH NODES, EXCISION (LEFT SENTINEL):       - NO NEOPLASM DETECTED. 2.  BREAST WITH AXILLARY LYMPH NODES, MASTECTOMY (LEFT):       - INVASIVE DUCTAL CARCINOMA, 3.9 CM. - EXCISION MARGINS NEGATIVE FOR NEOPLASM.       - LYMPH NODES NEGATIVE FOR NEOPLASM. - NEGATIVE ESTROGEN RECEPTOR.       - NEGATIVE PROGESTERONE RECEPTOR.       - HER2 GENE AMPLIFICATION ASSESSMENT PENDING. 3.  BREAST, MASTECTOMY (RIGHT):       - FIBROCYSTIC CHANGE.       - NO ATYPIA OR NEOPLASIA DETECTED.       Leland Wen M.D.  **Electronically Signed Out** 41.0 x 7.0 cm and is surmounted by a 43.0 x 39.0 cm ellipse of  tan-brown skin with an eccentrically located non-retracted nipple. The specimen is oriented with a suture designating medial.  Sectioning  reveals markedly fatty cut surfaces with no discrete masses. There  are no lymph nodes. Cassette summary:  \"A\" nipple and deep margin,  \"B-D\" upper outer quadrant, \"E-F\" lower outer quadrant, \"G-I\" upper  inner quadrant, \"J-K\" lower inner quadrant. tm      Intr aoperative Diagnosis  1. FSDX:  Lymph nodes, no neoplasm detected. Aidan Antonia, [id number],  2/12/2019)      Microscopic Description  1-2.   INVASIVE BREAST CARCINOMA         PROCEDURE:     Mastectomy  SPECIMEN LATERALITY AND TUMOR SITE:     Left, not specified further    TUMOR SIZE (LARGEST INVASIVE COMPONENT):     3.9 x 2.5 x 2.0 cm  HISTOLOGIC TYPE OF INVASIVE CARCINOMA:     Ductal; some features  suggest neuroendocrine differentiation; however, control-reactive  immunostain for synaptophysin is negative  HISTOLOGIC GRADE (YOLETTE)       - GLANDULAR/TUBULAR DIFFERENTIATION SCORE:     3  - NUCLEAR PLEOMORPHISM SCORE:     2  - MITOTIC RATE SCORE:     3  - OVERALL GRADE (FROM YOLETTE TOTAL SCORE):     Score 8 = grade 3    TUMOR FOCALITY:       DUCTAL CARCINOMA IN SITU:     Present, few peritumoral foci  TUMOR EXTENSION       - SKIN:     Negative  - NIPPLE:     Negative  - SKELETAL MUSCLE:     N/A    MARGINS - INVASIVE CARCINOMA -  - SITE(S) OF ALL POSITIVE MARGINS: N/A  - IF ALL NEGATI VE, SITE(S) AND DISTANCE TO CLOSEST: 5 cm or more  MARGINS - DCIS -  - SITE(S) OF ALL POSITIVE MARGINS: N/A  - IF ALL NEGATIVE, SITE(S) AND DISTANCE TO CLOSEST:     5 cm or more  LYMPHOVASCULAR INVASION:     Negative    REGIONAL LYMPH NODES -       - NUMBER EXAMINED (SENTINEL AND NON-SENTINEL):     4+3=7  - NUMBER OF SENTINEL:     4  - NUMBER WITH MACROMETASTASES (>2MM):     0  - NUMBER WITH MICROMETASTASES (>0.2MM-2MM AND / OR >200 CELLS):     0;  confirms frozen section diagnoses  - NUMBER WITH ISOLATED TUMOR CELLS (<0.2 MM AND  <200 CELLS):     N/A  - SIZE OF LARGEST METASTATIC DEPOSIT:     N/A   - EXTRANODAL EXTENSION:     N/A  TREATMENT EFFECT: RESPONSE TO PRE-SURGICAL (NEOADJUVANT) THERAPY (IF  APPLICABLE)       - IN BREAST TISSUES:     N/A  - IN LYMPH NODES:     N/A    PATHOLOGIC STAGE CLASSIFICATION (pTNM):     pT2  pN0    CAP InvasiveBreast 4100 in conjunction with AJCC 8 ed. BIOMARKER TESTING  BREAST CARCINOMA       Biomarker studies were performed  on prior breast biopsy from January, 2019 ([facility] ), with triple negative results. Therefore, per  recommendations, those studies are repeated on a larger tumor specimen  now.   ESTROGEN RECEPTOR*:   -POSITIVE (PERCENT =>1% OF POSITIVE TUMOR CELL NUCLEI):     N/A  ---AVERAGE INTENSITY OF POSITIVE STAINING:     N/A  -NEGATIVE (PERCENT <1% OF POSITIVE TUMOR CELL NUCLEI):     0%  ---AVERAGE INTENSITY OF ANY POSITIVE STAINING: N/A  ---INTERNAL CONTROL PRESENT AND STAIN AS EXPECTED:     Yes  ---INTERNAL CONTROL CELLS ABSENT (COMMENT):     N/A  ---INTERNAL CONTROL CELLS PRESENT BUT DO NOT STAIN (COMMENT):     N/A    PROGESTERONE RECEPTOR*:  ---AVERAGE INTENSITY OF POSITIVE STAINING:     N/A  -NEGATIVE (PERCENT <1% OF POSITIVE TUMOR CELL NUCLEI):     0%  ---AVERAGE INTENSITY OF ANY POSITIVE STAINING: N/A  ---INTERNAL CONTROL PRESENT AND STAIN AS EXPECTED:     Yes  ---INTERNAL CONTROL CELLS ABSENT (COMMENT):     N/A  ---INTERNAL CONTROL CELLS PRESENT BUT DO NOT STAIN (COMMENT):     N/A     HER2*:  HER2 PROTEIN EXPRESSION  (IMMUNOHISTOCHEMISTRY):     N/A  HER2 GENE AMPLIFICATION (INSITU HYBRIDIZATION):     Pending (see  addendum report)         TIME SPECIMEN REMOVED:     February 12, 2019, 1205  TIME SPECIMEN PLACED IN FORMALIN:     February 12, 2019, 1205  COLD ISCHEMIA TIME MEETS CAP / ASCO REQUIREMENTS (LESS THAN 1 HOUR):      Yes  FIXATION TIME (CAP / ASCO GUIDELINES 6-72 HOURS):     29.5 hours  FIXATIVE:     10% BUFFERED FORMALIN    *Information on biomarker regents:  Estrogen Receptor: If performed at [facility] laboratory: Immunostain  clone SP1 with indirect biotin streptavidin detection system, vendor  Flora Jang (FDA cleared); evaluated by manual morphometry  (negative=less than 1% tumor cell nuclear staining; otherwise  positive); external control is reactive. Progesterone Receptor: If performed at [facility] laboratory: Immunostain  clone 1E2 with indirect biotin streptavidin detection system, vendor  Flora Jang (FDA cleared); evaluated by m anual morphometry  (negative=less than 1% tumor cell nuclear staining; otherwise  positive); external control is reactive. HER 2 insitu hybridization: FDA cleared, vendor [facility] [employer]. CAP BreastBiomarkers 1201 in conjunction with AJCC 8 ed. 3.  Microscopic examination performed. Impression:  Invasive ductal carcinoma of left breast, triple negative, pT2,p N0, M0  Status post bilateral mastectomy and left sentinel lymph node biopsy  Personal history of gynecological malignancy (uterus/cervical cancer) status post surgery in 1992  Cancer related pain  Leg swelling  Anemia secondary to chemotherapy  Family history of malignancy  Chronic back pain  Hypertension  Obesity      Plan:    Personally reviewed results of lab work-up and other relevant clinical data  Toxicity check performed. Pain is not well controlled. We will increase dose of Percocet to 10/325 mg every 6 hours as needed  Continue Neurontin 3 mg 3 times daily  Patient continues to complain of cough. We will call in Mucinex  Reiterated treatment plan. We will plan to treat patient with 12 cycles of weekly Taxol however if patient neuropathy worsens me we may consider dose reduction or shortening length of treatment. NCCN guidelines were reviewed and discussed with the patient. The diagnosis and care plan were discussed with the patient in detail.  I discussed the natural history of

## 2019-08-01 NOTE — TELEPHONE ENCOUNTER
Mehdi Randolph FOR MD VISIT & TX  DR MACE INTO SEE PATIENT  ORDERS RECEIVED  TX AFTER CHECKING LAB RESULTS  REFERRAL TO PAIN MANAGEMENT. CALLED [phone] & SPOKE W/ Sunil Daily. [facility] TRANSFERRED ME TO [facility] WHO HANDLES PAIN MANAGEMENT REFERRALS. LEFT DETAILED VOICEMAIL. AWAITING CALL BACK.   RV 2 WEEKS  MD VISIT 8/15/19 @9AM  [email or web address]  SCRIPTS SENT TO PATIENTS PHARMACY  AVS PRINTED AND GIVEN TO PATIENT WITH INSTRUCTIONS  PATIENT REMAINS IN TX AREA

## 2019-08-01 NOTE — PROGRESS NOTES
Pt arrives per ambulatory per self and seen per md and labs and orders reviewed and pt tolerated premeds and chemo well and NS flushing line before and between and after meds and chemo. No reactions or complaints and blood return present throughout infusions. Pt given AVS with next appts. And pt discharged per ambulatory per self.

## 2019-08-01 NOTE — TELEPHONE ENCOUNTER
Discussed with Dr. Mauri Lux script for [facility] 10/325 #120 but states for 60 day supply. Pharmacy questioning . It is not for 60 day but in fact for 30 day supply. Reford Tiffanie Pharmacist @ [facility]. She will send a notification of PA needed. Cover my meds used: Dipak Rosa Bowles: [id number] - PA Case ID: [id number] - Rx #: [id number]   Await response.

## 2019-08-05 ENCOUNTER — HOSPITAL ENCOUNTER (OUTPATIENT)
Facility: MEDICAL CENTER | Age: 59
End: 2019-08-05
Payer: COMMERCIAL

## 2019-08-08 ENCOUNTER — HOSPITAL ENCOUNTER (OUTPATIENT)
Dept: INFUSION THERAPY | Facility: MEDICAL CENTER | Age: 59
Discharge: HOME | End: 2019-08-08
Payer: COMMERCIAL

## 2019-08-08 VITALS
DIASTOLIC BLOOD PRESSURE: 62 MMHG | TEMPERATURE: 98.5 F | HEART RATE: 90 BPM | WEIGHT: 254.9 LBS | BODY MASS INDEX: 39.92 KG/M2 | RESPIRATION RATE: 20 BRPM | SYSTOLIC BLOOD PRESSURE: 114 MMHG

## 2019-08-08 DIAGNOSIS — Z17.1 MALIGNANT NEOPLASM OF UPPER-OUTER QUADRANT OF LEFT BREAST IN FEMALE, ESTROGEN RECEPTOR NEGATIVE (HCC): Primary | ICD-10-CM

## 2019-08-08 DIAGNOSIS — C50.412 MALIGNANT NEOPLASM OF UPPER-OUTER QUADRANT OF LEFT BREAST IN FEMALE, ESTROGEN RECEPTOR NEGATIVE (HCC): Primary | ICD-10-CM

## 2019-08-08 LAB
ABSOLUTE EOS #: 0.05 K/UL (ref 0–0.4)
ABSOLUTE IMMATURE GRANULOCYTE: 0 K/UL (ref 0–0.3)
ABSOLUTE LYMPH #: 0.99 K/UL (ref 1–4.8)
ABSOLUTE MONO #: 0.52 K/UL (ref 0.2–0.8)
ALBUMIN SERPL-MCNC: 3.8 G/DL (ref 3.5–5.2)
ALBUMIN/GLOBULIN RATIO: ABNORMAL (ref 1–2.5)
ALP BLD-CCNC: 76 U/L (ref 35–104)
ALT SERPL-CCNC: 15 U/L (ref 5–33)
ANION GAP SERPL CALCULATED.3IONS-SCNC: 12 MMOL/L (ref 9–17)
AST SERPL-CCNC: 11 U/L
BASOPHILS # BLD: 1 %
BASOPHILS ABSOLUTE: 0.05 K/UL (ref 0–0.2)
BILIRUB SERPL-MCNC: 0.15 MG/DL (ref 0.3–1.2)
BUN BLDV-MCNC: 13 MG/DL (ref 6–20)
BUN/CREAT BLD: 17 (ref 9–20)
CALCIUM SERPL-MCNC: 9.3 MG/DL (ref 8.6–10.4)
CHLORIDE BLD-SCNC: 104 MMOL/L (ref 98–107)
CO2: 26 MMOL/L (ref 20–31)
CREAT SERPL-MCNC: 0.78 MG/DL (ref 0.5–0.9)
DIFFERENTIAL TYPE: ABNORMAL
EOSINOPHILS RELATIVE PERCENT: 1 % (ref 1–4)
GFR AFRICAN AMERICAN: >60 ML/MIN
GFR NON-AFRICAN AMERICAN: >60 ML/MIN
GFR SERPL CREATININE-BSD FRML MDRD: ABNORMAL ML/MIN/{1.73_M2}
GFR SERPL CREATININE-BSD FRML MDRD: ABNORMAL ML/MIN/{1.73_M2}
GLUCOSE BLD-MCNC: 118 MG/DL (ref 70–99)
HCT VFR BLD CALC: 31.4 % (ref 36.3–47.1)
HEMOGLOBIN: 9.6 G/DL (ref 11.9–15.1)
IMMATURE GRANULOCYTES: 0 %
LYMPHOCYTES # BLD: 19 % (ref 24–44)
MCH RBC QN AUTO: 29.3 PG (ref 25.2–33.5)
MCHC RBC AUTO-ENTMCNC: 30.6 G/DL (ref 28.4–34.8)
MCV RBC AUTO: 95.7 FL (ref 82.6–102.9)
MONOCYTES # BLD: 10 % (ref 1–7)
MORPHOLOGY: ABNORMAL
NRBC AUTOMATED: 0 PER 100 WBC
PDW BLD-RTO: 20.1 % (ref 11.8–14.4)
PLATELET # BLD: 228 K/UL (ref 138–453)
PLATELET ESTIMATE: ABNORMAL
PMV BLD AUTO: 9.3 FL (ref 8.1–13.5)
POTASSIUM SERPL-SCNC: 3.8 MMOL/L (ref 3.7–5.3)
RBC # BLD: 3.28 M/UL (ref 3.95–5.11)
RBC # BLD: ABNORMAL 10*6/UL
SEG NEUTROPHILS: 69 % (ref 36–66)
SEGMENTED NEUTROPHILS ABSOLUTE COUNT: 3.59 K/UL (ref 1.8–7.7)
SODIUM BLD-SCNC: 142 MMOL/L (ref 135–144)
TOTAL PROTEIN: 6.2 G/DL (ref 6.4–8.3)
WBC # BLD: 5.2 K/UL (ref 3.5–11.3)
WBC # BLD: ABNORMAL 10*3/UL

## 2019-08-08 PROCEDURE — 85025 COMPLETE CBC W/AUTO DIFF WBC: CPT

## 2019-08-08 PROCEDURE — 96375 TX/PRO/DX INJ NEW DRUG ADDON: CPT

## 2019-08-08 PROCEDURE — 96413 CHEMO IV INFUSION 1 HR: CPT

## 2019-08-08 PROCEDURE — 80053 COMPREHEN METABOLIC PANEL: CPT

## 2019-08-08 PROCEDURE — 2500000003 HC RX 250 WO HCPCS: Performed by: INTERNAL MEDICINE

## 2019-08-08 PROCEDURE — 6360000002 HC RX W HCPCS: Performed by: INTERNAL MEDICINE

## 2019-08-08 PROCEDURE — 2580000003 HC RX 258: Performed by: INTERNAL MEDICINE

## 2019-08-08 PROCEDURE — 96374 THER/PROPH/DIAG INJ IV PUSH: CPT

## 2019-08-08 PROCEDURE — 36591 DRAW BLOOD OFF VENOUS DEVICE: CPT

## 2019-08-08 RX ORDER — HEPARIN SODIUM (PORCINE) LOCK FLUSH IV SOLN 100 UNIT/ML 100 UNIT/ML
500 SOLUTION INTRAVENOUS PRN
Status: DISCONTINUED | OUTPATIENT
Start: 2019-08-08 | End: 2019-08-09 | Stop reason: HOSPADM

## 2019-08-08 RX ORDER — DEXAMETHASONE SODIUM PHOSPHATE 10 MG/ML
10 INJECTION INTRAMUSCULAR; INTRAVENOUS ONCE
Status: COMPLETED | OUTPATIENT
Start: 2019-08-08 | End: 2019-08-08

## 2019-08-08 RX ORDER — SODIUM CHLORIDE 9 MG/ML
INJECTION, SOLUTION INTRAVENOUS ONCE
Status: DISCONTINUED | OUTPATIENT
Start: 2019-08-08 | End: 2019-08-09 | Stop reason: HOSPADM

## 2019-08-08 RX ORDER — SODIUM CHLORIDE 0.9 % (FLUSH) 0.9 %
10 SYRINGE (ML) INJECTION PRN
Status: DISCONTINUED | OUTPATIENT
Start: 2019-08-08 | End: 2019-08-09 | Stop reason: HOSPADM

## 2019-08-08 RX ORDER — 0.9 % SODIUM CHLORIDE 0.9 %
10 VIAL (ML) INJECTION ONCE
Status: DISCONTINUED | OUTPATIENT
Start: 2019-08-08 | End: 2019-08-09 | Stop reason: HOSPADM

## 2019-08-08 RX ORDER — DIPHENHYDRAMINE HYDROCHLORIDE 50 MG/ML
50 INJECTION INTRAMUSCULAR; INTRAVENOUS ONCE
Status: COMPLETED | OUTPATIENT
Start: 2019-08-08 | End: 2019-08-08

## 2019-08-08 RX ORDER — ONDANSETRON 2 MG/ML
8 INJECTION INTRAMUSCULAR; INTRAVENOUS ONCE
Status: COMPLETED | OUTPATIENT
Start: 2019-08-08 | End: 2019-08-08

## 2019-08-08 RX ADMIN — ONDANSETRON 8 MG: 2 INJECTION INTRAMUSCULAR; INTRAVENOUS at 12:09

## 2019-08-08 RX ADMIN — DEXAMETHASONE SODIUM PHOSPHATE 10 MG: 10 INJECTION INTRAMUSCULAR; INTRAVENOUS at 12:01

## 2019-08-08 RX ADMIN — PACLITAXEL 192 MG: 6 INJECTION, SOLUTION INTRAVENOUS at 12:41

## 2019-08-08 RX ADMIN — FAMOTIDINE 20 MG: 10 INJECTION INTRAVENOUS at 12:05

## 2019-08-08 RX ADMIN — SODIUM CHLORIDE: 9 INJECTION, SOLUTION INTRAVENOUS at 11:51

## 2019-08-08 RX ADMIN — Medication 500 UNITS: at 14:09

## 2019-08-08 RX ADMIN — DIPHENHYDRAMINE HYDROCHLORIDE 50 MG: 50 INJECTION, SOLUTION INTRAMUSCULAR; INTRAVENOUS at 12:15

## 2019-08-08 NOTE — FLOWSHEET NOTE
Situation:   encountered Ms. Dayan Bonilla when rounding the infusion clinic. Assessment:  Ms. Dayan Bonilla smiled and greeted . She reported that she has two more treatments. She identified her attitude as what has helped her cope. She shared that she was raised to have this kind of attitude of doing what needs to be done as the oldest child with brothers, a young mother of 3, and a grandmother of 15. She acknowledged that her family is supportive and that she needs to take care of herself as she lives alone. Ms. Dayan Bonilla shared that she prays and that this helps her. Ms. Dayan Bonilla was open to a prayer square made by a volunteer and to a copy of \"Guideposts. \"    Intervention:   provided supportive presence and explored Pt's coping and needs.  inquired about Pt's sources of support and strength.  offered words of encouragement and support.  gave Pt a copy of \"Guideposts\" and a prayer square. Outcome:  Ms. Dayan Bonilla received spiritual resources and 's words of encouragement. She shook 's hand and expressed thanks. 08/08/19 1305   Encounter Summary   Services provided to: Patient   Referral/Consult From: [facility] Family members   Continue Visiting   (8/8/19)   Complexity of Encounter Low   Length of Encounter 15 minutes   Spiritual Assessment Completed Yes   Spiritual/Oriental orthodox   Type Spiritual support   Assessment Calm; Approachable; Hopeful;Coping   Intervention Active listening;Explored feelings, thoughts, concerns;Explored coping resources;Provided reading materials/devotional materials;Sustaining presence/ Ministry of presence; Discussed relationship with God;Discussed illness/injury and it's impact   Outcome Expressed gratitude;Engaged in conversation;Coping;Encouraged; Hopeful;Receptive     Electronically signed by Stacy Reaves, Oncology [facility], [address] [facility] Radiation Oncology  8/8/2019  1:07 PM

## 2019-08-08 NOTE — PROGRESS NOTES
He still needs to come in for a physical for his yearly and for Down Syndrome surveillance. Needs to get yearly labs and xray that we will order at well visit. He is behind Tdap #4 and has also not gotten Hib#3 and HAV #1 and 2.   Prefer him seen to discu Patient here for labs and chemo. Feels well today. Vitals obtained. Port accessed per policy and labs drawn and sent. Labs reviewed. Premeds given per [facility]. Taxol hung per MAR. Infusing. No complaints. Completed over 1 hour. Tolerated well. Port flushed per [place] nd gripper removed intact, band aid to site. Has a return visit scheduled. Discharged ambulatory per self.

## 2019-08-09 ENCOUNTER — HOSPITAL ENCOUNTER (OUTPATIENT)
Facility: MEDICAL CENTER | Age: 59
End: 2019-08-09
Payer: COMMERCIAL

## 2019-08-15 ENCOUNTER — OFFICE VISIT (OUTPATIENT)
Dept: ONCOLOGY | Age: 59
End: 2019-08-15
Payer: COMMERCIAL

## 2019-08-15 ENCOUNTER — HOSPITAL ENCOUNTER (OUTPATIENT)
Dept: INFUSION THERAPY | Facility: MEDICAL CENTER | Age: 59
Discharge: HOME | End: 2019-08-15
Payer: COMMERCIAL

## 2019-08-15 VITALS
SYSTOLIC BLOOD PRESSURE: 115 MMHG | RESPIRATION RATE: 18 BRPM | DIASTOLIC BLOOD PRESSURE: 68 MMHG | TEMPERATURE: 98 F | HEART RATE: 77 BPM

## 2019-08-15 VITALS
TEMPERATURE: 98 F | SYSTOLIC BLOOD PRESSURE: 115 MMHG | RESPIRATION RATE: 18 BRPM | BODY MASS INDEX: 41.49 KG/M2 | HEART RATE: 77 BPM | DIASTOLIC BLOOD PRESSURE: 68 MMHG | WEIGHT: 264.9 LBS

## 2019-08-15 DIAGNOSIS — G62.9 NEUROPATHY: ICD-10-CM

## 2019-08-15 DIAGNOSIS — C50.412 MALIGNANT NEOPLASM OF UPPER-OUTER QUADRANT OF LEFT BREAST IN FEMALE, ESTROGEN RECEPTOR NEGATIVE (HCC): Primary | ICD-10-CM

## 2019-08-15 DIAGNOSIS — Z17.1 MALIGNANT NEOPLASM OF UPPER-OUTER QUADRANT OF LEFT BREAST IN FEMALE, ESTROGEN RECEPTOR NEGATIVE (HCC): Primary | ICD-10-CM

## 2019-08-15 LAB
ABSOLUTE EOS #: 0.04 K/UL (ref 0–0.44)
ABSOLUTE IMMATURE GRANULOCYTE: 0.04 K/UL (ref 0–0.3)
ABSOLUTE LYMPH #: 1.13 K/UL (ref 1.1–3.7)
ABSOLUTE MONO #: 0.55 K/UL (ref 0.1–1.2)
ALBUMIN SERPL-MCNC: 3.6 G/DL (ref 3.5–5.2)
ALBUMIN/GLOBULIN RATIO: ABNORMAL (ref 1–2.5)
ALP BLD-CCNC: 72 U/L (ref 35–104)
ALT SERPL-CCNC: 13 U/L (ref 5–33)
ANION GAP SERPL CALCULATED.3IONS-SCNC: 9 MMOL/L (ref 9–17)
AST SERPL-CCNC: 11 U/L
BASOPHILS # BLD: 0 % (ref 0–2)
BASOPHILS ABSOLUTE: <0.03 K/UL (ref 0–0.2)
BILIRUB SERPL-MCNC: 0.14 MG/DL (ref 0.3–1.2)
BUN BLDV-MCNC: 12 MG/DL (ref 6–20)
BUN/CREAT BLD: 20 (ref 9–20)
CALCIUM SERPL-MCNC: 9.3 MG/DL (ref 8.6–10.4)
CHLORIDE BLD-SCNC: 103 MMOL/L (ref 98–107)
CO2: 26 MMOL/L (ref 20–31)
CREAT SERPL-MCNC: 0.61 MG/DL (ref 0.5–0.9)
DIFFERENTIAL TYPE: ABNORMAL
EOSINOPHILS RELATIVE PERCENT: 1 % (ref 1–4)
GFR AFRICAN AMERICAN: >60 ML/MIN
GFR NON-AFRICAN AMERICAN: >60 ML/MIN
GFR SERPL CREATININE-BSD FRML MDRD: ABNORMAL ML/MIN/{1.73_M2}
GFR SERPL CREATININE-BSD FRML MDRD: ABNORMAL ML/MIN/{1.73_M2}
GLUCOSE BLD-MCNC: 106 MG/DL (ref 70–99)
HCT VFR BLD CALC: 31.2 % (ref 36.3–47.1)
HEMOGLOBIN: 9.5 G/DL (ref 11.9–15.1)
IMMATURE GRANULOCYTES: 1 %
LYMPHOCYTES # BLD: 22 % (ref 24–43)
MCH RBC QN AUTO: 29.1 PG (ref 25.2–33.5)
MCHC RBC AUTO-ENTMCNC: 30.4 G/DL (ref 28.4–34.8)
MCV RBC AUTO: 95.7 FL (ref 82.6–102.9)
MONOCYTES # BLD: 11 % (ref 3–12)
NRBC AUTOMATED: 0 PER 100 WBC
PDW BLD-RTO: 19.1 % (ref 11.8–14.4)
PLATELET # BLD: 232 K/UL (ref 138–453)
PLATELET ESTIMATE: ABNORMAL
PMV BLD AUTO: 8.9 FL (ref 8.1–13.5)
POTASSIUM SERPL-SCNC: 3.8 MMOL/L (ref 3.7–5.3)
RBC # BLD: 3.26 M/UL (ref 3.95–5.11)
RBC # BLD: ABNORMAL 10*6/UL
SEG NEUTROPHILS: 65 % (ref 36–65)
SEGMENTED NEUTROPHILS ABSOLUTE COUNT: 3.4 K/UL (ref 1.5–8.1)
SODIUM BLD-SCNC: 138 MMOL/L (ref 135–144)
TOTAL PROTEIN: 6 G/DL (ref 6.4–8.3)
WBC # BLD: 5.2 K/UL (ref 3.5–11.3)
WBC # BLD: ABNORMAL 10*3/UL

## 2019-08-15 PROCEDURE — 6360000002 HC RX W HCPCS: Performed by: INTERNAL MEDICINE

## 2019-08-15 PROCEDURE — G8417 CALC BMI ABV UP PARAM F/U: HCPCS | Performed by: INTERNAL MEDICINE

## 2019-08-15 PROCEDURE — 99214 OFFICE O/P EST MOD 30 MIN: CPT | Performed by: INTERNAL MEDICINE

## 2019-08-15 PROCEDURE — 2580000003 HC RX 258: Performed by: INTERNAL MEDICINE

## 2019-08-15 PROCEDURE — 80053 COMPREHEN METABOLIC PANEL: CPT

## 2019-08-15 PROCEDURE — 2500000003 HC RX 250 WO HCPCS: Performed by: INTERNAL MEDICINE

## 2019-08-15 PROCEDURE — 85025 COMPLETE CBC W/AUTO DIFF WBC: CPT

## 2019-08-15 PROCEDURE — 99211 OFF/OP EST MAY X REQ PHY/QHP: CPT | Performed by: INTERNAL MEDICINE

## 2019-08-15 PROCEDURE — 36591 DRAW BLOOD OFF VENOUS DEVICE: CPT

## 2019-08-15 PROCEDURE — 4004F PT TOBACCO SCREEN RCVD TLK: CPT | Performed by: INTERNAL MEDICINE

## 2019-08-15 PROCEDURE — 96375 TX/PRO/DX INJ NEW DRUG ADDON: CPT

## 2019-08-15 PROCEDURE — 96413 CHEMO IV INFUSION 1 HR: CPT

## 2019-08-15 PROCEDURE — G8427 DOCREV CUR MEDS BY ELIG CLIN: HCPCS | Performed by: INTERNAL MEDICINE

## 2019-08-15 PROCEDURE — 3017F COLORECTAL CA SCREEN DOC REV: CPT | Performed by: INTERNAL MEDICINE

## 2019-08-15 RX ORDER — SODIUM CHLORIDE 9 MG/ML
INJECTION, SOLUTION INTRAVENOUS ONCE
Status: COMPLETED | OUTPATIENT
Start: 2019-08-15 | End: 2019-08-15

## 2019-08-15 RX ORDER — 0.9 % SODIUM CHLORIDE 0.9 %
10 VIAL (ML) INJECTION ONCE
Status: COMPLETED | OUTPATIENT
Start: 2019-08-15 | End: 2019-08-15

## 2019-08-15 RX ORDER — EPINEPHRINE 1 MG/ML
0.3 INJECTION, SOLUTION, CONCENTRATE INTRAVENOUS PRN
Status: CANCELLED | OUTPATIENT
Start: 2019-08-15

## 2019-08-15 RX ORDER — MEPERIDINE HYDROCHLORIDE 50 MG/ML
12.5 INJECTION INTRAMUSCULAR; INTRAVENOUS; SUBCUTANEOUS ONCE
Status: CANCELLED | OUTPATIENT
Start: 2019-08-15

## 2019-08-15 RX ORDER — METHYLPREDNISOLONE SODIUM SUCCINATE 125 MG/2ML
125 INJECTION, POWDER, LYOPHILIZED, FOR SOLUTION INTRAMUSCULAR; INTRAVENOUS ONCE
Status: CANCELLED | OUTPATIENT
Start: 2019-08-15

## 2019-08-15 RX ORDER — ONDANSETRON 2 MG/ML
8 INJECTION INTRAMUSCULAR; INTRAVENOUS ONCE
Status: CANCELLED | OUTPATIENT
Start: 2019-08-22

## 2019-08-15 RX ORDER — FUROSEMIDE 20 MG/1
20 TABLET ORAL DAILY PRN
Qty: 30 TABLET | Refills: 1 | Status: SHIPPED | OUTPATIENT
Start: 2019-08-15 | End: 2019-08-28 | Stop reason: SDUPTHER

## 2019-08-15 RX ORDER — HEPARIN SODIUM (PORCINE) LOCK FLUSH IV SOLN 100 UNIT/ML 100 UNIT/ML
500 SOLUTION INTRAVENOUS PRN
Status: CANCELLED | OUTPATIENT
Start: 2019-08-15

## 2019-08-15 RX ORDER — SODIUM CHLORIDE 0.9 % (FLUSH) 0.9 %
10 SYRINGE (ML) INJECTION PRN
Status: CANCELLED | OUTPATIENT
Start: 2019-08-22

## 2019-08-15 RX ORDER — DIPHENHYDRAMINE HYDROCHLORIDE 50 MG/ML
50 INJECTION INTRAMUSCULAR; INTRAVENOUS ONCE
Status: CANCELLED | OUTPATIENT
Start: 2019-08-15

## 2019-08-15 RX ORDER — 0.9 % SODIUM CHLORIDE 0.9 %
10 VIAL (ML) INJECTION ONCE
Status: CANCELLED | OUTPATIENT
Start: 2019-08-15

## 2019-08-15 RX ORDER — HEPARIN SODIUM (PORCINE) LOCK FLUSH IV SOLN 100 UNIT/ML 100 UNIT/ML
500 SOLUTION INTRAVENOUS PRN
Status: DISCONTINUED | OUTPATIENT
Start: 2019-08-15 | End: 2019-08-16 | Stop reason: HOSPADM

## 2019-08-15 RX ORDER — GABAPENTIN 300 MG/1
300 CAPSULE ORAL 3 TIMES DAILY
Qty: 90 CAPSULE | Refills: 0 | Status: SHIPPED | OUTPATIENT
Start: 2019-08-15 | End: 2019-09-25 | Stop reason: SDUPTHER

## 2019-08-15 RX ORDER — SODIUM CHLORIDE 9 MG/ML
INJECTION, SOLUTION INTRAVENOUS ONCE
Status: CANCELLED | OUTPATIENT
Start: 2019-08-15

## 2019-08-15 RX ORDER — MEPERIDINE HYDROCHLORIDE 50 MG/ML
12.5 INJECTION INTRAMUSCULAR; INTRAVENOUS; SUBCUTANEOUS ONCE
Status: CANCELLED | OUTPATIENT
Start: 2019-08-22

## 2019-08-15 RX ORDER — SODIUM CHLORIDE 0.9 % (FLUSH) 0.9 %
10 SYRINGE (ML) INJECTION PRN
Status: CANCELLED | OUTPATIENT
Start: 2019-08-15

## 2019-08-15 RX ORDER — DEXAMETHASONE SODIUM PHOSPHATE 10 MG/ML
10 INJECTION INTRAMUSCULAR; INTRAVENOUS ONCE
Status: COMPLETED | OUTPATIENT
Start: 2019-08-15 | End: 2019-08-15

## 2019-08-15 RX ORDER — DIPHENHYDRAMINE HYDROCHLORIDE 50 MG/ML
50 INJECTION INTRAMUSCULAR; INTRAVENOUS ONCE
Status: CANCELLED | OUTPATIENT
Start: 2019-08-22

## 2019-08-15 RX ORDER — ONDANSETRON 2 MG/ML
8 INJECTION INTRAMUSCULAR; INTRAVENOUS ONCE
Status: CANCELLED | OUTPATIENT
Start: 2019-08-15

## 2019-08-15 RX ORDER — METHYLPREDNISOLONE SODIUM SUCCINATE 125 MG/2ML
125 INJECTION, POWDER, LYOPHILIZED, FOR SOLUTION INTRAMUSCULAR; INTRAVENOUS ONCE
Status: CANCELLED | OUTPATIENT
Start: 2019-08-22

## 2019-08-15 RX ORDER — 0.9 % SODIUM CHLORIDE 0.9 %
10 VIAL (ML) INJECTION ONCE
Status: CANCELLED | OUTPATIENT
Start: 2019-08-22

## 2019-08-15 RX ORDER — SODIUM CHLORIDE 9 MG/ML
INJECTION, SOLUTION INTRAVENOUS CONTINUOUS
Status: CANCELLED | OUTPATIENT
Start: 2019-08-22

## 2019-08-15 RX ORDER — DIPHENHYDRAMINE HYDROCHLORIDE 50 MG/ML
50 INJECTION INTRAMUSCULAR; INTRAVENOUS ONCE
Status: COMPLETED | OUTPATIENT
Start: 2019-08-15 | End: 2019-08-15

## 2019-08-15 RX ORDER — EPINEPHRINE 1 MG/ML
0.3 INJECTION, SOLUTION, CONCENTRATE INTRAVENOUS PRN
Status: CANCELLED | OUTPATIENT
Start: 2019-08-22

## 2019-08-15 RX ORDER — SODIUM CHLORIDE 9 MG/ML
INJECTION, SOLUTION INTRAVENOUS CONTINUOUS
Status: CANCELLED | OUTPATIENT
Start: 2019-08-15

## 2019-08-15 RX ORDER — ONDANSETRON 2 MG/ML
8 INJECTION INTRAMUSCULAR; INTRAVENOUS ONCE
Status: COMPLETED | OUTPATIENT
Start: 2019-08-15 | End: 2019-08-15

## 2019-08-15 RX ORDER — SODIUM CHLORIDE 0.9 % (FLUSH) 0.9 %
5 SYRINGE (ML) INJECTION PRN
Status: CANCELLED | OUTPATIENT
Start: 2019-08-22

## 2019-08-15 RX ORDER — SODIUM CHLORIDE 0.9 % (FLUSH) 0.9 %
10 SYRINGE (ML) INJECTION PRN
Status: DISCONTINUED | OUTPATIENT
Start: 2019-08-15 | End: 2019-08-16 | Stop reason: HOSPADM

## 2019-08-15 RX ORDER — SODIUM CHLORIDE 9 MG/ML
INJECTION, SOLUTION INTRAVENOUS ONCE
Status: CANCELLED | OUTPATIENT
Start: 2019-08-22

## 2019-08-15 RX ORDER — SODIUM CHLORIDE 0.9 % (FLUSH) 0.9 %
5 SYRINGE (ML) INJECTION PRN
Status: CANCELLED | OUTPATIENT
Start: 2019-08-15

## 2019-08-15 RX ORDER — HEPARIN SODIUM (PORCINE) LOCK FLUSH IV SOLN 100 UNIT/ML 100 UNIT/ML
500 SOLUTION INTRAVENOUS PRN
Status: CANCELLED | OUTPATIENT
Start: 2019-08-22

## 2019-08-15 RX ADMIN — ONDANSETRON 8 MG: 2 INJECTION INTRAMUSCULAR; INTRAVENOUS at 11:07

## 2019-08-15 RX ADMIN — Medication 10 ML: at 13:17

## 2019-08-15 RX ADMIN — DEXAMETHASONE SODIUM PHOSPHATE 10 MG: 10 INJECTION INTRAMUSCULAR; INTRAVENOUS at 11:14

## 2019-08-15 RX ADMIN — FAMOTIDINE 20 MG: 10 INJECTION, SOLUTION INTRAVENOUS at 11:11

## 2019-08-15 RX ADMIN — Medication 10 ML: at 11:11

## 2019-08-15 RX ADMIN — Medication 10 ML: at 09:30

## 2019-08-15 RX ADMIN — SODIUM CHLORIDE: 9 INJECTION, SOLUTION INTRAVENOUS at 11:07

## 2019-08-15 RX ADMIN — Medication 500 UNITS: at 13:17

## 2019-08-15 RX ADMIN — DIPHENHYDRAMINE HYDROCHLORIDE 50 MG: 50 INJECTION, SOLUTION INTRAMUSCULAR; INTRAVENOUS at 11:18

## 2019-08-15 RX ADMIN — PACLITAXEL 192 MG: 6 INJECTION, SOLUTION INTRAVENOUS at 11:49

## 2019-08-15 ASSESSMENT — PAIN SCALES - GENERAL: PAINLEVEL_OUTOF10: 6

## 2019-08-15 ASSESSMENT — PAIN DESCRIPTION - PAIN TYPE: TYPE: CHRONIC PAIN

## 2019-08-15 ASSESSMENT — PAIN DESCRIPTION - LOCATION: LOCATION: BACK

## 2019-08-15 NOTE — PROGRESS NOTES
[facility] OR    MASTECTOMY, BILATERAL Bilateral 02/12/2019     TOTAL MASTECTOMY,  AXILLARY SENTINEL LYMPH NODE BIOPSY, FROZEN SECTION     SC COLSC FLX W/REMOVAL LESION BY HOT BX FORCEPS N/A 10/13/2017    COLONOSCOPY WITH BIOPSY AND POLYPECTOMY performed by Carline Jovel IV, DO at [address]    TUNNELED VENOUS PORT PLACEMENT  03/27/2019    X-RAY FOOT 3+VW      both       Patient Family Social History:     Social History     Socioeconomic History    Marital status: Single     Spouse name: Not on file    Number of children: Not on file    Years of education: Not on file    Highest education level: Not on file   Occupational History    Not on file   Social Needs    Financial resource strain: Not on file    Food insecurity:     Worry: Not on file     Inability: Not on file    Transportation needs:     Medical: Not on file     Non-medical: Not on file   Tobacco Use    Smoking status: Current Some Day Smoker     Packs/day: 0.00     Years: 41.00     Pack years: 0.00     Types: Cigarettes    Smokeless tobacco: Never Used    Tobacco comment: STATES SMOKES A PACK A WEEK   Substance and Sexual Activity    Alcohol use: No    Drug use: Yes     Frequency: 7.0 times per week     Types: Marijuana     Comment: LAST USE 02/10/2018, AND PAIN PILLS     Sexual activity: Not Currently   Lifestyle    Physical activity:     Days per week: Not on file     Minutes per session: Not on file    Stress: Not on file   Relationships    Social connections:     Talks on phone: Not on file     Gets together: Not on file     Attends Anabaptism service: Not on file     Active member of club or organization: Not on file     Attends meetings of clubs or organizations: Not on file     Relationship status: Not on file    Intimate partner violence:     Fear of current or ex partner: Not on file     Emotionally abused: Not on file     Physically abused: Not on file     Forced sexual activity: Not on file   Other General appearance - well appearing, no in pain or distress  Mental status - AAO X3  Eyes - pupils equal and reactive, extraocular eye movements intact  Mouth - mucous membranes moist, pharynx normal without lesions  Neck - supple, no significant adenopathy  Lymphatics - no palpable lymphadenopathy, no hepatosplenomegaly  Chest - clear to auscultation, no wheezes, rales or rhonchi, symmetric air entry  Heart - normal rate, regular rhythm, normal S1, S2, no murmurs  Abdomen - soft, nontender, nondistended, no masses or organomegaly  Neurological - alert, oriented, normal speech, no focal findings or movement disorder noted; +neuropathy - stable  Extremities - positive pitting edema - resolved  Skin - normal coloration and turgor, no rashes, no suspicious skin lesions noted; +nail changes      DATA:    Results for orders placed or performed during the hospital encounter of 02/12/19   Anaerobic and Aerobic Culture   Result Value Ref Range    Specimen Description . AXILLA SWAB     Special Requests SKIN ABSCESS LEFT AXILLA     Direct Exam NO NEUTROPHILS SEEN     Direct Exam NO BACTERIA SEEN     Culture NORMAL YOANA     Culture ANAEROCOCCUS PREVOTII  LIGHT GROWTH   (A)    Hemoglobin and hematocrit, blood   Result Value Ref Range    POC Hemoglobin 14.8 12.0 - 16.0 g/dL    POC Hematocrit 43 36 - 46 %   SODIUM (POC)   Result Value Ref Range    POC Sodium 143 138 - 146 mmol/L   POTASSIUM (POC)   Result Value Ref Range    POC Potassium 4.3 3.5 - 4.5 mmol/L   CHLORIDE (POC)   Result Value Ref Range    POC Chloride 108 (H) 98 - 107 mmol/L   CALCIUM, IONIC (POC)   Result Value Ref Range    POC Ionized Calcium 1.29 1.15 - 1.33 mmol/L   Surgical Pathology   Result Value Ref Range    Comment SPECIMEN RECEIVED    Surgical Pathology   Result Value Ref Range    Surgical Pathology Report       [id number]  [employer]  [facility]  ANATOMIC PATHOLOGY  [address] [place] [address], [address].   [place], [address]  (139) quadrant. tm      Intr aoperative Diagnosis  1. FSDX:  Lymph nodes, no neoplasm detected. Saray Mandujano, 1138,  2/12/2019)      Microscopic Description  1-2. INVASIVE BREAST CARCINOMA         PROCEDURE:     Mastectomy  SPECIMEN LATERALITY AND TUMOR SITE:     Left, not specified further    TUMOR SIZE (LARGEST INVASIVE COMPONENT):     3.9 x 2.5 x 2.0 cm  HISTOLOGIC TYPE OF INVASIVE CARCINOMA:     Ductal; some features  suggest neuroendocrine differentiation; however, control-reactive  immunostain for synaptophysin is negative  HISTOLOGIC GRADE (YOLETTE)       - GLANDULAR/TUBULAR DIFFERENTIATION SCORE:     3  - NUCLEAR PLEOMORPHISM SCORE:     2  - MITOTIC RATE SCORE:     3  - OVERALL GRADE (FROM YOLETTE TOTAL SCORE):     Score 8 = grade 3    TUMOR FOCALITY:       DUCTAL CARCINOMA IN SITU:     Present, few peritumoral foci  TUMOR EXTENSION       - SKIN:     Negative  - NIPPLE:     Negative  - SKELETAL MUSCLE:     N/A    MARGINS - INVASIVE CARCINOMA -  - SITE(S) OF ALL POSITIVE MARGINS: N/A  - IF ALL NEGATI VE, SITE(S) AND DISTANCE TO CLOSEST: 5 cm or more  MARGINS - DCIS -  - SITE(S) OF ALL POSITIVE MARGINS: N/A  - IF ALL NEGATIVE, SITE(S) AND DISTANCE TO CLOSEST:     5 cm or more  LYMPHOVASCULAR INVASION:     Negative    REGIONAL LYMPH NODES -       - NUMBER EXAMINED (SENTINEL AND NON-SENTINEL):     4+3=7  - NUMBER OF SENTINEL:     4  - NUMBER WITH MACROMETASTASES (>2MM):     0  - NUMBER WITH MICROMETASTASES (>0.2MM-2MM AND / OR >200 CELLS):     0;  confirms frozen section diagnoses  - NUMBER WITH ISOLATED TUMOR CELLS (<0.2 MM AND  <200 CELLS):     N/A  - SIZE OF LARGEST METASTATIC DEPOSIT:     N/A   - EXTRANODAL EXTENSION:     N/A  TREATMENT EFFECT: RESPONSE TO PRE-SURGICAL (NEOADJUVANT) THERAPY (IF  APPLICABLE)       - IN BREAST TISSUES:     N/A  - IN LYMPH NODES:     N/A    PATHOLOGIC STAGE CLASSIFICATION (pTNM):     pT2  pN0    CAP InvasiveBreast 4100 in conjunction with AJCC 8 ed.     BIOMARKER TESTING

## 2019-08-15 NOTE — PROGRESS NOTES
Pt arrives per ambulatory per self and labs and orders reviewed and pt seen per md. Pt tolerated premeds and NS flushing line before and between and after meds and chemo. Taxol infusing at 100 ml/hr for 10 min, then infusing at 290 ml/hr for remainder. No reactions or complaints and blood return present throughout infusion. Pt given AVS with next appts and pt discharged per ambulatory per self.

## 2019-08-19 ENCOUNTER — HOSPITAL ENCOUNTER (OUTPATIENT)
Facility: MEDICAL CENTER | Age: 59
End: 2019-08-19
Payer: COMMERCIAL

## 2019-08-22 ENCOUNTER — HOSPITAL ENCOUNTER (OUTPATIENT)
Dept: INFUSION THERAPY | Facility: MEDICAL CENTER | Age: 59
Discharge: HOME | End: 2019-08-22
Payer: COMMERCIAL

## 2019-08-22 VITALS
WEIGHT: 261.5 LBS | HEART RATE: 76 BPM | BODY MASS INDEX: 40.96 KG/M2 | SYSTOLIC BLOOD PRESSURE: 107 MMHG | RESPIRATION RATE: 18 BRPM | TEMPERATURE: 97.8 F | DIASTOLIC BLOOD PRESSURE: 62 MMHG

## 2019-08-22 DIAGNOSIS — Z17.1 MALIGNANT NEOPLASM OF UPPER-OUTER QUADRANT OF LEFT BREAST IN FEMALE, ESTROGEN RECEPTOR NEGATIVE (HCC): Primary | ICD-10-CM

## 2019-08-22 DIAGNOSIS — C50.412 MALIGNANT NEOPLASM OF UPPER-OUTER QUADRANT OF LEFT BREAST IN FEMALE, ESTROGEN RECEPTOR NEGATIVE (HCC): Primary | ICD-10-CM

## 2019-08-22 LAB
ABSOLUTE EOS #: 0.06 K/UL (ref 0–0.44)
ABSOLUTE IMMATURE GRANULOCYTE: 0.02 K/UL (ref 0–0.3)
ABSOLUTE LYMPH #: 1.33 K/UL (ref 1.1–3.7)
ABSOLUTE MONO #: 0.6 K/UL (ref 0.1–1.2)
ALBUMIN SERPL-MCNC: 3.8 G/DL (ref 3.5–5.2)
ALBUMIN/GLOBULIN RATIO: ABNORMAL (ref 1–2.5)
ALP BLD-CCNC: 81 U/L (ref 35–104)
ALT SERPL-CCNC: 13 U/L (ref 5–33)
ANION GAP SERPL CALCULATED.3IONS-SCNC: 12 MMOL/L (ref 9–17)
AST SERPL-CCNC: 11 U/L
BASOPHILS # BLD: 1 % (ref 0–2)
BASOPHILS ABSOLUTE: 0.04 K/UL (ref 0–0.2)
BILIRUB SERPL-MCNC: <0.1 MG/DL (ref 0.3–1.2)
BUN BLDV-MCNC: 14 MG/DL (ref 6–20)
BUN/CREAT BLD: 20 (ref 9–20)
CALCIUM SERPL-MCNC: 9.5 MG/DL (ref 8.6–10.4)
CHLORIDE BLD-SCNC: 103 MMOL/L (ref 98–107)
CO2: 25 MMOL/L (ref 20–31)
CREAT SERPL-MCNC: 0.71 MG/DL (ref 0.5–0.9)
DIFFERENTIAL TYPE: ABNORMAL
EOSINOPHILS RELATIVE PERCENT: 1 % (ref 1–4)
GFR AFRICAN AMERICAN: >60 ML/MIN
GFR NON-AFRICAN AMERICAN: >60 ML/MIN
GFR SERPL CREATININE-BSD FRML MDRD: ABNORMAL ML/MIN/{1.73_M2}
GFR SERPL CREATININE-BSD FRML MDRD: ABNORMAL ML/MIN/{1.73_M2}
GLUCOSE BLD-MCNC: 119 MG/DL (ref 70–99)
HCT VFR BLD CALC: 34.6 % (ref 36.3–47.1)
HEMOGLOBIN: 10.6 G/DL (ref 11.9–15.1)
IMMATURE GRANULOCYTES: 0 %
LYMPHOCYTES # BLD: 24 % (ref 24–43)
MCH RBC QN AUTO: 29.4 PG (ref 25.2–33.5)
MCHC RBC AUTO-ENTMCNC: 30.6 G/DL (ref 28.4–34.8)
MCV RBC AUTO: 95.8 FL (ref 82.6–102.9)
MONOCYTES # BLD: 11 % (ref 3–12)
NRBC AUTOMATED: 0 PER 100 WBC
PDW BLD-RTO: 18 % (ref 11.8–14.4)
PLATELET # BLD: 266 K/UL (ref 138–453)
PLATELET ESTIMATE: ABNORMAL
PMV BLD AUTO: 10.1 FL (ref 8.1–13.5)
POTASSIUM SERPL-SCNC: 4.6 MMOL/L (ref 3.7–5.3)
RBC # BLD: 3.61 M/UL (ref 3.95–5.11)
RBC # BLD: ABNORMAL 10*6/UL
SEG NEUTROPHILS: 63 % (ref 36–65)
SEGMENTED NEUTROPHILS ABSOLUTE COUNT: 3.57 K/UL (ref 1.5–8.1)
SODIUM BLD-SCNC: 140 MMOL/L (ref 135–144)
TOTAL PROTEIN: 6.5 G/DL (ref 6.4–8.3)
WBC # BLD: 5.6 K/UL (ref 3.5–11.3)
WBC # BLD: ABNORMAL 10*3/UL

## 2019-08-22 PROCEDURE — 85025 COMPLETE CBC W/AUTO DIFF WBC: CPT

## 2019-08-22 PROCEDURE — 2580000003 HC RX 258: Performed by: INTERNAL MEDICINE

## 2019-08-22 PROCEDURE — 2500000003 HC RX 250 WO HCPCS: Performed by: INTERNAL MEDICINE

## 2019-08-22 PROCEDURE — 6360000002 HC RX W HCPCS: Performed by: INTERNAL MEDICINE

## 2019-08-22 PROCEDURE — 80053 COMPREHEN METABOLIC PANEL: CPT

## 2019-08-22 PROCEDURE — 96413 CHEMO IV INFUSION 1 HR: CPT

## 2019-08-22 PROCEDURE — 36591 DRAW BLOOD OFF VENOUS DEVICE: CPT

## 2019-08-22 PROCEDURE — 96375 TX/PRO/DX INJ NEW DRUG ADDON: CPT

## 2019-08-22 PROCEDURE — 96415 CHEMO IV INFUSION ADDL HR: CPT

## 2019-08-22 RX ORDER — ONDANSETRON 2 MG/ML
8 INJECTION INTRAMUSCULAR; INTRAVENOUS ONCE
Status: COMPLETED | OUTPATIENT
Start: 2019-08-22 | End: 2019-08-22

## 2019-08-22 RX ORDER — SODIUM CHLORIDE 9 MG/ML
INJECTION, SOLUTION INTRAVENOUS ONCE
Status: COMPLETED | OUTPATIENT
Start: 2019-08-22 | End: 2019-08-22

## 2019-08-22 RX ORDER — DEXAMETHASONE SODIUM PHOSPHATE 10 MG/ML
10 INJECTION INTRAMUSCULAR; INTRAVENOUS ONCE
Status: COMPLETED | OUTPATIENT
Start: 2019-08-22 | End: 2019-08-22

## 2019-08-22 RX ORDER — SODIUM CHLORIDE 0.9 % (FLUSH) 0.9 %
10 SYRINGE (ML) INJECTION PRN
Status: DISCONTINUED | OUTPATIENT
Start: 2019-08-22 | End: 2019-08-23 | Stop reason: HOSPADM

## 2019-08-22 RX ORDER — 0.9 % SODIUM CHLORIDE 0.9 %
10 VIAL (ML) INJECTION ONCE
Status: COMPLETED | OUTPATIENT
Start: 2019-08-22 | End: 2019-08-22

## 2019-08-22 RX ORDER — DIPHENHYDRAMINE HYDROCHLORIDE 50 MG/ML
50 INJECTION INTRAMUSCULAR; INTRAVENOUS ONCE
Status: COMPLETED | OUTPATIENT
Start: 2019-08-22 | End: 2019-08-22

## 2019-08-22 RX ORDER — HEPARIN SODIUM (PORCINE) LOCK FLUSH IV SOLN 100 UNIT/ML 100 UNIT/ML
500 SOLUTION INTRAVENOUS PRN
Status: DISCONTINUED | OUTPATIENT
Start: 2019-08-22 | End: 2019-08-23 | Stop reason: HOSPADM

## 2019-08-22 RX ADMIN — Medication 10 ML: at 12:30

## 2019-08-22 RX ADMIN — DIPHENHYDRAMINE HYDROCHLORIDE 50 MG: 50 INJECTION, SOLUTION INTRAMUSCULAR; INTRAVENOUS at 12:30

## 2019-08-22 RX ADMIN — Medication 10 ML: at 14:46

## 2019-08-22 RX ADMIN — FAMOTIDINE 20 MG: 10 INJECTION, SOLUTION INTRAVENOUS at 12:30

## 2019-08-22 RX ADMIN — DEXAMETHASONE SODIUM PHOSPHATE 10 MG: 10 INJECTION INTRAMUSCULAR; INTRAVENOUS at 12:31

## 2019-08-22 RX ADMIN — Medication 500 UNITS: at 14:47

## 2019-08-22 RX ADMIN — PACLITAXEL 192 MG: 6 INJECTION, SOLUTION INTRAVENOUS at 13:11

## 2019-08-22 RX ADMIN — SODIUM CHLORIDE: 9 INJECTION, SOLUTION INTRAVENOUS at 12:10

## 2019-08-22 RX ADMIN — ONDANSETRON 8 MG: 2 INJECTION INTRAMUSCULAR; INTRAVENOUS at 12:30

## 2019-08-22 NOTE — PROGRESS NOTES
Patient arrive ambulatory for cycle 12 day 1 treatment. Port accessed; specimen sent. Labs reviewed. Patient premedicated. Taxol infusion begin slowly with no adverse reaction; then increased to infuse at hourly rate. Patient tolerate with no adverse reaction. Port flushed and heparinized with intact rosas needle removed per protocol. Patient states her son needs a slip for work to be excused as he has been transporting her on Thursdays during her treatment. Note written for Be Rosa given to patient. Further stated to patient if additional days or information are required he may submit appropriate [facility] paperwork for completion. Copy of note to  to scan to chart. Patient ambulate off unit per self at discharge.

## 2019-08-27 DIAGNOSIS — G62.9 NEUROPATHY: ICD-10-CM

## 2019-08-27 DIAGNOSIS — C50.412 MALIGNANT NEOPLASM OF UPPER-OUTER QUADRANT OF LEFT BREAST IN FEMALE, ESTROGEN RECEPTOR NEGATIVE (HCC): ICD-10-CM

## 2019-08-27 DIAGNOSIS — Z17.1 MALIGNANT NEOPLASM OF UPPER-OUTER QUADRANT OF LEFT BREAST IN FEMALE, ESTROGEN RECEPTOR NEGATIVE (HCC): ICD-10-CM

## 2019-08-27 DIAGNOSIS — Z76.0 MEDICATION REFILL: ICD-10-CM

## 2019-08-28 RX ORDER — OXYCODONE AND ACETAMINOPHEN 10; 325 MG/1; MG/1
1 TABLET ORAL EVERY 6 HOURS PRN
Qty: 120 TABLET | Refills: 0 | Status: SHIPPED | OUTPATIENT
Start: 2019-08-28 | End: 2019-09-25 | Stop reason: SDUPTHER

## 2019-08-28 RX ORDER — AMLODIPINE BESYLATE 10 MG/1
TABLET ORAL
Qty: 90 TABLET | Refills: 1 | Status: SHIPPED | OUTPATIENT
Start: 2019-08-28 | End: 2020-01-06 | Stop reason: SDUPTHER

## 2019-08-28 RX ORDER — FUROSEMIDE 20 MG/1
20 TABLET ORAL DAILY PRN
Qty: 30 TABLET | Refills: 1 | Status: SHIPPED | OUTPATIENT
Start: 2019-08-28 | End: 2019-09-12 | Stop reason: SDUPTHER

## 2019-09-09 ENCOUNTER — HOSPITAL ENCOUNTER (OUTPATIENT)
Facility: MEDICAL CENTER | Age: 59
End: 2019-09-09
Payer: COMMERCIAL

## 2019-09-12 ENCOUNTER — HOSPITAL ENCOUNTER (OUTPATIENT)
Dept: INFUSION THERAPY | Facility: MEDICAL CENTER | Age: 59
Discharge: HOME | End: 2019-09-12
Payer: COMMERCIAL

## 2019-09-12 ENCOUNTER — TELEPHONE (OUTPATIENT)
Dept: ONCOLOGY | Age: 59
End: 2019-09-12

## 2019-09-12 ENCOUNTER — OFFICE VISIT (OUTPATIENT)
Dept: ONCOLOGY | Age: 59
End: 2019-09-12
Payer: COMMERCIAL

## 2019-09-12 VITALS
DIASTOLIC BLOOD PRESSURE: 78 MMHG | SYSTOLIC BLOOD PRESSURE: 114 MMHG | WEIGHT: 264.9 LBS | TEMPERATURE: 98.4 F | BODY MASS INDEX: 41.49 KG/M2 | HEART RATE: 88 BPM

## 2019-09-12 VITALS
SYSTOLIC BLOOD PRESSURE: 114 MMHG | HEART RATE: 88 BPM | WEIGHT: 264.9 LBS | DIASTOLIC BLOOD PRESSURE: 78 MMHG | RESPIRATION RATE: 18 BRPM | TEMPERATURE: 98.4 F | BODY MASS INDEX: 41.49 KG/M2

## 2019-09-12 DIAGNOSIS — C50.412 MALIGNANT NEOPLASM OF UPPER-OUTER QUADRANT OF LEFT BREAST IN FEMALE, ESTROGEN RECEPTOR NEGATIVE (HCC): Primary | ICD-10-CM

## 2019-09-12 DIAGNOSIS — G62.9 NEUROPATHY: ICD-10-CM

## 2019-09-12 DIAGNOSIS — Z17.1 MALIGNANT NEOPLASM OF UPPER-OUTER QUADRANT OF LEFT BREAST IN FEMALE, ESTROGEN RECEPTOR NEGATIVE (HCC): Primary | ICD-10-CM

## 2019-09-12 LAB
ABSOLUTE EOS #: 0.21 K/UL (ref 0–0.44)
ABSOLUTE IMMATURE GRANULOCYTE: 0.03 K/UL (ref 0–0.3)
ABSOLUTE LYMPH #: 1.88 K/UL (ref 1.1–3.7)
ABSOLUTE MONO #: 1.01 K/UL (ref 0.1–1.2)
ALBUMIN SERPL-MCNC: 3.4 G/DL (ref 3.5–5.2)
ALBUMIN/GLOBULIN RATIO: ABNORMAL (ref 1–2.5)
ALP BLD-CCNC: 96 U/L (ref 35–104)
ALT SERPL-CCNC: 9 U/L (ref 5–33)
ANION GAP SERPL CALCULATED.3IONS-SCNC: 8 MMOL/L (ref 9–17)
AST SERPL-CCNC: 11 U/L
BASOPHILS # BLD: 1 % (ref 0–2)
BASOPHILS ABSOLUTE: 0.05 K/UL (ref 0–0.2)
BILIRUB SERPL-MCNC: 0.1 MG/DL (ref 0.3–1.2)
BUN BLDV-MCNC: 12 MG/DL (ref 6–20)
BUN/CREAT BLD: 16 (ref 9–20)
CALCIUM SERPL-MCNC: 9.3 MG/DL (ref 8.6–10.4)
CHLORIDE BLD-SCNC: 105 MMOL/L (ref 98–107)
CO2: 27 MMOL/L (ref 20–31)
CREAT SERPL-MCNC: 0.73 MG/DL (ref 0.5–0.9)
DIFFERENTIAL TYPE: ABNORMAL
EOSINOPHILS RELATIVE PERCENT: 2 % (ref 1–4)
GFR AFRICAN AMERICAN: >60 ML/MIN
GFR NON-AFRICAN AMERICAN: >60 ML/MIN
GFR SERPL CREATININE-BSD FRML MDRD: ABNORMAL ML/MIN/{1.73_M2}
GFR SERPL CREATININE-BSD FRML MDRD: ABNORMAL ML/MIN/{1.73_M2}
GLUCOSE BLD-MCNC: 100 MG/DL (ref 70–99)
HCT VFR BLD CALC: 35.5 % (ref 36.3–47.1)
HEMOGLOBIN: 10.9 G/DL (ref 11.9–15.1)
IMMATURE GRANULOCYTES: 0 %
LYMPHOCYTES # BLD: 21 % (ref 24–43)
MCH RBC QN AUTO: 28.8 PG (ref 25.2–33.5)
MCHC RBC AUTO-ENTMCNC: 30.7 G/DL (ref 28.4–34.8)
MCV RBC AUTO: 93.9 FL (ref 82.6–102.9)
MONOCYTES # BLD: 11 % (ref 3–12)
NRBC AUTOMATED: 0 PER 100 WBC
PDW BLD-RTO: 17.4 % (ref 11.8–14.4)
PLATELET # BLD: 210 K/UL (ref 138–453)
PLATELET ESTIMATE: ABNORMAL
PMV BLD AUTO: 9.7 FL (ref 8.1–13.5)
POTASSIUM SERPL-SCNC: 4.1 MMOL/L (ref 3.7–5.3)
RBC # BLD: 3.78 M/UL (ref 3.95–5.11)
RBC # BLD: ABNORMAL 10*6/UL
SEG NEUTROPHILS: 65 % (ref 36–65)
SEGMENTED NEUTROPHILS ABSOLUTE COUNT: 5.75 K/UL (ref 1.5–8.1)
SODIUM BLD-SCNC: 140 MMOL/L (ref 135–144)
TOTAL PROTEIN: 6 G/DL (ref 6.4–8.3)
WBC # BLD: 8.9 K/UL (ref 3.5–11.3)
WBC # BLD: ABNORMAL 10*3/UL

## 2019-09-12 PROCEDURE — 99212 OFFICE O/P EST SF 10 MIN: CPT | Performed by: INTERNAL MEDICINE

## 2019-09-12 PROCEDURE — 4004F PT TOBACCO SCREEN RCVD TLK: CPT | Performed by: INTERNAL MEDICINE

## 2019-09-12 PROCEDURE — 3017F COLORECTAL CA SCREEN DOC REV: CPT | Performed by: INTERNAL MEDICINE

## 2019-09-12 PROCEDURE — G8417 CALC BMI ABV UP PARAM F/U: HCPCS | Performed by: INTERNAL MEDICINE

## 2019-09-12 PROCEDURE — 80053 COMPREHEN METABOLIC PANEL: CPT

## 2019-09-12 PROCEDURE — 2580000003 HC RX 258: Performed by: INTERNAL MEDICINE

## 2019-09-12 PROCEDURE — 85025 COMPLETE CBC W/AUTO DIFF WBC: CPT

## 2019-09-12 PROCEDURE — 36591 DRAW BLOOD OFF VENOUS DEVICE: CPT

## 2019-09-12 PROCEDURE — 6360000002 HC RX W HCPCS: Performed by: INTERNAL MEDICINE

## 2019-09-12 PROCEDURE — G8427 DOCREV CUR MEDS BY ELIG CLIN: HCPCS | Performed by: INTERNAL MEDICINE

## 2019-09-12 PROCEDURE — 99214 OFFICE O/P EST MOD 30 MIN: CPT | Performed by: INTERNAL MEDICINE

## 2019-09-12 RX ORDER — HEPARIN SODIUM (PORCINE) LOCK FLUSH IV SOLN 100 UNIT/ML 100 UNIT/ML
500 SOLUTION INTRAVENOUS PRN
Status: CANCELLED | OUTPATIENT
Start: 2019-09-12

## 2019-09-12 RX ORDER — FUROSEMIDE 20 MG/1
20 TABLET ORAL DAILY
Qty: 30 TABLET | Refills: 1 | Status: SHIPPED | OUTPATIENT
Start: 2019-09-12 | End: 2020-01-06 | Stop reason: SDUPTHER

## 2019-09-12 RX ORDER — SODIUM CHLORIDE 0.9 % (FLUSH) 0.9 %
10 SYRINGE (ML) INJECTION PRN
Status: CANCELLED | OUTPATIENT
Start: 2019-09-12

## 2019-09-12 RX ORDER — HEPARIN SODIUM (PORCINE) LOCK FLUSH IV SOLN 100 UNIT/ML 100 UNIT/ML
500 SOLUTION INTRAVENOUS PRN
Status: DISCONTINUED | OUTPATIENT
Start: 2019-09-12 | End: 2019-09-13 | Stop reason: HOSPADM

## 2019-09-12 RX ORDER — SODIUM CHLORIDE 0.9 % (FLUSH) 0.9 %
10 SYRINGE (ML) INJECTION PRN
Status: DISCONTINUED | OUTPATIENT
Start: 2019-09-12 | End: 2019-09-13 | Stop reason: HOSPADM

## 2019-09-12 RX ORDER — SODIUM CHLORIDE 0.9 % (FLUSH) 0.9 %
20 SYRINGE (ML) INJECTION PRN
Status: DISCONTINUED | OUTPATIENT
Start: 2019-09-12 | End: 2019-09-13 | Stop reason: HOSPADM

## 2019-09-12 RX ORDER — SODIUM CHLORIDE 0.9 % (FLUSH) 0.9 %
20 SYRINGE (ML) INJECTION PRN
Status: CANCELLED | OUTPATIENT
Start: 2019-09-12

## 2019-09-12 RX ADMIN — Medication 10 ML: at 10:00

## 2019-09-12 RX ADMIN — Medication 500 UNITS: at 10:38

## 2019-09-12 RX ADMIN — Medication 20 ML: at 10:38

## 2019-09-12 ASSESSMENT — PAIN DESCRIPTION - ORIENTATION: ORIENTATION: LOWER;RIGHT;LEFT

## 2019-09-12 ASSESSMENT — PAIN DESCRIPTION - LOCATION: LOCATION: BACK;LEG;FOOT

## 2019-09-12 ASSESSMENT — PAIN SCALES - GENERAL: PAINLEVEL_OUTOF10: 6

## 2019-09-12 ASSESSMENT — PAIN DESCRIPTION - ONSET: ONSET: ON-GOING

## 2019-09-12 ASSESSMENT — PAIN DESCRIPTION - PROGRESSION: CLINICAL_PROGRESSION: NOT CHANGED

## 2019-09-12 ASSESSMENT — PAIN DESCRIPTION - FREQUENCY: FREQUENCY: CONTINUOUS

## 2019-09-12 ASSESSMENT — PAIN DESCRIPTION - PAIN TYPE: TYPE: CHRONIC PAIN

## 2019-09-12 NOTE — PROGRESS NOTES
1381:  Pt arrives ambulatory for RN draw and MD visit. Orders reviewed. Pt c/o's feeling worse now than when she did when she was on chemo 3 weeks ago. Pt reports having chronic lower back pain and usual numbness/tingling to her bilateral lower extremities and burning to the bottom of her feet. Denies other complaints. VS and weight obtained. 1000:  Pt's port accessed per policy as charted in LDA's. Labs drawn/sent to lab pending. Pt's port flushed as per [facility] and then port cap applied and line clamped. 1026:  Dr. Vicenta Rodgers in treatment area to see the patient. Labs reviewed. MD visit note orders reviewed. Plan is to schedule pt to have her port removed. Okay to flush and heparinize pt's port and de-access. Pt's port flushed and heparinized as per [facility] and then de-accessed as charted in LDA's. Pt sent to checkout to  her AVS.  RV on 12/5/19. Discharged to home.

## 2019-09-12 NOTE — PROGRESS NOTES
Not on file      Family History   Problem Relation Age of Onset    High Blood Pressure Mother     Cancer Mother         cervical and breast    Diabetes Father     Heart Attack Father     Heart Disease Maternal Aunt         times 2      Current Medications:     Current Outpatient Medications   Medication Sig Dispense Refill    amLODIPine (NORVASC) 10 MG tablet TAKE 1 TABLET EVERY DAY 90 tablet 1    furosemide (LASIX) 20 MG tablet Take 1 tablet by mouth daily as needed (leg swelling) (Patient taking differently: Take 20 mg by mouth daily ) 30 tablet 1    oxyCODONE-acetaminophen (PERCOCET)  MG per tablet Take 1 tablet by mouth every 6 hours as needed for Pain for up to 30 days. 120 tablet 0    gabapentin (NEURONTIN) 300 MG capsule Take 1 capsule by mouth 3 times daily for 30 days. 90 capsule 0    Magic Mouthwash (MIRACLE MOUTHWASH) Swish and spit 5 mLs 4 times daily as needed for Irritation      docusate sodium (COLACE) 100 MG capsule Take 1 capsule by mouth 2 times daily as needed for Constipation 60 capsule 0    lidocaine-prilocaine (EMLA) 2.5-2.5 % cream Apply topically as needed PRIOR TO MEDIPORT ACCESS, NO MORE THAN TWICE PER DAY. 1 Tube 3    prochlorperazine (COMPAZINE) 10 MG tablet Take 1 tablet by mouth every 6 hours as needed (CHEMO INDUCED NAUSEA) 120 tablet 3    ondansetron (ZOFRAN-ODT) 8 MG TBDP disintegrating tablet Place 1 tablet under the tongue every 8 hours as needed for Nausea or Vomiting (CHEMO INDUCED NAUSEA) 30 tablet 2    EPINEPHrine (EPIPEN) 0.3 MG/0.3ML SOAJ injection Use as directed for allergic reaction (Patient taking differently: 0.3 mg as needed Use as directed for allergic reaction) 1 each 1    naproxen (NAPROSYN) 500 MG tablet Take 1 tablet by mouth daily as needed for Pain 30 tablet 0     No current facility-administered medications for this visit.       Facility-Administered Medications Ordered in Other Visits   Medication Dose Route Frequency Provider Last Rate guidelines for analysis:            Yes  Joint recommendation of the College of American Pathologists and  American Society of Clinical Oncology to optimize accuracy and  consistency of HER2 (ERBB2) testing is for prompt appropriate  sectioning of specimens with cold ischemia time < 1 hour and fixation  in 10% buffered formalin for 6-72 hours. The PathVysion kit is  designed to detect amplification of the HER2 (ERBB2) gene via  fluorescence in situ hybridization (FISH). This test is approved by  the U.S. Food and Drug Administration. Bianka Mortensen M.D. Final Diagnosis  1. LYMPH NODES, EXCISION (LEFT SENTINEL):       - NO NEOPLASM DETECTED. 2.  BREAST WITH AXILLARY LYMPH NODES, MASTECTOMY (LEFT):       - INVASIVE DUCTAL CARCINOMA, 3.9 CM. - EXCISION MARGINS NEGATIVE FOR NEOPLASM.       - LYMPH NODES NEGATIVE FOR NEOPLASM. - NEGATIVE ESTROGEN RECEPTOR.       - NEGATIVE PROGESTERONE RECEPTOR.       - HER2 GENE AMPLIFICATION ASSESSMENT PENDING. 3.  BREAST, MASTECTOMY (RIGHT):       - FIBROCYSTIC CHANGE.       - NO ATYPIA OR NEOPLASIA DETECTED. Marvin Aguiar M.D.  **Electronically Signed Out**         [facility]/2/15/2019  Clinical Information  Pre-op Diagnosis:  INVASIVE DUCTAL CARCINOMA LEFT BREAST   Operative Findings:  SENTINEL LYMPH NODE LEFT AXILLA  SILK ON PRIMARY  LYMPH NODE; LEFT BREAST  SILK AT MEDIAL MARGIN; RIGHT BREAST  SILK AT  MEDIAL MARGIN  Operation Performed:  SIMPLE MASTECTOMY AXILLARY SENTINEL LYMPH NODE  BIOPSY, POSSIBLE FROZEN SECTION, POSSIBLE AXILLARY LYMPH NODE  DISSECTION    Source:  1: SE NTINEL LYMPH NODE LEFT AXILLA, SILK ON PRIMARY LYMPH NODE (A)  2: LEFT BREAST (B)  3: RIGHT BREAST (C)    Gross Description  1. \"SARAH VARSHA, SENTINEL LYMPH NODE LEFT AXILLA\" Received fresh are  four nodes from 0.4 to 1.8 cm. The largest is tagged with a suture  designating \"primary lymph node. \"  Cassette summary:  \"A\" one node,  \"B\" one node, \"C\" one node, \"D-E\" largest 2.5 x 2.0 cm  HISTOLOGIC TYPE OF INVASIVE CARCINOMA:     Ductal; some features  suggest neuroendocrine differentiation; however, control-reactive  immunostain for synaptophysin is negative  HISTOLOGIC GRADE (YOLETTE)       - GLANDULAR/TUBULAR DIFFERENTIATION SCORE:     3  - NUCLEAR PLEOMORPHISM SCORE:     2  - MITOTIC RATE SCORE:     3  - OVERALL GRADE (FROM YOLETTE TOTAL SCORE):     Score 8 = grade 3    TUMOR FOCALITY:       DUCTAL CARCINOMA IN SITU:     Present, few peritumoral foci  TUMOR EXTENSION       - SKIN:     Negative  - NIPPLE:     Negative  - SKELETAL MUSCLE:     N/A    MARGINS - INVASIVE CARCINOMA -  - SITE(S) OF ALL POSITIVE MARGINS: N/A  - IF ALL NEGATI VE, SITE(S) AND DISTANCE TO CLOSEST: 5 cm or more  MARGINS - DCIS -  - SITE(S) OF ALL POSITIVE MARGINS: N/A  - IF ALL NEGATIVE, SITE(S) AND DISTANCE TO CLOSEST:     5 cm or more  LYMPHOVASCULAR INVASION:     Negative    REGIONAL LYMPH NODES -       - NUMBER EXAMINED (SENTINEL AND NON-SENTINEL):     4+3=7  - NUMBER OF SENTINEL:     4  - NUMBER WITH MACROMETASTASES (>2MM):     0  - NUMBER WITH MICROMETASTASES (>0.2MM-2MM AND / OR >200 CELLS):     0;  confirms frozen section diagnoses  - NUMBER WITH ISOLATED TUMOR CELLS (<0.2 MM AND  <200 CELLS):     N/A  - SIZE OF LARGEST METASTATIC DEPOSIT:     N/A   - EXTRANODAL EXTENSION:     N/A  TREATMENT EFFECT: RESPONSE TO PRE-SURGICAL (NEOADJUVANT) THERAPY (IF  APPLICABLE)       - IN BREAST TISSUES:     N/A  - IN LYMPH NODES:     N/A    PATHOLOGIC STAGE CLASSIFICATION (pTNM):     pT2  pN0    CAP InvasiveBreast 4100 in conjunction with AJCC 8 ed. BIOMARKER TESTING  BREAST CARCINOMA       Biomarker studies were performed  on prior breast biopsy from January, 2019 (VS ), with triple negative results. Therefore, per  recommendations, those studies are repeated on a larger tumor specimen  now.   ESTROGEN RECEPTOR*:   -POSITIVE (PERCENT =>1% OF POSITIVE TUMOR CELL NUCLEI):     N/A  ---AVERAGE INTENSITY OF POSITIVE STAINING:     N/A  -NEGATIVE (PERCENT <1% OF POSITIVE TUMOR CELL NUCLEI):     0%  ---AVERAGE INTENSITY OF ANY POSITIVE STAINING: N/A  ---INTERNAL CONTROL PRESENT AND STAIN AS EXPECTED:     Yes  ---INTERNAL CONTROL CELLS ABSENT (COMMENT):     N/A  ---INTERNAL CONTROL CELLS PRESENT BUT DO NOT STAIN (COMMENT):     N/A    PROGESTERONE RECEPTOR*:  ---AVERAGE INTENSITY OF POSITIVE STAINING:     N/A  -NEGATIVE (PERCENT <1% OF POSITIVE TUMOR CELL NUCLEI):     0%  ---AVERAGE INTENSITY OF ANY POSITIVE STAINING: N/A  ---INTERNAL CONTROL PRESENT AND STAIN AS EXPECTED:     Yes  ---INTERNAL CONTROL CELLS ABSENT (COMMENT):     N/A  ---INTERNAL CONTROL CELLS PRESENT BUT DO NOT STAIN (COMMENT):     N/A     HER2*:  HER2 PROTEIN EXPRESSION  (IMMUNOHISTOCHEMISTRY):     N/A  HER2 GENE AMPLIFICATION (INSITU HYBRIDIZATION):     Pending (see  addendum report)         TIME SPECIMEN REMOVED:     February 12, 2019, 1205  TIME SPECIMEN PLACED IN FORMALIN:     February 12, 2019, 1205  COLD ISCHEMIA TIME MEETS CAP / ASCO REQUIREMENTS (LESS THAN 1 HOUR):      Yes  FIXATION TIME (CAP / ASCO GUIDELINES 6-72 HOURS):     29.5 hours  FIXATIVE:     10% BUFFERED FORMALIN    *Information on biomarker regents:  Estrogen Receptor: If performed at [place] [address] laboratory: Immunostain  clone SP1 with indirect biotin streptavidin detection system, vendor  Flora Jang (FDA cleared); evaluated by manual morphometry  (negative=less than 1% tumor cell nuclear staining; otherwise  positive); external control is reactive. Progesterone Receptor: If performed at [place] [address] laboratory: Immunostain  clone 1E2 with indirect biotin streptavidin detection system, vendor  Flora Jang (FDA cleared); evaluated by m anual morphometry  (negative=less than 1% tumor cell nuclear staining; otherwise  positive); external control is reactive. HER 2 insitu hybridization: FDA cleared, vendor [facility] [employer].     [facility] BreastBiomarkers 1201 in conjunction reflects the content of the visit, the document can still have some errors including those of syntax and sound a like substitutions which may escape proof reading. It such instances, actual meaning can be extrapolated by contextual diversion.

## 2019-09-16 ENCOUNTER — HOSPITAL ENCOUNTER (OUTPATIENT)
Dept: INTERVENTIONAL RADIOLOGY/VASCULAR | Age: 59
Discharge: HOME | End: 2019-09-18
Payer: COMMERCIAL

## 2019-09-16 VITALS
SYSTOLIC BLOOD PRESSURE: 112 MMHG | RESPIRATION RATE: 20 BRPM | OXYGEN SATURATION: 95 % | HEART RATE: 78 BPM | DIASTOLIC BLOOD PRESSURE: 68 MMHG

## 2019-09-16 DIAGNOSIS — C50.412 MALIGNANT NEOPLASM OF UPPER-OUTER QUADRANT OF LEFT FEMALE BREAST, UNSPECIFIED ESTROGEN RECEPTOR STATUS (HCC): ICD-10-CM

## 2019-09-16 PROCEDURE — 36590 REMOVAL TUNNELED CV CATH: CPT

## 2019-09-16 PROCEDURE — 77001 FLUOROGUIDE FOR VEIN DEVICE: CPT

## 2019-09-16 RX ORDER — SODIUM CHLORIDE 9 MG/ML
INJECTION, SOLUTION INTRAVENOUS CONTINUOUS
Status: DISCONTINUED | OUTPATIENT
Start: 2019-09-16 | End: 2019-09-16

## 2019-09-16 RX ORDER — SODIUM CHLORIDE 9 MG/ML
INJECTION, SOLUTION INTRAVENOUS CONTINUOUS
Status: CANCELLED | OUTPATIENT
Start: 2019-09-16

## 2019-09-16 RX ORDER — CEFAZOLIN SODIUM 1 G/50ML
1 INJECTION, SOLUTION INTRAVENOUS
Status: CANCELLED | OUTPATIENT
Start: 2019-09-16 | End: 2019-09-16

## 2019-09-16 RX ORDER — SODIUM CHLORIDE 0.9 % (FLUSH) 0.9 %
10 SYRINGE (ML) INJECTION PRN
Status: CANCELLED | OUTPATIENT
Start: 2019-09-16

## 2019-09-16 RX ORDER — SODIUM CHLORIDE 0.9 % (FLUSH) 0.9 %
10 SYRINGE (ML) INJECTION PRN
Status: DISCONTINUED | OUTPATIENT
Start: 2019-09-16 | End: 2019-09-16

## 2019-09-16 RX ORDER — CEFAZOLIN SODIUM 1 G/50ML
1 INJECTION, SOLUTION INTRAVENOUS
Status: DISCONTINUED | OUTPATIENT
Start: 2019-09-16 | End: 2019-09-16

## 2019-09-19 ENCOUNTER — TELEPHONE (OUTPATIENT)
Dept: ONCOLOGY | Age: 59
End: 2019-09-19

## 2019-09-19 NOTE — TELEPHONE ENCOUNTER
Per Daja Leavitt RN Nurse Navigator, will sign off from navigation. Pt completed active treatment. and is having port removed.

## 2019-09-25 DIAGNOSIS — G62.9 NEUROPATHY: ICD-10-CM

## 2019-09-25 DIAGNOSIS — C50.412 MALIGNANT NEOPLASM OF UPPER-OUTER QUADRANT OF LEFT BREAST IN FEMALE, ESTROGEN RECEPTOR NEGATIVE (HCC): ICD-10-CM

## 2019-09-25 DIAGNOSIS — Z17.1 MALIGNANT NEOPLASM OF UPPER-OUTER QUADRANT OF LEFT BREAST IN FEMALE, ESTROGEN RECEPTOR NEGATIVE (HCC): ICD-10-CM

## 2019-09-25 RX ORDER — GABAPENTIN 300 MG/1
300 CAPSULE ORAL 3 TIMES DAILY
Qty: 90 CAPSULE | Refills: 3 | Status: SHIPPED | OUTPATIENT
Start: 2019-09-25 | End: 2019-11-21 | Stop reason: SDUPTHER

## 2019-09-25 RX ORDER — OXYCODONE AND ACETAMINOPHEN 10; 325 MG/1; MG/1
1 TABLET ORAL EVERY 6 HOURS PRN
Qty: 120 TABLET | Refills: 0 | Status: SHIPPED | OUTPATIENT
Start: 2019-09-25 | End: 2019-10-23 | Stop reason: SDUPTHER

## 2019-09-27 ENCOUNTER — HOSPITAL ENCOUNTER (OUTPATIENT)
Dept: PHYSICAL THERAPY | Age: 59
Setting detail: THERAPIES SERIES
Discharge: HOME | End: 2019-09-27
Payer: COMMERCIAL

## 2019-09-27 PROCEDURE — 97161 PT EVAL LOW COMPLEX 20 MIN: CPT

## 2019-09-27 ASSESSMENT — PAIN DESCRIPTION - PAIN TYPE: TYPE: CHRONIC PAIN

## 2019-09-27 ASSESSMENT — PAIN DESCRIPTION - LOCATION: LOCATION: GENERALIZED

## 2019-09-27 ASSESSMENT — PAIN DESCRIPTION - FREQUENCY: FREQUENCY: CONTINUOUS

## 2019-09-27 ASSESSMENT — PAIN DESCRIPTION - ORIENTATION: ORIENTATION: OTHER (COMMENT)

## 2019-09-27 ASSESSMENT — PAIN DESCRIPTION - ONSET: ONSET: ON-GOING

## 2019-09-27 ASSESSMENT — PAIN SCALES - GENERAL: PAINLEVEL_OUTOF10: 5

## 2019-09-27 ASSESSMENT — PAIN - FUNCTIONAL ASSESSMENT: PAIN_FUNCTIONAL_ASSESSMENT: PREVENTS OR INTERFERES WITH ALL ACTIVE AND SOME PASSIVE ACTIVITIES

## 2019-09-27 NOTE — PROGRESS NOTES
Yes:  Barriers to Goal Achievement:  [] No  [x] Yes: Back surgery followed by breast cancer and chemo all within 9 months  Domestic Concerns:  [x] No  [] Yes:    Treatment Plan:  [] Therapeutic Exercise    [] Modalities:  [] Therapeutic Activity    [] Ultrasound  [] Electrical Stimulation  [] Gait Training     [] Massage       [] Lumbar/Cervical Traction  [] Neuromuscular Re-education [] Cold/hot pack [] Other:  [x] Instruction in HEP              [] Work Conditioning                                  [] Manual Therapy                     [x] Aquatic Therapy     [] Vasocompression  [] Iontophoresis: 4 mg/mL                      Dexamethasone Sodium      Phosphate 40-80mAmin (Allergies Reviewed)     Frequency:   2 X/wk x 6 wk's      [x] Plans/Goals, Risk/Benefits discussed with pt/family  Comprehension of Education [x] yes  [] Needs Review  Pt/Family Education: [x] Verbal  [] Demo  [x] Written    More objective information is available upon request.  Thank you for this referral.          [facility]) @ [facility]  [address] [place] [address]  [address]  Phone [phone]  Fax [phone]         OutComes Score  UEFS Score: 37.5 (09/27/19 1629)       Goals  Short term goals  Time Frame for Short term goals: 6 visits  Short term goal 1: give HEP at next land visit  Short term goal 2: tolerate 30 min aquatics without fatigue  Short term goal 3: Improve endurance to walk 300 ft (3 laps around gym)  Long term goals  Time Frame for Long term goals : 12 visits  Long term goal 1: UEFS improved by 9 points  Long term goal 2: Indep HEP  Long term goal 3: Amb 5 min continuous without fatigue  Patient Goals   Patient goals : Get stronger and have more energy so I can do my daily activities without help.         Therapy Time   Individual Concurrent Group Co-treatment   Time In 1415         Time Out 1500         Minutes 23 [facility] Xenia Juarez

## 2019-09-30 ENCOUNTER — HOSPITAL ENCOUNTER (OUTPATIENT)
Dept: PHYSICAL THERAPY | Age: 59
Setting detail: THERAPIES SERIES
Discharge: HOME | End: 2019-09-30
Payer: COMMERCIAL

## 2019-09-30 PROCEDURE — 97113 AQUATIC THERAPY/EXERCISES: CPT

## 2019-09-30 ASSESSMENT — PAIN SCALES - GENERAL: PAINLEVEL_OUTOF10: 5

## 2019-09-30 ASSESSMENT — PAIN DESCRIPTION - PAIN TYPE: TYPE: CHRONIC PAIN

## 2019-09-30 ASSESSMENT — PAIN DESCRIPTION - LOCATION: LOCATION: GENERALIZED

## 2019-10-02 ENCOUNTER — HOSPITAL ENCOUNTER (OUTPATIENT)
Dept: PHYSICAL THERAPY | Age: 59
Setting detail: THERAPIES SERIES
End: 2019-10-02
Payer: COMMERCIAL

## 2019-10-07 ENCOUNTER — HOSPITAL ENCOUNTER (OUTPATIENT)
Dept: PHYSICAL THERAPY | Age: 59
Setting detail: THERAPIES SERIES
Discharge: HOME | End: 2019-10-07
Payer: COMMERCIAL

## 2019-10-07 PROCEDURE — 97113 AQUATIC THERAPY/EXERCISES: CPT

## 2019-10-07 ASSESSMENT — PAIN SCALES - GENERAL: PAINLEVEL_OUTOF10: 5

## 2019-10-07 ASSESSMENT — PAIN DESCRIPTION - PAIN TYPE: TYPE: CHRONIC PAIN

## 2019-10-07 ASSESSMENT — PAIN DESCRIPTION - LOCATION: LOCATION: GENERALIZED

## 2019-10-09 ENCOUNTER — HOSPITAL ENCOUNTER (OUTPATIENT)
Dept: PHYSICAL THERAPY | Age: 59
Setting detail: THERAPIES SERIES
Discharge: HOME | End: 2019-10-09
Payer: COMMERCIAL

## 2019-10-09 PROCEDURE — 97113 AQUATIC THERAPY/EXERCISES: CPT

## 2019-10-09 ASSESSMENT — PAIN SCALES - GENERAL: PAINLEVEL_OUTOF10: 6

## 2019-10-09 ASSESSMENT — PAIN DESCRIPTION - LOCATION: LOCATION: GENERALIZED

## 2019-10-09 ASSESSMENT — PAIN DESCRIPTION - PAIN TYPE: TYPE: CHRONIC PAIN

## 2019-10-14 ENCOUNTER — HOSPITAL ENCOUNTER (OUTPATIENT)
Dept: PHYSICAL THERAPY | Age: 59
Setting detail: THERAPIES SERIES
Discharge: HOME | End: 2019-10-14
Payer: COMMERCIAL

## 2019-10-14 PROCEDURE — 97113 AQUATIC THERAPY/EXERCISES: CPT

## 2019-10-16 ENCOUNTER — HOSPITAL ENCOUNTER (OUTPATIENT)
Dept: PHYSICAL THERAPY | Age: 59
Setting detail: THERAPIES SERIES
Discharge: HOME | End: 2019-10-16
Payer: COMMERCIAL

## 2019-10-20 ENCOUNTER — HOSPITAL ENCOUNTER (EMERGENCY)
Age: 59
Discharge: HOME | End: 2019-10-20
Attending: EMERGENCY MEDICINE
Payer: COMMERCIAL

## 2019-10-20 ENCOUNTER — APPOINTMENT (OUTPATIENT)
Dept: CT IMAGING | Age: 59
End: 2019-10-20
Payer: COMMERCIAL

## 2019-10-20 VITALS
SYSTOLIC BLOOD PRESSURE: 105 MMHG | HEIGHT: 66 IN | RESPIRATION RATE: 16 BRPM | TEMPERATURE: 97.9 F | OXYGEN SATURATION: 98 % | BODY MASS INDEX: 43.39 KG/M2 | DIASTOLIC BLOOD PRESSURE: 64 MMHG | HEART RATE: 79 BPM | WEIGHT: 270 LBS

## 2019-10-20 DIAGNOSIS — M54.42 CHRONIC LOW BACK PAIN WITH BILATERAL SCIATICA, UNSPECIFIED BACK PAIN LATERALITY: Primary | ICD-10-CM

## 2019-10-20 DIAGNOSIS — G89.29 CHRONIC LOW BACK PAIN WITH BILATERAL SCIATICA, UNSPECIFIED BACK PAIN LATERALITY: Primary | ICD-10-CM

## 2019-10-20 DIAGNOSIS — M54.41 CHRONIC LOW BACK PAIN WITH BILATERAL SCIATICA, UNSPECIFIED BACK PAIN LATERALITY: Primary | ICD-10-CM

## 2019-10-20 PROCEDURE — 72131 CT LUMBAR SPINE W/O DYE: CPT

## 2019-10-20 PROCEDURE — 6370000000 HC RX 637 (ALT 250 FOR IP): Performed by: EMERGENCY MEDICINE

## 2019-10-20 PROCEDURE — 99283 EMERGENCY DEPT VISIT LOW MDM: CPT

## 2019-10-20 PROCEDURE — 96372 THER/PROPH/DIAG INJ SC/IM: CPT

## 2019-10-20 PROCEDURE — 6360000002 HC RX W HCPCS: Performed by: EMERGENCY MEDICINE

## 2019-10-20 RX ORDER — PREDNISONE 20 MG/1
50 TABLET ORAL ONCE
Status: COMPLETED | OUTPATIENT
Start: 2019-10-20 | End: 2019-10-20

## 2019-10-20 RX ORDER — ORPHENADRINE CITRATE 30 MG/ML
60 INJECTION INTRAMUSCULAR; INTRAVENOUS ONCE
Status: COMPLETED | OUTPATIENT
Start: 2019-10-20 | End: 2019-10-20

## 2019-10-20 RX ORDER — PREDNISONE 50 MG/1
50 TABLET ORAL DAILY
Qty: 5 TABLET | Refills: 0 | Status: SHIPPED | OUTPATIENT
Start: 2019-10-20 | End: 2019-10-25

## 2019-10-20 RX ORDER — KETOROLAC TROMETHAMINE 30 MG/ML
30 INJECTION, SOLUTION INTRAMUSCULAR; INTRAVENOUS ONCE
Status: COMPLETED | OUTPATIENT
Start: 2019-10-20 | End: 2019-10-20

## 2019-10-20 RX ADMIN — PREDNISONE 50 MG: 20 TABLET ORAL at 16:08

## 2019-10-20 RX ADMIN — KETOROLAC TROMETHAMINE 30 MG: 30 INJECTION, SOLUTION INTRAMUSCULAR; INTRAVENOUS at 13:25

## 2019-10-20 RX ADMIN — ORPHENADRINE CITRATE 60 MG: 30 INJECTION INTRAMUSCULAR; INTRAVENOUS at 13:25

## 2019-10-20 ASSESSMENT — PAIN SCALES - GENERAL
PAINLEVEL_OUTOF10: 7
PAINLEVEL_OUTOF10: 7

## 2019-10-20 ASSESSMENT — PAIN DESCRIPTION - DESCRIPTORS: DESCRIPTORS: SHARP;CONSTANT

## 2019-10-20 ASSESSMENT — PAIN DESCRIPTION - ORIENTATION: ORIENTATION: LEFT

## 2019-10-20 ASSESSMENT — PAIN DESCRIPTION - LOCATION: LOCATION: BUTTOCKS

## 2019-10-20 ASSESSMENT — PAIN DESCRIPTION - FREQUENCY: FREQUENCY: CONTINUOUS

## 2019-10-21 ENCOUNTER — HOSPITAL ENCOUNTER (OUTPATIENT)
Dept: PHYSICAL THERAPY | Age: 59
Setting detail: THERAPIES SERIES
Discharge: HOME | End: 2019-10-21
Payer: COMMERCIAL

## 2019-10-21 PROCEDURE — 97113 AQUATIC THERAPY/EXERCISES: CPT

## 2019-10-21 ASSESSMENT — PAIN SCALES - GENERAL: PAINLEVEL_OUTOF10: 7

## 2019-10-21 ASSESSMENT — PAIN DESCRIPTION - LOCATION: LOCATION: GENERALIZED

## 2019-10-21 ASSESSMENT — PAIN DESCRIPTION - PAIN TYPE: TYPE: CHRONIC PAIN

## 2019-10-22 ENCOUNTER — TELEPHONE (OUTPATIENT)
Dept: ONCOLOGY | Age: 59
End: 2019-10-22

## 2019-10-22 DIAGNOSIS — C50.412 MALIGNANT NEOPLASM OF UPPER-OUTER QUADRANT OF LEFT BREAST IN FEMALE, ESTROGEN RECEPTOR NEGATIVE (HCC): ICD-10-CM

## 2019-10-22 DIAGNOSIS — Z17.1 MALIGNANT NEOPLASM OF UPPER-OUTER QUADRANT OF LEFT BREAST IN FEMALE, ESTROGEN RECEPTOR NEGATIVE (HCC): ICD-10-CM

## 2019-10-23 ENCOUNTER — HOSPITAL ENCOUNTER (OUTPATIENT)
Dept: PHYSICAL THERAPY | Age: 59
Setting detail: THERAPIES SERIES
Discharge: HOME | End: 2019-10-23
Payer: COMMERCIAL

## 2019-10-23 PROCEDURE — 97113 AQUATIC THERAPY/EXERCISES: CPT

## 2019-10-23 RX ORDER — OXYCODONE AND ACETAMINOPHEN 10; 325 MG/1; MG/1
1 TABLET ORAL EVERY 6 HOURS PRN
Qty: 120 TABLET | Refills: 0 | Status: SHIPPED | OUTPATIENT
Start: 2019-10-23 | End: 2019-11-21 | Stop reason: SDUPTHER

## 2019-10-23 ASSESSMENT — PAIN DESCRIPTION - PAIN TYPE: TYPE: CHRONIC PAIN

## 2019-10-23 ASSESSMENT — PAIN DESCRIPTION - LOCATION: LOCATION: GENERALIZED

## 2019-10-23 ASSESSMENT — PAIN SCALES - GENERAL: PAINLEVEL_OUTOF10: 7

## 2019-10-25 ENCOUNTER — OFFICE VISIT (OUTPATIENT)
Dept: FAMILY MEDICINE CLINIC | Age: 59
End: 2019-10-25
Payer: COMMERCIAL

## 2019-10-25 VITALS
BODY MASS INDEX: 42.36 KG/M2 | WEIGHT: 263.6 LBS | DIASTOLIC BLOOD PRESSURE: 65 MMHG | HEIGHT: 66 IN | SYSTOLIC BLOOD PRESSURE: 113 MMHG

## 2019-10-25 DIAGNOSIS — Z90.13 S/P MASTECTOMY, BILATERAL: ICD-10-CM

## 2019-10-25 DIAGNOSIS — M54.42 CHRONIC LEFT-SIDED LOW BACK PAIN WITH LEFT-SIDED SCIATICA: Primary | ICD-10-CM

## 2019-10-25 DIAGNOSIS — G89.29 CHRONIC LEFT-SIDED LOW BACK PAIN WITH LEFT-SIDED SCIATICA: Primary | ICD-10-CM

## 2019-10-25 DIAGNOSIS — M79.89 LEG SWELLING: ICD-10-CM

## 2019-10-25 PROCEDURE — 99211 OFF/OP EST MAY X REQ PHY/QHP: CPT | Performed by: FAMILY MEDICINE

## 2019-10-25 PROCEDURE — 96160 PT-FOCUSED HLTH RISK ASSMT: CPT | Performed by: STUDENT IN AN ORGANIZED HEALTH CARE EDUCATION/TRAINING PROGRAM

## 2019-10-25 PROCEDURE — 99213 OFFICE O/P EST LOW 20 MIN: CPT | Performed by: STUDENT IN AN ORGANIZED HEALTH CARE EDUCATION/TRAINING PROGRAM

## 2019-10-25 PROCEDURE — G8427 DOCREV CUR MEDS BY ELIG CLIN: HCPCS | Performed by: STUDENT IN AN ORGANIZED HEALTH CARE EDUCATION/TRAINING PROGRAM

## 2019-10-25 PROCEDURE — 3017F COLORECTAL CA SCREEN DOC REV: CPT | Performed by: STUDENT IN AN ORGANIZED HEALTH CARE EDUCATION/TRAINING PROGRAM

## 2019-10-25 PROCEDURE — 4004F PT TOBACCO SCREEN RCVD TLK: CPT | Performed by: STUDENT IN AN ORGANIZED HEALTH CARE EDUCATION/TRAINING PROGRAM

## 2019-10-25 PROCEDURE — G8484 FLU IMMUNIZE NO ADMIN: HCPCS | Performed by: STUDENT IN AN ORGANIZED HEALTH CARE EDUCATION/TRAINING PROGRAM

## 2019-10-25 PROCEDURE — G8417 CALC BMI ABV UP PARAM F/U: HCPCS | Performed by: STUDENT IN AN ORGANIZED HEALTH CARE EDUCATION/TRAINING PROGRAM

## 2019-10-25 ASSESSMENT — ENCOUNTER SYMPTOMS
ABDOMINAL PAIN: 0
CHEST TIGHTNESS: 0
NAUSEA: 0
COUGH: 0
WHEEZING: 0
DIARRHEA: 0
SHORTNESS OF BREATH: 0
BACK PAIN: 1
VOMITING: 0

## 2019-10-25 ASSESSMENT — PATIENT HEALTH QUESTIONNAIRE - PHQ9
7. TROUBLE CONCENTRATING ON THINGS, SUCH AS READING THE NEWSPAPER OR WATCHING TELEVISION: 0
6. FEELING BAD ABOUT YOURSELF - OR THAT YOU ARE A FAILURE OR HAVE LET YOURSELF OR YOUR FAMILY DOWN: 0
2. FEELING DOWN, DEPRESSED OR HOPELESS: 1
4. FEELING TIRED OR HAVING LITTLE ENERGY: 2
10. IF YOU CHECKED OFF ANY PROBLEMS, HOW DIFFICULT HAVE THESE PROBLEMS MADE IT FOR YOU TO DO YOUR WORK, TAKE CARE OF THINGS AT HOME, OR GET ALONG WITH OTHER PEOPLE: 1
SUM OF ALL RESPONSES TO PHQ QUESTIONS 1-9: 10
9. THOUGHTS THAT YOU WOULD BE BETTER OFF DEAD, OR OF HURTING YOURSELF: 0
SUM OF ALL RESPONSES TO PHQ QUESTIONS 1-9: 10
3. TROUBLE FALLING OR STAYING ASLEEP: 3
1. LITTLE INTEREST OR PLEASURE IN DOING THINGS: 3
5. POOR APPETITE OR OVEREATING: 0
8. MOVING OR SPEAKING SO SLOWLY THAT OTHER PEOPLE COULD HAVE NOTICED. OR THE OPPOSITE, BEING SO FIGETY OR RESTLESS THAT YOU HAVE BEEN MOVING AROUND A LOT MORE THAN USUAL: 1
SUM OF ALL RESPONSES TO PHQ9 QUESTIONS 1 & 2: 4

## 2019-10-28 ENCOUNTER — HOSPITAL ENCOUNTER (OUTPATIENT)
Dept: PHYSICAL THERAPY | Age: 59
Setting detail: THERAPIES SERIES
Discharge: HOME | End: 2019-10-28
Payer: COMMERCIAL

## 2019-10-28 PROCEDURE — 97113 AQUATIC THERAPY/EXERCISES: CPT

## 2019-10-28 ASSESSMENT — PAIN DESCRIPTION - LOCATION: LOCATION: GENERALIZED

## 2019-10-28 ASSESSMENT — PAIN DESCRIPTION - PAIN TYPE: TYPE: CHRONIC PAIN

## 2019-10-28 ASSESSMENT — PAIN SCALES - GENERAL: PAINLEVEL_OUTOF10: 7

## 2019-10-29 ENCOUNTER — NURSE TRIAGE (OUTPATIENT)
Dept: OTHER | Facility: CLINIC | Age: 59
End: 2019-10-29

## 2019-10-30 ENCOUNTER — HOSPITAL ENCOUNTER (OUTPATIENT)
Dept: PHYSICAL THERAPY | Age: 59
Setting detail: THERAPIES SERIES
End: 2019-10-30
Payer: COMMERCIAL

## 2019-10-30 ENCOUNTER — TELEPHONE (OUTPATIENT)
Dept: FAMILY MEDICINE CLINIC | Age: 59
End: 2019-10-30

## 2019-10-30 DIAGNOSIS — M79.89 LEG SWELLING: Primary | ICD-10-CM

## 2019-11-04 ENCOUNTER — HOSPITAL ENCOUNTER (OUTPATIENT)
Dept: VASCULAR LAB | Age: 59
Discharge: HOME | End: 2019-11-04
Payer: COMMERCIAL

## 2019-11-04 ENCOUNTER — HOSPITAL ENCOUNTER (OUTPATIENT)
Dept: PHYSICAL THERAPY | Age: 59
Setting detail: THERAPIES SERIES
Discharge: HOME | End: 2019-11-04
Payer: COMMERCIAL

## 2019-11-04 DIAGNOSIS — M79.89 LEG SWELLING: ICD-10-CM

## 2019-11-04 PROCEDURE — 97113 AQUATIC THERAPY/EXERCISES: CPT

## 2019-11-04 PROCEDURE — 93971 EXTREMITY STUDY: CPT

## 2019-11-04 ASSESSMENT — PAIN SCALES - GENERAL: PAINLEVEL_OUTOF10: 7

## 2019-11-04 ASSESSMENT — PAIN DESCRIPTION - PAIN TYPE: TYPE: CHRONIC PAIN

## 2019-11-04 ASSESSMENT — PAIN DESCRIPTION - LOCATION: LOCATION: GENERALIZED

## 2019-11-06 ENCOUNTER — APPOINTMENT (OUTPATIENT)
Dept: PHYSICAL THERAPY | Age: 59
End: 2019-11-06
Payer: COMMERCIAL

## 2019-11-06 ENCOUNTER — HOSPITAL ENCOUNTER (OUTPATIENT)
Dept: PHYSICAL THERAPY | Age: 59
Setting detail: THERAPIES SERIES
Discharge: HOME | End: 2019-11-06
Payer: COMMERCIAL

## 2019-11-06 PROCEDURE — 97113 AQUATIC THERAPY/EXERCISES: CPT

## 2019-11-06 ASSESSMENT — PAIN DESCRIPTION - PAIN TYPE: TYPE: CHRONIC PAIN

## 2019-11-06 ASSESSMENT — PAIN SCALES - GENERAL: PAINLEVEL_OUTOF10: 7

## 2019-11-06 ASSESSMENT — PAIN DESCRIPTION - LOCATION: LOCATION: GENERALIZED

## 2019-11-11 ENCOUNTER — APPOINTMENT (OUTPATIENT)
Dept: PHYSICAL THERAPY | Age: 59
End: 2019-11-11
Payer: COMMERCIAL

## 2019-11-11 ENCOUNTER — HOSPITAL ENCOUNTER (OUTPATIENT)
Dept: PHYSICAL THERAPY | Age: 59
Setting detail: THERAPIES SERIES
End: 2019-11-11
Payer: COMMERCIAL

## 2019-11-18 ENCOUNTER — HOSPITAL ENCOUNTER (OUTPATIENT)
Dept: PHYSICAL THERAPY | Age: 59
Setting detail: THERAPIES SERIES
Discharge: HOME | End: 2019-11-18
Payer: COMMERCIAL

## 2019-11-18 PROCEDURE — 97110 THERAPEUTIC EXERCISES: CPT

## 2019-11-20 ENCOUNTER — HOSPITAL ENCOUNTER (OUTPATIENT)
Dept: PHYSICAL THERAPY | Age: 59
Setting detail: THERAPIES SERIES
Discharge: HOME | End: 2019-11-20
Payer: COMMERCIAL

## 2019-11-20 DIAGNOSIS — G62.9 NEUROPATHY: ICD-10-CM

## 2019-11-20 DIAGNOSIS — Z17.1 MALIGNANT NEOPLASM OF UPPER-OUTER QUADRANT OF LEFT BREAST IN FEMALE, ESTROGEN RECEPTOR NEGATIVE (HCC): ICD-10-CM

## 2019-11-20 DIAGNOSIS — C50.412 MALIGNANT NEOPLASM OF UPPER-OUTER QUADRANT OF LEFT BREAST IN FEMALE, ESTROGEN RECEPTOR NEGATIVE (HCC): ICD-10-CM

## 2019-11-20 PROCEDURE — 97110 THERAPEUTIC EXERCISES: CPT

## 2019-11-21 RX ORDER — GABAPENTIN 300 MG/1
300 CAPSULE ORAL 3 TIMES DAILY
Qty: 90 CAPSULE | Refills: 3 | Status: SHIPPED | OUTPATIENT
Start: 2019-11-21 | End: 2020-04-21 | Stop reason: SDUPTHER

## 2019-11-21 RX ORDER — OXYCODONE AND ACETAMINOPHEN 10; 325 MG/1; MG/1
1 TABLET ORAL EVERY 6 HOURS PRN
Qty: 120 TABLET | Refills: 0 | Status: SHIPPED | OUTPATIENT
Start: 2019-11-21 | End: 2019-12-18 | Stop reason: SDUPTHER

## 2019-12-03 ENCOUNTER — HOSPITAL ENCOUNTER (OUTPATIENT)
Facility: MEDICAL CENTER | Age: 59
End: 2019-12-03
Payer: COMMERCIAL

## 2019-12-05 ENCOUNTER — OFFICE VISIT (OUTPATIENT)
Dept: ONCOLOGY | Age: 59
End: 2019-12-05
Payer: COMMERCIAL

## 2019-12-05 ENCOUNTER — TELEPHONE (OUTPATIENT)
Dept: ONCOLOGY | Age: 59
End: 2019-12-05

## 2019-12-05 VITALS
RESPIRATION RATE: 18 BRPM | BODY MASS INDEX: 41.4 KG/M2 | DIASTOLIC BLOOD PRESSURE: 96 MMHG | TEMPERATURE: 97.7 F | HEART RATE: 87 BPM | WEIGHT: 256.5 LBS | SYSTOLIC BLOOD PRESSURE: 142 MMHG

## 2019-12-05 DIAGNOSIS — R53.83 OTHER FATIGUE: ICD-10-CM

## 2019-12-05 DIAGNOSIS — Z17.1 MALIGNANT NEOPLASM OF UPPER-OUTER QUADRANT OF LEFT BREAST IN FEMALE, ESTROGEN RECEPTOR NEGATIVE (HCC): Primary | ICD-10-CM

## 2019-12-05 DIAGNOSIS — E66.01 MORBID OBESITY DUE TO EXCESS CALORIES (HCC): ICD-10-CM

## 2019-12-05 DIAGNOSIS — C50.412 MALIGNANT NEOPLASM OF UPPER-OUTER QUADRANT OF LEFT BREAST IN FEMALE, ESTROGEN RECEPTOR NEGATIVE (HCC): Primary | ICD-10-CM

## 2019-12-05 DIAGNOSIS — G62.9 NEUROPATHY: ICD-10-CM

## 2019-12-05 DIAGNOSIS — F17.200 SMOKING: ICD-10-CM

## 2019-12-05 PROCEDURE — G8417 CALC BMI ABV UP PARAM F/U: HCPCS | Performed by: INTERNAL MEDICINE

## 2019-12-05 PROCEDURE — 3017F COLORECTAL CA SCREEN DOC REV: CPT | Performed by: INTERNAL MEDICINE

## 2019-12-05 PROCEDURE — G8484 FLU IMMUNIZE NO ADMIN: HCPCS | Performed by: INTERNAL MEDICINE

## 2019-12-05 PROCEDURE — 99214 OFFICE O/P EST MOD 30 MIN: CPT | Performed by: INTERNAL MEDICINE

## 2019-12-05 PROCEDURE — G8427 DOCREV CUR MEDS BY ELIG CLIN: HCPCS | Performed by: INTERNAL MEDICINE

## 2019-12-05 PROCEDURE — 4004F PT TOBACCO SCREEN RCVD TLK: CPT | Performed by: INTERNAL MEDICINE

## 2019-12-16 ENCOUNTER — HOSPITAL ENCOUNTER (OUTPATIENT)
Facility: MEDICAL CENTER | Age: 59
Discharge: HOME | End: 2019-12-16
Payer: COMMERCIAL

## 2019-12-16 DIAGNOSIS — R53.83 OTHER FATIGUE: ICD-10-CM

## 2019-12-16 DIAGNOSIS — E66.01 MORBID OBESITY DUE TO EXCESS CALORIES (HCC): ICD-10-CM

## 2019-12-16 DIAGNOSIS — G62.9 NEUROPATHY: ICD-10-CM

## 2019-12-16 DIAGNOSIS — C50.412 MALIGNANT NEOPLASM OF UPPER-OUTER QUADRANT OF LEFT BREAST IN FEMALE, ESTROGEN RECEPTOR NEGATIVE (HCC): ICD-10-CM

## 2019-12-16 DIAGNOSIS — F17.200 SMOKING: ICD-10-CM

## 2019-12-16 DIAGNOSIS — Z17.1 MALIGNANT NEOPLASM OF UPPER-OUTER QUADRANT OF LEFT BREAST IN FEMALE, ESTROGEN RECEPTOR NEGATIVE (HCC): ICD-10-CM

## 2019-12-16 LAB
ABSOLUTE EOS #: 0.15 K/UL (ref 0–0.44)
ABSOLUTE IMMATURE GRANULOCYTE: 0.01 K/UL (ref 0–0.3)
ABSOLUTE LYMPH #: 1.82 K/UL (ref 1.1–3.7)
ABSOLUTE MONO #: 0.68 K/UL (ref 0.1–1.2)
ALBUMIN SERPL-MCNC: 4 G/DL (ref 3.5–5.2)
ALBUMIN/GLOBULIN RATIO: ABNORMAL (ref 1–2.5)
ALP BLD-CCNC: 117 U/L (ref 35–104)
ALT SERPL-CCNC: 8 U/L (ref 5–33)
ANION GAP SERPL CALCULATED.3IONS-SCNC: 15 MMOL/L (ref 9–17)
AST SERPL-CCNC: 10 U/L
BASOPHILS # BLD: 0 % (ref 0–2)
BASOPHILS ABSOLUTE: 0.03 K/UL (ref 0–0.2)
BILIRUB SERPL-MCNC: 0.25 MG/DL (ref 0.3–1.2)
BUN BLDV-MCNC: 9 MG/DL (ref 6–20)
BUN/CREAT BLD: 15 (ref 9–20)
CALCIUM SERPL-MCNC: 9.7 MG/DL (ref 8.6–10.4)
CHLORIDE BLD-SCNC: 102 MMOL/L (ref 98–107)
CO2: 23 MMOL/L (ref 20–31)
CREAT SERPL-MCNC: 0.62 MG/DL (ref 0.5–0.9)
DIFFERENTIAL TYPE: ABNORMAL
EOSINOPHILS RELATIVE PERCENT: 2 % (ref 1–4)
GFR AFRICAN AMERICAN: >60 ML/MIN
GFR NON-AFRICAN AMERICAN: >60 ML/MIN
GFR SERPL CREATININE-BSD FRML MDRD: ABNORMAL ML/MIN/{1.73_M2}
GFR SERPL CREATININE-BSD FRML MDRD: ABNORMAL ML/MIN/{1.73_M2}
GLUCOSE BLD-MCNC: 94 MG/DL (ref 70–99)
HCT VFR BLD CALC: 44.1 % (ref 36.3–47.1)
HEMOGLOBIN: 13.8 G/DL (ref 11.9–15.1)
IMMATURE GRANULOCYTES: 0 %
LYMPHOCYTES # BLD: 27 % (ref 24–43)
MCH RBC QN AUTO: 27.4 PG (ref 25.2–33.5)
MCHC RBC AUTO-ENTMCNC: 31.3 G/DL (ref 28.4–34.8)
MCV RBC AUTO: 87.7 FL (ref 82.6–102.9)
MONOCYTES # BLD: 10 % (ref 3–12)
NRBC AUTOMATED: 0 PER 100 WBC
PDW BLD-RTO: 18.1 % (ref 11.8–14.4)
PLATELET # BLD: 244 K/UL (ref 138–453)
PLATELET ESTIMATE: ABNORMAL
PMV BLD AUTO: 9.1 FL (ref 8.1–13.5)
POTASSIUM SERPL-SCNC: 3.9 MMOL/L (ref 3.7–5.3)
RBC # BLD: 5.03 M/UL (ref 3.95–5.11)
RBC # BLD: ABNORMAL 10*6/UL
SEG NEUTROPHILS: 61 % (ref 36–65)
SEGMENTED NEUTROPHILS ABSOLUTE COUNT: 4.16 K/UL (ref 1.5–8.1)
SODIUM BLD-SCNC: 140 MMOL/L (ref 135–144)
TOTAL PROTEIN: 6.9 G/DL (ref 6.4–8.3)
WBC # BLD: 6.9 K/UL (ref 3.5–11.3)
WBC # BLD: ABNORMAL 10*3/UL

## 2019-12-16 PROCEDURE — 85025 COMPLETE CBC W/AUTO DIFF WBC: CPT

## 2019-12-16 PROCEDURE — 36415 COLL VENOUS BLD VENIPUNCTURE: CPT

## 2019-12-16 PROCEDURE — 80053 COMPREHEN METABOLIC PANEL: CPT

## 2019-12-18 DIAGNOSIS — Z17.1 MALIGNANT NEOPLASM OF UPPER-OUTER QUADRANT OF LEFT BREAST IN FEMALE, ESTROGEN RECEPTOR NEGATIVE (HCC): ICD-10-CM

## 2019-12-18 DIAGNOSIS — C50.412 MALIGNANT NEOPLASM OF UPPER-OUTER QUADRANT OF LEFT BREAST IN FEMALE, ESTROGEN RECEPTOR NEGATIVE (HCC): ICD-10-CM

## 2019-12-18 DIAGNOSIS — G62.9 NEUROPATHY: ICD-10-CM

## 2019-12-19 RX ORDER — OXYCODONE AND ACETAMINOPHEN 10; 325 MG/1; MG/1
1 TABLET ORAL EVERY 6 HOURS PRN
Qty: 120 TABLET | Refills: 0 | Status: SHIPPED | OUTPATIENT
Start: 2019-12-19 | End: 2020-01-17 | Stop reason: SDUPTHER

## 2020-01-06 ENCOUNTER — OFFICE VISIT (OUTPATIENT)
Dept: FAMILY MEDICINE CLINIC | Age: 60
End: 2020-01-06
Payer: COMMERCIAL

## 2020-01-06 VITALS
SYSTOLIC BLOOD PRESSURE: 130 MMHG | DIASTOLIC BLOOD PRESSURE: 92 MMHG | WEIGHT: 255 LBS | HEART RATE: 69 BPM | BODY MASS INDEX: 41.16 KG/M2

## 2020-01-06 PROCEDURE — 99211 OFF/OP EST MAY X REQ PHY/QHP: CPT | Performed by: FAMILY MEDICINE

## 2020-01-06 PROCEDURE — G0296 VISIT TO DETERM LDCT ELIG: HCPCS | Performed by: STUDENT IN AN ORGANIZED HEALTH CARE EDUCATION/TRAINING PROGRAM

## 2020-01-06 PROCEDURE — 3017F COLORECTAL CA SCREEN DOC REV: CPT | Performed by: STUDENT IN AN ORGANIZED HEALTH CARE EDUCATION/TRAINING PROGRAM

## 2020-01-06 PROCEDURE — 4004F PT TOBACCO SCREEN RCVD TLK: CPT | Performed by: STUDENT IN AN ORGANIZED HEALTH CARE EDUCATION/TRAINING PROGRAM

## 2020-01-06 PROCEDURE — G8484 FLU IMMUNIZE NO ADMIN: HCPCS | Performed by: STUDENT IN AN ORGANIZED HEALTH CARE EDUCATION/TRAINING PROGRAM

## 2020-01-06 PROCEDURE — 99213 OFFICE O/P EST LOW 20 MIN: CPT | Performed by: STUDENT IN AN ORGANIZED HEALTH CARE EDUCATION/TRAINING PROGRAM

## 2020-01-06 PROCEDURE — G8427 DOCREV CUR MEDS BY ELIG CLIN: HCPCS | Performed by: STUDENT IN AN ORGANIZED HEALTH CARE EDUCATION/TRAINING PROGRAM

## 2020-01-06 PROCEDURE — G8417 CALC BMI ABV UP PARAM F/U: HCPCS | Performed by: STUDENT IN AN ORGANIZED HEALTH CARE EDUCATION/TRAINING PROGRAM

## 2020-01-06 RX ORDER — FUROSEMIDE 20 MG/1
20 TABLET ORAL DAILY
Qty: 30 TABLET | Refills: 1 | Status: SHIPPED | OUTPATIENT
Start: 2020-01-06 | End: 2020-03-20 | Stop reason: ALTCHOICE

## 2020-01-06 RX ORDER — AMLODIPINE BESYLATE 10 MG/1
TABLET ORAL
Qty: 90 TABLET | Refills: 1 | Status: SHIPPED | OUTPATIENT
Start: 2020-01-06 | End: 2020-03-20 | Stop reason: ALTCHOICE

## 2020-01-06 ASSESSMENT — ENCOUNTER SYMPTOMS
WHEEZING: 0
BACK PAIN: 1
SHORTNESS OF BREATH: 0
DIARRHEA: 0
ABDOMINAL PAIN: 0
CHEST TIGHTNESS: 0
VOMITING: 0
NAUSEA: 0
COUGH: 0

## 2020-01-06 ASSESSMENT — PATIENT HEALTH QUESTIONNAIRE - PHQ9
SUM OF ALL RESPONSES TO PHQ QUESTIONS 1-9: 0
SUM OF ALL RESPONSES TO PHQ9 QUESTIONS 1 & 2: 0
2. FEELING DOWN, DEPRESSED OR HOPELESS: 0
SUM OF ALL RESPONSES TO PHQ QUESTIONS 1-9: 0
1. LITTLE INTEREST OR PLEASURE IN DOING THINGS: 0

## 2020-01-06 NOTE — PROGRESS NOTES
Subjective:      Patient ID: Markos Pantoja is a 61 y.o. female. Pt presents for med refill  HTN well controlled, no change in meds  Pt following w/ Oncology, receives pain meds from them - discussed pain mgmt referral from last visit, pt requests different doctor willing to travel to [facility]   Pt complains of persistent LE swelling, L>>R - last time U/S LE ordered, negative for DVT - pt has tried compression stockings before, they help intermittently  Discussed LDCT, patient amenable for screen due to smoking hx  Shingles vaccine discussed and ordered      Review of Systems   Constitutional: Negative for appetite change, chills, fever and unexpected weight change. Respiratory: Negative for cough, chest tightness, shortness of breath and wheezing. Cardiovascular: Positive for leg swelling (unchanged from prior). Negative for chest pain and palpitations. Gastrointestinal: Negative for abdominal pain, diarrhea, nausea and vomiting. Genitourinary: Negative for difficulty urinating, dysuria, flank pain and frequency. Musculoskeletal: Positive for arthralgias and back pain. Skin: Negative for rash. Neurological: Negative for dizziness, syncope, weakness and headaches. Objective:   Physical Exam  Vitals signs reviewed. Constitutional:       General: She is not in acute distress. Appearance: She is well-developed. She is not diaphoretic. HENT:      Head: Normocephalic and atraumatic. Cardiovascular:      Rate and Rhythm: Normal rate and regular rhythm. Heart sounds: Normal heart sounds. No murmur. No gallop. Pulmonary:      Effort: Pulmonary effort is normal. No respiratory distress. Breath sounds: Normal breath sounds. No wheezing or rales. Abdominal:      General: Bowel sounds are normal.      Palpations: Abdomen is soft. Tenderness: There is no tenderness. There is no guarding. Musculoskeletal: Normal range of motion. General: No tenderness.       Left lower

## 2020-01-06 NOTE — PROGRESS NOTES
Visit Information    Have you changed or started any medications since your last visit including any over-the-counter medicines, vitamins, or herbal medicines? no   Have you stopped taking any of your medications? Is so, why? -  no  Are you having any side effects from any of your medications? - no    Have you seen any other physician or provider since your last visit? yes - oncology and pt   Have you had any other diagnostic tests since your last visit? Yes- ct and blood work  Have you been seen in the emergency room and/or had an admission in a hospital since we last saw you?  no   Have you had your routine dental cleaning in the past 6 months?  no     Do you have an active MyChart account? If no, what is the barrier?   No: declined    Patient Care Team:  Laurie Warner MD as PCP - General (Family Medicine)  Kitty Og RN as Nurse [address] [place] [address], MD as Consulting Physician (Hematology and Oncology)  Phoebe Hines MD as Consulting Physician (Radiation Oncology)    Medical History Review  Past Medical, Family, and Social History reviewed and does not contribute to the patient presenting condition    Health Maintenance   Topic Date Due    Hepatitis C screen  1960    HIV screen  11/19/1975    Shingles Vaccine (1 of 2) 11/19/2010    Low dose CT lung screening  11/19/2015    Flu vaccine (1) 09/01/2019    Lipid screen  07/26/2022    Cervical cancer screen  09/22/2022    DTaP/Tdap/Td vaccine (2 - Td) 08/14/2027    Colon cancer screen colonoscopy  10/13/2027    Pneumococcal 0-64 years Vaccine  Completed

## 2020-01-06 NOTE — PROGRESS NOTES
I have reviewed and discussed key elements of [facility] Lizette Hamilton with the resident including plan of care and follow up and agree with the care darrius plan.

## 2020-01-09 ENCOUNTER — TELEPHONE (OUTPATIENT)
Dept: ONCOLOGY | Age: 60
End: 2020-01-09

## 2020-01-16 ENCOUNTER — HOSPITAL ENCOUNTER (OUTPATIENT)
Dept: CT IMAGING | Age: 60
Discharge: HOME | End: 2020-01-18
Payer: COMMERCIAL

## 2020-01-16 PROCEDURE — G0297 LDCT FOR LUNG CA SCREEN: HCPCS

## 2020-01-17 RX ORDER — OXYCODONE AND ACETAMINOPHEN 10; 325 MG/1; MG/1
1 TABLET ORAL EVERY 6 HOURS PRN
Qty: 120 TABLET | Refills: 0 | Status: SHIPPED | OUTPATIENT
Start: 2020-01-17 | End: 2020-02-19 | Stop reason: SDUPTHER

## 2020-02-19 RX ORDER — OXYCODONE AND ACETAMINOPHEN 10; 325 MG/1; MG/1
1 TABLET ORAL EVERY 6 HOURS PRN
Qty: 120 TABLET | Refills: 0 | Status: SHIPPED | OUTPATIENT
Start: 2020-02-19 | End: 2020-03-20

## 2020-03-06 ENCOUNTER — HOSPITAL ENCOUNTER (OUTPATIENT)
Facility: MEDICAL CENTER | Age: 60
End: 2020-03-06

## 2020-03-13 ENCOUNTER — TELEPHONE (OUTPATIENT)
Dept: ONCOLOGY | Age: 60
End: 2020-03-13

## 2020-03-18 RX ORDER — OXYCODONE AND ACETAMINOPHEN 10; 325 MG/1; MG/1
1 TABLET ORAL EVERY 6 HOURS PRN
Qty: 120 TABLET | Refills: 0 | OUTPATIENT
Start: 2020-03-18 | End: 2020-04-17

## 2020-03-20 ENCOUNTER — OFFICE VISIT (OUTPATIENT)
Dept: FAMILY MEDICINE CLINIC | Age: 60
End: 2020-03-20
Payer: COMMERCIAL

## 2020-03-20 VITALS
HEART RATE: 69 BPM | BODY MASS INDEX: 39.5 KG/M2 | WEIGHT: 245.8 LBS | DIASTOLIC BLOOD PRESSURE: 74 MMHG | SYSTOLIC BLOOD PRESSURE: 123 MMHG | HEIGHT: 66 IN

## 2020-03-20 PROCEDURE — 3017F COLORECTAL CA SCREEN DOC REV: CPT | Performed by: STUDENT IN AN ORGANIZED HEALTH CARE EDUCATION/TRAINING PROGRAM

## 2020-03-20 PROCEDURE — G8484 FLU IMMUNIZE NO ADMIN: HCPCS | Performed by: STUDENT IN AN ORGANIZED HEALTH CARE EDUCATION/TRAINING PROGRAM

## 2020-03-20 PROCEDURE — 99213 OFFICE O/P EST LOW 20 MIN: CPT | Performed by: STUDENT IN AN ORGANIZED HEALTH CARE EDUCATION/TRAINING PROGRAM

## 2020-03-20 PROCEDURE — 4004F PT TOBACCO SCREEN RCVD TLK: CPT | Performed by: STUDENT IN AN ORGANIZED HEALTH CARE EDUCATION/TRAINING PROGRAM

## 2020-03-20 PROCEDURE — G8427 DOCREV CUR MEDS BY ELIG CLIN: HCPCS | Performed by: STUDENT IN AN ORGANIZED HEALTH CARE EDUCATION/TRAINING PROGRAM

## 2020-03-20 PROCEDURE — 99211 OFF/OP EST MAY X REQ PHY/QHP: CPT | Performed by: FAMILY MEDICINE

## 2020-03-20 PROCEDURE — G8417 CALC BMI ABV UP PARAM F/U: HCPCS | Performed by: STUDENT IN AN ORGANIZED HEALTH CARE EDUCATION/TRAINING PROGRAM

## 2020-03-20 RX ORDER — HYDROCHLOROTHIAZIDE 50 MG/1
50 TABLET ORAL DAILY
Qty: 30 TABLET | Refills: 3 | Status: SHIPPED | OUTPATIENT
Start: 2020-03-20 | End: 2020-07-20

## 2020-03-20 ASSESSMENT — ENCOUNTER SYMPTOMS
CHEST TIGHTNESS: 0
SHORTNESS OF BREATH: 0
APNEA: 0

## 2020-03-20 NOTE — PROGRESS NOTES
Visit Information    Have you changed or started any medications since your last visit including any over-the-counter medicines, vitamins, or herbal medicines? no   Have you stopped taking any of your medications? Is so, why? -  no  Are you having any side effects from any of your medications? - no    Have you seen any other physician or provider since your last visit?  no   Have you had any other diagnostic tests since your last visit?  no   Have you been seen in the emergency room and/or had an admission in a hospital since we last saw you?  no   Have you had your routine dental cleaning in the past 6 months?  no     Do you have an active MyChart account? If no, what is the barrier?   No: Declined     Patient Care Team:  Celestino Miller MD as PCP - General (Family Medicine)  Meghan Murray MD as Consulting Physician (Hematology and Oncology)  Davion Quigley MD as Consulting Physician (Radiation Oncology)  Adri Arana RN as Nurse Navigator    Medical History Review  Past Medical, Family, and Social History reviewed and does contribute to the patient presenting condition    Health Maintenance   Topic Date Due    Hepatitis C screen  1960    HIV screen  11/19/1975    Shingles Vaccine (1 of 2) 11/19/2010    Flu vaccine (1) 01/06/2021 (Originally 9/1/2019)    Low dose CT lung screening  01/16/2021    Lipid screen  07/26/2022    Cervical cancer screen  09/22/2022    DTaP/Tdap/Td vaccine (2 - Td) 08/14/2027    Colon cancer screen colonoscopy  10/13/2027    Pneumococcal 0-64 years Vaccine  Completed    Hepatitis A vaccine  Aged Out    Hepatitis B vaccine  Aged Out    Hib vaccine  Aged Out    Meningococcal (ACWY) vaccine  Aged Out

## 2020-03-20 NOTE — TELEPHONE ENCOUNTER
SARAH AT  WANTING TO KNOW WHY SHE DIDN'T GET HER PAIN MEDICATION REFILL   ADVISED SHE WAS A NO SHOW AND THE PHYSICIANS ARE NOT FILLING PAIN MEDS FOR PT'S WHO DO NOT KEEP THEIR APPTS.    SHE WAS UPSET, STATED IT WAS AN OVERSITE AND SHE HAS AN APPT NEXT WEEK   AGAIN REMINDED HER THAT IF SHE DOES NOT SHOW FOR HER APPT SHE WILL NOT GET HER MEDS REFILLED   Markus Kang MD

## 2020-03-20 NOTE — PROGRESS NOTES
I have reviewed and discussed key elements of [facility] with the resident including plan of care and follow up and agree with the care darrius plan.

## 2020-03-20 NOTE — PATIENT INSTRUCTIONS
Thank you for letting us take care of you today. We hope all your questions were addressed. If a question was overlooked or something else comes to mind after you return home, please contact a member of your Care Team listed below. Please make sure you have a routine office visit set up to follow-up on [address]. Your Care Team at [place] [address] is Team #4  Rodríguez Raya MD (Faculty)  Abraham Swenson MD (Faculty  Faridaarthur Estrada MD (Resident)  Liseth Cantu MD (Resident)  Deepali Meza MD (Resident)  Alem Wood MD (Resident)  Nicole Fleming MD (Resident)  Tashi Butler LPN  Leigh Ann Boehringer. ,RMA  Melany H.,RMA  Lori LEMUS., RMA  Nella Lange ([address] [facility] office)  Candace [facility] office)  [facility]  Katherine Overton, [address] ([address] [facility])  Ted Willis, [address] [facility] (Clinical Pharmacist)       Office phone number: [phone]    If you need to get in right away due to illness, please be advised we have \"Same Day\" appointments available Monday-Friday. Please call us at [phone] option #3 to schedule your \"Same Day\" appointment. Preventing the Spread of Coronavirus Disease 2019 in Homes and Residential Communities   For the most recent information go to [employer].    Prevention steps for People with confirmed or suspected COVID-19 (including persons under investigation) who do not need to be hospitalized  and   People with confirmed COVID-19 who were hospitalized and determined to be medically stable to go home    Your healthcare provider and public health staff will evaluate whether you can be cared for at home. If it is determined that you do not need to be hospitalized and can be isolated at home, you will be monitored by staff from your local or state health department.  You should follow the prevention steps below until a healthcare provider or local or state health department says you can return to your normal room.  Cover your coughs and sneezes  Cover your mouth and nose with a tissue when you cough or sneeze. Throw used tissues in a lined trash can. Immediately wash your hands with soap and water for at least 20 seconds or, if soap and water are not available, clean your hands with an alcohol-based hand  that contains at least 60% alcohol. Clean your hands often  Wash your hands often with soap and water for at least 20 seconds, especially after blowing your nose, coughing, or sneezing; going to the bathroom; and before eating or preparing food. If soap and water are not readily available, use an alcohol-based hand  with at least 60% alcohol, covering all surfaces of your hands and rubbing them together until they feel dry. Soap and water are the best option if hands are visibly dirty. Avoid touching your eyes, nose, and mouth with unwashed hands. Avoid sharing personal household items  You should not share dishes, drinking glasses, cups, eating utensils, towels, or bedding with other people or pets in your home. After using these items, they should be washed thoroughly with soap and water. Clean all high-touch surfaces everyday  High touch surfaces include counters, tabletops, doorknobs, bathroom fixtures, toilets, phones, keyboards, tablets, and bedside tables. Also, clean any surfaces that may have blood, stool, or body fluids on them. Use a household cleaning spray or wipe, according to the label instructions. Labels contain instructions for safe and effective use of the cleaning product including precautions you should take when applying the product, such as wearing gloves and making sure you have good ventilation during use of the product. Monitor your symptoms  Seek prompt medical attention if your illness is worsening (e.g., difficulty breathing). Before seeking care, call your healthcare provider and tell them that you have, or are being evaluated for, COVID-19.  Put on a facemask before you enter the facility. These steps will help the healthcare providers office to keep other people in the office or waiting room from getting infected or exposed. Ask your healthcare provider to call the local or state health department. Persons who are placed under active monitoring or facilitated self-monitoring should follow instructions provided by their local health department or occupational health professionals, as appropriate. When working with your local health department check their available hours. If you have a medical emergency and need to call 911, notify the dispatch personnel that you have, or are being evaluated for COVID-19. If possible, put on a facemask before emergency medical services arrive. Discontinuing home isolation  Patients with confirmed COVID-19 should remain under home isolation precautions until the risk of secondary transmission to others is thought to be low. The decision to discontinue home isolation precautions should be made on a case-by-case basis, in consultation with healthcare providers and state and local health departments.

## 2020-03-20 NOTE — PROGRESS NOTES
Normal appearance. Cardiovascular:      Rate and Rhythm: Normal rate and regular rhythm. Pulmonary:      Effort: Pulmonary effort is normal.      Breath sounds: Normal breath sounds. Musculoskeletal: Normal range of motion. General: Swelling present. Neurological:      Mental Status: She is alert. Lab Results   Component Value Date    WBC 6.9 12/16/2019    HGB 13.8 12/16/2019    HCT 44.1 12/16/2019     12/16/2019    CHOL 164 07/26/2017    TRIG 90 07/26/2017    HDL 50 07/26/2017    ALT 8 12/16/2019    AST 10 12/16/2019     12/16/2019    K 3.9 12/16/2019     12/16/2019    CREATININE 0.62 12/16/2019    BUN 9 12/16/2019    CO2 23 12/16/2019    TSH 1.54 06/23/2014    INR 1.0 03/27/2019    LABA1C 5.5 01/04/2018    LABMICR 25 09/29/2015     Lab Results   Component Value Date    CALCIUM 9.7 12/16/2019    PHOS 2.5 (L) 03/04/2015     Lab Results   Component Value Date    LDLCHOLESTEROL 96 07/26/2017       Assessment and Plan:    1. Essential hypertension  - hydroCHLOROthiazide (HYDRODIURIL) 50 MG tablet; Take 1 tablet by mouth daily  Dispense: 30 tablet; Refill: 3  - will discontinue norvasc 2/2 to LE edema. Will also discontinue Lasix. I believe patient does not need it and HCTZ would be a good option to control both edema and BP. 2. Bilateral leg edema  - Handicap placard    3. Healthcare maintenance  - Hepatitis C Antibody; Future  - HIV Screen;  Future          Requested Prescriptions     Signed Prescriptions Disp Refills    hydroCHLOROthiazide (HYDRODIURIL) 50 MG tablet 30 tablet 3     Sig: Take 1 tablet by mouth daily       Medications Discontinued During This Encounter   Medication Reason    prochlorperazine (COMPAZINE) 10 MG tablet LIST CLEANUP    ondansetron (ZOFRAN-ODT) 8 MG TBDP disintegrating tablet LIST CLEANUP    naproxen (NAPROSYN) 500 MG tablet LIST CLEANUP    Magic Mouthwash (MIRACLE MOUTHWASH) LIST CLEANUP    lidocaine-prilocaine (EMLA) 2.5-2.5 % cream LIST CLEANUP    EPINEPHrine (EPIPEN) 0.3 MG/0.3ML SOAJ injection LIST CLEANUP    docusate sodium (COLACE) 100 MG capsule LIST CLEANUP    furosemide (LASIX) 20 MG tablet Therapy completed    amLODIPine (NORVASC) 10 MG tablet Therapy completed       Lazara received counseling on the following healthy behaviors:nutrition, exercise and medication adherence    Discussed use, benefit, and side effects of prescribed medications. Barriers to medication compliance addressed. All patient questions answered. Pt voicedunderstanding. Return in about 2 months (around 5/20/2020) for HTN.

## 2020-03-23 ENCOUNTER — OFFICE VISIT (OUTPATIENT)
Dept: ONCOLOGY | Age: 60
End: 2020-03-23
Payer: COMMERCIAL

## 2020-03-23 ENCOUNTER — HOSPITAL ENCOUNTER (OUTPATIENT)
Facility: MEDICAL CENTER | Age: 60
Discharge: HOME | End: 2020-03-23
Payer: COMMERCIAL

## 2020-03-23 ENCOUNTER — HOSPITAL ENCOUNTER (OUTPATIENT)
Facility: MEDICAL CENTER | Age: 60
End: 2020-03-23
Payer: COMMERCIAL

## 2020-03-23 ENCOUNTER — TELEPHONE (OUTPATIENT)
Dept: ONCOLOGY | Age: 60
End: 2020-03-23

## 2020-03-23 VITALS
WEIGHT: 250.7 LBS | HEART RATE: 113 BPM | DIASTOLIC BLOOD PRESSURE: 65 MMHG | TEMPERATURE: 97.6 F | BODY MASS INDEX: 40.46 KG/M2 | SYSTOLIC BLOOD PRESSURE: 137 MMHG

## 2020-03-23 DIAGNOSIS — R53.83 OTHER FATIGUE: ICD-10-CM

## 2020-03-23 DIAGNOSIS — F17.200 SMOKING: ICD-10-CM

## 2020-03-23 DIAGNOSIS — G62.9 NEUROPATHY: ICD-10-CM

## 2020-03-23 DIAGNOSIS — E66.01 MORBID OBESITY DUE TO EXCESS CALORIES (HCC): ICD-10-CM

## 2020-03-23 DIAGNOSIS — Z17.1 MALIGNANT NEOPLASM OF UPPER-OUTER QUADRANT OF LEFT BREAST IN FEMALE, ESTROGEN RECEPTOR NEGATIVE (HCC): ICD-10-CM

## 2020-03-23 DIAGNOSIS — C50.412 MALIGNANT NEOPLASM OF UPPER-OUTER QUADRANT OF LEFT BREAST IN FEMALE, ESTROGEN RECEPTOR NEGATIVE (HCC): ICD-10-CM

## 2020-03-23 LAB
ABSOLUTE EOS #: 0.16 K/UL (ref 0–0.44)
ABSOLUTE IMMATURE GRANULOCYTE: 0.02 K/UL (ref 0–0.3)
ABSOLUTE LYMPH #: 2.59 K/UL (ref 1.1–3.7)
ABSOLUTE MONO #: 0.75 K/UL (ref 0.1–1.2)
ALBUMIN SERPL-MCNC: 3.6 G/DL (ref 3.5–5.2)
ALBUMIN/GLOBULIN RATIO: ABNORMAL (ref 1–2.5)
ALP BLD-CCNC: 126 U/L (ref 35–104)
ALT SERPL-CCNC: 9 U/L (ref 5–33)
ANION GAP SERPL CALCULATED.3IONS-SCNC: 12 MMOL/L (ref 9–17)
AST SERPL-CCNC: 16 U/L
BASOPHILS # BLD: 1 % (ref 0–2)
BASOPHILS ABSOLUTE: 0.07 K/UL (ref 0–0.2)
BILIRUB SERPL-MCNC: 0.11 MG/DL (ref 0.3–1.2)
BUN BLDV-MCNC: 15 MG/DL (ref 6–20)
BUN/CREAT BLD: 24 (ref 9–20)
CALCIUM SERPL-MCNC: 9.5 MG/DL (ref 8.6–10.4)
CHLORIDE BLD-SCNC: 103 MMOL/L (ref 98–107)
CO2: 22 MMOL/L (ref 20–31)
CREAT SERPL-MCNC: 0.62 MG/DL (ref 0.5–0.9)
DIFFERENTIAL TYPE: ABNORMAL
EOSINOPHILS RELATIVE PERCENT: 2 % (ref 1–4)
GFR AFRICAN AMERICAN: >60 ML/MIN
GFR NON-AFRICAN AMERICAN: >60 ML/MIN
GFR SERPL CREATININE-BSD FRML MDRD: ABNORMAL ML/MIN/{1.73_M2}
GFR SERPL CREATININE-BSD FRML MDRD: ABNORMAL ML/MIN/{1.73_M2}
GLUCOSE BLD-MCNC: 87 MG/DL (ref 70–99)
HCT VFR BLD CALC: 44.3 % (ref 36.3–47.1)
HEMOGLOBIN: 13.7 G/DL (ref 11.9–15.1)
IMMATURE GRANULOCYTES: 0 %
LYMPHOCYTES # BLD: 32 % (ref 24–43)
MCH RBC QN AUTO: 28.3 PG (ref 25.2–33.5)
MCHC RBC AUTO-ENTMCNC: 30.9 G/DL (ref 28.4–34.8)
MCV RBC AUTO: 91.5 FL (ref 82.6–102.9)
MONOCYTES # BLD: 9 % (ref 3–12)
NRBC AUTOMATED: 0 PER 100 WBC
PDW BLD-RTO: 17.7 % (ref 11.8–14.4)
PLATELET # BLD: 192 K/UL (ref 138–453)
PLATELET ESTIMATE: ABNORMAL
PMV BLD AUTO: 9.6 FL (ref 8.1–13.5)
POTASSIUM SERPL-SCNC: 5.7 MMOL/L (ref 3.7–5.3)
RBC # BLD: 4.84 M/UL (ref 3.95–5.11)
RBC # BLD: ABNORMAL 10*6/UL
SEG NEUTROPHILS: 56 % (ref 36–65)
SEGMENTED NEUTROPHILS ABSOLUTE COUNT: 4.45 K/UL (ref 1.5–8.1)
SODIUM BLD-SCNC: 137 MMOL/L (ref 135–144)
TOTAL PROTEIN: 7.1 G/DL (ref 6.4–8.3)
WBC # BLD: 8 K/UL (ref 3.5–11.3)
WBC # BLD: ABNORMAL 10*3/UL

## 2020-03-23 PROCEDURE — G8427 DOCREV CUR MEDS BY ELIG CLIN: HCPCS | Performed by: INTERNAL MEDICINE

## 2020-03-23 PROCEDURE — 85025 COMPLETE CBC W/AUTO DIFF WBC: CPT

## 2020-03-23 PROCEDURE — G8417 CALC BMI ABV UP PARAM F/U: HCPCS | Performed by: INTERNAL MEDICINE

## 2020-03-23 PROCEDURE — 80053 COMPREHEN METABOLIC PANEL: CPT

## 2020-03-23 PROCEDURE — 3017F COLORECTAL CA SCREEN DOC REV: CPT | Performed by: INTERNAL MEDICINE

## 2020-03-23 PROCEDURE — G8484 FLU IMMUNIZE NO ADMIN: HCPCS | Performed by: INTERNAL MEDICINE

## 2020-03-23 PROCEDURE — 99211 OFF/OP EST MAY X REQ PHY/QHP: CPT | Performed by: INTERNAL MEDICINE

## 2020-03-23 PROCEDURE — 99214 OFFICE O/P EST MOD 30 MIN: CPT | Performed by: INTERNAL MEDICINE

## 2020-03-23 PROCEDURE — 36415 COLL VENOUS BLD VENIPUNCTURE: CPT

## 2020-03-23 PROCEDURE — 4004F PT TOBACCO SCREEN RCVD TLK: CPT | Performed by: INTERNAL MEDICINE

## 2020-03-23 RX ORDER — FUROSEMIDE 20 MG/1
20 TABLET ORAL DAILY
Status: ON HOLD | COMMUNITY
End: 2020-05-27

## 2020-03-23 RX ORDER — AMLODIPINE BESYLATE 10 MG/1
10 TABLET ORAL DAILY
Status: ON HOLD | COMMUNITY
End: 2020-05-27

## 2020-03-23 RX ORDER — OXYCODONE AND ACETAMINOPHEN 10; 325 MG/1; MG/1
1 TABLET ORAL EVERY 6 HOURS PRN
Qty: 120 TABLET | Refills: 0 | Status: SHIPPED | OUTPATIENT
Start: 2020-03-23 | End: 2020-04-21 | Stop reason: SDUPTHER

## 2020-03-23 NOTE — PROGRESS NOTES
lesions, bruises.   Nails now growing back slowly, toenail fungis  Hematologic/Lymphatic: negative for easy bruising, bleeding, lymphadenopathy, or petechiae   Endocrine: negative for heat or cold intolerance,weight changes, change in bowel habits and hair loss   Musculoskeletal: negative for myalgias, pain, joint swelling; +arhtralgias and bone pain   Neurological: negative for headaches, dizziness, seizures, weakness;+  neuropathy is stable        Physical Exam:    Vitals: /65   Pulse 113   Temp 97.6 °F (36.4 °C) (Oral)   Wt 250 lb 11.2 oz (113.7 kg)   LMP 02/11/1992   BMI 40.46 kg/m²   General appearance - well appearing, no in pain or distress  Mental status - AAO X3  Eyes - pupils equal and reactive, extraocular eye movements intact  Mouth - mucous membranes moist, pharynx normal without lesions  Neck - supple, no significant adenopathy  Lymphatics - no palpable lymphadenopathy, no hepatosplenomegaly  Chest - clear to auscultation, no wheezes, rales or rhonchi, symmetric air entry  Heart - normal rate, regular rhythm, normal S1, S2, no murmurs  Abdomen - soft, nontender, nondistended, no masses or organomegaly  Neurological - alert, oriented, normal speech, no focal findings or movement disorder noted; +neuropathy - stable  Extremities -bilateral ankle edema  Skin - normal coloration and turgor, no rashes, no suspicious skin lesions noted; toenail fungus  Breast- bilateral mastectomy, no masses or lumps        DATA:  Lab Results   Component Value Date    WBC 8.0 03/23/2020    HGB 13.7 03/23/2020    HCT 44.3 03/23/2020    MCV 91.5 03/23/2020     03/23/2020       Chemistry        Component Value Date/Time     03/23/2020 0914    K 5.7 (H) 03/23/2020 0914     03/23/2020 0914    CO2 22 03/23/2020 0914    BUN 15 03/23/2020 0914    CREATININE 0.62 03/23/2020 0914        Component Value Date/Time    CALCIUM 9.5 03/23/2020 0914    ALKPHOS 126 (H) 03/23/2020 0914    AST 16 03/23/2020 0914 ALT 9 03/23/2020 0914    BILITOT 0.11 (L) 03/23/2020 0914              Impression:  Invasive ductal carcinoma of left breast, triple negative, pT2,p N0, M0  Status post bilateral mastectomy and left sentinel lymph node biopsy  Personal history of gynecological malignancy (uterus/cervical cancer) status post surgery in 1992  Cancer related pain  Leg swelling  Anemia secondary to chemotherapy  Family history of malignancy  Chronic back pain  Hypertension  Obesity      Plan: We had detailed discussion regarding ongoing pain management, she has had difficulty establishing with pain management, I explained restrictions for writing scripts while she is not on active treatment. I asked her to follow up with insurance company regarding approved providers. Exam is negative, she is asymptomatic and she remains in remission. Clinically she is doing well in regards to breast cancer. I am refilling Percocet. I reviewed diagnosis and prognostic data. Reviewed NCCN guidelines for surveillance. Continue age-appropriate screening studies. Patient was counseled tobacco cessation. Discussed natural history of breast cancer. Discussed NCCN guidelines for treatment of triple negative breast cancer. Reviewed NCCN guidelines for surveillance. Patient was educated on signs and symptoms concerning for disease recurrence. We will see patient back in office in 4 months with lab work-up or sooner if clinically indicated. Jillian Jacome        This note is created with the assistance of a speech recognition program.  While intending to generate a document that actually reflects the content of the visit, the document can still have some errors including those of syntax and sound a like substitutions which may escape proof reading. It such instances, actual meaning can be extrapolated by contextual diversion.

## 2020-03-23 NOTE — TELEPHONE ENCOUNTER
Gricelda Logan MD VISIT  DR MACE IN TO SEE PATIENT  ORDERS RECEIVED   RV 4 MONTHS W/LABS  LABS CDP CMP 07/20/20  MD VISIT 07/20/20 @11AM  SCRIPT CALLED INTO PATIENTS PHARMACY  AVS PRINTED AND GIVEN TO PATIENT WITH INSTRUCTIONS  PATIENT DISCHARGED AMBULATORY

## 2020-04-13 ENCOUNTER — TELEPHONE (OUTPATIENT)
Dept: PHARMACY | Age: 60
End: 2020-04-13

## 2020-04-13 NOTE — TELEPHONE ENCOUNTER
Medication Management Service    Patient's Name: Paz Alfaro YOB: 1960            ______________________________________________________________________    Date of patient's visit: 4/13/2020    Subjective/Objective: Paz Alfaro spoke with pharmacy regarding the targeted intervention(s) listed below as identified by [facility]. Targeted Interventions:  [] Medication adherence   [] Patient education  [] Suboptimal drug   [x] Medication assessment - Opioid (Oxycodone/APAP 10/325 mg)  [] Cost-effective alternative  [] Needs drug therapy  [] Needs laboratory monitoring  [] Drug interaction  [] Unnecessary therapy  [] Adverse drug reaction  [] Inappropriate administration  ______________________________________________________________________    Assessment/Plan:  Contacted the patient to discuss use of Oxycodone/APAP 10/325 mg for pain management and to assess for any potential adverse events with current therapy. Patient reports her pain is controlled by taking one tablet by mouth every 6 hours as needed. Denies any side effects to opioid therapy such as constipation and respiratory depression. Informed patient that if these occur, she should contact her doctor for follow-up management. Tamar Cueto, Pharm.  D.  PGY2 Ambulatory Care Pharmacist Resident   [facility]) Medication Management Service  [phone]  4/13/2020 1:01 PM

## 2020-04-21 RX ORDER — GABAPENTIN 300 MG/1
300 CAPSULE ORAL 3 TIMES DAILY
Qty: 90 CAPSULE | Refills: 3 | Status: SHIPPED | OUTPATIENT
Start: 2020-04-21 | End: 2020-07-20 | Stop reason: SDUPTHER

## 2020-04-21 RX ORDER — OXYCODONE AND ACETAMINOPHEN 10; 325 MG/1; MG/1
1 TABLET ORAL EVERY 6 HOURS PRN
Qty: 120 TABLET | Refills: 0 | Status: SHIPPED | OUTPATIENT
Start: 2020-04-23 | End: 2020-05-20 | Stop reason: SDUPTHER

## 2020-04-21 NOTE — TELEPHONE ENCOUNTER
Message from Kenroy Medina, requesting refill on Percocet and Gabapentin   Last filled 3-   Sent to md

## 2020-05-20 RX ORDER — OXYCODONE AND ACETAMINOPHEN 10; 325 MG/1; MG/1
1 TABLET ORAL EVERY 6 HOURS PRN
Qty: 120 TABLET | Refills: 0 | Status: SHIPPED | OUTPATIENT
Start: 2020-05-22 | End: 2020-06-21

## 2020-05-23 ENCOUNTER — HOSPITAL ENCOUNTER (OUTPATIENT)
Dept: PREADMISSION TESTING | Age: 60
Discharge: HOME | End: 2020-05-27
Payer: COMMERCIAL

## 2020-05-23 PROCEDURE — U0004 COV-19 TEST NON-CDC HGH THRU: HCPCS

## 2020-05-25 LAB
SARS-COV-2, PCR: NOT DETECTED
SARS-COV-2, RAPID: NORMAL
SARS-COV-2: NORMAL
SOURCE: NORMAL

## 2020-05-26 ENCOUNTER — TELEPHONE (OUTPATIENT)
Dept: PRIMARY CARE CLINIC | Age: 60
End: 2020-05-26

## 2020-05-26 RX ORDER — CEFAZOLIN SODIUM 2 G/50ML
2 SOLUTION INTRAVENOUS ONCE
Status: DISCONTINUED | OUTPATIENT
Start: 2020-05-26 | End: 2020-05-26

## 2020-05-27 ENCOUNTER — HOSPITAL ENCOUNTER (OUTPATIENT)
Age: 60
Setting detail: OUTPATIENT SURGERY
Discharge: HOME | End: 2020-05-27
Attending: PODIATRIST | Admitting: PODIATRIST
Payer: COMMERCIAL

## 2020-05-27 VITALS
BODY MASS INDEX: 40.18 KG/M2 | SYSTOLIC BLOOD PRESSURE: 141 MMHG | RESPIRATION RATE: 14 BRPM | HEIGHT: 66 IN | WEIGHT: 250 LBS | DIASTOLIC BLOOD PRESSURE: 97 MMHG | TEMPERATURE: 97.7 F | OXYGEN SATURATION: 100 % | HEART RATE: 51 BPM

## 2020-05-27 PROCEDURE — 3600000002 HC SURGERY LEVEL 2 BASE: Performed by: PODIATRIST

## 2020-05-27 PROCEDURE — 2500000003 HC RX 250 WO HCPCS: Performed by: PODIATRIST

## 2020-05-27 PROCEDURE — 2709999900 HC NON-CHARGEABLE SUPPLY: Performed by: PODIATRIST

## 2020-05-27 PROCEDURE — 7100000011 HC PHASE II RECOVERY - ADDTL 15 MIN: Performed by: PODIATRIST

## 2020-05-27 PROCEDURE — 7100000010 HC PHASE II RECOVERY - FIRST 15 MIN: Performed by: PODIATRIST

## 2020-05-27 PROCEDURE — 99153 MOD SED SAME PHYS/QHP EA: CPT

## 2020-05-27 PROCEDURE — 3600000012 HC SURGERY LEVEL 2 ADDTL 15MIN: Performed by: PODIATRIST

## 2020-05-27 PROCEDURE — 99152 MOD SED SAME PHYS/QHP 5/>YRS: CPT

## 2020-05-27 ASSESSMENT — PAIN - FUNCTIONAL ASSESSMENT: PAIN_FUNCTIONAL_ASSESSMENT: 0-10

## 2020-05-27 ASSESSMENT — PAIN SCALES - GENERAL: PAINLEVEL_OUTOF10: 0

## 2020-05-27 NOTE — H&P
Negative as described in HPI. CONSTITUTIONAL: Negative for fevers, chills, sweats, fatigue, and weight loss. HEENT: Pt wears glasses. Negative for hearing changes, rhinorrhea, and throat pain. RESPIRATORY: Negative for shortness of breath, cough, congestion, and wheezing. CARDIOVASCULAR: Negative for chest pain, blood clot, irregular heartbeat, and palpitations. GASTROINTESTINAL: History of GERD. Negative for nausea, vomiting, diarrhea, constipation, change in bowel habits, and abdominal pain. GENITOURINARY: Negative for difficulty of urination, burning with urination, and frequency. INTEGUMENT: Easy bruising. Negative for rash and skin lesions. HEMATOLOGIC/LYMPHATIC: Chronic left leg edema. Left hallux edema. ALLERGIC/IMMUNOLOGIC: Negative for urticaria and itching. ENDOCRINE: Negative for increase in drinking, increase in urination, and heat or cold intolerance. MUSCULOSKELETAL: See HPI. NEUROLOGICAL: Numbness and tingling in hands and feet. Negative for headaches, dizziness, and lightheadedness. BEHAVIOR/PSYCH: History of depression. Negative for anxiety. Physical Exam:   BP (!) 147/80   Pulse 64   Temp 98.4 °F (36.9 °C) (Oral)   Resp 20   LMP 02/11/1992   SpO2 98%   Patient's last menstrual period was 02/11/1992. No obstetric history on file. No results for input(s): POCGLU in the last 72 hours. General Appearance:  Alert, well appearing, and in no acute distress. Mental status: Oriented to person, place, and time. Head: Normocephalic and atraumatic. Eye: No icterus, redness, pupils equal and reactive, extraocular eye movements intact, and conjunctiva clear. Ear: Hearing grossly intact. Nose: No drainage noted. Mouth: Mucous membranes moist.  Neck: Supple and no carotid bruits noted. Lungs: Bilateral equal air entry, clear to auscultation, no wheezing, rales or rhonchi, and normal effort. Cardiovascular: Distant heart sounds.  Normal rate, regular rhythm, no murmur, gallop, and rub. Abdomen: Rounded. Soft, non-tender, and active bowel sounds. Neurologic: Normal speech and cranial nerves II through XII grossly intact. Strength 5/5 bilaterally. Skin: No gross lesions, rashes, bruising, or bleeding on exposed skin area. Extremities: Hardware is visible in the distal left hallux. Left hallux edema. Mild left lower leg edema. Left ankle 3+ pitting edema. Right ankle 2+ pitting edema. Capillary refill < 3 seconds in the left hallux. Posterior tibial pulses are palpable bilaterally. No calf tenderness with palpation. Psych: Normal affect. Investigations:      Laboratory Testing:  No results found for this or any previous visit (from the past 24 hour(s)). No results for input(s): HGB, HCT, WBC, MCV, PLATELET, NA, K, CL, CO2, BUN, CREATININE, GLUCOSE, INR, PROTIME, APTT, AST, ALT, LABALBU, HCG in the last 720 hours. Recent Labs     05/23/20  1713   COVID19 Not Detected                 Diagnosis:      1. Left great toe painful hardware    Plans:     1.  Left great toe hardware removal       KIRSTIN Morris - CNP  5/27/2020  8:29 AM

## 2020-05-27 NOTE — BRIEF OP NOTE
PODIATRY BRIEF OP NOTE    PATIENT NAME: Sherri Salcido  YOB: 1960  -  61 y.o. female  MRN: [id number]  DATE: 5/27/2020  BILLING #: [id number]    Surgeon(s):  Linnea Yi DPM     ASSISTANTS: Hazel Escobedo DPM     PRE-OP DIAGNOSIS:   1. Painful retained hardware, left hallux  2. Chronic non-healing wound, left hallux    POST-OP DIAGNOSIS: Same as above    PROCEDURE:   1. Removal of hardware, left hallux   2.  Excision of wound with primary closure, left hallux    ANESTHESIA: MAC with local injection 10 cc 1:1 mix 1% lidocaine plain & 0.5% marcaine plain    HEMOSTASIS: Direct pressure    ESTIMATED BLOOD LOSS: Less than 3 cc    MATERIALS: 4-0 Vicryl & 3-0 Nylon    INJECTABLES: None post-op    SPECIMEN: None    COMPLICATIONS: None    FINDINGS: Consistent with above diagnoses    Hazel Escobedo DPM   Podiatric Medicine & Surgery   5/27/2020 at 11:13 AM

## 2020-05-29 NOTE — OP NOTE
PODIATRY OPERATIVE NOTE     PATIENT NAME: Sanjay Madden  YOB: 1960  -  61 y.o. female  MRN: [id number]  DATE: 5/27/2020  BILLING #: [id number]     Surgeon(s):  Chelly Lopez DPM      ASSISTANTS: Oli Alejo DPM      PRE-OP DIAGNOSIS:   1. Painful retained hardware, left hallux  2. Chronic non-healing wound, left hallux     POST-OP DIAGNOSIS: Same as above     PROCEDURE:   1. Removal of hardware, left hallux   2. Excision of wound with primary closure, left hallux     ANESTHESIA: MAC with local injection 10 cc 1:1 mix 1% lidocaine plain & 0.5% marcaine plain     HEMOSTASIS: Direct pressure     ESTIMATED BLOOD LOSS: Less than 3 cc     MATERIALS: 4-0 Vicryl & 3-0 Nylon     INJECTABLES: None post-op     SPECIMEN: None     COMPLICATIONS: None     FINDINGS: Consistent with above diagnoses      Indications for procedure: Patient is a 59-year-old female with painful retained hardware of the long left hallux after hallux IPJ fusion several years prior. Patient developed a callus to the tip of the digit which upon debridement showed exposed hardware. There are no other local signs of infection. Procedure in detail and indications/risks were explained to patient in detail, no guarantees were given or implied. Patient signed consent and left foot was marked. Procedure in detail:    Patient was brought to the OR and placed on the operating table in the supine position, secured a safety belt across the waist.  The procedure was performed under local so no anesthesia was required. Left ankle tourniquet was placed but not used. The left foot was scrubbed, prepped and draped in usual aseptic manner. A hemostat was used to dilate the exposed hardware at the tip of the distal phalanx of the hallux where the screw head was visible. Universal  was used to remove the screw which was passed the back table. There was no exposed bone, sinus tracking, fluctuance or undermining.   The chronic nonhealing

## 2020-06-19 ENCOUNTER — TELEPHONE (OUTPATIENT)
Dept: ONCOLOGY | Age: 60
End: 2020-06-19

## 2020-06-19 NOTE — TELEPHONE ENCOUNTER
RECEIVED VM FROM SARAH FOR REFILL OF PERCOCET. PER YOUR MD NOTE 03/20/2020:    Plan: We had detailed discussion regarding ongoing pain management, she has had difficulty establishing with pain management, I explained restrictions for writing scripts while she is not on active treatment. I asked her to follow up with insurance company regarding approved providers. Exam is negative, she is asymptomatic and she remains in remission. Clinically she is doing well in regards to breast cancer. I am refilling Percocet. I reviewed diagnosis and prognostic data    THERE HAS BEEN NO PROGRESSION FROM PATIENT FOR PAIN CLINIC. WE HAVE CONTINUED TO WRITER #[id number] PERCOCET 10/325 Q 28 TO 30 DAYS 03/23/2020, 04/23/2020 AND 05/22/2020    PLEASE DIRECT IF OK TO FILL/ CONTINUE/  DECREASE NUMBER ?     FOLLOW UP IS SCHEDULED FOR 07/20/2020

## 2020-06-22 RX ORDER — OXYCODONE AND ACETAMINOPHEN 10; 325 MG/1; MG/1
1 TABLET ORAL EVERY 6 HOURS PRN
Qty: 120 TABLET | Refills: 0 | Status: SHIPPED | OUTPATIENT
Start: 2020-06-22 | End: 2020-07-20 | Stop reason: SDUPTHER

## 2020-06-22 NOTE — TELEPHONE ENCOUNTER
DISCUSSED WITH DR Shaunna Santana.   OK TO PEND SCRIPT TO HIM AT THIS TIME AND AS NEW POLICY IS WRITTEN AND IMPLEMENTED HE WILL DISCUSS WITH PT.

## 2020-07-15 ENCOUNTER — HOSPITAL ENCOUNTER (OUTPATIENT)
Facility: MEDICAL CENTER | Age: 60
End: 2020-07-15
Payer: COMMERCIAL

## 2020-07-20 ENCOUNTER — HOSPITAL ENCOUNTER (OUTPATIENT)
Facility: MEDICAL CENTER | Age: 60
Discharge: HOME | End: 2020-07-20
Payer: COMMERCIAL

## 2020-07-20 ENCOUNTER — TELEPHONE (OUTPATIENT)
Dept: ONCOLOGY | Age: 60
End: 2020-07-20

## 2020-07-20 ENCOUNTER — OFFICE VISIT (OUTPATIENT)
Dept: ONCOLOGY | Age: 60
End: 2020-07-20
Payer: COMMERCIAL

## 2020-07-20 VITALS
DIASTOLIC BLOOD PRESSURE: 73 MMHG | SYSTOLIC BLOOD PRESSURE: 116 MMHG | TEMPERATURE: 98.1 F | WEIGHT: 247.5 LBS | HEART RATE: 59 BPM | BODY MASS INDEX: 39.95 KG/M2

## 2020-07-20 DIAGNOSIS — E66.01 MORBID OBESITY DUE TO EXCESS CALORIES (HCC): ICD-10-CM

## 2020-07-20 DIAGNOSIS — C50.412 MALIGNANT NEOPLASM OF UPPER-OUTER QUADRANT OF LEFT BREAST IN FEMALE, ESTROGEN RECEPTOR NEGATIVE (HCC): ICD-10-CM

## 2020-07-20 DIAGNOSIS — F17.200 SMOKING: ICD-10-CM

## 2020-07-20 DIAGNOSIS — G62.9 NEUROPATHY: ICD-10-CM

## 2020-07-20 DIAGNOSIS — R53.83 OTHER FATIGUE: ICD-10-CM

## 2020-07-20 DIAGNOSIS — Z17.1 MALIGNANT NEOPLASM OF UPPER-OUTER QUADRANT OF LEFT BREAST IN FEMALE, ESTROGEN RECEPTOR NEGATIVE (HCC): ICD-10-CM

## 2020-07-20 LAB
ABSOLUTE EOS #: 0.22 K/UL (ref 0–0.44)
ABSOLUTE IMMATURE GRANULOCYTE: 0.03 K/UL (ref 0–0.3)
ABSOLUTE LYMPH #: 3.12 K/UL (ref 1.1–3.7)
ABSOLUTE MONO #: 0.82 K/UL (ref 0.1–1.2)
ALBUMIN SERPL-MCNC: 3.7 G/DL (ref 3.5–5.2)
ALBUMIN/GLOBULIN RATIO: ABNORMAL (ref 1–2.5)
ALP BLD-CCNC: 107 U/L (ref 35–104)
ALT SERPL-CCNC: 9 U/L (ref 5–33)
ANION GAP SERPL CALCULATED.3IONS-SCNC: 12 MMOL/L (ref 9–17)
AST SERPL-CCNC: 11 U/L
BASOPHILS # BLD: 1 % (ref 0–2)
BASOPHILS ABSOLUTE: 0.04 K/UL (ref 0–0.2)
BILIRUB SERPL-MCNC: 0.19 MG/DL (ref 0.3–1.2)
BUN BLDV-MCNC: 16 MG/DL (ref 6–20)
BUN/CREAT BLD: 23 (ref 9–20)
CALCIUM SERPL-MCNC: 9.6 MG/DL (ref 8.6–10.4)
CHLORIDE BLD-SCNC: 99 MMOL/L (ref 98–107)
CO2: 26 MMOL/L (ref 20–31)
CREAT SERPL-MCNC: 0.71 MG/DL (ref 0.5–0.9)
DIFFERENTIAL TYPE: ABNORMAL
EOSINOPHILS RELATIVE PERCENT: 3 % (ref 1–4)
GFR AFRICAN AMERICAN: >60 ML/MIN
GFR NON-AFRICAN AMERICAN: >60 ML/MIN
GFR SERPL CREATININE-BSD FRML MDRD: ABNORMAL ML/MIN/{1.73_M2}
GFR SERPL CREATININE-BSD FRML MDRD: ABNORMAL ML/MIN/{1.73_M2}
GLUCOSE BLD-MCNC: 95 MG/DL (ref 70–99)
HCT VFR BLD CALC: 41.6 % (ref 36.3–47.1)
HEMOGLOBIN: 13.4 G/DL (ref 11.9–15.1)
IMMATURE GRANULOCYTES: 0 %
LYMPHOCYTES # BLD: 36 % (ref 24–43)
MCH RBC QN AUTO: 28.9 PG (ref 25.2–33.5)
MCHC RBC AUTO-ENTMCNC: 32.2 G/DL (ref 28.4–34.8)
MCV RBC AUTO: 89.8 FL (ref 82.6–102.9)
MONOCYTES # BLD: 9 % (ref 3–12)
NRBC AUTOMATED: 0 PER 100 WBC
PDW BLD-RTO: 15.4 % (ref 11.8–14.4)
PLATELET # BLD: 187 K/UL (ref 138–453)
PLATELET ESTIMATE: ABNORMAL
PMV BLD AUTO: 9.7 FL (ref 8.1–13.5)
POTASSIUM SERPL-SCNC: 4.2 MMOL/L (ref 3.7–5.3)
RBC # BLD: 4.63 M/UL (ref 3.95–5.11)
RBC # BLD: ABNORMAL 10*6/UL
SEG NEUTROPHILS: 51 % (ref 36–65)
SEGMENTED NEUTROPHILS ABSOLUTE COUNT: 4.47 K/UL (ref 1.5–8.1)
SODIUM BLD-SCNC: 137 MMOL/L (ref 135–144)
TOTAL PROTEIN: 6.8 G/DL (ref 6.4–8.3)
WBC # BLD: 8.7 K/UL (ref 3.5–11.3)
WBC # BLD: ABNORMAL 10*3/UL

## 2020-07-20 PROCEDURE — 80053 COMPREHEN METABOLIC PANEL: CPT

## 2020-07-20 PROCEDURE — 3017F COLORECTAL CA SCREEN DOC REV: CPT | Performed by: INTERNAL MEDICINE

## 2020-07-20 PROCEDURE — G8417 CALC BMI ABV UP PARAM F/U: HCPCS | Performed by: INTERNAL MEDICINE

## 2020-07-20 PROCEDURE — 36415 COLL VENOUS BLD VENIPUNCTURE: CPT

## 2020-07-20 PROCEDURE — G8427 DOCREV CUR MEDS BY ELIG CLIN: HCPCS | Performed by: INTERNAL MEDICINE

## 2020-07-20 PROCEDURE — 99211 OFF/OP EST MAY X REQ PHY/QHP: CPT | Performed by: INTERNAL MEDICINE

## 2020-07-20 PROCEDURE — 99214 OFFICE O/P EST MOD 30 MIN: CPT | Performed by: INTERNAL MEDICINE

## 2020-07-20 PROCEDURE — 4004F PT TOBACCO SCREEN RCVD TLK: CPT | Performed by: INTERNAL MEDICINE

## 2020-07-20 PROCEDURE — 85025 COMPLETE CBC W/AUTO DIFF WBC: CPT

## 2020-07-20 RX ORDER — HYDROCHLOROTHIAZIDE 50 MG/1
50 TABLET ORAL DAILY
Qty: 30 TABLET | Refills: 2 | Status: SHIPPED | OUTPATIENT
Start: 2020-07-20 | End: 2020-10-15

## 2020-07-20 RX ORDER — OXYCODONE AND ACETAMINOPHEN 10; 325 MG/1; MG/1
1 TABLET ORAL EVERY 6 HOURS PRN
Qty: 120 TABLET | Refills: 0 | Status: SHIPPED | OUTPATIENT
Start: 2020-07-20 | End: 2020-08-17 | Stop reason: SDUPTHER

## 2020-07-20 RX ORDER — GABAPENTIN 300 MG/1
300 CAPSULE ORAL 3 TIMES DAILY
Qty: 90 CAPSULE | Refills: 3 | Status: SHIPPED | OUTPATIENT
Start: 2020-07-20 | End: 2020-09-14 | Stop reason: SDUPTHER

## 2020-07-20 NOTE — TELEPHONE ENCOUNTER
Veronica Curtis MD VISIT  DR MACE IN TO SEE PATIENT  ORDERS RECEIVED  PAIN CONTRACT   SCRIPT SENT TO PATIENT'S PHARMACY  RV 1 MONTHS  **PAIN CONTRACT SIGNED 7/20/20  MD VISIT 8/17/20 @ 11:45 AM  AVS PRINTED AND GIVEN TO PATIENT W/ INSTRUCTIONS  PATIENT DISCHARGED AMBULATORY

## 2020-07-20 NOTE — PROGRESS NOTES
Hannah Stock                                                                                                                  7/20/2020  MRN:   [id number]  YOB: 1960  PCP:                           Carlos Rojo MD  Referring Physician: No ref. provider found  Treating Physician Name: Nisreen Stuart MD      Reason for visit:  Chief Complaint   Patient presents with    Follow-up     Review status of disease    Discuss Labs    Medication Refill       Current problems:   IDC of Left Breast cancer, Triple negative, pT2,pN0,M0    Active and recent treatments:  Bilateral Mastectomy with left sentinel lymph node  Adjuvant chemotherapy using dose denseAC followed by T on 4/11/19--- 8/ /19    Summary of Case/History:    Hannah Stock a 61 y. o.female invasive carcinoma of the breast.     Patient initially felt a lump in her left breast for 2-3 months. She underwent screening mammogram which showed 2 cm mass in upper outer left breast.  Right breast do not have any concerning findings. Ultrasound of left breast conformed mass measuring 1.9 cm in the upper outer left breast.  Patient underwent biopsy on 1/23/19 which revealed invasive ductal carcinoma, grade 3, ER/NH negative. HER-2 results are pending.     Patient has history of uterus/cervical cancer diagnosed in 1992. Patient states she underwent surgery for the cervical cancer. She does not recall getting any radiation therapy or chemotherapy. Patient states she has been interested in getting bilateral breast reduction due to cosmetic reasons however now with a diagnosis of breast cancer she wishes to proceed with bilateral mastectomy.     Patient has history of breast cancer in her mother as well as 3 paternal aunts. Patient's mother also had cervical and uterine cancer.     Patient underwent bilateral mastectomy with left sentinel lymph node biopsy.   Pathological stage was T2, N0, M0    Started chemotherapy for triple negative breast cancer using AC followed by T on 4/11/19    Interim History:    Patient presents to the clinic for a follow-up visit and to discuss further treatment plan and for active surveillance of breast cancer. She follow up with podiatry and was treated for fungus. Her neuropathy persists but improved. She has some soreness on the outer aspect of her right mastectomy, she denies any mass, she has not followed up with lymphedema. Her chronic pain is unchanged, she feels most of the days of the week are good. During this visit patient's allergy, social, medical, surgical history and medications were reviewed and updated.       Past Medical History:   Past Medical History:   Diagnosis Date    Arthritis     GENERALIZED ALL OVER RHEUMATOID AND OSTEO    Breast cancer ([address]     left breast    Cancer ([address] 1992    CERVICAL, UTERINE    Chronic back pain     scioliosis,  02/2019 WEARING A BACK BRACE    Degenerative disorder of bone     ALL OVER    Depression     Difficult intravenous access     HANDS AR BEST SITE    GERD (gastroesophageal reflux disease)     Hyperlipidemia     HX OF NO MEDS IN YEARS    Hypertension 2007    Pain management clinic    Insomnia     Neuropathy     hands and feet    Obesity     BMI -  43    Sleep apnea 2016    NO LONGER USES MACHINE    Snores     has been told by family that she stops breathing    Use of cane as ambulatory aid     Wears glasses     READING    Wears glasses     \"CHEATERS\"       Past Surgical History:     Past Surgical History:   Procedure Laterality Date    BREAST SURGERY Left 1990    CYST    BUNIONECTOMY Bilateral     CERVIX SURGERY  1992    COLONOSCOPY  10/13/2017    FOOT SURGERY Left 2014    PINS IN ALL TOES    FOOT SURGERY Left 5/27/2020    HARDWARE REMOVAL LEFT GREAT TOE- performed by Anish Ramos DPM at [address] [facility] Bilateral    [address] [facility] [place] Bilateral 2/12/2019    TOTAL MASTECTOMY performed by Erika Holland DO [facility] at [facility], BILATERAL Bilateral 02/12/2019     TOTAL MASTECTOMY,  AXILLARY SENTINEL LYMPH NODE BIOPSY, FROZEN SECTION     IL COLSC FLX W/REMOVAL LESION BY HOT BX FORCEPS N/A 10/13/2017    COLONOSCOPY WITH BIOPSY AND POLYPECTOMY performed by Leena Jovel IV, DO at [address] [place] [address]     TUNNELED VENOUS PORT PLACEMENT  03/27/2019    X-RAY FOOT 3+VW      both       Patient Family Social History:     Social History     Socioeconomic History    Marital status: Single     Spouse name: Not on file    Number of children: Not on file    Years of education: Not on file    Highest education level: Not on file   Occupational History    Not on file   Social Needs    Financial resource strain: Not on file    Food insecurity     Worry: Not on file     Inability: Not on file   [employer] needs     Medical: Not on file     Non-medical: Not on file   Tobacco Use    Smoking status: Current Some Day Smoker     Packs/day: 0.25     Years: 41.00     Pack years: 10.25     Types: Cigarettes    Smokeless tobacco: Never Used    Tobacco comment: STATES SMOKES A PACK A WEEK   Substance and Sexual Activity    Alcohol use: No    Drug use: Yes     Frequency: 7.0 times per week     Types: Marijuana     Comment: LAST USE 02/10/2018, AND PAIN PILLS     Sexual activity: Not Currently   Lifestyle    Physical activity     Days per week: Not on file     Minutes per session: Not on file    Stress: Not on file   Relationships    Social connections     Talks on phone: Not on file     Gets together: Not on file     Attends Synagogue service: Not on file     Active member of club or organization: Not on file     Attends meetings of clubs or organizations: Not on file     Relationship status: Not on file    Intimate partner violence     Fear of current or ex partner: Not on file     Emotionally abused: Not on file     Physically abused: Not on file     Forced sexual activity: Not on file LUNG RADS:    Per ACR Lung-RADS Version 1.0         Category 1, Negative (No nodules and definitely benign    nodules). Management:Continue annual lung screening with LDCT in 12    months.(probability of malignancy <1%).         RECOMMENDATIONS:    If you would like to register your patient with the [address] [place] [address] Program, please contact the Nurse Navigator at    [phone]).            Impression:  Invasive ductal carcinoma of left breast, triple negative, pT2,p N0, M0  Status post bilateral mastectomy and left sentinel lymph node biopsy  Personal history of gynecological malignancy (uterus/cervical cancer) status post surgery in 1992  Cancer related pain  Leg swelling  Anemia secondary to chemotherapy  Family history of malignancy  Chronic back pain  Hypertension  Obesity      Plan:  Her lab work was reviewed, counts are stable. Breast exam is negative and we discussed strategy to manage soreness around excess skin on the right. Clinically patient continues to be in remission in regards to breast cancer. Discussed natural history of breast cancer. Reviewed NCCN guidelines for surveillance. Patient encouraged to achieve ideal body weight  Patient counseled tobacco cessation  Continue current pain medication regimen. Patient has chronic pain and has not been able to establish care with the pain clinic. We will continue to prescribe pain medication until patient is able to do so. Patient has a pain contract with us. Patient understands she will undergo random urine drug screens. [facility]        This note is created with the assistance of a speech recognition program.  While intending to generate a document that actually reflects the content of the visit, the document can still have some errors including those of syntax and sound a like substitutions which may escape proof reading.   It such instances, actual meaning can be extrapolated by contextual diversion.

## 2020-07-20 NOTE — TELEPHONE ENCOUNTER
Next Visit Date:  Future Appointments   Date Time Provider Nola Raymundoi   8/17/2020 11:45 AM Joan Yap MD [id number] [facility] Maintenance   Topic Date Due    Hepatitis C screen  1960    HIV screen  11/19/1975    Shingles Vaccine (1 of 2) 11/19/2010    Flu vaccine (1) 09/01/2020    Potassium monitoring  03/23/2021    Creatinine monitoring  03/23/2021    Lipid screen  07/26/2022    Cervical cancer screen  09/22/2022    DTaP/Tdap/Td vaccine (2 - Td) 08/14/2027    Colon cancer screen colonoscopy  10/13/2027    Pneumococcal 0-64 years Vaccine  Completed    Hepatitis A vaccine  Aged Out    Hepatitis B vaccine  Aged Out    Hib vaccine  Aged Out    Meningococcal (ACWY) vaccine  Aged Out       Hemoglobin A1C (%)   Date Value   01/04/2018 5.5   10/23/2014 5.9             ( goal A1C is < 7)   Microalb/Crt.  Ratio (mcg/mg creat)   Date Value   09/29/2015 25     LDL Cholesterol (mg/dL)   Date Value   07/26/2017 96   01/24/2012 143 (H)       (goal LDL is <100)   AST (U/L)   Date Value   07/20/2020 11     ALT (U/L)   Date Value   07/20/2020 9     BUN (mg/dL)   Date Value   07/20/2020 16     BP Readings from Last 3 Encounters:   07/20/20 116/73   05/27/20 (!) 141/97   03/23/20 137/65          (goal 120/80)    All Future Testing planned in [facility]  Lab Frequency Next Occurrence   US DUP UPPER EXTREMITY LEFT VENOUS Once 10/25/2020   Hepatitis C Antibody Once 03/20/2021   HIV Screen Once 03/20/2021   CBC Auto Differential     Comprehensive Metabolic Panel     CBC Auto Differential     Comprehensive Metabolic Panel                 Patient Active Problem List:     Idiopathic angioedema     HTN (hypertension)     Back pain     Morbid obesity with BMI of 45.0-49.9, adult ([address]     Morbid obesity due to excess calories (HCC)     Smoking     Need for vaccination     Carcinoma of upper-outer quadrant of left breast in female, estrogen receptor negative ([address]     Status post mastectomy, bilateral     S/P mastectomy, bilateral     Malignant neoplasm of upper-outer quadrant of left breast in female, estrogen receptor negative ([facility] [address] [facility]

## 2020-08-12 ENCOUNTER — HOSPITAL ENCOUNTER (OUTPATIENT)
Facility: MEDICAL CENTER | Age: 60
End: 2020-08-12
Payer: COMMERCIAL

## 2020-08-17 ENCOUNTER — HOSPITAL ENCOUNTER (OUTPATIENT)
Facility: MEDICAL CENTER | Age: 60
Discharge: HOME | End: 2020-08-17
Payer: COMMERCIAL

## 2020-08-17 ENCOUNTER — TELEPHONE (OUTPATIENT)
Dept: ONCOLOGY | Age: 60
End: 2020-08-17

## 2020-08-17 ENCOUNTER — OFFICE VISIT (OUTPATIENT)
Dept: ONCOLOGY | Age: 60
End: 2020-08-17
Payer: COMMERCIAL

## 2020-08-17 VITALS
BODY MASS INDEX: 39.29 KG/M2 | WEIGHT: 243.4 LBS | DIASTOLIC BLOOD PRESSURE: 85 MMHG | HEART RATE: 56 BPM | SYSTOLIC BLOOD PRESSURE: 122 MMHG | TEMPERATURE: 98.2 F

## 2020-08-17 LAB
AMPHETAMINE SCREEN URINE: NEGATIVE
BARBITURATE SCREEN URINE: NEGATIVE
BENZODIAZEPINE SCREEN, URINE: NEGATIVE
BUPRENORPHINE URINE: ABNORMAL
CANNABINOID SCREEN URINE: POSITIVE
COCAINE METABOLITE, URINE: NEGATIVE
MDMA URINE: ABNORMAL
METHADONE SCREEN, URINE: NEGATIVE
METHAMPHETAMINE, URINE: ABNORMAL
OPIATES, URINE: NEGATIVE
OXYCODONE SCREEN URINE: POSITIVE
PHENCYCLIDINE, URINE: NEGATIVE
PROPOXYPHENE, URINE: ABNORMAL
TEST INFORMATION: ABNORMAL
TRICYCLIC ANTIDEPRESSANTS, UR: ABNORMAL

## 2020-08-17 PROCEDURE — G8417 CALC BMI ABV UP PARAM F/U: HCPCS | Performed by: INTERNAL MEDICINE

## 2020-08-17 PROCEDURE — 3017F COLORECTAL CA SCREEN DOC REV: CPT | Performed by: INTERNAL MEDICINE

## 2020-08-17 PROCEDURE — G8427 DOCREV CUR MEDS BY ELIG CLIN: HCPCS | Performed by: INTERNAL MEDICINE

## 2020-08-17 PROCEDURE — 99211 OFF/OP EST MAY X REQ PHY/QHP: CPT | Performed by: INTERNAL MEDICINE

## 2020-08-17 PROCEDURE — 80307 DRUG TEST PRSMV CHEM ANLYZR: CPT

## 2020-08-17 PROCEDURE — 4004F PT TOBACCO SCREEN RCVD TLK: CPT | Performed by: INTERNAL MEDICINE

## 2020-08-17 PROCEDURE — 99213 OFFICE O/P EST LOW 20 MIN: CPT | Performed by: INTERNAL MEDICINE

## 2020-08-17 RX ORDER — OXYCODONE AND ACETAMINOPHEN 10; 325 MG/1; MG/1
1 TABLET ORAL EVERY 6 HOURS PRN
Qty: 120 TABLET | Refills: 0 | Status: SHIPPED | OUTPATIENT
Start: 2020-08-17 | End: 2020-09-14 | Stop reason: SDUPTHER

## 2020-08-17 NOTE — PROGRESS NOTES
Jean Pierre Lal                                                                                                                  8/17/2020  MRN:   [id number]  YOB: 1960  PCP:                           Cain Parham MD  Referring Physician: No ref. provider found  Treating Physician Name: Sushma Hilario MD      Reason for visit:  Chief Complaint   Patient presents with    Follow-up     Review status of disease     Medication Refill       Current problems:   IDC of Left Breast cancer, Triple negative, pT2,pN0,M0    Active and recent treatments:  Bilateral Mastectomy with left sentinel lymph node  Adjuvant chemotherapy using dose denseAC followed by T on 4/11/19--- 8/ /19    Summary of Case/History:    Jean Pierre Lal a 61 y. o.female invasive carcinoma of the breast.     Patient initially felt a lump in her left breast for 2-3 months. She underwent screening mammogram which showed 2 cm mass in upper outer left breast.  Right breast do not have any concerning findings. Ultrasound of left breast conformed mass measuring 1.9 cm in the upper outer left breast.  Patient underwent biopsy on 1/23/19 which revealed invasive ductal carcinoma, grade 3, ER/NE negative. HER-2 results are pending.     Patient has history of uterus/cervical cancer diagnosed in 1992. Patient states she underwent surgery for the cervical cancer. She does not recall getting any radiation therapy or chemotherapy. Patient states she has been interested in getting bilateral breast reduction due to cosmetic reasons however now with a diagnosis of breast cancer she wishes to proceed with bilateral mastectomy.     Patient has history of breast cancer in her mother as well as 3 paternal aunts. Patient's mother also had cervical and uterine cancer.     Patient underwent bilateral mastectomy with left sentinel lymph node biopsy.   Pathological stage was T2, N0, M0    Started chemotherapy for triple negative breast cancer using AC followed [facility] OR    MASTECTOMY, BILATERAL Bilateral 02/12/2019     TOTAL MASTECTOMY,  AXILLARY SENTINEL LYMPH NODE BIOPSY, FROZEN SECTION     [place] COLSC FLX W/REMOVAL LESION BY HOT BX FORCEPS N/A 10/13/2017    COLONOSCOPY WITH BIOPSY AND POLYPECTOMY performed by Enrike Jovel IV, DO at [address]     TUNNELED VENOUS PORT PLACEMENT  03/27/2019    X-RAY FOOT 3+VW      both       Patient Family Social History:     Social History     Socioeconomic History    Marital status: Single     Spouse name: Not on file    Number of children: Not on file    Years of education: Not on file    Highest education level: Not on file   Occupational History    Not on file   Social Needs    Financial resource strain: Not on file    Food insecurity     Worry: Not on file     Inability: Not on file   [employer] needs     Medical: Not on file     Non-medical: Not on file   Tobacco Use    Smoking status: Current Some Day Smoker     Packs/day: 0.25     Years: 41.00     Pack years: 10.25     Types: Cigarettes    Smokeless tobacco: Never Used    Tobacco comment: STATES SMOKES A PACK A WEEK   Substance and Sexual Activity    Alcohol use: No    Drug use: Yes     Frequency: 7.0 times per week     Types: Marijuana     Comment: LAST USE 02/10/2018, AND PAIN PILLS     Sexual activity: Not Currently   Lifestyle    Physical activity     Days per week: Not on file     Minutes per session: Not on file    Stress: Not on file   Relationships    Social connections     Talks on phone: Not on file     Gets together: Not on file     Attends Mandaen service: Not on file     Active member of club or organization: Not on file     Attends meetings of clubs or organizations: Not on file     Relationship status: Not on file    Intimate partner violence     Fear of current or ex partner: Not on file     Emotionally abused: Not on file     Physically abused: Not on file     Forced sexual activity: Not on file   Other Topics Concern    Not on file   Social History Narrative    Not on file      Family History   Problem Relation Age of Onset    High Blood Pressure Mother     Cancer Mother         cervical and breast    Diabetes Father     Heart Attack Father     Heart Disease Maternal Aunt         times 2      Current Medications:     Current Outpatient Medications   Medication Sig Dispense Refill    Nutritional Supplements (OSTEO ADVANCE PO) Take by mouth daily      Omega-3 Fatty Acids (OMEGA 3 500 PO) Take by mouth daily      oxyCODONE-acetaminophen (PERCOCET)  MG per tablet Take 1 tablet by mouth every 6 hours as needed for Pain for up to 30 days. Intended supply: 30 days 120 tablet 0    gabapentin (NEURONTIN) 300 MG capsule Take 1 capsule by mouth 3 times daily for 30 days. 90 capsule 3    hydroCHLOROthiazide (HYDRODIURIL) 50 MG tablet TAKE 1 TABLET BY MOUTH DAILY 30 tablet 2     No current facility-administered medications for this visit. Allergies:   Lisinopril; Bee venom; Amino acids; and Sulfa antibiotics    Review of Systems:    Constitutional: No fever or chills. No night sweats. +intentional weight loss  Eyes: No eye discharge, double vision, or eye pain   HEENT: negative for sore mouth, sore throat, hoarseness and voice change   Respiratory: URI with productive cough, sneezing, sinus drainage and congestion, runny eyes  Cardiovascular: negative for chest pain, dyspnea, palpitations, orthopnea, PND   Gastrointestinal: negative for nausea, vomiting, diarrhea, constipation, Dysphagia, hematemesis and hematochezia +some GI distress following food poisoning - resolved  Genitourinary: negative for frequency, dysuria, nocturia, urinary incontinence, and hematuria   Integument: negative for rash, skin lesions, bruises.   Nails now growing back slowly, toenail fungus  Hematologic/Lymphatic: negative for easy bruising, bleeding, lymphadenopathy, or petechiae   Endocrine: negative for heat or cold intolerance,weight changes, change in bowel habits and hair loss   Musculoskeletal: negative for myalgias, pain, joint swelling; +arhtralgias and bone pain   Neurological: negative for headaches, dizziness, seizures, weakness;+  neuropathy is stable        Physical Exam:    Vitals: /85   Pulse 56   Temp 98.2 °F (36.8 °C) (Oral)   Wt 243 lb 6.4 oz (110.4 kg)   LMP 02/11/1992   BMI 39.29 kg/m²   General appearance - well appearing, no in pain or distress  Mental status - AAO X3  Eyes - pupils equal and reactive, extraocular eye movements intact  Mouth - mucous membranes moist, pharynx normal without lesions  Neck - supple, no significant adenopathy  Lymphatics - no palpable lymphadenopathy, no hepatosplenomegaly  Chest - clear to auscultation, no wheezes, rales or rhonchi, symmetric air entry  Heart - normal rate, regular rhythm, normal S1, S2, no murmurs  Abdomen - soft, nontender, nondistended, no masses or organomegaly  Neurological - alert, oriented, normal speech, no focal findings or movement disorder noted; +neuropathy - stable  Extremities -bilateral ankle edema  Skin - normal coloration and turgor, no rashes, no suspicious skin lesions noted; toenail fungus  Breast- bilateral mastectomy, no masses or lumps, excess skin on right side - no lymphedema appreciated        DATA:  Lab Results   Component Value Date    WBC 8.7 07/20/2020    HGB 13.4 07/20/2020    HCT 41.6 07/20/2020    MCV 89.8 07/20/2020     07/20/2020       Chemistry        Component Value Date/Time     07/20/2020 1105    K 4.2 07/20/2020 1105    CL 99 07/20/2020 1105    CO2 26 07/20/2020 1105    BUN 16 07/20/2020 1105    CREATININE 0.71 07/20/2020 1105        Component Value Date/Time    CALCIUM 9.6 07/20/2020 1105    ALKPHOS 107 (H) 07/20/2020 1105    AST 11 07/20/2020 1105    ALT 9 07/20/2020 1105    BILITOT 0.19 (L) 07/20/2020 1105        CT lung screen:    Impression    No suspicious noncalcified lung nodule or mass.      LUNG RADS:    Per ACR Lung-RADS Version 1.0         Category 1, Negative (No nodules and definitely benign    nodules). Management:Continue annual lung screening with LDCT in 12    months.(probability of malignancy <1%).         RECOMMENDATIONS:    If you would like to register your patient with the [address] [place] [address] Program, please contact the Nurse Navigator at    [phone]).            Impression:  Invasive ductal carcinoma of left breast, triple negative, pT2,p N0, M0  Status post bilateral mastectomy and left sentinel lymph node biopsy  Personal history of gynecological malignancy (uterus/cervical cancer) status post surgery in 1992  Cancer related pain  Leg swelling  Anemia secondary to chemotherapy  Family history of malignancy  Chronic back pain  Hypertension  Obesity      Plan:  Her lab work was reviewed, counts are stable. Patient was given refill on her pain medication. Patient has signed a pain contract. Patient is encouraged to continue to look for pain doctor but in the meanwhile we will continue to refill her pain medication. Patient urine tox screen was positive for cannabinoids and pain medication prescribed to her. Patient clinically continues to be in remission in regards to breast cancer. Discussed natural history of breast cancer. Reviewed NCCN guidelines for surveillance. Patient encouraged to achieve ideal body weight  Patient counseled tobacco cessation  Continue current pain medication regimen. Patient has chronic pain and has not been able to establish care with the pain clinic. We will continue to prescribe pain medication until patient is able to do so. Patient has a pain contract with us. Patient understands she will undergo random urine drug screens.       Lilly Lemus        This note is created with the assistance of a speech recognition program.  While intending to generate a document that actually reflects the content of the visit, the document can still have some errors including those of syntax and sound a like substitutions which may escape proof reading. It such instances, actual meaning can be extrapolated by contextual diversion.

## 2020-08-17 NOTE — TELEPHONE ENCOUNTER
Christo Vaz MD VISIT  DR MACE IN TO SEE PATIENT  ORDERS RECEIVED  RV 1 MONTH W/URINE TOX SCREEN  REFERRAL TO PHYSICAL THERAPY  SPOKE TO NICOLE @PHYSICAL THERAPY, PT IS SCHEDULED FOR 08/24/20 @2:45PM  URINE TOX DONE TODAY & SCHEDULED FOR 09/14/20  MD VISIT 09/14/20 @3:30PM  SCRIPT SENT TO PATIENTS PHARMACY  AVS PRINTED AND GIVEN TO PATIENT WITH INSTRUCTIONS  PATIENT DISCHARGED AMBULATORY

## 2020-08-24 ENCOUNTER — HOSPITAL ENCOUNTER (OUTPATIENT)
Dept: PHYSICAL THERAPY | Facility: CLINIC | Age: 60
Setting detail: THERAPIES SERIES
Discharge: HOME | End: 2020-08-24
Payer: COMMERCIAL

## 2020-08-24 PROCEDURE — 97163 PT EVAL HIGH COMPLEX 45 MIN: CPT

## 2020-08-24 NOTE — CONSULTS
[] [facility]) [facility] &  Therapy  [address]  [phone]  F: [phone] [x] [address]  [place] [address]   [address]  P: [phone]  F: [phone] [] [address]  Therapy  [address]  P: [phone]  F: [phone] [] [address]  [facility]   [address]   [place]: [phone]  F: [phone]      Physical Therapy Spine Evaluation    Date:  2020  Patient: Tomás Polk   : 1960  MRN: [id number]  Physician: Dr. Siobhan Pearson: Osmany Last (30 max per year)  Medical Diagnosis: left breast CA, cancer related pain    Rehab Codes: R60.0, M54.5, M79.1, M62.830, R2.2, R29.3, M62.81  R26.82, R53.83, G62.9  Onset Date: 2020 date of script (ongoing past few years)     Next 's appt.:     Subjective:   CC:  Currently having pain in feet and back. Spent years \"bent over\" due to large breasted prior to mastectomy. Now unable to sleep > 2 hours at a time, unable to get comfortable. Significant swelling in left ankle, (to a lesser degree on the right)   Hx ankle fractures left never healed right. Wearing slip on flip flops today. States hands were crooked prior to chemotherapy, now \"fingers straight\"  Patient reporting difficulty walking, sitting forward, slumped. HPI: 30+ years ago cervical CA, surgery, no chemotherapy or radiation  2018 back surgery \"screws and a disc in my back\"  Dr. John Paul Yu  While recuperating, wound vac in back, felt a mole changed  Had mammogram, had been trying for 20+ years to get a breast reduction. Mastectomy . Chemotherapy  Chemotherapy for breast cancer did not react well with pins in back  2020 had pins removed from foot. (left foot) Told she had rheumatoid arthritis at age 11, didn't get medical care.     PMHx: [] Unremarkable [] Diabetes [x] HTN  [] Pacemaker   [] MI/Heart Problems [] Cancer [] Arthritis [x] Other: current smoker              [] Refer to full medical chart  In EPIC     Comorbidities:   [] Obesity [] Dialysis  [] Other:   [] Asthma/COPD [] Dementia [] Other:   [] Stroke [] Sleep apnea [] Other:   [] Vascular disease [] Rheumatic disease [] Other:     Tests: [] X-Ray: [x] MRI:  [x] Other:Ct Scan Posterior interbody fusion L4-5.  No acute disease.     L1-L2: Mild disc bulging is noted diffusely.  Facet hypertrophic changes are    noted.         L2-L3: Facet and ligament hypertrophic changes are noted.  Minor proximal    foraminal disc bulging is noted bilaterally.  Minimal proximal foraminal    narrowing is noted bilaterally         L3-L4: Facet and ligament hypertrophic changes are noted.  Minor proximal    foraminal disc bulging is noted bilaterally with minor proximal foraminal    narrowing bilaterally.         L4-L5: Diffuse disc bulging is noted with annular tear.  Findings are    greatest in the left proximal inferior foraminal region.  Mild to moderate    bilateral foraminal narrowing is noted.  Facet and ligament hypertrophic    changes are noted         L5-S1: Mild disc bulging is noted.  Mild proximal foraminal narrowing is    noted bilaterally.  Facet and ligament hypertrophic changes are noted        Medications: [x] Refer to full medical record [] None [] Other:  Allergies:      [x] Refer to full medical record [] None [] Other:    Function:  Hand Dominance  [x] Right  [] Left  Patient lives with: alone   In what type of home [x]  One story   [] Two story   [] Split level   Number of stairs to enter 1   With handrail on the []  Right to enter   [] Left to enter   Bathroom has a []  Tub only  [x] Tub/shower combo   [] Walk in shower    [x]  Grab bars, shower chair  Also uses raised toilet seata   Washing machine is on [x]  Main level   [] Second level   [] Basement   Employer    Job Status []  Normal duty   [] Light duty   [] Off due to condition    [x]  Retired   [] Not employed   [x] Disability  [] Other:  []  Return to work: Work activities/duties Gardening, loves to garden, may try quilting this winter       ADL/IADL Previous level of function Current level of function Who currently assists the patient with task   Bathing  [x] Independent  [] Assist [x] Independent  [] Assist    Dress/grooming [x] Independent  [] Assist [x] Independent  [] Assist    Transfer/mobility [x] Independent  [] Assist [x] Independent  [] Assist    Feeding [x] Independent  [] Assist [x] Independent  [] Assist    Toileting [x] Independent  [] Assist [x] Independent  [] Assist    Driving [x] Independent  [] Assist [x] Independent  [] Assist    Housekeeping [x] Independent  [] Assist [x] Independent  [] Assist    Grocery shop/meal prep [x] Independent  [] Assist [x] Independent  [] Assist Limited activity tolerance, 5 minute walking ability per patient, has to lean forward on cart, electric scooter     Gait Prior level of function Current level of function    [x] Independent  [] Assist [x] Independent  [] Assist   Device: [x] Independent [] Independent    [] Straight Cane [] Quad cane [x] Straight Cane - if in unfamiliar area, waiting on a brace - back       Pain:  [x] Yes  [] No Location: neck pain, bilateral lumbar pain, hand pain, lower leg, foot and ankle pain Pain Rating: (0-10 scale) 7/10  Pain altered Tx:  [] Yes  [] No  Action:    Symptoms:  [] Improving [] Worsening [x] Same  Better:  [] AM    [] PM    [x] Sit    [] Rise/Sit    []Stand    [] Walk    [] Lying    [x] Other:bent forward  Worse: [] AM    [] PM    [] Sit    [] Rise/Sit    []Stand    [] Walk    [] Lying    [] Bend                      [] Valsalva    [] Other:  Sleep: [] OK    [x] Disturbed 2 hours at a time  Hand neuropathy has improved, bilateral bottoms of feet feel like \"scrapying bricks\"      Objective:       ROM.  Strength  STRENGTH  ROM    Left Right  Left Right Cervical    C5 Shld Abd   L1-2 Hip Flex +3 -4 Flexion    Shld Flexion 143 160 Hip Abd -3  Extension    Shld IR Great trocher To lumbar spine L3-4 Knee Ext 3 -4 Rotation L R   Shld ER   L4 Ankle DF   Sidebend L R   C6 Elb Flex   L5 EHL   Retraction    C7 Elb Ext   S1 Plant. Flex   Lumbar    C8 EPL   Abdominals   Flexion 22   T1 Fing Abd   Erector Spinae   Extension Unable to come to upright neutral         Rotation L  15 R 17         Sidebend L12 15   Pt reports left leg \"shakes\" occasionally with standing    Girth malleoli right 28.7, left 32.2  Aidee Tests ? Pain ? Pain No Change Not Tested   RFIS [x] [] [] []   JARROD [x] [] [] []   RFIL [] [] [] []   REIL [] [] [] []   Rep Prot [] [] [] []   Rep Retract [] [] [] []        OBSERVATION No Deficit Deficit Not Tested Comments   Posture       Forward Head [] [x] []    Rounded Shoulders [] [x] []    Kyphosis [] [x] [] significant   Lordosis [] [x] [] Flat posturing   Lateral Shift [] [] []    Scoliosis [] [x] [] Left scapula superior in standing   Iliac Crest [] [x] [] Uneven height   PSIS [] [] []    ASIS [] [] []    Genu Valgus [] [] []    Genu Varus [] [] []    Genu Recurvatum [] [] []    Pronation [] [x] []    Supination [] [] []    Leg Length Discrp [] [] []    Slumped Sitting [] [x] []    Palpation [] [x] []    Sensation [] [x] [] Hands and feet, post chemotherapy   Edema [] [x] [] Severe in left ankle, moderate in right   Neurological [] [] []    Unable to stand upright, if extends just bends knees more and leans posterior    Functional Test: Optimal Score: 70% functionally impaired     TUG  20, using arms to rise from chair  Gait, no DF of left ankle, leg externaly rotated.   Unable to wear a shoe, only mens flip flops (to try a slip on ankle brace to see if it assists with edema in left ankle worse than right)    Comments:    Assessment:  Patient would benefit from skilled physical therapy services in order to:improve upright posturing, improve activity tolerance, increase standing balance, improve static balance, address edema in ankle, improve core strength, decrease back pain and spasms, learn strategies and postures to decrease forward slumping. Gain the ability to wear supportive shoes to improve standing posture. Problems:    [x] ? Pain:neck, low back, lower legs, feet and ankles  [x] ? ROM:ankles, trunk  [x] ? Strength:core, spine extensors, LE's, trunk  [x] ? Function:Significant limitations with ADL's, all standing tasks, interrupted sleep. [] Other:      STG: (to be met in 8 treatments)  1. ? Pain: 25% to 5/10 maximum in back  2. Demonstrate 10 minutes of walking ability with upright posturing  3. ? Function: Ability to stand 10 minutes to cook a light meal  4. Patient to be independent with home exercise program as demonstrated by performance with correct form without cues. LTG: (to be met in 16 treatments)  1. Improve TUG score by 4 seconds )20%)  2. Ability to sit to stand from a chair without use of arms to demonstrate improved LE and core strength  3. Able to sleep 3 hours uninterrupted by back pain  4. Ability to stand upright without forward trunk flexion  5. Ability to get a supportive shoe on and off. Patient goals:to stand up straight and walk right    Rehab Potential:  [] Good  [x] Fair  [] Poor   Suggested Professional Referral:  [] No  [x] Yes:ongoing with specialists. Barriers to Goal Achievement:  [] No  [x] Yes:  Domestic Concerns:  [x] No  [] Yes:    Pt. Education:  [x] Plans/Goals, Risks/Benefits discussed  [] Home exercise program  Method of Education: [x] Verbal  [] Demo  [] Written  Comprehension of Education:  [] Verbalizes understanding. [] Demonstrates understanding. [] Needs Review. [] Demonstrates/verbalizes understanding of HEP/Ed previously given.     Treatment Plan:  [] Therapeutic Exercise   13029  [] Iontophoresis: 4 mg/mL Dexamethasone Sodium Phosphate  mAmin  05984   [] Therapeutic Activity  99916 [] Vasopneumatic cold with compression  99675    [] Gait Training   [id number] [] Ultrasound   [id number]   [] Neuromuscular Re-education  [id number] [] Electrical Stimulation Unattended  63926   [] Manual Therapy  13824 [] Electrical Stimulation Attended  [id number]   [] Instruction in HEP  [] Lumbar/Cervical Traction  [id number]   [] Aquatic Therapy   91431 [] Cold/hotpack    [] Massage   50718      [] Dry Needling, 1 or 2 muscles  53561   [] Biofeedback, first 15 minutes   28894  [] Biofeedback, additional 15 minutes   48926 [] Dry Needling, 3 or more muscles  68541     Frequency:  2 x/week for 16 visits    Todays Treatment:  Modalities:   Precautions:  Exercises:try below next visit as able  Exercise Reps/ Time Weight/ Level Comments   SciFit            Standing - in / bars      Hip ext      abd            mat      Ankle pumps      Hip abd      Hip add pillow squeeze      Heel slides            SLR      Other:    Specific Instructions for next treatment:  Issue Brief Fatigue Inventory  Ask re: ankle brace      Evaluation Complexity:  History (Personal factors, comorbidities) [] 0 [] 1-2 [x] 3+   Exam (limitations, restrictions) [] 1-2 [] 3 [x] 4+   Clinical presentation (progression) [] Stable [] Evolving  [x] Unstable   Decision Making [] Low [] Moderate [x] High    [] Low Complexity [] Moderate Complexity [x] High Complexity       Treatment Charges: Mins Units   [x] Evaluation       []  Low       []  Moderate       [x]  High 41 1   []  Modalities     []  Ther Exercise     []  Manual Therapy     []  Ther Activities     []  Aquatics     []  Vasocompression     []  Other       TOTAL TREATMENT TIME: 41    Time in: 3:20      Time out: 4:01 pm    Electronically signed by: Sue Potts PT        Physician Signature:________________________________Date:__________________  By signing above or cosigning this note, I have reviewed this plan of care and certify a need for medically necessary rehabilitation services.

## 2020-08-27 ENCOUNTER — HOSPITAL ENCOUNTER (OUTPATIENT)
Dept: PHYSICAL THERAPY | Facility: CLINIC | Age: 60
Setting detail: THERAPIES SERIES
Discharge: HOME | End: 2020-08-27
Payer: COMMERCIAL

## 2020-08-27 PROCEDURE — 97124 MASSAGE THERAPY: CPT

## 2020-08-27 PROCEDURE — 97110 THERAPEUTIC EXERCISES: CPT

## 2020-08-27 NOTE — FLOWSHEET NOTE
[] [facility]  [address]  [phone]  F: [phone] [x] [address]  [facility]   [address]  P: [phone]  F: [phone] [] [address] &  Therapy  [address] [facility]  P: [phone]  F: [phone] [] [address]  [facility]   [address]   [place]: [phone]  F: [phone]      Physical Therapy Daily Treatment Note    Date:  2020  Patient Name:  Steve Duong    :  1960  MRN: [id number]  Physician: Dr. Cheree Aase: Lucía (30 max per year)  Medical Diagnosis: left breast CA, cancer related pain                     Rehab Codes: R60.0, M54.5, M79.1, M62.830, R2.2, R29.3, M62.81  R26.82, R53.83, G62.9  Onset Date: 2020 date of script (ongoing past few years)                            Next 's appt.:      Cancels/No Shows: 0 / 0    Subjective:    Pain:  [x] Yes  [] No Location: low back Pain Rating: (0-10 scale) 9/10  Pain altered Tx:  [] No  [x] Yes  Action:massage to thoracic, scapular region  Comments: Still awaiting brace per patient  Back pain 'terrible\"  , sleep disturbed, left sided hip adductor pain    Objective:  Modalities: massage to thoracic/scapular area of back, pt sitting on mat table leaning forward into wall for , therapist behind  Precautions:  Exercises: initiated exercises below  Exercise Reps/ Time Weight/ Level Comments   NuStep   5 min  L2               Standing - in / bars      standing sideways   Hip ext  10x each   Cueing to contract gluts   abd  10x each       marching 10x each       balance   SBA of PT   Feet apart  30 sec  Arms across chest   Feet together 30 sec  Arms across chest   On foam feet apart 30 sec     On foam feet together 30 sec     On foam feet apart, eyes shut 30 sec  Slight LOB in all directions                   mat         Ankle pumps  10x each       Hip abd- supine, knees bent, butterfly position  10x each       Hip add pillow squeeze  10x    bolster   Heel slides  15x    orange slider, unable to fully extend left leg    SKTC with manual overpressure  5x  20 sec     SLR  5x 2     knee slightly flexed   Other:     Specific Instructions for next treatment:  Issue Brief Fatigue Inventory  Ask re: ankle brace     Treatment Charges: Mins Units   []  Modalities     X  Ther Exercise 35 2   []  Manual Therapy     []  Ther Activities     []  Aquatics     []  Vasocompression     [x]  Other massage 10 1   Total Treatment time 45        Assessment: [x] Progressing toward goals. Patient using straight cane on right side that is too long in length, she is placing it forward at an angle. She reports this is the height she would like to leave her cane at. Initiated balance exercises in gym, pt able to wear a sandal today vs slip on shoe but straps are tight due to significant edema in left ankle. [] No change. [] Other:  [x] Patient would continue to benefit from skilled physical therapy services in order to improve posture and core strength, improve balance, decrease fall risk, decrease back pain and increase activity tolerance with home tasks and grooming. STG: (to be met in 8 treatments)  1. ? Pain: 25% to 5/10 maximum in back  2. Demonstrate 10 minutes of walking ability with upright posturing  3. ? Function: Ability to stand 10 minutes to cook a light meal  4. Patient to be independent with home exercise program as demonstrated by performance with correct form without cues.     LTG: (to be met in 16 treatments)  1. Improve TUG score by 4 seconds )20%)  2. Ability to sit to stand from a chair without use of arms to demonstrate improved LE and core strength  3. Able to sleep 3 hours uninterrupted by back pain  4. Ability to stand upright without forward trunk flexion  5.  Ability to get a supportive shoe on and off.        Patient goals:to stand up straight and walk right     Pt. Education:  [] Yes  [] No  [] Reviewed Prior HEP/Ed  Method of Education: [x] Verbal  [x] Demo  [] Written  8- issued blue band for sitting scapular rows for postural strengthening  Comprehension of Education:  [] Verbalizes understanding. [] Demonstrates understanding. [] Needs review. [] Demonstrates/verbalizes HEP/Ed previously given. Plan: [] Continue current frequency toward long and short term goals. [x] Specific Instructions for subsequent treatments: Continue to work on balance, LE strength and decreasing back pain.        Time In:4:09 pm            Time Out: 4: 55 pm    Electronically signed by:  Lyubov Pleitez PT

## 2020-08-31 ENCOUNTER — HOSPITAL ENCOUNTER (OUTPATIENT)
Dept: PHYSICAL THERAPY | Facility: CLINIC | Age: 60
Setting detail: THERAPIES SERIES
Discharge: HOME | End: 2020-08-31
Payer: COMMERCIAL

## 2020-08-31 PROCEDURE — 97110 THERAPEUTIC EXERCISES: CPT

## 2020-08-31 PROCEDURE — 97124 MASSAGE THERAPY: CPT

## 2020-08-31 NOTE — PROGRESS NOTES
Detail Level: Simple
Received phone call from Frances Thorpe with PT requesting referral for Lymphedema to lower extremeties. Discussed with Dr. Gaurang Vogel and orders received.   Entered into EPIC
Size Of Lesion In Cm (Optional): 0
Body Location Override (Optional - Billing Will Still Be Based On Selected Body Map Location If Applicable): neck

## 2020-08-31 NOTE — FLOWSHEET NOTE
[] [facility]  [address]  [phone]  F: [phone] [x] [address]  [facility] 36   [address]  P: [phone]  F: [phone] [] [address] &  Therapy  [address] [facility] [address]  P: [phone]  F: [phone] [] [address]  [facility]   [address]   [place]: [phone]  F: [phone]      Physical Therapy Daily Treatment Note    Date:  2020  Patient Name:  Lizette Arora    :  1960  MRN: [id number]  Physician: Dr. Lluvia Sheldon: Lucía (30 max per year)   Medical Diagnosis: left breast CA, cancer related pain                     Rehab Codes: R60.0, M54.5, M79.1, M62.830, R2.2, R29.3, M62.81  R26.82, R53.83, G62.9  Onset Date: 2020 date of script (ongoing past few years)                            Next 's appt.:      Cancels/No Shows: 0 / 0  Visit #3/ 16    Subjective:    Pain:  [x] Yes  [] No Location: low back Pain Rating: (0-10 scale) 810  Pain altered Tx:  [] No  [x] Yes  Action:massage to thoracic, scapular region  Comments:  Patient reports she had a lot of pain over the weekend \"everywhere\" no difference with taking meds or not or being active or not. Objective:  Modalities: massage to bilateral lumbar area of back, pt side lying on her left.    Precautions:  Exercises: initiated exercises below  Exercise Reps/ Time Weight/ Level Comments   NuStep   5 min  L2               Standing - in / bars      standing sideways   Hip ext  10x each   Cueing to contract gluts   abd  10x each       marching 10x each       balance   SBA of PT   Feet apart  30 sec  Arms across chest   Feet together 30 sec  Arms across chest   On foam feet apart 30 sec     On foam feet together 30 sec     On foam feet apart, eyes caterina]  }\"_)[id number] 30 sec Slight LOB in all directions                   mat         Ankle pumps  10x each       Hip abd- supine, knees bent, butterfly position  10x each       Hip add pillow squeeze  10x    bolster   Heel slides  15x    orange slider, unable to fully extend left leg    SKTC with manual overpressure  5x  20 sec     SLR  5x 2     knee slightly flexed   Other:     Specific Instructions for next treatment:  Issue Brief Fatigue Inventory     Treatment Charges: Mins Units   []  Modalities     X  Ther Exercise 35 2   []  Manual Therapy     []  Ther Activities     []  Aquatics     []  Vasocompression     [x]  Other massage 10 1   Total Treatment time 45        Assessment: [x] Progressing toward goals. Patient reports she felt better after last therapy last visit, but had pain when she layed down. May try to call [address] [facility] to see if she can get a hospital bed to help with sleeping positions. Has not had or used an ankle brace, unable to get shoes on today, just loose slide on plastic sandals  Patient reports more low back than mid back pain today, adjusted massage to low back area, pt reporting some relief afterwards. [] No change. [x] Other:Left message with Nurse Navigator about a referral to OT/lymphedema for bilateral LE's. [x] Patient would continue to benefit from skilled physical therapy services in order to improve posture and core strength, improve balance, decrease fall risk, decrease back pain and increase activity tolerance with home tasks and grooming. STG: (to be met in 8 treatments)  1. ? Pain: 25% to 5/10 maximum in back  2. Demonstrate 10 minutes of walking ability with upright posturing  3. ? Function: Ability to stand 10 minutes to cook a light meal  4. Patient to be independent with home exercise program as demonstrated by performance with correct form without cues.     LTG: (to be met in 16 treatments)  1. Improve TUG score by 4 seconds )20%)  2.  Ability to sit to stand from a chair without use of arms to demonstrate improved LE and core strength  3. Able to sleep 3 hours uninterrupted by back pain  4. Ability to stand upright without forward trunk flexion  5. Ability to get a supportive shoe on and off.        Patient goals:to stand up straight and walk right     Pt. Education:  [] Yes  [] No  [] Reviewed Prior HEP/Ed  Method of Education: [x] Verbal  [x] Demo  [] Written  8- issued blue band for sitting scapular rows for postural strengthening  Comprehension of Education:  [] Verbalizes understanding. [] Demonstrates understanding. [] Needs review. [] Demonstrates/verbalizes HEP/Ed previously given. Plan: [] Continue current frequency toward long and short term goals. [x] Specific Instructions for subsequent treatments: Continue to work on balance, LE strength and decreasing back pain.        Time In: 11:09 am          Time Out: 12:00 pm    Electronically signed by:  Birgit Zayas, PT

## 2020-09-01 ENCOUNTER — TELEPHONE (OUTPATIENT)
Dept: FAMILY MEDICINE CLINIC | Age: 60
End: 2020-09-01

## 2020-09-01 NOTE — TELEPHONE ENCOUNTER
PC from pt stating she has an appointment with [facility] on Thursday to get custom bra after having mastectomy but needs order from [facility] for special bra- writer called to get fax number-- no answer so LM for them to call back w fax number for once order is sign     Please review and advise in regards to order      Previous katlyn pt-- sent to last attending and care team

## 2020-09-01 NOTE — TELEPHONE ENCOUNTER
Patient will likely need a DME/face-to-face order for this to be signed. I do not see a DME order during Dr. Rosario Meadows last visit on 3/20/2020. Thank you.   Electronically signed by Thai Miller MD on 9/1/2020 at 9:45 AM

## 2020-09-03 ENCOUNTER — HOSPITAL ENCOUNTER (OUTPATIENT)
Dept: PHYSICAL THERAPY | Facility: CLINIC | Age: 60
Setting detail: THERAPIES SERIES
Discharge: HOME | End: 2020-09-03
Payer: COMMERCIAL

## 2020-09-03 PROCEDURE — 97124 MASSAGE THERAPY: CPT

## 2020-09-03 PROCEDURE — 97110 THERAPEUTIC EXERCISES: CPT

## 2020-09-03 NOTE — FLOWSHEET NOTE
[] [facility]) [facility] &  Therapy  [address]  [phone]  F: [phone] [x] [address]  [facility]   [address]  P: [phone]  F: [phone] [] [address] &  Therapy  [address]  P: [phone]  F: [phone] [] [address]  [facility]   [address]   [place]: [phone]  F: [phone]      Physical Therapy Daily Treatment Note    Date:  9/3/2020  Patient Name:  Petra Meza    :  1960  MRN: [id number]  Physician: Dr. Renita Armstrong: Lucía (30 max per year)   Medical Diagnosis: left breast CA, cancer related pain                     Rehab Codes: R60.0, M54.5, M79.1, M62.830, R2.2, R29.3, M62.81  R26.82, R53.83, G62.9  Onset Date: 2020 date of script (ongoing past few years)                            Next 's appt.:      Cancels/No Shows: 0/0  Visit # 4    Subjective:    Pain:  [x] Yes  [] No Location: low back Pain Rating: (0-10 scale) 7.5/10  Pain altered Tx:  [x] No  [] Yes  Action:     Comments:  Patient states she is always in this level of pain. Pt is scheduled with OT 10/19/20. Objective:  Modalities: massage to bilateral lumbar area of back, pt side lying on her left.    Precautions:  Exercises:  Exercise Reps/ Time Weight/ Level Comments   NuStep   5 min  L2               Standing - in / bars      standing sideways   Hip ext  10x each   Cueing to contract gluts   abd  10x each       marching 10x each       balance   SBA of PT   Feet apart  30 sec  Arms across chest   Feet together 30 sec  Arms across chest   On foam feet apart 30 sec     On foam feet together 30 sec     On foam feet apart, eyes shut 30 sec  Slight LOB in all directions                   mat         Ankle pumps 20x each       Hip abd- supine, knees bent, butterfly position  10x each       Hip add pillow squeeze  10x    bolster   Heel slides  15x    orange slider, unable to fully extend left leg    SKTC with manual overpressure  5x  20 sec     SLR  5x 2     knee slightly flexed- unable to complete 9/3/20   Other:     Specific Instructions for next treatment:  Issue Brief Fatigue Inventory     Treatment Charges: Mins Units   []  Modalities     X  Ther Exercise 40 2   []  Manual Therapy     []  Ther Activities     []  Aquatics     []  Vasocompression     [x]  Other massage 10 1   Total Treatment time 50 3       Assessment: [] Progressing toward goals. [] No change. [x] Other: fair tolerance to exercises, unable to complete SLR this date d/t LBP. Ended with massage to LB only this date. [x] Patient would continue to benefit from skilled physical therapy services in order to improve posture and core strength, improve balance, decrease fall risk, decrease back pain and increase activity tolerance with home tasks and grooming. STG: (to be met in 8 treatments)  1. ? Pain: 25% to 5/10 maximum in back  2. Demonstrate 10 minutes of walking ability with upright posturing  3. ? Function: Ability to stand 10 minutes to cook a light meal  4. Patient to be independent with home exercise program as demonstrated by performance with correct form without cues.     LTG: (to be met in 16 treatments)  1. Improve TUG score by 4 seconds )20%)  2. Ability to sit to stand from a chair without use of arms to demonstrate improved LE and core strength  3. Able to sleep 3 hours uninterrupted by back pain  4. Ability to stand upright without forward trunk flexion  5. Ability to get a supportive shoe on and off.        Patient goals:to stand up straight and walk right     Pt.  Education:  [x] Yes  [] No  [x] Reviewed Prior HEP/Ed  Method of Education: [] Verbal  [] Demo  [] Written  8- issued blue band for sitting scapular rows for postural strengthening  Comprehension of

## 2020-09-04 ENCOUNTER — TELEPHONE (OUTPATIENT)
Dept: ONCOLOGY | Age: 60
End: 2020-09-04

## 2020-09-04 NOTE — TELEPHONE ENCOUNTER
PC to pt in regards to needing an appointment for order. Pt states she already got it done. SHe began to get upset about always having an MD that leaves, tried to explain office is a residency clinic meaning the residents graduate every 3 years causing the need to establish w a new MD. PT stated she will look elsewhere for provider thanked writer and hung up.      Call ended

## 2020-09-04 NOTE — TELEPHONE ENCOUNTER
[employer] for mastectomy products received via fax from [facility]  Form filled out and placed in md rack for signature

## 2020-09-08 ENCOUNTER — TELEPHONE (OUTPATIENT)
Dept: INFUSION THERAPY | Facility: MEDICAL CENTER | Age: 60
End: 2020-09-08

## 2020-09-08 ENCOUNTER — HOSPITAL ENCOUNTER (OUTPATIENT)
Dept: PHYSICAL THERAPY | Facility: CLINIC | Age: 60
Setting detail: THERAPIES SERIES
Discharge: HOME | End: 2020-09-08
Payer: COMMERCIAL

## 2020-09-08 PROCEDURE — 97110 THERAPEUTIC EXERCISES: CPT

## 2020-09-08 PROCEDURE — 97124 MASSAGE THERAPY: CPT

## 2020-09-08 NOTE — TELEPHONE ENCOUNTER
Certificate of medical necessity for mastectomy products signed by physician.   Form faxed to [facility] at [phone] with confirmation  All to front office for scanning

## 2020-09-08 NOTE — FLOWSHEET NOTE
[] [facility]) [facility] &  Therapy  [address]  [phone]  F: [phone] [x] [address]  [place] [address]   [address]  P: [phone]  F: [phone] [] [address]  [address] [facility]  P: [phone]  F: [phone] [] [address]  [facility]   [address]   [place]: [phone]  F: [phone]      Physical Therapy Daily Treatment Note    Date:  2020  Patient Name:  Pradeep Duffy    :  1960  MRN: [id number]  Physician: Dr. Rajeev Bain: Dorna Goldmann (30 max per year)   Medical Diagnosis: left breast CA, cancer related pain                     Rehab Codes: R60.0, M54.5, M79.1, M62.830, R2.2, R29.3, M62.81  R26.82, R53.83, G62.9  Onset Date: 2020 date of script (ongoing past few years)                            Next 's appt.:      Cancels/No Shows: 0/0  Visit # 5/16    Subjective:    Pain:  [x] Yes  [] No Location: low back Pain Rating: (0-10 scale) no specified rating given/10  Pain altered Tx:  [x] No  [] Yes  Action:     Comments:  Pt comments that she is always in pain, but she won't complain. Pt does state that she is having more pain in her LEs which is not usual for her. Pt also states that her neuropathy has been worse lately. Objective:  Modalities: massage to bilateral lumbar area of back, pt side lying on her left.    Precautions:  Exercises:  Exercise Reps/ Time Weight/ Level Comments   NuStep   6 min  L2               Standing - in // bars      standing sideways   Hip ext  10x each   Cueing to contract gluts   abd  10x each       marching 10x each       balance   SBA of PT   Feet apart  30 sec EC Arms across chest   Feet together 30 sec EC Arms across chest   On foam feet apart 30 sec EO    On foam feet together 30 sec EO    On foam feet apart, eyes shut 30 sec  Slight LOB in all directions                   mat         Ankle pumps 20x each       Hip abd- supine, knees bent, butterfly position  10x each       Hip add pillow squeeze  10x      Heel slides  15x    orange slider, unable to fully extend left leg    SKTC with manual overpressure  5x  20 sec     SLR  5x 2     knee slightly flexed- unable to complete 9/8/20   Other: issued pt brief fatigue inventory, pt to fill out at home and bring back next session     Specific Instructions for next treatment:       Treatment Charges: Mins Units   []  Modalities     X  Ther Exercise 37 2   []  Manual Therapy     []  Ther Activities     []  Aquatics     []  Vasocompression     [x]  Other massage 12 1   Total Treatment time 49 3       Assessment: [] Progressing toward goals. [] No change. [x] Other: pt with some trouble focusing and finding her thoughts this date. Progressed to eyes closed during balance exercises on solid ground. Pt still unable to complete SLR d/t high LBP. Ended session with massage. [x] Patient would continue to benefit from skilled physical therapy services in order to improve posture and core strength, improve balance, decrease fall risk, decrease back pain and increase activity tolerance with home tasks and grooming. STG: (to be met in 8 treatments)  1. ? Pain: 25% to 5/10 maximum in back  2. Demonstrate 10 minutes of walking ability with upright posturing  3. ? Function: Ability to stand 10 minutes to cook a light meal  4. Patient to be independent with home exercise program as demonstrated by performance with correct form without cues.     LTG: (to be met in 16 treatments)  1. Improve TUG score by 4 seconds )20%)  2. Ability to sit to stand from a chair without use of arms to demonstrate improved LE and core strength  3. Able to sleep 3 hours uninterrupted by back pain  4. Ability to stand upright without forward trunk flexion  5.  Ability to get a supportive shoe on and off.        Patient goals:to stand up straight and walk right     Pt. Education:  [] Yes  [x] No  [] Reviewed Prior HEP/Ed  Method of Education: [] Verbal  [] Demo  [] Written  8- issued blue band for sitting scapular rows for postural strengthening  Comprehension of Education:  [] Verbalizes understanding. [] Demonstrates understanding. [] Needs review. [x] Demonstrates/verbalizes HEP/Ed previously given. Plan: [] Continue current frequency toward long and short term goals. [x] Specific Instructions for subsequent treatments: Continue to work on balance, LE strength and decreasing back pain.        Time In: 11:43am          Time Out: 12:38pm    Electronically signed by:  Evelio Edwards PTA

## 2020-09-10 ENCOUNTER — HOSPITAL ENCOUNTER (OUTPATIENT)
Dept: PHYSICAL THERAPY | Facility: CLINIC | Age: 60
Setting detail: THERAPIES SERIES
Discharge: HOME | End: 2020-09-10
Payer: COMMERCIAL

## 2020-09-10 PROCEDURE — 97110 THERAPEUTIC EXERCISES: CPT

## 2020-09-10 PROCEDURE — 97124 MASSAGE THERAPY: CPT

## 2020-09-10 NOTE — FLOWSHEET NOTE
mat         Ankle pumps 20x each       Hip abd- supine, knees bent, butterfly position  10x each       Hip add pillow squeeze  10x      Heel slides  15x    orange slider, unable to fully extend left leg    SKTC with manual overpressure  5x  20 sec     SLR  5x 2     knee slightly flexed- unable to complete 9/8/20   Other: issued pt brief fatigue inventory, pt to fill out at home and bring back next session     Specific Instructions for next treatment:       Treatment Charges: Mins Units   []  Modalities     X  Ther Exercise 35 2   []  Manual Therapy     []  Ther Activities     []  Aquatics     []  Vasocompression     [x]  Other massage 12 1   Total Treatment time 47 3       Assessment: [x] Progressing toward goals. Patient reports improved gait, not \"shuffling\" feet and able to lift them up better when walking. Wearing elastic lumbar support today. [] No change. [] Other:  [x] Patient would continue to benefit from skilled physical therapy services in order to improve posture and core strength, improve balance, decrease fall risk, decrease back pain and increase activity tolerance with home tasks and grooming. End of October appt with lymphedema    STG: (to be met in 8 treatments)  1. ? Pain: 25% to 5/10 maximum in back  2. Demonstrate 10 minutes of walking ability with upright posturing  3. ? Function: Ability to stand 10 minutes to cook a light meal  4. Patient to be independent with home exercise program as demonstrated by performance with correct form without cues.     LTG: (to be met in 16 treatments)  1. Improve TUG score by 4 seconds )20%)  2. Ability to sit to stand from a chair without use of arms to demonstrate improved LE and core strength  3. Able to sleep 3 hours uninterrupted by back pain  4. Ability to stand upright without forward trunk flexion  5. Ability to get a supportive shoe on and off.        Patient goals:to stand up straight and walk right     Pt.  Education:  [] Yes  [x] No  [] Reviewed Prior HEP/Ed  Method of Education: [] Verbal  [] Demo  [] Written  8- issued blue band for sitting scapular rows for postural strengthening  Comprehension of Education:  [] Verbalizes understanding. [] Demonstrates understanding. [] Needs review. [x] Demonstrates/verbalizes HEP/Ed previously given. Plan: [] Continue current frequency toward long and short term goals. [x] Specific Instructions for subsequent treatments: Continue to work on balance, LE strength and decreasing back pain.        Time In: 10: 52 am          Time Out: 11: 42 pm    Electronically signed by:  Anila Ordoñez PT

## 2020-09-14 ENCOUNTER — HOSPITAL ENCOUNTER (OUTPATIENT)
Dept: PHYSICAL THERAPY | Facility: CLINIC | Age: 60
Setting detail: THERAPIES SERIES
Discharge: HOME | End: 2020-09-14
Payer: COMMERCIAL

## 2020-09-14 ENCOUNTER — OFFICE VISIT (OUTPATIENT)
Dept: ONCOLOGY | Age: 60
End: 2020-09-14
Payer: COMMERCIAL

## 2020-09-14 ENCOUNTER — HOSPITAL ENCOUNTER (OUTPATIENT)
Facility: MEDICAL CENTER | Age: 60
Discharge: HOME | End: 2020-09-14
Payer: COMMERCIAL

## 2020-09-14 ENCOUNTER — TELEPHONE (OUTPATIENT)
Dept: ONCOLOGY | Age: 60
End: 2020-09-14

## 2020-09-14 VITALS
TEMPERATURE: 98 F | BODY MASS INDEX: 39.22 KG/M2 | HEART RATE: 70 BPM | DIASTOLIC BLOOD PRESSURE: 54 MMHG | SYSTOLIC BLOOD PRESSURE: 126 MMHG | WEIGHT: 243 LBS

## 2020-09-14 PROCEDURE — G8417 CALC BMI ABV UP PARAM F/U: HCPCS | Performed by: INTERNAL MEDICINE

## 2020-09-14 PROCEDURE — 97110 THERAPEUTIC EXERCISES: CPT

## 2020-09-14 PROCEDURE — 99213 OFFICE O/P EST LOW 20 MIN: CPT | Performed by: INTERNAL MEDICINE

## 2020-09-14 PROCEDURE — 4004F PT TOBACCO SCREEN RCVD TLK: CPT | Performed by: INTERNAL MEDICINE

## 2020-09-14 PROCEDURE — 97124 MASSAGE THERAPY: CPT

## 2020-09-14 PROCEDURE — 3017F COLORECTAL CA SCREEN DOC REV: CPT | Performed by: INTERNAL MEDICINE

## 2020-09-14 PROCEDURE — 99211 OFF/OP EST MAY X REQ PHY/QHP: CPT | Performed by: INTERNAL MEDICINE

## 2020-09-14 PROCEDURE — G8427 DOCREV CUR MEDS BY ELIG CLIN: HCPCS | Performed by: INTERNAL MEDICINE

## 2020-09-14 PROCEDURE — 80307 DRUG TEST PRSMV CHEM ANLYZR: CPT

## 2020-09-14 RX ORDER — OXYCODONE AND ACETAMINOPHEN 10; 325 MG/1; MG/1
1 TABLET ORAL EVERY 6 HOURS PRN
Qty: 120 TABLET | Refills: 0 | Status: SHIPPED | OUTPATIENT
Start: 2020-09-14 | End: 2020-10-14 | Stop reason: SDUPTHER

## 2020-09-14 RX ORDER — GABAPENTIN 300 MG/1
300 CAPSULE ORAL 3 TIMES DAILY
Qty: 90 CAPSULE | Refills: 3 | Status: SHIPPED | OUTPATIENT
Start: 2020-09-14 | End: 2020-12-11 | Stop reason: SDUPTHER

## 2020-09-14 NOTE — TELEPHONE ENCOUNTER
SARAH ARRIVES AMBULATORY FOR MD VISIT   DR MACE IN TO SEE PATIENT  ORDERS RECEIVED  RV 2 MONTHS W/DEXA SCAN PRIOR  LABS CDP CMP UPON RV  DEXA SCAN SCHEDULED FOR 10/19/20 @3PM  ARRIVE BY 2:45PM [address], MD VISIT 11/9/20 @2:45PM  LABS CDP CMP 11/9/20  AVS PRINTED AND GIVEN TO PATIENT WITH INSTRUCTIONS  PATIENT DISCHARGED AMBULATORY

## 2020-09-14 NOTE — FLOWSHEET NOTE
[] [phone]  - [facility] &  Therapy  [address]  [phone]  F: [phone] [x] [address]  [place] [address]   [address]  P: [phone]  F: [phone] [] [address]  Therapy  [address]  P: [phone]  F: [phone] [] [address]  [facility]   [address]   [place]: [phone]  F: [phone]      Physical Therapy Daily Treatment Note    Date:  2020  Patient Name:  Karie Newell    :  1960  MRN: [id number]  Physician: Dr. Daniel Cordero: Karen Lozada (30 max per year)   Medical Diagnosis: left breast CA, cancer related pain                     Rehab Codes: R60.0, M54.5, M79.1, M62.830, R2.2, R29.3, M62.81  R26.82, R53.83, G62.9  Onset Date: 2020 date of script (ongoing past few years)                            Next 's appt.:      Cancels/No Shows: 0/0  Visit #     Subjective:    Pain:  [x] Yes  [] No Location: low back Pain Rating: (0-10 scale) 7/10 LBP, 10/10 inner thigh pain  Pain altered Tx:  [x] No  [] Yes  Action:     Comments:  Pt had a busier than usual day yesterday, therefore with higher pain today. Pt states she always has some degree of LBP and has accepted that she will always live with LBP. Objective:  Modalities: massage to bilateral lumbar area of back, pt side lying on her left.    Precautions:  Exercises:  Exercise Reps/ Time Weight/ Level Comments   NuStep   8 min  L2               Standing - in // bars      standing sideways, increased to 15 reps   Hip ext  15x each   Cueing to contract gluts, not \"swing\"   abd  15x each       marching 15x each       balance   SBA of PT   Feet apart  30 sec EC Arms across chest   Feet together 30 sec EC Arms across chest   On foam feet apart 30 sec EO    On foam feet together 30 sec EO chair without use of arms to demonstrate improved LE and core strength  3. Able to sleep 3 hours uninterrupted by back pain  4. Ability to stand upright without forward trunk flexion  5. Ability to get a supportive shoe on and off.        Patient goals:to stand up straight and walk right     Pt. Education:  [] Yes  [x] No  [] Reviewed Prior HEP/Ed  Method of Education: [] Verbal  [] Demo  [] Written  8- issued blue band for sitting scapular rows for postural strengthening  Comprehension of Education:  [] Verbalizes understanding. [] Demonstrates understanding. [] Needs review. [x] Demonstrates/verbalizes HEP/Ed previously given. Plan: [] Continue current frequency toward long and short term goals. [x] Specific Instructions for subsequent treatments: Continue to work on balance, LE strength and decreasing back pain.        Time In: 11:11am          Time Out: 12:03pm    Electronically signed by:  Rosi Mendoza PTA

## 2020-09-14 NOTE — PROGRESS NOTES
Jean Pierre Lal                                                                                                                  9/14/2020  MRN:   [id number]  YOB: 1960  PCP:                           Cain Parham MD  Referring Physician: No ref. provider found  Treating Physician Name: Sushma Hilario MD      Reason for visit:  Chief Complaint   Patient presents with    Follow-up    Medication Refill       Current problems:   IDC of Left Breast cancer, Triple negative, pT2,pN0,M0    Active and recent treatments:  Bilateral Mastectomy with left sentinel lymph node  Adjuvant chemotherapy using dose denseAC followed by T on 4/11/19--- 8/ /19    Summary of Case/History:    Jean Pierre Lal a 61 y. o.female invasive carcinoma of the breast.     Patient initially felt a lump in her left breast for 2-3 months. She underwent screening mammogram which showed 2 cm mass in upper outer left breast.  Right breast do not have any concerning findings. Ultrasound of left breast conformed mass measuring 1.9 cm in the upper outer left breast.  Patient underwent biopsy on 1/23/19 which revealed invasive ductal carcinoma, grade 3, ER/MA negative. HER-2 results are pending.     Patient has history of uterus/cervical cancer diagnosed in 1992. Patient states she underwent surgery for the cervical cancer. She does not recall getting any radiation therapy or chemotherapy. Patient states she has been interested in getting bilateral breast reduction due to cosmetic reasons however now with a diagnosis of breast cancer she wishes to proceed with bilateral mastectomy.     Patient has history of breast cancer in her mother as well as 3 paternal aunts. Patient's mother also had cervical and uterine cancer.     Patient underwent bilateral mastectomy with left sentinel lymph node biopsy.   Pathological stage was T2, N0, M0    Started chemotherapy for triple negative breast cancer using AC followed by T on 4/11/19    Interim History:    Patient presents to the clinic for a follow-up visit and to discuss further treatment plan and for active surveillance of breast cancer. She has been undergoing physical therapy and has some muscle soreness today. She is getting referral to ortho for bone pain. She is not taking calcium or vitamin D. The neuropathy in her fingers and hands has receded to the edges. Continues to be on pain medication for chronic pain. During this visit patient's allergy, social, medical, surgical history and medications were reviewed and updated.       Past Medical History:   Past Medical History:   Diagnosis Date    Arthritis     GENERALIZED ALL OVER RHEUMATOID AND OSTEO    Breast cancer [facility] [address]     left breast    Cancer ([facility] [address] 1992    CERVICAL, UTERINE    Chronic back pain     scioliosis,  02/2019 WEARING A BACK BRACE    Degenerative disorder of bone     ALL OVER    Depression     Difficult intravenous access     HANDS AR BEST SITE    GERD (gastroesophageal reflux disease)     Hyperlipidemia     HX OF NO MEDS IN YEARS    Hypertension 2007    Pain management clinic    Insomnia     Neuropathy     hands and feet    Obesity     BMI -  43    Sleep apnea 2016    NO LONGER USES MACHINE    Snores     has been told by family that she stops breathing    Use of cane as ambulatory aid     Wears glasses     READING    Wears glasses     \"CHEATERS\"       Past Surgical History:     Past Surgical History:   Procedure Laterality Date    BREAST SURGERY Left 1990    CYST    BUNIONECTOMY Bilateral     CERVIX SURGERY  1992    COLONOSCOPY  10/13/2017    FOOT SURGERY Left 2014    PINS IN ALL TOES    FOOT SURGERY Left 5/27/2020    HARDWARE REMOVAL LEFT GREAT TOE- performed by Toan Tellez DPM at [facility] Bilateral    [facility] Bilateral 2/12/2019    TOTAL MASTECTOMY performed by Jaguar Gutierrez DO at [facility] [address] BILATERAL Bilateral 02/12/2019     TOTAL MASTECTOMY,  AXILLARY SENTINEL LYMPH NODE BIOPSY, FROZEN SECTION     WA COLSC FLX W/REMOVAL LESION BY HOT BX FORCEPS N/A 10/13/2017    COLONOSCOPY WITH BIOPSY AND POLYPECTOMY performed by Elena Jovel IV, DO at [address]    TUNNELED VENOUS PORT PLACEMENT  03/27/2019    X-RAY FOOT 3+VW      both       Patient Family Social History:     Social History     Socioeconomic History    Marital status: Single     Spouse name: Not on file    Number of children: Not on file    Years of education: Not on file    Highest education level: Not on file   Occupational History    Not on file   Social Needs    Financial resource strain: Not on file    Food insecurity     Worry: Not on file     Inability: Not on file   [employer] needs     Medical: Not on file     Non-medical: Not on file   Tobacco Use    Smoking status: Current Some Day Smoker     Packs/day: 0.25     Years: 41.00     Pack years: 10.25     Types: Cigarettes    Smokeless tobacco: Never Used    Tobacco comment: STATES SMOKES A PACK A WEEK   Substance and Sexual Activity    Alcohol use: No    Drug use: Yes     Frequency: 7.0 times per week     Types: Marijuana     Comment: LAST USE 02/10/2018, AND PAIN PILLS     Sexual activity: Not Currently   Lifestyle    Physical activity     Days per week: Not on file     Minutes per session: Not on file    Stress: Not on file   Relationships    Social connections     Talks on phone: Not on file     Gets together: Not on file     Attends Protestant service: Not on file     Active member of club or organization: Not on file     Attends meetings of clubs or organizations: Not on file     Relationship status: Not on file    Intimate partner violence     Fear of current or ex partner: Not on file     Emotionally abused: Not on file     Physically abused: Not on file     Forced sexual activity: Not on file   Other Topics Concern    Not on file   Social History neuropathy is nearly resolved        Physical Exam:    Vitals: BP (!) 126/54   Pulse 70   Temp 98 °F (36.7 °C) (Oral)   Wt 243 lb (110.2 kg)   LMP 02/11/1992   BMI 39.22 kg/m²   General appearance - well appearing, no in pain or distress  Mental status - AAO X3  Eyes - pupils equal and reactive, extraocular eye movements intact  Mouth - mucous membranes moist, pharynx normal without lesions  Neck - supple, no significant adenopathy  Lymphatics - no palpable lymphadenopathy, no hepatosplenomegaly  Chest - clear to auscultation, no wheezes, rales or rhonchi, symmetric air entry  Heart - normal rate, regular rhythm, normal S1, S2, no murmurs  Abdomen - soft, nontender, nondistended, no masses or organomegaly  Neurological - alert, oriented, normal speech, no focal findings or movement disorder noted; +neuropathy - stable  Extremities -bilateral ankle edema  Skin - normal coloration and turgor, no rashes, no suspicious skin lesions noted; toenail fungus  Breast- bilateral mastectomy, no masses or lumps, excess skin on right side - no lymphedema appreciated        DATA:  Lab Results   Component Value Date    WBC 8.7 07/20/2020    HGB 13.4 07/20/2020    HCT 41.6 07/20/2020    MCV 89.8 07/20/2020     07/20/2020       Chemistry        Component Value Date/Time     07/20/2020 1105    K 4.2 07/20/2020 1105    CL 99 07/20/2020 1105    CO2 26 07/20/2020 1105    BUN 16 07/20/2020 1105    CREATININE 0.71 07/20/2020 1105        Component Value Date/Time    CALCIUM 9.6 07/20/2020 1105    ALKPHOS 107 (H) 07/20/2020 1105    AST 11 07/20/2020 1105    ALT 9 07/20/2020 1105    BILITOT 0.19 (L) 07/20/2020 1105        Component  Value  Ref Range & Units  Status  Collected  Lab    Amphetamine Screen, Ur  NEGATIVE  NEGATIVE  Final  09/14/2020  3:37 PM  [facility] [address]  Lab           (Positive cutoff 1000 ng/mL)                Barbiturate Screen, Ur  NEGATIVE  NEGATIVE  Final  09/14/2020  3:37 PM  [facility] months.(probability of malignancy <1%).         RECOMMENDATIONS:    If you would like to register your patient with the [address] [place] [address] Program, please contact the Nurse Navigator at    [phone]). Impression:  Invasive ductal carcinoma of left breast, triple negative, pT2,p N0, M0  Status post bilateral mastectomy and left sentinel lymph node biopsy  Personal history of gynecological malignancy (uterus/cervical cancer) status post surgery in 1992  Cancer related pain  Leg swelling  Anemia secondary to chemotherapy  Family history of malignancy  Chronic back pain  Hypertension  Obesity      Plan:  Reviewed available clinical data   Toxicity check performed   tox screen +ve for oxycodone and cannabinoids   Pt was given med refil for chrnoc pain    Pain contract in tact   Continues to be in remission wrt breast cancer    I discussed plan for DEXA prior to next visit and I am putting in orders. She remains in remission and we will continue with surveillance. Her neuropathy is imprved     Return in 2 months. Kevin Walker        This note is created with the assistance of a speech recognition program.  While intending to generate a document that actually reflects the content of the visit, the document can still have some errors including those of syntax and sound a like substitutions which may escape proof reading. It such instances, actual meaning can be extrapolated by contextual diversion.

## 2020-09-17 ENCOUNTER — HOSPITAL ENCOUNTER (OUTPATIENT)
Dept: PHYSICAL THERAPY | Facility: CLINIC | Age: 60
Setting detail: THERAPIES SERIES
Discharge: HOME | End: 2020-09-17
Payer: COMMERCIAL

## 2020-09-17 PROCEDURE — 97124 MASSAGE THERAPY: CPT

## 2020-09-17 PROCEDURE — 97110 THERAPEUTIC EXERCISES: CPT

## 2020-09-17 NOTE — FLOWSHEET NOTE
[] [facility]) [facility] &  Therapy  [address]  [phone]  F: [phone] [x] [address]  [place] [address]   [address]  P: [phone]  F: [phone] [] [address]  Therapy  [address]  P: [phone]  F: [phone] [] [address]  [facility]   [address]   [place]: [phone]  F: [phone]      Physical Therapy Daily Treatment Note    Date:  2020  Patient Name:  Marisela Blevins    :  1960  MRN: [id number]  Physician: Dr. Gracy Lay: Lucía (30 max per year)   Medical Diagnosis: left breast CA, cancer related pain                     Rehab Codes: R60.0, M54.5, M79.1, M62.830, R2.2, R29.3, M62.81  R26.82, R53.83, G62.9  Onset Date: 2020 date of script (ongoing past few years)                            Next 's appt.:      Cancels/No Shows: 0/0  Visit #     Subjective:    Pain:  [x] Yes  [] No Location: low back Pain Rating: (0-10 scale) 7/10 LBP, 5/ 10 inner thigh pain   Pain altered Tx:  [x] No  [] Yes  Action:     Comments:  Pt had a busier than usual day yesterday, therefore with higher pain today. Pt states she always has some degree of LBP and has accepted that she will always live with LBP. Objective:  Modalities: massage to bilateral lumbar area of back, pt side lying on her left.    Precautions:  Exercises:  Exercise Reps/ Time Weight/ Level Comments   NuStep   10 min  L3               Standing - in // bars      standing sideways, increased to 15 reps   Hip ext  15x each   Cueing to contract gluts, not \"swing\"   abd  15x each       marching 15x each       balance   SBA of PT   Feet apart  30 sec Eyes closed Arms across chest   Feet together 30 sec EC Arms across chest   On foam feet apart 30 sec EO    On foam feet together 30 sec EO    On foam feet apart, eyes shut 30 sec  Improved, 1x slight loss                      mat         Ankle pumps 20x each       Hip abd- supine, knees bent, butterfly position  10x each       Hip add pillow squeeze  15x      Heel slides  15x    orange slider, unable to fully extend left leg    SKTC with manual overpressure  5x  20 sec     SLR  5x 2     knee slightly flexed- unable to complete 9/8/20   Other:      Specific Instructions for next treatment:       Treatment Charges: Mins Units   []  Modalities     X  Ther Exercise 44 2   []  Manual Therapy     []  Ther Activities     []  Aquatics     []  Vasocompression     [x]  Other massage 8 1   Total Treatment time 52 3       Assessment: [x] Progressing toward goals. Pt reports significant improvement in ability to do activity and housework at home. Patient reports \"I feel coy much better\"    Patient is wearing a lumbar Bregg velcro brace, reports it slides up, considering a compression type tank to help with back support. [] No change. [] Other:  [x] Patient would continue to benefit from skilled physical therapy services in order to improve posture and core strength, improve balance, decrease fall risk, decrease back pain and increase activity tolerance with home tasks and grooming. End of October appt with lymphedema    Brief Fatigue Inventory (9- fully completed)  7/5/10  = Severe    STG: (to be met in 8 treatments)   1. ? Pain: 25% to 5/10 maximum in back, met 9-  2. Demonstrate 10 minutes of walking ability with upright posturing 9- able with household chores, not walking outside due to poor sidewalks. 3. ? Function: Ability to stand 10 minutes to cook a light meal, met 9-  4. Patient to be independent with home exercise program as demonstrated by performance with correct form without cues. Met 9-     LTG: (to be met in 16 treatments)  1. Improve TUG score by 4 seconds )20%)  2.  Ability to sit to stand

## 2020-09-21 ENCOUNTER — HOSPITAL ENCOUNTER (OUTPATIENT)
Dept: PHYSICAL THERAPY | Facility: CLINIC | Age: 60
Setting detail: THERAPIES SERIES
Discharge: HOME | End: 2020-09-21
Payer: COMMERCIAL

## 2020-09-21 PROCEDURE — 97124 MASSAGE THERAPY: CPT

## 2020-09-21 PROCEDURE — 97110 THERAPEUTIC EXERCISES: CPT

## 2020-09-21 NOTE — FLOWSHEET NOTE
[] [facility]) [facility] &  Therapy  [address]  [phone]  F: [phone] [x] [address]  [facility]   [address]  P: [phone]  F: [phone] [] [address]  Therapy  [address]  P: [phone]  F: [phone] [] [address]  [facility]   [address]   [place]: [phone]  F: [phone]      Physical Therapy Daily Treatment Note    Date:  2020  Patient Name:  Jean Pierre Lal    :  1960  MRN: [id number]  Physician: Dr. Shama Mcgee: Lucía (30 max per year)   Medical Diagnosis: left breast CA, cancer related pain                     Rehab Codes: R60.0, M54.5, M79.1, M62.830, R2.2, R29.3, M62.81  R26.82, R53.83, G62.9  Onset Date: 2020 date of script (ongoing past few years)                            Next 's appt.:      Cancels/No Shows: 0/0  Visit #     Subjective:    Pain:  [] Yes  [] No Location: L side Pain Rating: (0-10 scale) none given  Pain altered Tx:  [x] No  [] Yes  Action:     Comments:  No complaints after last visit, usually feels good after therapy. When asked about pain today, patient states she is doing well but \"always has pain\". Has not yet found a compression type tank to help with back brace sliding. Objective:  Modalities: massage to bilateral lumbar area of back, pt side lying on her left.    Precautions:  Exercises:  Exercise Reps/ Time Weight/ Level Comments   NuStep   11 min  L3 patient states she likes to add a little time each visit             Standing - in // bars         Hip ext 15x each   Cueing to contract gluts, not \"swing\"   abd 15x each       marching 15x each       balance   SBA of PT   Feet apart  30 sec Eyes closed Arms across chest   Feet together 30 sec EC Arms across chest   On foam feet

## 2020-09-24 ENCOUNTER — HOSPITAL ENCOUNTER (OUTPATIENT)
Dept: PHYSICAL THERAPY | Facility: CLINIC | Age: 60
Setting detail: THERAPIES SERIES
Discharge: HOME | End: 2020-09-24
Payer: COMMERCIAL

## 2020-09-24 PROCEDURE — 97110 THERAPEUTIC EXERCISES: CPT

## 2020-09-24 PROCEDURE — 97124 MASSAGE THERAPY: CPT

## 2020-09-24 NOTE — FLOWSHEET NOTE
[] [facility]) [facility] &  Therapy  [address]  [phone]  F: [phone] [x] [address]  [facility]   [address]  P: [phone]  F: [phone] [] [address]  Therapy  [facility]  P: [phone]  F: [phone] [] [address]  [facility]   [address]   [place]: [phone]  F: [phone]      Physical Therapy Daily Treatment Note    Date:  2020  Patient Name:  Gypsy White    :  1960  MRN: [id number]  Physician: Dr. Namita Rice: Lucía (30 max per year)   Medical Diagnosis: left breast CA, cancer related pain                     Rehab Codes: R60.0, M54.5, M79.1, M62.830, R2.2, R29.3, M62.81  R26.82, R53.83, G62.9  Onset Date: 2020 date of script (ongoing past few years)                            Next 's appt.:      Cancels/No Shows: 0/0  Visit # 10/16    Subjective:    Pain:  [] Yes  [] No Location: L side Pain Rating: (0-10 scale) none given  Pain altered Tx:  [x] No  [] Yes  Action:     Comments:  No complaints after last visit, usually feels good after therapy. When asked about pain today, patient states she is doing well but \"always has pain\". Has not yet found a compression type tank to help with back brace sliding. Objective:  Modalities: massage to bilateral lumbar area of back, pt side lying on her left.    Precautions:  Exercises:  Exercise Reps/ Time Weight/ Level Comments   NuStep   13.3 min, 1/2 a mile  L3 patient states she likes to add a little time each visit             Standing - in // bars         Hip ext 20x each   Cueing to contract gluts, not \"swing\"   abd 20x each       marching 20x each       balance   SBA of PT   Feet apart  30 sec Eyes closed Arms across chest   Feet together 30 sec EC Arms across chest On foam feet apart 30 sec EO    On foam feet together 30 sec EO    On foam feet apart, eyes shut 30 sec  1x slight loss    Tandem Stance 30 sec ea  Added 9/21/20, difficulty, multiple LOB               mat         Ankle pumps 20x each       Hip abd- supine, knees bent, butterfly position  10x each       Hip add pillow squeeze  15x      Heel slides  15x    orange slider, unable to fully extend left leg    SKTC with manual overpressure  5x  20 sec     SLR  5x 2     knee slightly flexed- unable to complete 9/8/20   Other:      Specific Instructions for next treatment:  May add 2# wts to standing exercises       Treatment Charges: Mins Units   []  Modalities     X  Ther Exercise 30 2   []  Manual Therapy     []  Ther Activities     []  Aquatics     []  Vasocompression     [x]  Other massage 15 1   Total Treatment time 45 3       Assessment: [x] Progressing toward goals. Patient able to fully extend left knee when supine, says she hasn't gotten that straight in a long time. Generally feels some soreness after therapy but then improved. Wore brace last night to sleep.  (got stung by a wasp in her back, no allergic reaction, just a \"bump\")  Patient reports she is stretching at home, noticing increasing flexibility. Did purchase a short style pull on compression garmet on line but difficulty to close all the front clasps. Also had some treatment to foot for callus removal    [] No change. [] Other:  [x] Patient would continue to benefit from skilled physical therapy services in order to improve posture and core strength, improve balance, decrease fall risk, decrease back pain and increase activity tolerance with home tasks and grooming. End of October appt with lymphedema    Brief Fatigue Inventory (9- fully completed)  7/5/10  = Severe    STG: (to be met in 8 treatments)   1. ? Pain: 25% to 5/10 maximum in back, met 9-  2.   Demonstrate 10 minutes of walking ability with upright posturing 9- able with household chores, not walking outside due to poor sidewalks. 3. ? Function: Ability to stand 10 minutes to cook a light meal, met 9-  4. Patient to be independent with home exercise program as demonstrated by performance with correct form without cues. Met 9-     LTG: (to be met in 16 treatments)  1. Improve TUG score by 4 seconds )20%)  2. Ability to sit to stand from a chair without use of arms to demonstrate improved LE and core strength  3. Able to sleep 3 hours uninterrupted by back pain  4. Ability to stand upright without forward trunk flexion  5. Ability to get a supportive shoe on and off.        Patient goals:to stand up straight and walk right     Pt. Education:  [] Yes  [x] No  [x] Reviewed Prior HEP/Ed  Method of Education: [x] Verbal  [] Demo  [] Written  8- issued blue band for sitting scapular rows for postural strengthening  Comprehension of Education:  [x] Verbalizes understanding. [x] Demonstrates understanding. [] Needs review. [] Demonstrates/verbalizes HEP/Ed previously given. Plan: [x] Continue current frequency toward long and short term goals. [x] Specific Instructions for subsequent treatments: Continue to work on balance, LE strength and decreasing back pain.        Time In: 11:06 am      Time Out:  12:10 pm    Electronically signed by:  Roman Ornelas PT

## 2020-09-28 ENCOUNTER — HOSPITAL ENCOUNTER (OUTPATIENT)
Dept: PHYSICAL THERAPY | Facility: CLINIC | Age: 60
Setting detail: THERAPIES SERIES
Discharge: HOME | End: 2020-09-28
Payer: COMMERCIAL

## 2020-09-28 PROCEDURE — 97110 THERAPEUTIC EXERCISES: CPT

## 2020-09-28 PROCEDURE — 97124 MASSAGE THERAPY: CPT

## 2020-09-28 NOTE — FLOWSHEET NOTE
[] [facility]) [facility] &  Therapy  [address]  [phone]  F: [phone] [x] [address]  [place] [address]   [address]  P: [phone]  F: [phone] [] [address]  Therapy  [address]  P: [phone]  F: [phone] [] [address]  [facility]   [address]   [place]: [phone]  F: [phone]      Physical Therapy Daily Treatment Note    Date:  2020  Patient Name:  Roxana Lion    :  1960  MRN: [id number]  Physician: Dr. Kim Delgado: Karin Leno (30 max per year)   Medical Diagnosis: left breast CA, cancer related pain                     Rehab Codes: R60.0, M54.5, M79.1, M62.830, R2.2, R29.3, M62.81  R26.82, R53.83, G62.9  Onset Date: 2020 date of script (ongoing past few years)                            Next 's appt.:      Cancels/No Shows: 0/0  Visit #     Subjective:    Pain:  [x] Yes  [] No Location: L side Pain Rating: (0-10 scale) 10+/10  Pain altered Tx:  [] No  [x] Yes  Action: Held standing exercises d/t increased pain related to neuropathy. Completed massage in R side lying today. Comments: pt states she slept most of the day yesterday, just having an off day. Pt states she gets days like this, but no as often as she was. Pt also with increased pain today in hands and feet d/t neuropathy. Pt also reports that she was stung by a wasp in her back last week. Pt reports the area to be itchy. Objective:  Modalities: massage to bilateral lumbar area of back, pt side lying on her Right.    Precautions:  Exercises:  Exercise Reps/ Time Weight/ Level Comments   NuStep   13  L1              Standing - in // bars      HELD all standing 20   Hip ext 20x each   Cueing to contract gluts, not \"swing\"   abd 20x each       marching 20x each       balance   SBA of PT   Feet apart  30 sec Eyes closed Arms across chest   Feet together 30 sec EC Arms across chest   On foam feet apart 30 sec EO    On foam feet together 30 sec EO    On foam feet apart, eyes shut 30 sec  1x slight loss    Tandem Stance 30 sec ea  Added 9/21/20, difficulty, multiple LOB               mat         Ankle pumps 20x each       Hip abd- supine, knees bent, butterfly position  10x each       Hip add pillow squeeze  15x      Heel slides  15x    orange slider, unable to fully extend left leg    SKTC with manual overpressure  5x  20 sec     SLR  5x2     knee slightly flexed-held    Other:      Specific Instructions for next treatment:  May add 2# wts to standing exercises       Treatment Charges: Mins Units   []  Modalities     X  Ther Exercise 27 2   []  Manual Therapy     []  Ther Activities     []  Aquatics     []  Vasocompression     [x]  Other massage 15 1   Total Treatment time 42 3       Assessment: [] Progressing toward goals. [] No change. [x] Other: D/t higher pain levels pt requests to hold off on standing exercises. Pt able to complete all mat exercises with fair tolerance. Pt moves at slower pace throughout treatment. Massage this date was performed in R side lying as her LBP is much higher on the L. [x] Patient would continue to benefit from skilled physical therapy services in order to improve posture and core strength, improve balance, decrease fall risk, decrease back pain and increase activity tolerance with home tasks and grooming. End of October appt with lymphedema    Brief Fatigue Inventory (9- fully completed)  7/5/10  = Severe    STG: (to be met in 8 treatments)   1. ? Pain: 25% to 5/10 maximum in back, met 9-  2. Demonstrate 10 minutes of walking ability with upright posturing 9- able with household chores, not walking outside due to poor sidewalks.   3. ? Function: Ability to stand 10 minutes to cook a light meal, met

## 2020-10-01 ENCOUNTER — HOSPITAL ENCOUNTER (OUTPATIENT)
Dept: PHYSICAL THERAPY | Facility: CLINIC | Age: 60
Setting detail: THERAPIES SERIES
Discharge: HOME | End: 2020-10-01
Payer: COMMERCIAL

## 2020-10-01 PROCEDURE — 97124 MASSAGE THERAPY: CPT

## 2020-10-01 PROCEDURE — 97110 THERAPEUTIC EXERCISES: CPT

## 2020-10-05 ENCOUNTER — HOSPITAL ENCOUNTER (OUTPATIENT)
Dept: PHYSICAL THERAPY | Facility: CLINIC | Age: 60
Setting detail: THERAPIES SERIES
Discharge: HOME | End: 2020-10-05
Payer: COMMERCIAL

## 2020-10-05 PROCEDURE — 97124 MASSAGE THERAPY: CPT

## 2020-10-05 PROCEDURE — 97110 THERAPEUTIC EXERCISES: CPT

## 2020-10-05 NOTE — FLOWSHEET NOTE
[] [facility] &  Therapy  [address]  [phone]  F: [phone] [x] [address]  [facility]   [address]  P: [phone]  F: [phone] [] [place]  [address]  P: [phone]  F: [phone] [] [address]  [facility]   [address]   [place]: [phone]  F: [phone]      Physical Therapy Daily Treatment Note    Date:  10/5/2020  Patient Name:  Trixie Middleton    :  1960  MRN: [id number]  Physician: Dr. Jenelle Jon: Lucía (30 max per year)   Medical Diagnosis: left breast CA, cancer related pain                     Rehab Codes: R60.0, M54.5, M79.1, M62.830, R2.2, R29.3, M62.81  R26.82, R53.83, G62.9  Onset Date: 2020 date of script (ongoing past few years)                            Next 's appt.:      Cancels/No Shows: 0/0  Visit # 1316    Subjective:    Pain:  [x] Yes  [] No Location: L side Pain Rating: (0-10 scale) 7/10  Pain altered Tx:  [x] No  [] Yes  Action:     Comments: pt states she is more fatigued this date d/t not sleeping well. Pain is the same. Objective:  Modalities: massage to bilateral lumbar area of back, pt side lying on her left.    Precautions:  Exercises:  Exercise Reps/ Time Weight/ Level Comments   NuStep   12  L1              Standing - in // bars         Hip ext 20x each  2# Added weight 10/5/20   abd 20x each  2# Added weight 10/5/20   marching 20x each   2# Added weight 10/5/20   balance   SBA of PT   Feet apart  30 sec Eyes closed Arms across chest   Feet together 30 sec EC Arms across chest   On foam feet apart 30 sec EO    On foam feet together 30 sec EO    On foam feet apart, eyes shut 30 sec  1x slight loss    Tandem Stance 30 sec ea  Added 20, difficulty, multiple LOB               mat         Ankle pumps 20x each       Hip abd- supine, knees bent, butterfly position  10x each       Hip add pillow squeeze  20x      Heel slides  15x    orange slider, unable to fully extend left leg    SKTC with manual overpressure  5x  20 sec     SLR    knee slightly flexed   Other:      Specific Instructions for next treatment:         Treatment Charges: Mins Units   []  Modalities     X  Ther Exercise 38 2   []  Manual Therapy     []  Ther Activities     []  Aquatics     []  Vasocompression     [x]  Other massage 15 1   Total Treatment time 43 3       Assessment: [x] Progressing toward goals. Added 2# weight to standing exercises with good tolerance. Pt was able to complete all exercises without modifications. Hands continue to be bothersome d/t neuropathy which limits her tolerance to touching // bars. Ended with massage. Pt reports feeling better upon completion of treatment. [] No change. [] Other:   [x] Patient would continue to benefit from skilled physical therapy services in order to improve posture and core strength, improve balance, decrease fall risk, decrease back pain and increase activity tolerance with home tasks and grooming. End of October appt with lymphedema    Brief Fatigue Inventory (9- fully completed)  7/5/10  = Severe    STG: (to be met in 8 treatments)   1. ? Pain: 25% to 5/10 maximum in back, met 9-  2. Demonstrate 10 minutes of walking ability with upright posturing 9- able with household chores, not walking outside due to poor sidewalks. 3. ? Function: Ability to stand 10 minutes to cook a light meal, met 9-  4. Patient to be independent with home exercise program as demonstrated by performance with correct form without cues. Met 9-     LTG: (to be met in 16 treatments)  1. Improve TUG score by 4 seconds )20%)  2. Ability to sit to stand from a chair without use of arms to demonstrate improved LE and core strength  3.  Able to sleep 3 hours uninterrupted by back pain  4. Ability to stand upright without forward trunk flexion  5. Ability to get a supportive shoe on and off.        Patient goals:to stand up straight and walk right     Pt. Education:  [] Yes  [x] No  [] Reviewed Prior HEP/Ed  Method of Education: [] Verbal  [] Demo  [] Written  8- issued blue band for sitting scapular rows for postural strengthening  Comprehension of Education:  [] Verbalizes understanding. [] Demonstrates understanding. [] Needs review. [] Demonstrates/verbalizes HEP/Ed previously given. Plan: [x] Continue current frequency toward long and short term goals. [x] Specific Instructions for subsequent treatments: Continue to work on balance, LE strength and decreasing back pain.        Time In: 10:55am         Time Out:  12:00pm    Electronically signed by:  Yee Landis PTA

## 2020-10-08 ENCOUNTER — HOSPITAL ENCOUNTER (OUTPATIENT)
Dept: PHYSICAL THERAPY | Facility: CLINIC | Age: 60
Setting detail: THERAPIES SERIES
Discharge: HOME | End: 2020-10-08
Payer: COMMERCIAL

## 2020-10-08 PROCEDURE — 97124 MASSAGE THERAPY: CPT

## 2020-10-08 PROCEDURE — 97110 THERAPEUTIC EXERCISES: CPT

## 2020-10-08 NOTE — PROGRESS NOTES
core strength, improve balance, decrease fall risk, decrease back pain and increase activity tolerance with home tasks and grooming. End of October appt with lymphedema     Brief Fatigue Inventory (9- fully completed)  7/5/10  = Severe     STG: (to be met in 8 treatments)   1. ? Pain: 25% to 5/10 maximum in back, met 9-  2.  Demonstrate 10 minutes of walking ability with upright posturing 9- able with household chores, not walking outside due to poor sidewalks. 3. ? Function: Ability to stand 10 minutes to cook a light meal, met 9-  4. Patient to be independent with home exercise program as demonstrated by performance with correct form without cues. Met 9-     LTG: (to be met in 16 treatments)  1. Improve TUG score by 4 seconds )20%)  2. Ability to sit to stand from a chair without use of arms to demonstrate improved LE and core strength  3. Able to sleep 3 hours uninterrupted by back pain  4. Ability to stand upright without forward trunk flexion  5. Ability to get a supportive shoe on and off. 6.   Treatment Plan:  [x] Therapeutic Exercise   67638  [] Iontophoresis: 4 mg/mL Dexamethasone Sodium Phosphate  mAmin  55628   [x] Therapeutic Activity  32194 [] Vasopneumatic cold with compression  72599    [] Gait Training   89094 [] Ultrasound   05588   [] Neuromuscular Re-education  02621 [] Electrical Stimulation Unattended  26880   [] Manual Therapy  07327 [] Electrical Stimulation Attended  17074   [x] Instruction in HEP  [] Lumbar/Cervical Traction  76840   [] Aquatic Therapy   21010 [] Cold/hotpack    [x] Massage   66003      [] Dry Needling, 1 or 2 muscles  53721   [] Biofeedback, first 15 minutes   61203  [] Biofeedback, additional 15 minutes   61523 [] Dry Needling, 3 or more muscles  28732       Patient Status:       [x] Additional visits necessary. Patient also to see OT for lymphedema in LE.      Requested Frequency/Duration: 2 times per week for 14 treatments. Electronically signed by Gillian Velazquez PT on 10/8/2020 at 11:57 AM      If you have any questions or concerns, please don't hesitate to call. Thank you for your referral.    Physician Signature:________________________________Date:__________________  By signing above or cosigning this note, I have reviewed this plan of care and certify a need for medically necessary rehabilitation services.

## 2020-10-08 NOTE — FLOWSHEET NOTE
[] [facility]) [facility] &  Therapy  [address]  [phone]  F: [phone] [x] [address]  [facility]   [address]  P: [phone]  F: [phone] [] [address]  [address] [facility] [facility] [address]  P: [phone]  F: [phone] [] [address]  [facility]   [address]   [place]: [phone]  F: [phone]      Physical Therapy Daily Treatment Note    Date:  10/8/2020  Patient Name:  Edgar Elena    :  1960  MRN: [id number]  Physician: Dr. Spencer Manus: Lucía (30 max per year)   Medical Diagnosis: left breast CA, cancer related pain                     Rehab Codes: R60.0, M54.5, M79.1, M62.830, R2.2, R29.3, M62.81  R26.82, R53.83, G62.9  Onset Date: 2020 date of script (ongoing past few years)                            Next 's appt.:      Cancels/No Shows: 0/0  Visit # 14/16    10-7-2020 progress note sent to Dr. Airam Duffy    Subjective:    Pain:  [x] Yes  [] No Location: L side Pain Rating: (0-10 scale) 7/10  Typical pain, awakes at 10/10 and takes meds, then 7/10  Pain altered Tx:  [x] No  [] Yes  Action:     Comments: pt states she has some leg soreness. Has been busy cleaning out a room making space for an adjustable bed to try to improve her sleep. Objective:  Modalities: massage to bilateral lumbar area of back, pt side lying on her left.    Precautions:  Exercises:  Exercise Reps/ Time Weight/ Level Comments   NuStep   13  L3              Standing - in // bars         Hip ext 20x each  2# Added weight 10/5/20   abd 20x each  2# Added weight 10/5/20   marching 20x each   2# Added weight 10/5/20   balance   SBA of PT   Feet apart  30 sec Eyes closed Arms across chest   Feet together 30 sec EC Arms across chest   On foam feet apart 30 sec EO    On foam feet together 30 sec EO    On foam feet apart, eyes shut 30 sec  1x slight loss    Tandem Stance 30 sec ea  Added 9/21/20, difficulty, multiple LOB               mat         Ankle pumps 20x each       Hip abd- supine, knees bent, butterfly position  10x each       Hip add pillow squeeze  20x      Heel slides  15x    orange slider, unable to fully extend left leg    SKTC with manual overpressure  5x  20 sec     SLR    knee slightly flexed   Other:      Specific Instructions for next treatment:    Treatment Charges: Mins Units   []  Modalities     X  Ther Exercise 35 2   []  Manual Therapy     []  Ther Activities     []  Aquatics     []  Vasocompression     [x]  Other massage 15 1   Total Treatment time 50 3       Assessment: [x] Progressing toward goals. Patient reporting increased stability with standing tasks at home. Continued LBP and forward slumping posture, wearing elastic back brace, 10-15 minute standing and walking ability. [] No change. [] Other:   [x] Patient would continue to benefit from skilled physical therapy services in order to improve posture and core strength, improve balance, decrease fall risk, decrease back pain and increase activity tolerance with home tasks and grooming. End of October appt with lymphedema    Brief Fatigue Inventory (9- fully completed)  7/5/10  = Severe    STG: (to be met in 8 treatments)   1. ? Pain: 25% to 5/10 maximum in back, met 9-  2. Demonstrate 10 minutes of walking ability with upright posturing 9- able with household chores, not walking outside due to poor sidewalks. 3. ? Function: Ability to stand 10 minutes to cook a light meal, met 9-  4. Patient to be independent with home exercise program as demonstrated by performance with correct form without cues. Met 9-     LTG: (to be met in 16 treatments)  1. Improve TUG score by 4 seconds )20%)  2.  Ability to sit to stand from a chair without use of arms to

## 2020-10-12 ENCOUNTER — HOSPITAL ENCOUNTER (OUTPATIENT)
Dept: PHYSICAL THERAPY | Facility: CLINIC | Age: 60
Setting detail: THERAPIES SERIES
Discharge: HOME | End: 2020-10-12
Payer: COMMERCIAL

## 2020-10-12 PROCEDURE — 97124 MASSAGE THERAPY: CPT

## 2020-10-12 PROCEDURE — 97110 THERAPEUTIC EXERCISES: CPT

## 2020-10-12 NOTE — FLOWSHEET NOTE
[] [facility] &  Therapy  [address]  [phone]  F: [phone] [x] [address]  [facility]   [address]  P: [phone]  F: [phone] [] [address]  Therapy  [address] [facility]  P: [phone]  F: [phone] [] [address]  [facility]   [address]   [place]: [phone]  F: [phone]      Physical Therapy Daily Treatment Note    Date:  10/12/2020  Patient Name:  Tommy Dalal    :  1960  MRN: [id number]  Physician: Dr. De La Cruz Nurse: Lucía (30 max per year)   Medical Diagnosis: left breast CA, cancer related pain                     Rehab Codes: R60.0, M54.5, M79.1, M62.830, R2.2, R29.3, M62.81  R26.82, R53.83, G62.9  Onset Date: 2020 date of script (ongoing past few years)                            Next 's appt.:      Cancels/No Shows: 0/0  Visit # 15/30    10-7-2020 progress note sent to Dr. Locke Section    Subjective:    Pain:  [x] Yes  [] No Location: L side Pain Rating: (0-10 scale) 7/10 \"generalized pain\"  Pain altered Tx:  [x] No  [] Yes  Action:     Comments: Pt describes her pain as \"the usual\". Pt feels less swelling in her LEs, stating she hasn't taken her water pill and believes her swelling is better without it. Objective:  Modalities: massage to bilateral lumbar area of back, pt side lying on her left.    Precautions:  Exercises:  Exercise Reps/ Time Weight/ Level Comments   NuStep   12  L3              Standing - in // bars         Hip ext 20x each 2# Added weight 10/5/20   abd 20x each 2# Added weight 10/5/20   marching 20x each   2# Added weight 10/5/20   balance   SBA of PT   Feet apart  30 sec Eyes closed Arms across chest   Feet together 30 sec EC Arms across chest   On foam feet apart 30 sec EO    On foam feet together 30 sec EO    On foam feet apart, eyes shut 30 sec  1x slight loss    Tandem Stance 30 sec ea  Added 9/21/20, difficulty, multiple LOB               mat         Ankle pumps 20x each       Hip abd- supine, knees bent, butterfly position  10x each       Hip add pillow squeeze  20x      Heel slides  15x    orange slider, unable to fully extend left leg    SKTC with manual overpressure  5x  20 sec     SLR    knee slightly flexed   Other:      Specific Instructions for next treatment:    Treatment Charges: Mins Units   []  Modalities     X  Ther Exercise 31 2   []  Manual Therapy     []  Ther Activities     []  Aquatics     []  Vasocompression     [x]  Other massage 15 1   Total Treatment time 46 3       Assessment: [x] Progressing toward goals. Pt tolerates exercises well. Less overall fatigue noted with treatment. Ended with massage to LB in L side lying. [] No change. [] Other:   [x] Patient would continue to benefit from skilled physical therapy services in order to improve posture and core strength, improve balance, decrease fall risk, decrease back pain and increase activity tolerance with home tasks and grooming. End of October appt with lymphedema    Brief Fatigue Inventory (9- fully completed)  7/5/10  = Severe    STG: (to be met in 8 treatments)   1. ? Pain: 25% to 5/10 maximum in back, met 9-  2. Demonstrate 10 minutes of walking ability with upright posturing 9- able with household chores, not walking outside due to poor sidewalks. 3. ? Function: Ability to stand 10 minutes to cook a light meal, met 9-  4. Patient to be independent with home exercise program as demonstrated by performance with correct form without cues. Met 9-     LTG: (to be met in 16 treatments)  1. Improve TUG score by 4 seconds )20%)  2. Ability to sit to stand from a chair without use of arms to demonstrate improved LE and core strength  3.  Able to sleep 3 hours uninterrupted by back pain  4. Ability to stand upright without forward trunk flexion  5. Ability to get a supportive shoe on and off.        Patient goals:to stand up straight and walk right     Pt. Education:  [] Yes  [x] No  [] Reviewed Prior HEP/Ed  Method of Education: [] Verbal  [] Demo  [] Written  8- issued blue band for sitting scapular rows for postural strengthening  Comprehension of Education:  [] Verbalizes understanding. [] Demonstrates understanding. [] Needs review. [] Demonstrates/verbalizes HEP/Ed previously given. Plan: [x] Continue current frequency toward long and short term goals. [x] Specific Instructions for subsequent treatments: Continue to work on balance, LE strength and decreasing back pain.        Time In: 10:55am       Time Out: 11:53am    Electronically signed by:  Nela Gaitan PTA

## 2020-10-14 RX ORDER — OXYCODONE AND ACETAMINOPHEN 10; 325 MG/1; MG/1
1 TABLET ORAL EVERY 6 HOURS PRN
Qty: 120 TABLET | Refills: 0 | Status: SHIPPED | OUTPATIENT
Start: 2020-10-14 | End: 2020-11-09 | Stop reason: SDUPTHER

## 2020-10-14 NOTE — TELEPHONE ENCOUNTER
RECEIVED PHONE REQUEST FROM SARAH FOR REFILL OF PAIN MED.   PERCOCET 10/325 #120 LAST WRITTEN 09/14/2020  CURRENT ON APPOINTMENTS AND MD FOLLOW UP 11/09/2020  PENDED TO MD FOR REVIEW

## 2020-10-15 ENCOUNTER — HOSPITAL ENCOUNTER (OUTPATIENT)
Dept: PHYSICAL THERAPY | Facility: CLINIC | Age: 60
Setting detail: THERAPIES SERIES
Discharge: HOME | End: 2020-10-15
Payer: COMMERCIAL

## 2020-10-15 RX ORDER — HYDROCHLOROTHIAZIDE 50 MG/1
50 TABLET ORAL DAILY
Qty: 30 TABLET | Refills: 1 | Status: SHIPPED | OUTPATIENT
Start: 2020-10-15 | End: 2021-03-08 | Stop reason: SDUPTHER

## 2020-10-15 NOTE — FLOWSHEET NOTE
[] [facility]) [facility] &  Therapy  [address]    [phone]  F: [phone]   [x] [address]  [facility] 36   [address]  P: [phone]  F: [phone]  [] [address]  [address]  P: [phone]  F: [phone] [] [address]  P: [phone]  F: [phone]  [] [address] [facility]   [address]   [place]: [phone]  F: [phone]   [] [facility]  [address]  [place]: [phone]   F: [phone]     Physical Therapy Cancel/No Show note    Date: 10/15/2020  Patient: Megan Narvaez  : 1960  MRN: [id number]    Cancels/No Shows to date:     For today's appointment patient:    [x]  Cancelled    [] Rescheduled appointment    [] No-show     Reason given by patient:    []  Patient ill    []  Conflicting appointment    [x] No transportation , car trouble     [] Conflict with work    [] No reason given    [] Weather related    [] [id number]    [] Other:      Comments:        [x] Next appointment was confirmed    Electronically signed by: Nathan Blas PT

## 2020-10-15 NOTE — TELEPHONE ENCOUNTER
Please address the medication refill and close the encounter. If I can be of assistance, please route to the applicable pool. Thank you. Last visit: 03/20/20  Last Med refill: 07/20/20  Does patient have enough medication for 72 hours: No:     Next Visit Date:  Future Appointments   Date Time Provider Nola Olmos   10/19/2020 11:00 AM Malissa Carmichael, PTA [facility]   10/19/2020  3:00 PM STA DEXA RM [facility]   10/19/2020  4:00 PM Timothy Ligia, OT [facility]   10/22/2020 11:00 AM Brett Perry, PT [facility]   11/9/2020  2:45 PM Aron Malloy MD [id number] [facility] [employer]   Topic Date Due    Hepatitis C screen  1960    HIV screen  11/19/1975    Shingles Vaccine (1 of 2) 11/19/2010    Flu vaccine (1) 09/01/2020    Potassium monitoring  07/20/2021    Creatinine monitoring  07/20/2021    Lipid screen  07/26/2022    Cervical cancer screen  09/22/2022    DTaP/Tdap/Td vaccine (2 - Td) 08/14/2027    Colon cancer screen colonoscopy  10/13/2027    Pneumococcal 0-64 years Vaccine  Completed    Hepatitis A vaccine  Aged Out    Hepatitis B vaccine  Aged Out    Hib vaccine  Aged Out    Meningococcal (ACWY) vaccine  Aged Out       Hemoglobin A1C (%)   Date Value   01/04/2018 5.5   10/23/2014 5.9             ( goal A1C is < 7)   Microalb/Crt.  Ratio (mcg/mg creat)   Date Value   09/29/2015 25     LDL Cholesterol (mg/dL)   Date Value   07/26/2017 96   01/24/2012 143 (H)       (goal LDL is <100)   AST (U/L)   Date Value   07/20/2020 11     ALT (U/L)   Date Value   07/20/2020 9     BUN (mg/dL)   Date Value   07/20/2020 16     BP Readings from Last 3 Encounters:   09/14/20 (!) 126/54   08/17/20 122/85   07/20/20 116/73          (goal 120/80)    All Future Testing planned in [facility]  Lab Frequency Next Occurrence   US DUP UPPER EXTREMITY LEFT VENOUS Once 10/25/2020   Hepatitis C Antibody Once 03/20/2021   HIV Screen Once

## 2020-10-19 ENCOUNTER — HOSPITAL ENCOUNTER (OUTPATIENT)
Dept: OCCUPATIONAL THERAPY | Age: 60
Setting detail: THERAPIES SERIES
Discharge: HOME | End: 2020-10-19
Payer: COMMERCIAL

## 2020-10-19 ENCOUNTER — HOSPITAL ENCOUNTER (OUTPATIENT)
Dept: PHYSICAL THERAPY | Facility: CLINIC | Age: 60
Setting detail: THERAPIES SERIES
Discharge: HOME | End: 2020-10-19
Payer: COMMERCIAL

## 2020-10-19 PROCEDURE — 97124 MASSAGE THERAPY: CPT

## 2020-10-19 PROCEDURE — 97110 THERAPEUTIC EXERCISES: CPT

## 2020-10-19 PROCEDURE — 97166 OT EVAL MOD COMPLEX 45 MIN: CPT

## 2020-10-19 PROCEDURE — 97535 SELF CARE MNGMENT TRAINING: CPT

## 2020-10-19 NOTE — CONSULTS
TREATMENT LOCATION:   [x] [address]   [facility]: [phone]  F: [phone] [] [address]: [phone]  F: [phone]        Lymphedema Services - Initial Evaluation of the Lower Extremity    Date:  10/19/2020  Patient: King Humphreys  : 1960             MRN: [id number]  Referring Physician: Naaman Duane, MD Phone Number: [phone]   Fax Number:   Insurance: Tracy Avel -   Medical Diagnosis: Malignant neoplasm of upper-outer quadrant of left breast in female, estrogen receptor negative ([facility] (C50.412,Z17.1 [ICD-10-CM])    Onset Date: 10/2015    Visit# / total visits: ; Progress note for Medicare patient due at visit 10      Patient is a 61year old []male [x]female referred to the Lymphedema Clinic with a diagnosis of  L UE Breast Cancer of which we will monitor for lymphedema and B LE Lymphedema. Pt states that she was seeing PT services who then suggested she start lymphedema therapy for the B LE. Pt states that she has noticed the swelling for years. Pt states she was started on water pills, however she does not see any benefit to taking them. Pt states she has attempted to wear compression stockings in the past, however she is not able to get them on and off due to neuropathy. Prior to leaving pt is also set up with compression tubigrips for the ( B LE ) (calf and foot) as well as provided with education for proper use. Pt is encouraged to monitor the extremity daily to ensure the device does not fold over. Pt is also instructed to take tubigrip off nightly and inspect the extremity for any wounds/ reduction in size. Pt states she has good understanding and has no further questions prior to leaving.       Allergies:   [x] Medications    [x] Other: Bee Venom     Pain:  [x] YES    [] NO     Location: Entire Body            Pain Rating: ( 0-10 scale) :  7/10  Pain Description:  [] Achy,Worse at end of day  [] Distention, Discomfort  [] Painful with palpation  [] Guarding, Tingling, Numbness      [] Hypersensitivity       Medications:  [x]See charted information in EPIC    Past Medical History:  [x] See charted information in Epic    Comorbidities:   [x] Obesity [] Dialysis  [] Other:   [] Asthma/COPD [] Dementia [] Other:   [] Stroke [x] Sleep apnea [] Other:   [] Vascular disease [] Rheumatic disease [] Other:     Patient lives with: Alone   In what type of home [x]  One story   [] Two story   [] Split level   Number of stairs to enter 1   With handrail on the [x] None   Bathroom has a []  Tub only  [x] Tub/shower combo   [] Walk in shower    [x]  Grab bars   Washing machine is on [x]  Main level   [] Second level   [] Basement   Employer    Job Status []  Normal duty   [] Light duty   [] Off due to condition    []  Retired   [] Not employed   [x] Disability  [] Other:  []  Return to work:    Work activities/duties      ADL/IADL Previous level of function Current level of function Who currently assists the patient with task   Bathing  [x] Independent  [] Assist [x] Independent  [] Assist    Dress/grooming [x] Independent  [] Assist [x] Independent  [] Assist    Transfer/mobility [x] Independent  [] Assist [x] Independent  [] Assist    Feeding [x] Independent  [] Assist [x] Independent  [] Assist    Toileting [x] Independent  [] Assist [x] Independent  [] Assist    Driving [x] Independent  [] Assist [x] Independent  [] Assist    Housekeeping [x] Independent  [] Assist [x] Independent  [] Assist    Grocery shop/meal prep [x] Independent  [] Assist [x] Independent  [] Assist      Gait Prior level of function Current level of function    [x] Independent  [] Assist [x] Independent  [] Assist   Device: [] Independent [] Independent    [] Straight Cane [] Quad cane [x] Straight Cane- OCCASIONALLY [] Quad cane    [] Standard walker [] Rolling walker   [] 4 wheeled walker [] Standard walker [] Rolling walker   [] 4 wheeled walker    [] Wheelchair [] Wheelchair     Restrictions/Precautions:   [] None              [x] No blood pressure/blood draw in: [x] LUE [] RUE     Fall Risk   [] No    [x] Yes        If yes, intervention: Ambulate next to patient for safety. Conservative Treatments Utilized in the Past:  [] None     [x] Compression Garments      [] Bandaging [x] Elevation  [] Exercise    []Self MLD       [] Vasopneumatic Pump     Current Lymphedmea Treatments:   [] None     [] Compression Garments    [] Bandaging [x] Elevation  [] Exercise    []Self MLD       [] Vasopneumatic Pump           Lymphedema Contraindication Assessment:  [x] None      [] CHF primary cause of swelling- Monitor weight loss - (no more than 5 pounds in 1 day)    [] Hyperthyroidism-No MLD to neck   []DVT-acute, No MLD to limb       [] Pelvic DVT-MLD lifetime contraindication    [] Kidney Dysfunction     [] Prenancy   [] Advanced Renal Dz-No compression bandaging  [] Age 60+-No MLD to neck         [] GFF-No abdominal MLD     [] Limb paralysis-Precaution with bandaging d/t no sensory feedback  [] Cardiac edema/Heart disease-absolute contraindication-No pneumo-massage or bandaging  [] Inflammatory intestine (Chrohns, diverticulitis, ulcerative colitis) -No abdominal MLD  [] Cellulitis (warm, tender/pain, swelling, rash, fever, less defined erythematous borders) - No MLD until 72 hours of antibiotics   [] Erysipelas (fiery/red, swollen/shiny, raised defined borders).  Fever, chills, shivering - No MLD until 72 hours of antibiotics        History of Cancer:  [x]Yes []No  Currently undergoing treatments at evaluation: []Yes  [x]No    Location:  L  Breast                                      Date of Diagnosis:  1/2018    Surgery:  [x]Yes  []No  Details: Bilateral Mastectomy  Node Dissection:  [x]Yes  []No  Number of Lymphnodes removed: 2   Chemotherapy: [x]Yes  []No  []Pending []In progress [x] Completed    Radiation:  []Yes  [x]No  []Pending []In [] No understanding    Physical Status:  Range of motion: Deficits [x] Yes [] No       Comment:Some decrease in ROM noted  Strength:       Deficits [x] Yes [] No        Comment: Weakness noted in B LE  Sensation:       Deficits [x] Yes [] No        Comment: Numbness in L LE  Transfer:       Deficits [x] Yes [] No        Comment: Increased time required to stand and then walk  Ambulation:       Deficits [x] Yes [] No        Comment: Difficulty walking long distances  Functional Status:  Self Care:   [x] Independent [] Needs Assist [] Dependent   Home Maintenance:    [x] Independent [] Needs Assist [] Dependent     Measurements Lower Extremity  Measurements taken from nail base of D2 digit.   They are circumferential.   Measurements in Centimeters Right Left   MTS          Dorsum    19.0 20.4   Inframalleolar       29.5 30.5   Supramalleolar     21.7 23.0   10 22.0 25.0   20 27.7 31.0   30 34.0 35.5   Knee 34.7 37.0   10 cm above knee 46.0 52.0   Thigh-widest     Hip-widest     Total 234.6 254.4     Assessment  Knowledge of home program:  [x] Good [] Fair  [] Poor  Family can assist with programming: [] Yes  [x] No  Instructions for patient:   [x] Verbal [x] Demonstration [x] Written    [] Education reviewed   [] Risk Reduction Practices I-VI handout issued    Compression Garment recommendation for medical management:  Compression Garment recommendation for medical management:  Possible Lower Extremity Garments Recommendations:   [x] Compression Stockings ( Knee High) of patient choice  [] Circ-aid Juxtafit Essentials-leggings and compression anklets, 6 month warranty  [x] Nicholas Hernandez Wrap B LE - foot and leg pieces   [x] CompreFlex Lite with sock lines ( foot/ ankle compression)   [x] CompreBoot  [x] Compression Barby Pants  [] [employer]   [] Custom made Flat Knit Barby Pants      Regular:   [x] 20/30 mmHg-UEs  [] 30/40 mmHg-Stage II -starting point for LEs, no skin changes [] 40/50 mmHg-Stage III, skin possible home use. 7. Pt will demonstrate safe and effective use of compression garment to maintain decreased size upon discharge. 8. Pt will demonstrate safe and effective use of alternative bandaging/ velcro devices to simplify and reduce size upon discharge. Plan:   Pt will be seen 1-3 times per week for 10 visits and reduce down as appropriate within 90 days, with focus on short and long term goals listed above. Continue to address:  []Bandaging  [] Self Bandaging/Family Assist  [x]MLD  [x]Self Massage  [x]Exercise  [x]Education  [x]Obtain referral for garments  []Medical Hold  []Discharge to Home Program    Functional Assessment Used: Lymphedema Life Impact Scale (LLIS)  Functional Impact Score: [x] Eval _48%___ [] 10th visit____  [] 20th visit ____ [] 30th visit____  [] D/C ____      Physican signature is required for :  [] Medicare requirement and/or POC- faxed to physician   [x] Eval  [] 10th visit  [] 20th visit  [] 30th visit   [] D/C     Clearance needed:  []Yes    [x]No       [] Obtained  Physician/s giving Clearance:    Charges   Minutes   Units   Evaluation (78119)            Low            Moderate 45           High     Manual Therapy ([id number]): pre,intra or post hands-on MLD service to modulate pain, increase ROM, reduce swelling, inflammation, or restriction; improve contractile/noncontractile tissue extensibility     Therapeutic activities (44477): dynamic activities to improve functional performance with or w/o bandages with direct pt contact using multiple perameters e.g. balance, strength, ROM & multiple outcomes     Therapeutic procedure (39580)-exercise for strength,endurance, ROM, flexibility (active, active-assist or passive)     Self care/home mgmt (70492)-pt educ re: self MLD for home program, self bandaging, do's/don'ts, activity guidelines 15    Other:      Total Treatment Time          Time In:  4:20 PM  Time Out: 5:20      Electronically signed by Xiang Lange OT on 10/19/2020 at 4:23 PM      Physician Signature: _________________________        Date: _______________  By signing above or cosigning this note, I have reviewed this plan of care and certify a need to continue medically necessary rehabilitation services.       *PLEASE SIGN ABOVE AND FAX BACK .[phone] WITH ALL PAGES FOR CONSENT TO CONTINUE LYMPHEDEMA TREATMENT*

## 2020-10-22 ENCOUNTER — HOSPITAL ENCOUNTER (OUTPATIENT)
Dept: PHYSICAL THERAPY | Facility: CLINIC | Age: 60
Setting detail: THERAPIES SERIES
Discharge: HOME | End: 2020-10-22
Payer: COMMERCIAL

## 2020-10-22 PROCEDURE — 97124 MASSAGE THERAPY: CPT

## 2020-10-22 PROCEDURE — 97110 THERAPEUTIC EXERCISES: CPT

## 2020-10-22 NOTE — FLOWSHEET NOTE
[] [facility]) [facility] &  Therapy  [address]  [phone]  F: [phone] [x] [address]  [facility]   [address]  P: [phone]  F: [phone] [] [place]  [address] [facility]  P: [phone]  F: [phone] [] [address]  [facility]   [address]   [place]: [phone]  F: [phone]      Physical Therapy Daily Treatment Note    Date:  10/22/2020  Patient Name:  Michelle Bob    :  1960  MRN: [id number]  Physician: Dr. Cruz Tate: Lucía (30 max per year)   Medical Diagnosis: left breast CA, cancer related pain                     Rehab Codes: R60.0, M54.5, M79.1, M62.830, R2.2, R29.3, M62.81  R26.82, R53.83, G62.9  Onset Date: 2020 date of script (ongoing past few years)                            Next 's appt.:      Cancels/No Shows: 1/  Visit #   (also in OT/Lympedema)      Subjective:    Pain:  [x] Yes  [] No Location: L side Pain Rating: (0-10 scale) 7/10 \"generalized pain\"  Pain altered Tx:  [x] No  [] Yes  Action:     Comments:  Pt comes in wearing wraps from lymphedema on legs. States she also had toe treated. Back still sore    Objective:  Modalities: massage to bilateral lumbar area of back, pt side lying on her left.    Precautions:  Exercises:  Exercise Reps/ Time Weight/ Level Comments   NuStep   10  L3 10/22 decreased time due to pt coming late             Standing - in // bars         Hip ext 20x each 2# Added weight 10/5/20   abd 20x each 2# Added weight 10/5/20   marching 20x each   2# Added weight 10/5/20   balance   SBA of PT   Feet apart  30 sec Eyes closed Arms across chest   Feet together 30 sec EC Arms across chest   On foam feet apart 30 sec x2 EO    On foam feet together 30 sec EO    On foam feet core strength met 10-  3. Able to sleep 3 hours uninterrupted by back [id number]- progressing  4. Ability to stand upright without forward trunk flexion 10- progressing  Ability to get a supportive shoe on and off.met 10-    Additional goals for visits 17-25)  Ability to stand upright without forward trunk flexion. Progress HEP to improve ability with home tasks  Stand 20 minutes for housekeeping and grooming taks.       Patient goals:to stand up straight and walk right     Pt. Education:  [] Yes  [] No  [x] Reviewed Prior HEP/Ed  Method of Education: [] Verbal  [] Demo  [] Written  8- issued blue band for sitting scapular rows for postural strengthening  Comprehension of Education:  [] Verbalizes understanding. [] Demonstrates understanding. [] Needs review. [x] Demonstrates/verbalizes HEP/Ed previously given. Plan: [x] Continue current frequency toward long and short term goals. [x] Specific Instructions for subsequent treatments: Continue to work on balance, LE strength and decreasing back pain.        Time In: 11:14 am       (pt late) Time Out: 12:04 pm    Electronically signed by:  Graham Galloway PT

## 2020-10-27 ENCOUNTER — HOSPITAL ENCOUNTER (OUTPATIENT)
Dept: PHYSICAL THERAPY | Facility: CLINIC | Age: 60
Setting detail: THERAPIES SERIES
Discharge: HOME | End: 2020-10-27
Payer: COMMERCIAL

## 2020-10-27 PROCEDURE — 97110 THERAPEUTIC EXERCISES: CPT

## 2020-10-27 NOTE — FLOWSHEET NOTE
[] [facility]) [facility] &  Therapy  [address]  [phone]  F: [phone] [x] [address]  [facility]   [address]  P: [phone]  F: [phone] [] [place]  [address] [facility]  P: [phone]  F: [phone] [] [address]  [facility]   [address]   [place]: [phone]  F: [phone]      Physical Therapy Daily Treatment Note    Date:  10/27/2020  Patient Name:  Tommy Dalal    :  1960  MRN: [id number]  Physician: Dr. De La Cruz Nurse: Lucía (30 max per year)   Medical Diagnosis: left breast CA, cancer related pain                     Rehab Codes: R60.0, M54.5, M79.1, M62.830, R2.2, R29.3, M62.81  R26.82, R53.83, G62.9  Onset Date: 2020 date of script (ongoing past few years)                            Next 's appt.:      Cancels/No Shows: 1  Visit #   (also in OT/Lympedema)      Subjective:    Pain:  [x] Yes  [] No Location: L side Pain Rating: (0-10 scale) no rating given  Pain altered Tx:  [x] No  [] Yes  Action:     Comments: pt comes in this date in good spirits. Presents with increased swelling in her L LE. Is wearing he lymphedema wraps. Objective:  Modalities: massage to bilateral lumbar area of back, pt side lying on her left. - pt declined 10/27/20   Precautions:  Exercises:  Exercise Reps/ Time Weight/ Level Comments   NuStep   16'  L2              Standing - in // bars         Hip ext 20x each 2# Added weight 10/5/20   abd 20x each 2# Added weight 10/5/20   marching 20x each   2# Added weight 10/5/20   balance   SBA of PT   Feet apart  30 sec Eyes closed Arms across chest   Feet together 30 sec EC Arms across chest   On foam feet apart 30 sec x2 EO    On foam feet together 30 sec EO    On foam feet apart, eyes shut 30 sec  1x slight loss    Tandem Stance 30 sec ea  Added 9/21/20, difficulty, multiple LOB         Step ups 10x ea 6\"   Added 10/27/20                     mat         Ankle pumps 20x each       Hip abd- supine, knees bent, butterfly position  10x each       Hip add pillow squeeze  20x      Pelvic tilts  10x  Added 10/27/20   LTR 10x ea  Added 10/27/20   Heel slides  15x    orange slider, unable to fully extend left leg    SKTC with manual overpressure  5x  20 sec Held per pt request 10/27/20   SLR 5xea   knee slightly flexed- held 10/27/20   Other:      Specific Instructions for next treatment:  Check TUG    Treatment Charges: Mins Units   []  Modalities     X  Ther Exercise 41 3   []  Manual Therapy     []  Ther Activities     []  Aquatics     []  Vasocompression     []  Other massage     Total Treatment time 41 3       Assessment: [x] Progressing toward goals. Pt requested to hold off on manual SKTC this date, stating that she has consistently been sore in her hips/groin area for ~3 days following PT and she things this is the cause. Pt completed increased time on Nustep, and was able to add in [address] and pelvic tilts with good tolerance. Pt described feeling good after treatment and declined massage. [] No change. [] Other:   [x] Patient would continue to benefit from skilled physical therapy services in order to improve posture and core strength, improve balance, decrease fall risk, decrease back pain and increase activity tolerance with home tasks and grooming. End of October appt with lymphedema    Brief Fatigue Inventory (9- fully completed)  7/5/10  = Severe    STG: (to be met in 8 treatments)   1. ? Pain: 25% to 5/10 maximum in back, met 9-  2. Demonstrate 10 minutes of walking ability with upright posturing 9- able with household chores, not walking outside due to poor sidewalks. 3. ? Function: Ability to stand 10 minutes to cook a light meal, met 9-  4.  Patient to be independent with home exercise program as demonstrated by performance with correct form without cues. Met 9-     LTG: (to be met in 16 treatments)  1. Improve TUG score by 4 seconds )20%)  2. Ability to sit to stand from a chair without use of arms to demonstrate improved LE and core strength met 10-  3. Able to sleep 3 hours uninterrupted by back [id number]- progressing  4. Ability to stand upright without forward trunk flexion 10- progressing  Ability to get a supportive shoe on and off.met 10-    Additional goals for visits 17-25)  Ability to stand upright without forward trunk flexion. Progress HEP to improve ability with home tasks  Stand 20 minutes for housekeeping and grooming taks.       Patient goals:to stand up straight and walk right     Pt. Education:  [] Yes  [] No  [x] Reviewed Prior HEP/Ed  Method of Education: [] Verbal  [] Demo  [] Written  8- issued blue band for sitting scapular rows for postural strengthening  Comprehension of Education:  [] Verbalizes understanding. [] Demonstrates understanding. [] Needs review. [x] Demonstrates/verbalizes HEP/Ed previously given. Plan: [x] Continue current frequency toward long and short term goals. [x] Specific Instructions for subsequent treatments: Continue to work on balance, LE strength and decreasing back pain.        Time In: 4:03pm      Time Out: 5:00 pm    Electronically signed by:  Emily Galindo PTA

## 2020-10-29 ENCOUNTER — HOSPITAL ENCOUNTER (OUTPATIENT)
Dept: PHYSICAL THERAPY | Facility: CLINIC | Age: 60
Setting detail: THERAPIES SERIES
Discharge: HOME | End: 2020-10-29
Payer: COMMERCIAL

## 2020-10-29 PROCEDURE — 97110 THERAPEUTIC EXERCISES: CPT

## 2020-10-29 NOTE — FLOWSHEET NOTE
[] [facility] [facility]  [address]  [phone]  F: [phone] [x] [address]  [facility]   [address]  P: [phone]  F: [phone] [] [address]  Therapy  [address] [facility]  P: [phone]  F: [phone] [] [address]  [facility]   [address]   [place]: [phone]  F: [phone]      Physical Therapy Daily Treatment Note    Date:  10/29/2020  Patient Name:  Ana Olmedo    :  1960  MRN: [id number]  Physician: Dr. Alison Benson: Lucía (30 max per year)   Medical Diagnosis: left breast CA, cancer related pain                     Rehab Codes: R60.0, M54.5, M79.1, M62.830, R2.2, R29.3, M62.81  R26.82, R53.83, G62.9  Onset Date: 2020 date of script (ongoing past few years)                            Next 's appt.:      Cancels/No Shows: 1  Visit #   (also in OT/Lympedema)      Subjective:    Pain:  [x] Yes  [] No Location: L side Pain Rating: (0-10 scale) no rating given  Pain altered Tx:  [x] No  [] Yes  Action:     Comments: Patient reports she was sore yesterday, but unsure what exercises are causing her \"pelvic area pain\"      Objective:  Modalities: massage to bilateral lumbar area of back, pt side lying on her left. - pt declined 10/29/20   Precautions:  Exercises:  Exercise Reps/ Time Weight/ Level Comments   NuStep   16'  L2              Standing - in // bars         Hip ext 20x each 2# Added weight 10/5/20   abd 20x each 2# Added weight 10/5/20   marching 20x each   2# Added weight 10/5/20   balance   SBA of PT   Feet apart  30 sec Eyes closed Arms across chest   Feet together 30 sec EC Arms across chest   On foam feet apart 30 sec x2 EO    On foam feet together 30 sec EO    On foam feet apart, eyes shut 30 sec  1x slight loss    Tandem Stance, on foam, eyes open 30 sec ea  Added 9/21/20, difficulty, multiple LOB         Step ups 15x ea 6\"   Added 10/27/20  10/28 increased reps from 10-15  Difficulty on right                     mat      HELD 10- per patient request   Ankle pumps 20x each       Hip abd- supine, knees bent, butterfly position  10x each       Hip add pillow squeeze  20x      Pelvic tilts  10x  Added 10/27/20   LTR 10x ea  Added 10/27/20   Heel slides  15x    orange slider, unable to fully extend left leg    SKTC with manual overpressure  5x  20 sec Held per pt request 10/27/20   SLR 5xea   knee slightly flexed- held 10/27/20   Other:      Specific Instructions for next treatment:  Check TUG    Treatment Charges: Mins Units   []  Modalities     X  Ther Exercise 30 2   []  Manual Therapy     []  Ther Activities     []  Aquatics     []  Vasocompression     []  Other massage     Total Treatment time 30 2       Assessment: [x] Progressing toward goals. Pt wore wraps for 2 days but not today, states her feet were tingling and she was more swollen in legs. Patient still has deficits with balance primarily with eyes shut. She wanted to hold all mat exercises today to see if they are the cause of any of her pain. [] No change. [] Other:   [x] Patient would continue to benefit from skilled physical therapy services in order to improve posture and core strength, improve balance, decrease fall risk, decrease back pain and increase activity tolerance with home tasks and grooming. End of October appt with lymphedema    Brief Fatigue Inventory (9- fully completed)  7/5/10  = Severe    STG: (to be met in 8 treatments)   1. ? Pain: 25% to 5/10 maximum in back, met 9-  2. Demonstrate 10 minutes of walking ability with upright posturing 9- able with household chores, not walking outside due to poor sidewalks.   3. ? Function: Ability to stand 10 minutes to cook a light meal, met 9-  4. Patient to be independent with home exercise program as demonstrated by performance with correct form without cues. Met 9-     LTG: (to be met in 16 treatments)  1. Improve TUG score by 4 seconds )20%)  2. Ability to sit to stand from a chair without use of arms to demonstrate improved LE and core strength met 10-  3. Able to sleep 3 hours uninterrupted by back [id number]- progressing  4. Ability to stand upright without forward trunk flexion 10- progressing  Ability to get a supportive shoe on and off.met 10-    Additional goals for visits 17-25)  Ability to stand upright without forward trunk flexion. Progress HEP to improve ability with home tasks  Stand 20 minutes for housekeeping and grooming taks.       Patient goals:to stand up straight and walk right     Pt. Education:  [] Yes  [x] No  [] Reviewed Prior HEP/Ed  Method of Education: [] Verbal  [] Demo  [] Written  8- issued blue band for sitting scapular rows for postural strengthening  Comprehension of Education:  [] Verbalizes understanding. [] Demonstrates understanding. [] Needs review. [] Demonstrates/verbalizes HEP/Ed previously given. Plan: [x] Continue current frequency toward long and short term goals. [x] Specific Instructions for subsequent treatments: Continue to work on balance, LE strength and decreasing back pain.        Time In: 4:07 pm    Time Out:  4: 54 pm    Electronically signed by:  Brendan Rivas PT

## 2020-11-02 ENCOUNTER — HOSPITAL ENCOUNTER (OUTPATIENT)
Facility: MEDICAL CENTER | Age: 60
End: 2020-11-02
Payer: COMMERCIAL

## 2020-11-02 ENCOUNTER — HOSPITAL ENCOUNTER (OUTPATIENT)
Dept: PHYSICAL THERAPY | Facility: CLINIC | Age: 60
Setting detail: THERAPIES SERIES
Discharge: HOME | End: 2020-11-02
Payer: COMMERCIAL

## 2020-11-04 ENCOUNTER — TELEPHONE (OUTPATIENT)
Dept: ONCOLOGY | Age: 60
End: 2020-11-04

## 2020-11-04 NOTE — TELEPHONE ENCOUNTER
LVM stating patient  missed ( No Showed ) her Dexa Scan, stated she was to have done for upcoming appt. With Dr. Devere Meckel on 11/09/20, left phone number of scheduling, asked her to call and reschedule test, also asked her to call office, if she wants to reschedule doctor appointment or she can still come as she has blood work due also. Patient name on .

## 2020-11-09 ENCOUNTER — TELEPHONE (OUTPATIENT)
Dept: ONCOLOGY | Age: 60
End: 2020-11-09

## 2020-11-09 ENCOUNTER — OFFICE VISIT (OUTPATIENT)
Dept: ONCOLOGY | Age: 60
End: 2020-11-09
Payer: COMMERCIAL

## 2020-11-09 ENCOUNTER — HOSPITAL ENCOUNTER (OUTPATIENT)
Facility: MEDICAL CENTER | Age: 60
Discharge: HOME | End: 2020-11-09
Payer: COMMERCIAL

## 2020-11-09 VITALS
HEART RATE: 59 BPM | TEMPERATURE: 98 F | RESPIRATION RATE: 18 BRPM | WEIGHT: 255.4 LBS | SYSTOLIC BLOOD PRESSURE: 131 MMHG | BODY MASS INDEX: 41.22 KG/M2 | DIASTOLIC BLOOD PRESSURE: 88 MMHG

## 2020-11-09 DIAGNOSIS — E66.01 MORBID OBESITY DUE TO EXCESS CALORIES (HCC): ICD-10-CM

## 2020-11-09 DIAGNOSIS — F17.200 SMOKING: ICD-10-CM

## 2020-11-09 DIAGNOSIS — R53.83 OTHER FATIGUE: ICD-10-CM

## 2020-11-09 DIAGNOSIS — Z17.1 MALIGNANT NEOPLASM OF UPPER-OUTER QUADRANT OF LEFT BREAST IN FEMALE, ESTROGEN RECEPTOR NEGATIVE (HCC): ICD-10-CM

## 2020-11-09 DIAGNOSIS — C50.412 MALIGNANT NEOPLASM OF UPPER-OUTER QUADRANT OF LEFT BREAST IN FEMALE, ESTROGEN RECEPTOR NEGATIVE (HCC): ICD-10-CM

## 2020-11-09 DIAGNOSIS — G62.9 NEUROPATHY: ICD-10-CM

## 2020-11-09 LAB
ABSOLUTE EOS #: 0.21 K/UL (ref 0–0.44)
ABSOLUTE IMMATURE GRANULOCYTE: 0.03 K/UL (ref 0–0.3)
ABSOLUTE LYMPH #: 2.9 K/UL (ref 1.1–3.7)
ABSOLUTE MONO #: 0.6 K/UL (ref 0.1–1.2)
ALBUMIN SERPL-MCNC: 3.5 G/DL (ref 3.5–5.2)
ALBUMIN/GLOBULIN RATIO: ABNORMAL (ref 1–2.5)
ALP BLD-CCNC: 105 U/L (ref 35–104)
ALT SERPL-CCNC: 7 U/L (ref 5–33)
AMPHETAMINE SCREEN URINE: NEGATIVE
ANION GAP SERPL CALCULATED.3IONS-SCNC: 11 MMOL/L (ref 9–17)
AST SERPL-CCNC: 12 U/L
BARBITURATE SCREEN URINE: NEGATIVE
BASOPHILS # BLD: 1 % (ref 0–2)
BASOPHILS ABSOLUTE: 0.08 K/UL (ref 0–0.2)
BENZODIAZEPINE SCREEN, URINE: NEGATIVE
BILIRUB SERPL-MCNC: 0.14 MG/DL (ref 0.3–1.2)
BUN BLDV-MCNC: 15 MG/DL (ref 6–20)
BUN/CREAT BLD: 17 (ref 9–20)
BUPRENORPHINE URINE: ABNORMAL
CALCIUM SERPL-MCNC: 9.1 MG/DL (ref 8.6–10.4)
CANNABINOID SCREEN URINE: POSITIVE
CHLORIDE BLD-SCNC: 104 MMOL/L (ref 98–107)
CO2: 22 MMOL/L (ref 20–31)
COCAINE METABOLITE, URINE: NEGATIVE
CREAT SERPL-MCNC: 0.88 MG/DL (ref 0.5–0.9)
DIFFERENTIAL TYPE: ABNORMAL
EOSINOPHILS RELATIVE PERCENT: 3 % (ref 1–4)
GFR AFRICAN AMERICAN: >60 ML/MIN
GFR NON-AFRICAN AMERICAN: >60 ML/MIN
GFR SERPL CREATININE-BSD FRML MDRD: ABNORMAL ML/MIN/{1.73_M2}
GFR SERPL CREATININE-BSD FRML MDRD: ABNORMAL ML/MIN/{1.73_M2}
GLUCOSE BLD-MCNC: 124 MG/DL (ref 70–99)
HCT VFR BLD CALC: 42.2 % (ref 36.3–47.1)
HEMOGLOBIN: 13.3 G/DL (ref 11.9–15.1)
IMMATURE GRANULOCYTES: 0 %
LYMPHOCYTES # BLD: 34 % (ref 24–43)
MCH RBC QN AUTO: 29.7 PG (ref 25.2–33.5)
MCHC RBC AUTO-ENTMCNC: 31.5 G/DL (ref 28.4–34.8)
MCV RBC AUTO: 94.2 FL (ref 82.6–102.9)
MDMA URINE: ABNORMAL
METHADONE SCREEN, URINE: NEGATIVE
METHAMPHETAMINE, URINE: ABNORMAL
MONOCYTES # BLD: 7 % (ref 3–12)
NRBC AUTOMATED: 0 PER 100 WBC
OPIATES, URINE: NEGATIVE
OXYCODONE SCREEN URINE: POSITIVE
PDW BLD-RTO: 15.5 % (ref 11.8–14.4)
PHENCYCLIDINE, URINE: NEGATIVE
PLATELET # BLD: 197 K/UL (ref 138–453)
PLATELET ESTIMATE: ABNORMAL
PMV BLD AUTO: 9.4 FL (ref 8.1–13.5)
POTASSIUM SERPL-SCNC: 4.5 MMOL/L (ref 3.7–5.3)
PROPOXYPHENE, URINE: ABNORMAL
RBC # BLD: 4.48 M/UL (ref 3.95–5.11)
RBC # BLD: ABNORMAL 10*6/UL
SEG NEUTROPHILS: 55 % (ref 36–65)
SEGMENTED NEUTROPHILS ABSOLUTE COUNT: 4.63 K/UL (ref 1.5–8.1)
SODIUM BLD-SCNC: 137 MMOL/L (ref 135–144)
TEST INFORMATION: ABNORMAL
TOTAL PROTEIN: 6.5 G/DL (ref 6.4–8.3)
TRICYCLIC ANTIDEPRESSANTS, UR: ABNORMAL
WBC # BLD: 8.5 K/UL (ref 3.5–11.3)
WBC # BLD: ABNORMAL 10*3/UL

## 2020-11-09 PROCEDURE — 80307 DRUG TEST PRSMV CHEM ANLYZR: CPT

## 2020-11-09 PROCEDURE — G8417 CALC BMI ABV UP PARAM F/U: HCPCS | Performed by: INTERNAL MEDICINE

## 2020-11-09 PROCEDURE — G8484 FLU IMMUNIZE NO ADMIN: HCPCS | Performed by: INTERNAL MEDICINE

## 2020-11-09 PROCEDURE — 99211 OFF/OP EST MAY X REQ PHY/QHP: CPT | Performed by: INTERNAL MEDICINE

## 2020-11-09 PROCEDURE — 80053 COMPREHEN METABOLIC PANEL: CPT

## 2020-11-09 PROCEDURE — 3017F COLORECTAL CA SCREEN DOC REV: CPT | Performed by: INTERNAL MEDICINE

## 2020-11-09 PROCEDURE — 4004F PT TOBACCO SCREEN RCVD TLK: CPT | Performed by: INTERNAL MEDICINE

## 2020-11-09 PROCEDURE — G8427 DOCREV CUR MEDS BY ELIG CLIN: HCPCS | Performed by: INTERNAL MEDICINE

## 2020-11-09 PROCEDURE — 99214 OFFICE O/P EST MOD 30 MIN: CPT | Performed by: INTERNAL MEDICINE

## 2020-11-09 PROCEDURE — 85025 COMPLETE CBC W/AUTO DIFF WBC: CPT

## 2020-11-09 PROCEDURE — 36415 COLL VENOUS BLD VENIPUNCTURE: CPT

## 2020-11-09 RX ORDER — OXYCODONE AND ACETAMINOPHEN 10; 325 MG/1; MG/1
1 TABLET ORAL EVERY 6 HOURS PRN
Qty: 120 TABLET | Refills: 0 | Status: SHIPPED | OUTPATIENT
Start: 2020-11-13 | End: 2020-12-11 | Stop reason: SDUPTHER

## 2020-11-09 NOTE — TELEPHONE ENCOUNTER
SARAH ARRIVES AMBULATORY FOR MD VISIT  DR MACE IN TO SEE PATIENT  ORDERS RECEIVED  URINE DRUG SCREEN TODAY AND AT RV  RV 2 MONTHS  URINE DRUG SCREEN SENT TO LAB W/ORDER  LABS CDP CMP URINE DRUG SCREEN 02/11/21  MD VISIT 02/11/21 @2:45PM  AVS PRINTED AND MAILED TO PATIENT WITH INSTRUCTIONS  PATIENT DISCHARGED AMBULATORY

## 2020-11-09 NOTE — PROGRESS NOTES
Jasmin Magaña                                                                                                                  11/9/2020  MRN:   [id number]  YOB: 1960  PCP:                           Jama Rodriguez MD  Referring Physician: No ref. provider found  Treating Physician Name: Lubna Carver MD      Reason for visit:  Chief Complaint   Patient presents with    Follow-up     review status of disease    Nausea    Fatigue    Numbness    Anorexia     sometimes    Pain     feet, back, hands       Current problems:   IDC of Left Breast cancer, Triple negative, pT2,pN0,M0    Active and recent treatments:  Bilateral Mastectomy with left sentinel lymph node  Adjuvant chemotherapy using dose denseAC followed by T on 4/11/19--- 8/ /19    Summary of Case/History:    Jasmin Magaña a 61 y. o.female invasive carcinoma of the breast.     Patient initially felt a lump in her left breast for 2-3 months. She underwent screening mammogram which showed 2 cm mass in upper outer left breast.  Right breast do not have any concerning findings. Ultrasound of left breast conformed mass measuring 1.9 cm in the upper outer left breast.  Patient underwent biopsy on 1/23/19 which revealed invasive ductal carcinoma, grade 3, ER/WA negative. HER-2 results are pending.     Patient has history of uterus/cervical cancer diagnosed in 1992. Patient states she underwent surgery for the cervical cancer. She does not recall getting any radiation therapy or chemotherapy. Patient states she has been interested in getting bilateral breast reduction due to cosmetic reasons however now with a diagnosis of breast cancer she wishes to proceed with bilateral mastectomy.     Patient has history of breast cancer in her mother as well as 3 paternal aunts. Patient's mother also had cervical and uterine cancer.     Patient underwent bilateral mastectomy with left sentinel lymph node biopsy.   Pathological stage was T2, N0, M0    Started chemotherapy for triple negative breast cancer using AC followed by T on 4/11/19    Interim History:    Patient presents to the clinic for a follow-up visit and to discuss results of his lab work-up and other relevant clinical data. Patient denies noticing any mass or lump on her chest wall. Denies noticing any swollen glands. Denies hospitalization or ER visit. She has not done her DEXA scan. Continues to struggle with neuropathy. Continues to be on Percocet. During this visit patient's allergy, social, medical, surgical history and medications were reviewed and updated.       Past Medical History:   Past Medical History:   Diagnosis Date    Arthritis     GENERALIZED ALL OVER RHEUMATOID AND OSTEO    Breast cancer ([address]     left breast    Cancer ([address] 1992    CERVICAL, UTERINE    Chronic back pain     scioliosis,  02/2019 WEARING A BACK BRACE    Degenerative disorder of bone     ALL OVER    Depression     Difficult intravenous access     HANDS AR BEST SITE    GERD (gastroesophageal reflux disease)     Hyperlipidemia     HX OF NO MEDS IN YEARS    Hypertension 2007    Pain management clinic    Insomnia     Neuropathy     hands and feet    Obesity     BMI -  43    Sleep apnea 2016    NO LONGER USES MACHINE    Snores     has been told by family that she stops breathing    Use of cane as ambulatory aid     Wears glasses     READING    Wears glasses     \"CHEATERS\"       Past Surgical History:     Past Surgical History:   Procedure Laterality Date    BREAST SURGERY Left 1990    CYST    BUNIONECTOMY Bilateral     CERVIX SURGERY  1992    COLONOSCOPY  10/13/2017    FOOT SURGERY Left 2014    PINS IN ALL TOES    FOOT SURGERY Left 5/27/2020    HARDWARE REMOVAL LEFT GREAT TOE- performed by Emily Díaz DPM at [address] [facility] Bilateral    [address] [facility] Bilateral 2/12/2019    TOTAL MASTECTOMY performed by Katia Hodge DO at [facility], BILATERAL Bilateral 02/12/2019     TOTAL MASTECTOMY,  AXILLARY SENTINEL LYMPH NODE BIOPSY, FROZEN SECTION     NM COLSC FLX W/REMOVAL LESION BY HOT BX FORCEPS N/A 10/13/2017    COLONOSCOPY WITH BIOPSY AND POLYPECTOMY performed by Mindy Jovel IV, DO at [facility]     TUNNELED VENOUS PORT PLACEMENT  03/27/2019    X-RAY FOOT 3+VW      both       Patient Family Social History:     Social History     Socioeconomic History    Marital status: Single     Spouse name: Not on file    Number of children: Not on file    Years of education: Not on file    Highest education level: Not on file   Occupational History    Not on file   Social Needs    Financial resource strain: Not on file    Food insecurity     Worry: Not on file     Inability: Not on file   [employer] needs     Medical: Not on file     Non-medical: Not on file   Tobacco Use    Smoking status: Current Some Day Smoker     Packs/day: 0.25     Years: 41.00     Pack years: 10.25     Types: Cigarettes    Smokeless tobacco: Never Used    Tobacco comment: STATES SMOKES A PACK A WEEK   Substance and Sexual Activity    Alcohol use: No    Drug use: Yes     Frequency: 7.0 times per week     Types: Marijuana     Comment: LAST USE 02/10/2018, AND PAIN PILLS     Sexual activity: Not Currently   Lifestyle    Physical activity     Days per week: Not on file     Minutes per session: Not on file    Stress: Not on file   Relationships    Social connections     Talks on phone: Not on file     Gets together: Not on file     Attends Cheondoism service: Not on file     Active member of club or organization: Not on file     Attends meetings of clubs or organizations: Not on file     Relationship status: Not on file    Intimate partner violence     Fear of current or ex partner: Not on file     Emotionally abused: Not on file     Physically abused: Not on file     Forced sexual activity: Not on file   Other Topics Concern    Not on file   Social History Narrative    Not on file      Family History   Problem Relation Age of Onset    High Blood Pressure Mother     Cancer Mother         cervical and breast    Diabetes Father     Heart Attack Father     Heart Disease Maternal Aunt         times 2      Current Medications:     Current Outpatient Medications   Medication Sig Dispense Refill    hydroCHLOROthiazide (HYDRODIURIL) 50 MG tablet TAKE 1 TABLET BY MOUTH DAILY 30 tablet 1    oxyCODONE-acetaminophen (PERCOCET)  MG per tablet Take 1 tablet by mouth every 6 hours as needed for Pain for up to 30 days. Intended supply: 30 days 120 tablet 0    gabapentin (NEURONTIN) 300 MG capsule Take 1 capsule by mouth 3 times daily for 30 days. 90 capsule 3     No current facility-administered medications for this visit. Allergies:   Lisinopril; Bee venom; Amino acids; and Sulfa antibiotics    Review of Systems:    Constitutional: No fever or chills. No night sweats. Positive weight gain. Eyes: No eye discharge, double vision, or eye pain   HEENT: negative for sore mouth, sore throat, hoarseness and voice change   Respiratory: URI with productive cough, sneezing, sinus drainage and congestion, runny eyes  Cardiovascular: negative for chest pain, dyspnea, palpitations, orthopnea, PND   Gastrointestinal: negative for nausea, vomiting, diarrhea, constipation, Dysphagia, hematemesis and hematochezia  Genitourinary: negative for frequency, dysuria, nocturia, urinary incontinence, and hematuria   Integument: negative for rash, skin lesions, bruises.   Nails now growing back slowly, toenail fungus  Hematologic/Lymphatic: negative for easy bruising, bleeding, lymphadenopathy, or petechiae   Endocrine: negative for heat or cold intolerance,weight changes, change in bowel habits and hair loss   Musculoskeletal: negative for pain, joint swelling; +arhtralgias and bone pain +myalgias   Neurological: negative for headaches, dizziness, seizures, weakness; + neuropathy is nearly resolved        Physical Exam:    Vitals: /88   Pulse 59   Temp 98 °F (36.7 °C) (Oral)   Resp 18   Wt 255 lb 6.4 oz (115.8 kg)   LMP 02/11/1992   BMI 41.22 kg/m²   General appearance - well appearing, no in pain or distress  Mental status - AAO X3  Eyes - pupils equal and reactive, extraocular eye movements intact  Mouth - mucous membranes moist, pharynx normal without lesions  Neck - supple, no significant adenopathy  Lymphatics - no palpable lymphadenopathy, no hepatosplenomegaly  Chest - clear to auscultation, no wheezes, rales or rhonchi, symmetric air entry  Heart - normal rate, regular rhythm, normal S1, S2, no murmurs  Abdomen - soft, nontender, nondistended, no masses or organomegaly  Neurological - alert, oriented, normal speech, no focal findings or movement disorder noted; +neuropathy - stable  Extremities -bilateral ankle edema  Skin - normal coloration and turgor, no rashes, no suspicious skin lesions noted; toenail fungus  Breast- bilateral mastectomy, no masses or lumps, excess skin on right side - no lymphedema appreciated        DATA:  Lab Results   Component Value Date    WBC 8.5 11/09/2020    HGB 13.3 11/09/2020    HCT 42.2 11/09/2020    MCV 94.2 11/09/2020     11/09/2020       Chemistry        Component Value Date/Time     11/09/2020 1442    K 4.5 11/09/2020 1442     11/09/2020 1442    CO2 22 11/09/2020 1442    BUN 15 11/09/2020 1442    CREATININE 0.88 11/09/2020 1442        Component Value Date/Time    CALCIUM 9.1 11/09/2020 1442    ALKPHOS 105 (H) 11/09/2020 1442    AST 12 11/09/2020 1442    ALT 7 11/09/2020 1442    BILITOT 0.14 (L) 11/09/2020 1442        Component  Value  Ref Range & Units  Status  Collected  Lab    Amphetamine Screen, Ur  NEGATIVE  NEGATIVE  Final  09/14/2020  3:37 PM  [facility] [address]  Lab           (Positive cutoff 1000 ng/mL)                Barbiturate Screen, Ur  NEGATIVE  NEGATIVE Final  09/14/2020  3:37 PM  [address] Lab           (Positive cutoff 200 ng/mL)                Benzodiazepine Screen, Urine  NEGATIVE  NEGATIVE  Final  09/14/2020  3:37 PM  [address] [facility] [address] Lab           (Positive cutoff 200 ng/mL)                Cocaine Metabolite, Urine  NEGATIVE  NEGATIVE  Final  09/14/2020  3:37 PM  [address] [facility]           (Positive cutoff 300 ng/mL)                Methadone Screen, Urine  NEGATIVE  NEGATIVE  Final  09/14/2020  3:37 PM  [address] [facility] [address] Lab           (Positive cutoff 300 ng/mL)                Opiates, Urine  NEGATIVE  NEGATIVE  Final  09/14/2020  3:37 PM  [address] [facility]           (Positive cutoff 300 ng/mL)                Phencyclidine, Urine  NEGATIVE  NEGATIVE  Final  09/14/2020  3:37 PM  [address] [facility] [address] [address] Lab           (Positive cutoff 25 ng/mL)                Propoxyphene, Urine  NOT REPORTED  NEGATIVE  Final  09/14/2020  3:37 PM  [address] [facility]    Cannabinoid Scrn, Ur  [place]    NEGATIVE  Final  09/14/2020  3:37 PM  [address] [facility]           (Positive cutoff 50 ng/mL)                Oxycodone Screen, Ur  POSITIVEAbnormal    NEGATIVE  Final  09/14/2020  3:37 PM  [address] [facility]           (Positive cutoff 100 ng/mL)                Methamphetamine, Urine  NOT REPORTED  NEGATIVE  Final  09/14/2020  3:37 PM  [address] [facility]    Tricyclic Antidepressants, Urine  NOT REPORTED  NEGATIVE  Final  09/14/2020  3:37 PM  [address] [facility]    MDMA, Urine  NOT REPORTED  NEGATIVE  Final  09/14/2020  3:37 PM  [address] [facility]    Buprenorphine Urine  NOT REPORTED  NEGATIVE  Final  09/14/2020  3:37 PM  [address] [facility] [address] [facility]    Test Information           Ct lung screen :    Impression    No suspicious noncalcified lung nodule or mass.         LUNG RADS:    Per ACR Lung-RADS Version 1.0         Category 1, Negative (No nodules and definitely benign nodules). Management:Continue annual lung screening with LDCT in 12    months.(probability of malignancy <1%).         RECOMMENDATIONS:    If you would like to register your patient with the [address] [place] [address] Program, please contact the Nurse Navigator at    [phone]). Impression:  Invasive ductal carcinoma of left breast, triple negative, pT2,p N0, M0  Status post bilateral mastectomy and left sentinel lymph node biopsy  Personal history of gynecological malignancy (uterus/cervical cancer) status post surgery in 1992  Cancer related pain  Leg swelling  Anemia secondary to chemotherapy  Family history of malignancy  Chronic back pain  Hypertension  Obesity      Plan:  Reviewed available clinical data   Toxicity check performed. Patient continues to struggle with neuropathy. Patient is taking Neurontin which is keeping it under control  O AHRS report reviewed. Will obtain urine toxin today  Discussed natural history of breast cancer. Reviewed NCCN guidelines for surveillance  Patient has pain contract intact  Patient counseled tobacco cessation. DEXA scan every 2 years  Return in 2 months. Yoav Abbott        This note is created with the assistance of a speech recognition program.  While intending to generate a document that actually reflects the content of the visit, the document can still have some errors including those of syntax and sound a like substitutions which may escape proof reading. It such instances, actual meaning can be extrapolated by contextual diversion.

## 2020-11-25 ENCOUNTER — HOSPITAL ENCOUNTER (OUTPATIENT)
Dept: MAMMOGRAPHY | Age: 60
Discharge: HOME | End: 2020-11-27
Payer: COMMERCIAL

## 2020-11-25 PROCEDURE — 77080 DXA BONE DENSITY AXIAL: CPT

## 2020-12-02 ENCOUNTER — HOSPITAL ENCOUNTER (OUTPATIENT)
Dept: OCCUPATIONAL THERAPY | Age: 60
Setting detail: THERAPIES SERIES
Discharge: HOME | End: 2020-12-02
Payer: COMMERCIAL

## 2020-12-02 NOTE — SIGNIFICANT EVENT
[x] [address] [facility] [address] Occupational Therapy       [address] [facility], 1st Floor       Phone: [phone]       Fax: [phone] [] [facility] at [address] [place] [address]. [place], [place]       Phone: [phone]       Fax: [phone]          Occupational Therapy Cancel/No Show note    Date: 2020  Patient: King Humphreys  : 1960  MRN: [id number]    Cancels/No Shows to date:  (for OT)    For today's appointment patient:  Clinic cancelled due to staffing issues. (Not counted against patient).     Reason given by patient:  n/a      Electronically signed by: Wilbur Davis PT

## 2020-12-07 ENCOUNTER — HOSPITAL ENCOUNTER (OUTPATIENT)
Dept: OCCUPATIONAL THERAPY | Age: 60
Setting detail: THERAPIES SERIES
Discharge: HOME | End: 2020-12-07
Payer: COMMERCIAL

## 2020-12-07 NOTE — SIGNIFICANT EVENT
[x] [address] [facility] [address] Occupational Therapy       [address] [facility], 1st Floor       Phone: [phone]       Fax: [phone] [] [facility] at [address] [place] [address]. [place], [place]       Phone: [phone]       Fax: [phone]          Occupational Therapy Cancel/No Show note    Date: 2020  Patient: Ion Gonzalez  : 1960  MRN: [id number]    Cancels/No Shows to date:     For today's appointment patient:  Patient cancelled as her daughter is having surgery.     Reason given by patient:  Daughter having surgery      Electronically signed by: Katia Pan PT

## 2020-12-07 NOTE — SIGNIFICANT EVENT
[x] [address] [facility] [address] Occupational Therapy       [address] [facility], 1st Floor       Phone: [phone]       Fax: [phone] [] [facility] at [address] [facility]. [place], [place]       Phone: [phone]       Fax: [phone]          Occupational Therapy Cancel/No Show note    Date: 2020  Patient: Pedro Peres  : 1960  MRN: [id number]      [] [address]  [address]    [phone]  F: [phone]   [] [address] [facility] [address]   [address]  P: [phone]  F: [phone]  [] [address]  P: [phone]  F: [phone] [] [address]  P: [phone]  F: [phone]  [] [address]  [address] [address] [facility]   [address]   P: [phone]  F: [phone]   [] [address]  [place]: [phone]   F: [phone]     Physical Therapy Cancel/No Show note    Date: 2020  Patient: Pedro Peres  : 1960  MRN: [id number]    Cancels/No Shows to date: 0    For today's appointment patient:    []  Cancelled    [] Rescheduled appointment    [] No-show     Reason given by patient:    []  Patient ill    []  Conflicting appointment    [] No transportation      [] Conflict with work    [] No reason given    [] Weather related    [] [id number]    [x] Other:      Comments:  Clinic cancelled due to staffing. Left a voicemail for patient.       [] Next appointment was confirmed    Electronically signed by: Margarita Garcia PT            Electronically signed by: Margarita Garcia PT

## 2020-12-09 ENCOUNTER — HOSPITAL ENCOUNTER (OUTPATIENT)
Dept: OCCUPATIONAL THERAPY | Age: 60
Setting detail: THERAPIES SERIES
Discharge: HOME | End: 2020-12-09
Payer: COMMERCIAL

## 2020-12-10 NOTE — TELEPHONE ENCOUNTER
RECEIVED PHONE REQUEST FROM SARAH FOR REFILL OF GABAPENTIN AND PERCOCET. PERCOCET LAST WRITTEN #120 11/13/2020    GABAPENTIN DUE AS WELL LAST WRITTEN 09/14/2020 +3 REFILLS. CURRENT ON APPOINTMENTS AND MD EXAM DUE 01/11/2021.     PEND TO MD

## 2020-12-11 RX ORDER — GABAPENTIN 300 MG/1
300 CAPSULE ORAL 3 TIMES DAILY
Qty: 90 CAPSULE | Refills: 3 | Status: SHIPPED | OUTPATIENT
Start: 2020-12-11 | End: 2021-01-11 | Stop reason: SDUPTHER

## 2020-12-11 RX ORDER — OXYCODONE AND ACETAMINOPHEN 10; 325 MG/1; MG/1
1 TABLET ORAL EVERY 6 HOURS PRN
Qty: 120 TABLET | Refills: 0 | Status: SHIPPED | OUTPATIENT
Start: 2020-12-11 | End: 2021-01-10

## 2020-12-16 ENCOUNTER — HOSPITAL ENCOUNTER (OUTPATIENT)
Dept: OCCUPATIONAL THERAPY | Age: 60
Setting detail: THERAPIES SERIES
Discharge: HOME | End: 2020-12-16
Payer: COMMERCIAL

## 2020-12-16 PROCEDURE — 97110 THERAPEUTIC EXERCISES: CPT

## 2020-12-16 PROCEDURE — 97535 SELF CARE MNGMENT TRAINING: CPT

## 2020-12-16 NOTE — FLOWSHEET NOTE
Pt is also educated on NLN/ risk reduction techniques of which the pt verbalizes good understanding. Examples provided were the following:   · Keep the affected extremity clean/dry and no picking of loose skin. · Apply moisturizer daily to prevent chapping of skin. · Use care with razors to avoid nicks and skin irritation. · If scratches/punctures to skin occur, wash and apply a neosporin like product and observe for signs of infection. · If rash, itching, redness, pain, increased skin temperature, increased swelling, fever or flu like symptoms occur, contact PCP immediately and doff bandages that are in place. · Avoid exposure to extreme cold which can be associated with rebound swelling or chapping of skin. · Avoid prolonged exposure to heat, particular hot tubs and saunas. · Proper sitting positions  · Protect exposed skin with sunscreen and insect repellant. Pt is educated on MLD/deep breathing techniques, clearing nodes at the deep abdominals, cervical, R UE underarms, bilateral groin and superficial abdominals. Pt compelted 5-10 reps with  ( min ) vc's required for correct techniques. Pt states she has no other questions as this time. Pt is encouraged to closely monitor the extremities     Pt states that she is wearing her tubigrips 3 days a week, daytime only. Not necessarily feeling better when she wears them, however the legs do go down in size. Pt states that she is not able to leave them off for very long before the legs start to increase again in size. Pt is educated that she needs to wear the compression items everyday and eventually into night time garments as well. Pt is educated on the purpose of compression rashid pants and the benefits they would provide. Pt states that she is going to look into getting some this weekend. OT further educated pt on lymphedema causes and prevention. PLEASE MAKE NOTE PT HAS A HX OF UTERINE AND CERVICAL CANCER AS WELL AS L UE BREAST CANCER. Lymphedema Contraindication Assessment:  [x] Age 60+-No MLD to neck           Restrictions/Precautions:   []? None              [x]? No blood pressure/blood draw in: [x]? LUE []? RUE     Fall Risk   []? No    [x]? Yes        If yes, intervention: Ambulate next to patient for safety.       Measurements Lower Extremity  Measurements taken from nail base of D2 digit. They are circumferential.     Measurements Lower Extremity  Measurements taken from nail base of D2 digit. They are circumferential.   Measurements in Centimeters Right Left   MTS            Dorsum    19.0 20.4   Inframalleolar       29.5 30.5   Supramalleolar     21.7 23.0   10 22.0 25.0   20 27.7 31.0   30 34.0 35.5   Knee 34.7 37.0   10 cm above knee 46.0 52.0   Thigh-widest       Hip-widest       Total 234.6 254. 4        Education Included:  [x] General Lymphedema Information  [] Self MLD [] Self Bandaging [] Kinesiotaping    [] Nutrition   [] Home Exercises   [] Skin/nail care   [] NLN Risk Reduction Practice handout    Learner: [x] Patient [] Spouse  []Other [] Family  Method: [x] Verbal [] Demonstration [] Handouts [] Exercise booklet  Response:  [x] Verbalized understanding [] Demonstrated understanding      [] Needs assist            [] No understanding  Knowledge of home program:  [] Good [] Fair  [] Poor  Family can assist with programming: [] Yes  [] No  Instructions for patient:   [x] Verbal [] Demonstration  [x] Written: Educational lymphedema hand outs,     [] Education reviewed      Therapy Goals: LOWER EXTREMITY GOALS:      1.  Pt will demonstrate compliance of maintaining lymphedema precautions to reduce the risks of infection and further exacerbations by the 3rd visit.     2. (Pt/Family) will demonstrate proper technique with bandaging and understand the principles and theory behind bandaging technique in order to assist with decreasing swelling up to ( 5+ ) cm.     3. Pt will demonstrate independence with decongestive exercise program in order to expedite fluid rerouting.     4. Pt will demonstrate competence with (traditional) lymphatic drainage massage in order to reroute lymphatic pathways for decreased swelling.     5. Pt will demonstrate competence with kinesiotape technique to affected area(s) to assist with decreasing swelling.     6. Pt will demonstrate independence donning the device with safe and effective use of pneumatic pump in order for possible home use.     7. Pt will demonstrate safe and effective use of compression garment to maintain decreased size upon discharge.     8. Pt will demonstrate safe and effective use of alternative bandaging/ velcro devices to simplify and reduce size upon discharge. Plan: Continue to address: []Bandaging  []Self Bandaging/Family Assist  [x]MLD  [x]Self Massage  [x]Exercise  [x]Education  [x]Obtain referral for garments  []Medical Hold  []Discharge to Home Program     Charges   Minutes   Units   Evaluation (75384)            Low            Moderate            High     Manual Therapy (74686): pre,intra or post hands-on MLD service to modulate pain, increase ROM, reduce swelling, inflammation, or restriction; improve contractile/noncontractile tissue extensibility     Therapeutic activities (79912): dynamic activities to improve functional performance with or w/o bandages with direct pt contact using multiple perameters e.g. balance, strength, ROM & multiple outcomes     Therapeutic Exercise (41514)-exercise for strength,endurance, ROM, flexibility (active, active-assist or passive) 45 3   Self care/home mgmt (54628)-pt educ re: self MLD for home program, self bandaging, do's/don'ts, activity guidelines 40 3   Vasopneumatic Pump (00508):       Total Treatment Time    85 6     Time In:  1:15  Time Out: 2:40      Electronically signed by Zulma Diaz OT on 12/16/2020 at 1:22 PM

## 2020-12-21 ENCOUNTER — HOSPITAL ENCOUNTER (OUTPATIENT)
Dept: OCCUPATIONAL THERAPY | Age: 60
Setting detail: THERAPIES SERIES
Discharge: HOME | End: 2020-12-21
Payer: COMMERCIAL

## 2020-12-21 NOTE — SIGNIFICANT EVENT
[x] [address] [facility] [address] Occupational Therapy       [address] [facility], 1st Floor       Phone: [phone]       Fax: [phone] [] [facility] at [address] [place] [address]. [place], [place]       Phone: [phone]       Fax: [phone]          Occupational Therapy Cancel/No Show note    Date: 2020  Patient: Kristi Moody  : 1960  MRN: [id number]    Cancels/No Shows to date: 3    For today's appointment patient:   Cancel    Reason given by patient:  Pt called in stating that she was at another apt of which ran over. Pt confirmed Wednesday apt.      Electronically signed by: Demi Davalos OT

## 2020-12-23 ENCOUNTER — HOSPITAL ENCOUNTER (OUTPATIENT)
Dept: OCCUPATIONAL THERAPY | Age: 60
Setting detail: THERAPIES SERIES
Discharge: HOME | End: 2020-12-23
Payer: COMMERCIAL

## 2020-12-23 PROCEDURE — 97110 THERAPEUTIC EXERCISES: CPT

## 2020-12-23 PROCEDURE — 97535 SELF CARE MNGMENT TRAINING: CPT

## 2020-12-23 NOTE — FLOWSHEET NOTE
TREATMENT LOCATION:   [x] [address]   [facility] [phone]  F: [phone] [] [address]: [phone]  F: [phone]        Lymphedema Services - Treatment Note of the Lower Extremity    Date:  2020  Patient: Dilshad Zhou  : 1960             MRN: [id number]  Referring Physician: Brendan Mendez MD  Phone Number: [phone]   Fax Number:   Insurance: [employer] Anand -   Medical Diagnosis: Malignant neoplasm of upper-outer quadrant of left breast in female, estrogen receptor negative ([facility] (C50.412,Z17.1 [ICD-10-CM]),    Cancer ([facility] [C80.1]  CERVICAL, UTERINE     Onset Date: 10/2015                Visit# / total visits: ; Progress note for Medicare patient due at visit 10      Subjective: \" I am not certain why it happened, but my leg increased in size     I wore the tubigrips everyday over the weekend, until Monday and I have not put them back on since. I don't feel like it helped very much, my legs were maybe a little smaller. Objective:   [] Measurement         [x]Manual Lymph Drainage     [] Bandaging  [] Kinesiotaping  [x] Education               [x]Self MLD                             []Self Bandaging  [x] Exercise                 [] Wound Care                       [] Hygiene      [] Elevation        Symptoms:   [x] Improving   []  Worsening  [] Same as previous treatment    Post-treatment symptom assessment for swelling, heaviness, tightness to the affected limb, chest or truncal area: Relief noted with exercises.       Pain:  [x] YES    [] NO     Location: B LE/ groing      Pain Rating: ( 0-10 scale) : 6/10  Pain Description:  [] Achy,Worse at end of day  [] Painful with palpation [] Guarding, Tingling, Numbness  [] Hypersensitivity Assessment : Patient would benefit from skilled occupational therapy services in order to become further educated on lymphedema causes and treatments as well as improve her overall quality of life. Pt reviews MLD/deep breathing techniques, clearing nodes at the deep abdominals, cervical, R UE underarms, bilateral groin and superficial abdominals. Pt compelted 5-10 reps with  ( min ) vc's required for correct techniques. Pt states she has no other questions as this time. Pt is encouraged to closely monitor the extremities  Pt is also educated on how to open the IA pathway from the R LE to the R UE and from the L groin to the R grion by completing  ( 5 ) repetitions and emphasis is placed on compelting 3 extra pumps at the last rib. Pt demonstrates with good understanding and states they have no other questions at this time. Pt is encouraged to complete these exercises after the node clearing techniques 2x a day. Pt states that she is wearing her tubigrips 3 days a week, daytime only. Not necessarily feeling better when she wears them, however the legs do go down in size. Pt states that she is not able to leave them off for very long before the legs start to increase again in size. Pt is educated that she needs to wear the compression items everyday and eventually into night time garments as well. Pt is educated on the purpose of compression rashid pants and the benefits they would provide. Pt states that she is going to look into getting some this weekend. OT further educated pt on lymphedema causes and prevention. PLEASE MAKE NOTE PT HAS A HX OF UTERINE AND CERVICAL CANCER AS WELL AS L UE BREAST CANCER. Lymphedema Contraindication Assessment:  [x] Age 60+-No MLD to neck           Restrictions/Precautions:   []? None              [x]? No blood pressure/blood draw in: [x]? LUE []? RUE     Fall Risk   []? No    [x]?  Yes        If yes, intervention: Ambulate next to patient for safety.      Measurements Lower Extremity  Measurements taken from nail base of D2 digit. They are circumferential.     Measurements Lower Extremity  Measurements taken from nail base of D2 digit. They are circumferential.   Measurements in Centimeters Right Left   MTS            Dorsum    19.0 20.4   Inframalleolar       29.5 30.5   Supramalleolar     21.7 23.0   10 22.0 25.0   20 27.7 31.0   30 34.0 35.5   Knee 34.7 37.0   10 cm above knee 46.0 52.0   Thigh-widest       Hip-widest       Total 234.6 254. 4        Education Included:  [x] General Lymphedema Information  [] Self MLD [] Self Bandaging [] Kinesiotaping    [] Nutrition   [] Home Exercises   [] Skin/nail care   [] NLN Risk Reduction Practice handout    Learner: [x] Patient [] Spouse  []Other [] Family  Method: [x] Verbal [] Demonstration [] Handouts [] Exercise booklet  Response:  [x] Verbalized understanding [] Demonstrated understanding      [] Needs assist            [] No understanding  Knowledge of home program:  [] Good [] Fair  [] Poor  Family can assist with programming: [] Yes  [] No  Instructions for patient:   [x] Verbal [] Demonstration  [x] Written: Educational lymphedema hand outs,     [] Education reviewed      Therapy Goals: LOWER EXTREMITY GOALS:      1. Pt will demonstrate compliance of maintaining lymphedema precautions to reduce the risks of infection and further exacerbations by the 3rd visit.     2. (Pt/Family) will demonstrate proper technique with bandaging and understand the principles and theory behind bandaging technique in order to assist with decreasing swelling up to ( 5+ ) cm.      3. Pt will demonstrate independence with decongestive exercise program in order to expedite fluid rerouting.     4.  Pt will demonstrate competence with (traditional) lymphatic drainage massage in order to reroute lymphatic pathways for decreased swelling.    5. Pt will demonstrate competence with kinesiotape technique to affected area(s) to assist with decreasing swelling.     6. Pt will demonstrate independence donning the device with safe and effective use of pneumatic pump in order for possible home use.     7. Pt will demonstrate safe and effective use of compression garment to maintain decreased size upon discharge.     8. Pt will demonstrate safe and effective use of alternative bandaging/ velcro devices to simplify and reduce size upon discharge. Plan: Continue to address: []Bandaging  []Self Bandaging/Family Assist  [x]MLD  [x]Self Massage  [x]Exercise  [x]Education  [x]Obtain referral for garments  []Medical Hold  []Discharge to Home Program     Charges   Minutes   Units   Evaluation (84273)            Low            Moderate            High     Manual Therapy (54554): pre,intra or post hands-on MLD service to modulate pain, increase ROM, reduce swelling, inflammation, or restriction; improve contractile/noncontractile tissue extensibility     Therapeutic activities (38830): dynamic activities to improve functional performance with or w/o bandages with direct pt contact using multiple perameters e.g. balance, strength, ROM & multiple outcomes     Therapeutic Exercise (95186)-exercise for strength,endurance, ROM, flexibility (active, active-assist or passive) 43 3   Self care/home mgmt (58900)-pt educ re: self MLD for home program, self bandaging, do's/don'ts, activity guidelines 40 3   Vasopneumatic Pump (79779):       Total Treatment Time    90 6     Time In: 1:00 PM  Time Out: 2:23 PM      Electronically signed by Jus Christensen OT on 12/23/2020 at 1:05 PM

## 2021-01-06 ENCOUNTER — HOSPITAL ENCOUNTER (OUTPATIENT)
Dept: OCCUPATIONAL THERAPY | Age: 61
Setting detail: THERAPIES SERIES
Discharge: HOME | End: 2021-01-06
Payer: COMMERCIAL

## 2021-01-06 PROCEDURE — 97535 SELF CARE MNGMENT TRAINING: CPT

## 2021-01-06 PROCEDURE — 97110 THERAPEUTIC EXERCISES: CPT

## 2021-01-06 PROCEDURE — 97140 MANUAL THERAPY 1/> REGIONS: CPT

## 2021-01-06 NOTE — FLOWSHEET NOTE
TREATMENT LOCATION:   [x] [address] [place], [place] [address]: [phone]  F: [phone] [] [address]: [phone]  F: [phone]        Lymphedema Services - Treatment Note of the Lower Extremity    Date:  2021  Patient: Abimbola Mishra  : 1960             MRN: [id number]  Referring Physician: Stormy Sanchez MD  Phone Number: [phone]   Fax Number:   Insurance: [facility] -   Medical Diagnosis: Malignant neoplasm of upper-outer quadrant of left breast in female, estrogen receptor negative ([facility] [address] (C50.412,Z17.1 [ICD-10-CM]),    Cancer ([facility] [address] [C80.1]  CERVICAL, UTERINE     Onset Date: 10/2015                Visit# / total visits: 3/9; Progress note for Medicare patient due at visit 10      Subjective: \" I went to the bathroom so much on the first day and was able to keep them on until  as I was instructed. When I took them off my legs were toothpicks! They were so little I thought they were going to break. It was easier to move around but the bottom of my foot was more painful or intense. Since  I wore only some shorter support stockings, I don't feel they were helpful and I found them to be more of a struggle. \"       Objective:   [] Measurement         [x]Manual Lymph Drainage     [] Bandaging  [] Kinesiotaping  [x] Education               [x]Self MLD                             []Self Bandaging  [x] Exercise                 [] Wound Care                       [] Hygiene      [] Elevation        Symptoms:   [x] Improving   []  Worsening  [] Same as previous treatment    Post-treatment symptom assessment for swelling, heaviness, tightness to the affected limb, chest or truncal area: Relief noted with exercises.       Pain:  [x] YES    [] NO     Location: B LE/ groin      Pain Rating: ( 0-10 scale) : 610 Pain Description:  [] Achy,Worse at end of day  [] Painful with palpation [] Guarding, Tingling, Numbness  [] Hypersensitivity       Assessment : Patient would benefit from skilled occupational therapy services in order to become further educated on lymphedema causes and treatments as well as improve her overall quality of life. New measurements are taken today and reported below. This shows an increase in size since evaluation, however, LE has visually decreased since she was last here. Pt reviews MLD/deep breathing techniques, clearing nodes at the deep abdominals, cervical, R UE underarms, bilateral groin and superficial abdominals. Pt also reviews opening of the AAA pathway from L to R and the IA pathway from R groin to R armpit. Pt last is educated no various compression garments that would be of benefit to her to purchase in the future for better containment of her edema. OT educated on the difference between flat knit and circular knit garments. Pt last is educated on various places she could go to retrieve the items of which she verbalizes good understanding. Pt states that she would like to move forward with purchase of the compression devices. OT bandages pt from the lake of the foot to the base of the knee with approx 50% pull to encourage lymphatic flow production and maintaining a reduced size. OT applies Eucerine lotion to the B LE only to include the foot and leg. Stockinette is applied over top of the leg as a protective barrier from the lotion. Artiflex fluff is applied to the L foot as the protective barrier from the foot and Rosidal foam is place on the leg from the ankle to just below the knee. Comprilan bandage is then placed from the lake of the foot to the base of the knee with approx 50% pull. Herringbone technique is used to progress up the leg and assist with venous return, and lymphatic flow production/ stimulating a suctioning effect. OT further educated pt on lymphedema causes and prevention. PLEASE MAKE NOTE PT HAS A HX OF UTERINE AND CERVICAL CANCER AS WELL AS L UE BREAST CANCER. Lymphedema Contraindication Assessment:  [x] Age 60+-No MLD to neck           Restrictions/Precautions:           [x]? No blood pressure/blood draw in: [x]? LUE []? RUE     Fall Risk   []? No    [x]? Yes        If yes, intervention: Ambulate next to patient for safety.       Measurements Lower Extremity: Measurements taken from nail base of D2 digit. They are circumferential.   Measurements in Centimeters Right Left   MTS            Dorsum    8 cm 20.4 22.5   Inframalleolar    13    31.0 32.0   Supramalleolar    17 24.0 28.5   10    24 24.0 28.0   20   34 29.0 33.5   30   44 35.7 37.5   Knee   51 34.5 36.0   10 cm above knee 50.0 55.5   Thigh-widest       Hip-widest       Total 248.6    234.6 273.5    254. 4         Education Included:  [x] General Lymphedema Information  [] Self MLD [] Self Bandaging [] Kinesiotaping    [] Nutrition   [] Home Exercises   [] Skin/nail care   [] NLN Risk Reduction Practice handout    Learner: [x] Patient [] Spouse  []Other [] Family  Method: [x] Verbal [] Demonstration [] Handouts [] Exercise booklet  Response:  [x] Verbalized understanding [] Demonstrated understanding      [] Needs assist            [] No understanding  Knowledge of home program:  [] Good [] Fair  [] Poor  Family can assist with programming: [] Yes  [] No  Instructions for patient:   [x] Verbal [] Demonstration  [x] Written: Educational lymphedema hand outs,     [] Education reviewed      Therapy Goals: LOWER EXTREMITY GOALS:      1.  Pt will demonstrate compliance of maintaining lymphedema precautions to reduce the risks of infection and further exacerbations by the 3rd visit.    2. (Pt/Family) will demonstrate proper technique with bandaging and understand the principles and theory behind bandaging technique in order to assist with decreasing swelling up to ( 5+ ) cm.      3. Pt will demonstrate independence with decongestive exercise program in order to expedite fluid rerouting.     4. Pt will demonstrate competence with (traditional) lymphatic drainage massage in order to reroute lymphatic pathways for decreased swelling.     5. Pt will demonstrate competence with kinesiotape technique to affected area(s) to assist with decreasing swelling.     6. Pt will demonstrate independence donning the device with safe and effective use of pneumatic pump in order for possible home use.     7. Pt will demonstrate safe and effective use of compression garment to maintain decreased size upon discharge.     8. Pt will demonstrate safe and effective use of alternative bandaging/ velcro devices to simplify and reduce size upon discharge.       Plan: Continue to address: []Bandaging  []Self Bandaging/Family Assist  [x]MLD  [x]Self Massage  [x]Exercise  [x]Education  [x]Obtain referral for garments  []Medical Hold  []Discharge to Home Program     Charges   Minutes   Units   Evaluation (53984)            Low            Moderate            High     Manual Therapy (29935): pre,intra or post hands-on MLD service to modulate pain, increase ROM, reduce swelling, inflammation, or restriction; improve contractile/noncontractile tissue extensibility 30 2   Therapeutic activities (92361): dynamic activities to improve functional performance with or w/o bandages with direct pt contact using multiple perameters e.g. balance, strength, ROM & multiple outcomes     Therapeutic Exercise (87540)-exercise for strength,endurance, ROM, flexibility (active, active-assist or passive) 30 2   Self care/home mgmt (47523)-pt educ re: self MLD for home program, self bandaging, do's/don'ts, activity guidelines 30 2 Vasopneumatic Pump (35147):       Total Treatment Time    90 6     Time In: 1:00 PM  Time Out: 2:25 PM      Electronically signed by Guerda Archer OT on 1/6/2021 at 1:06 PM

## 2021-01-11 ENCOUNTER — HOSPITAL ENCOUNTER (OUTPATIENT)
Facility: MEDICAL CENTER | Age: 61
Discharge: HOME | End: 2021-01-11
Payer: COMMERCIAL

## 2021-01-11 ENCOUNTER — HOSPITAL ENCOUNTER (OUTPATIENT)
Dept: OCCUPATIONAL THERAPY | Age: 61
Setting detail: THERAPIES SERIES
Discharge: HOME | End: 2021-01-11
Payer: COMMERCIAL

## 2021-01-11 ENCOUNTER — TELEPHONE (OUTPATIENT)
Dept: ONCOLOGY | Age: 61
End: 2021-01-11

## 2021-01-11 ENCOUNTER — OFFICE VISIT (OUTPATIENT)
Dept: ONCOLOGY | Age: 61
End: 2021-01-11
Payer: COMMERCIAL

## 2021-01-11 VITALS
HEART RATE: 75 BPM | SYSTOLIC BLOOD PRESSURE: 158 MMHG | BODY MASS INDEX: 40.66 KG/M2 | DIASTOLIC BLOOD PRESSURE: 106 MMHG | RESPIRATION RATE: 18 BRPM | WEIGHT: 251.9 LBS | TEMPERATURE: 98.2 F

## 2021-01-11 DIAGNOSIS — Z17.1 MALIGNANT NEOPLASM OF UPPER-OUTER QUADRANT OF LEFT BREAST IN FEMALE, ESTROGEN RECEPTOR NEGATIVE (HCC): ICD-10-CM

## 2021-01-11 DIAGNOSIS — G89.29 OTHER CHRONIC PAIN: ICD-10-CM

## 2021-01-11 DIAGNOSIS — E66.01 MORBID OBESITY DUE TO EXCESS CALORIES (HCC): ICD-10-CM

## 2021-01-11 DIAGNOSIS — C50.412 MALIGNANT NEOPLASM OF UPPER-OUTER QUADRANT OF LEFT BREAST IN FEMALE, ESTROGEN RECEPTOR NEGATIVE (HCC): ICD-10-CM

## 2021-01-11 DIAGNOSIS — G62.9 NEUROPATHY: ICD-10-CM

## 2021-01-11 DIAGNOSIS — C50.412 MALIGNANT NEOPLASM OF UPPER-OUTER QUADRANT OF LEFT BREAST IN FEMALE, ESTROGEN RECEPTOR NEGATIVE (HCC): Primary | ICD-10-CM

## 2021-01-11 DIAGNOSIS — F17.200 SMOKING: ICD-10-CM

## 2021-01-11 DIAGNOSIS — Z17.1 MALIGNANT NEOPLASM OF UPPER-OUTER QUADRANT OF LEFT BREAST IN FEMALE, ESTROGEN RECEPTOR NEGATIVE (HCC): Primary | ICD-10-CM

## 2021-01-11 LAB
ABSOLUTE EOS #: 0.15 K/UL (ref 0–0.44)
ABSOLUTE IMMATURE GRANULOCYTE: 0.02 K/UL (ref 0–0.3)
ABSOLUTE LYMPH #: 3.27 K/UL (ref 1.1–3.7)
ABSOLUTE MONO #: 0.69 K/UL (ref 0.1–1.2)
ALBUMIN SERPL-MCNC: 3.9 G/DL (ref 3.5–5.2)
ALBUMIN/GLOBULIN RATIO: ABNORMAL (ref 1–2.5)
ALP BLD-CCNC: 98 U/L (ref 35–104)
ALT SERPL-CCNC: 7 U/L (ref 5–33)
AMPHETAMINE SCREEN URINE: NEGATIVE
ANION GAP SERPL CALCULATED.3IONS-SCNC: 11 MMOL/L (ref 9–17)
AST SERPL-CCNC: 10 U/L
BARBITURATE SCREEN URINE: NEGATIVE
BASOPHILS # BLD: 1 % (ref 0–2)
BASOPHILS ABSOLUTE: 0.05 K/UL (ref 0–0.2)
BENZODIAZEPINE SCREEN, URINE: NEGATIVE
BILIRUB SERPL-MCNC: 0.28 MG/DL (ref 0.3–1.2)
BUN BLDV-MCNC: 13 MG/DL (ref 8–23)
BUN/CREAT BLD: 18 (ref 9–20)
BUPRENORPHINE URINE: ABNORMAL
CALCIUM SERPL-MCNC: 9.5 MG/DL (ref 8.6–10.4)
CANNABINOID SCREEN URINE: POSITIVE
CHLORIDE BLD-SCNC: 101 MMOL/L (ref 98–107)
CO2: 24 MMOL/L (ref 20–31)
COCAINE METABOLITE, URINE: NEGATIVE
CREAT SERPL-MCNC: 0.72 MG/DL (ref 0.5–0.9)
DIFFERENTIAL TYPE: ABNORMAL
EOSINOPHILS RELATIVE PERCENT: 2 % (ref 1–4)
GFR AFRICAN AMERICAN: >60 ML/MIN
GFR NON-AFRICAN AMERICAN: >60 ML/MIN
GFR SERPL CREATININE-BSD FRML MDRD: ABNORMAL ML/MIN/{1.73_M2}
GFR SERPL CREATININE-BSD FRML MDRD: ABNORMAL ML/MIN/{1.73_M2}
GLUCOSE BLD-MCNC: 101 MG/DL (ref 70–99)
HCT VFR BLD CALC: 44.3 % (ref 36.3–47.1)
HEMOGLOBIN: 13.9 G/DL (ref 11.9–15.1)
IMMATURE GRANULOCYTES: 0 %
LYMPHOCYTES # BLD: 39 % (ref 24–43)
MCH RBC QN AUTO: 29.1 PG (ref 25.2–33.5)
MCHC RBC AUTO-ENTMCNC: 31.4 G/DL (ref 28.4–34.8)
MCV RBC AUTO: 92.7 FL (ref 82.6–102.9)
MDMA URINE: ABNORMAL
METHADONE SCREEN, URINE: NEGATIVE
METHAMPHETAMINE, URINE: ABNORMAL
MONOCYTES # BLD: 8 % (ref 3–12)
NRBC AUTOMATED: 0 PER 100 WBC
OPIATES, URINE: NEGATIVE
OXYCODONE SCREEN URINE: POSITIVE
PDW BLD-RTO: 15.9 % (ref 11.8–14.4)
PHENCYCLIDINE, URINE: NEGATIVE
PLATELET # BLD: 185 K/UL (ref 138–453)
PLATELET ESTIMATE: ABNORMAL
PMV BLD AUTO: 9.4 FL (ref 8.1–13.5)
POTASSIUM SERPL-SCNC: 4.4 MMOL/L (ref 3.7–5.3)
PROPOXYPHENE, URINE: ABNORMAL
RBC # BLD: 4.78 M/UL (ref 3.95–5.11)
RBC # BLD: ABNORMAL 10*6/UL
SEG NEUTROPHILS: 50 % (ref 36–65)
SEGMENTED NEUTROPHILS ABSOLUTE COUNT: 4.12 K/UL (ref 1.5–8.1)
SODIUM BLD-SCNC: 136 MMOL/L (ref 135–144)
TEST INFORMATION: ABNORMAL
TOTAL PROTEIN: 6.9 G/DL (ref 6.4–8.3)
TRICYCLIC ANTIDEPRESSANTS, UR: ABNORMAL
WBC # BLD: 8.3 K/UL (ref 3.5–11.3)
WBC # BLD: ABNORMAL 10*3/UL

## 2021-01-11 PROCEDURE — G8484 FLU IMMUNIZE NO ADMIN: HCPCS | Performed by: INTERNAL MEDICINE

## 2021-01-11 PROCEDURE — 85025 COMPLETE CBC W/AUTO DIFF WBC: CPT

## 2021-01-11 PROCEDURE — 99214 OFFICE O/P EST MOD 30 MIN: CPT | Performed by: INTERNAL MEDICINE

## 2021-01-11 PROCEDURE — G8417 CALC BMI ABV UP PARAM F/U: HCPCS | Performed by: INTERNAL MEDICINE

## 2021-01-11 PROCEDURE — 3017F COLORECTAL CA SCREEN DOC REV: CPT | Performed by: INTERNAL MEDICINE

## 2021-01-11 PROCEDURE — 4004F PT TOBACCO SCREEN RCVD TLK: CPT | Performed by: INTERNAL MEDICINE

## 2021-01-11 PROCEDURE — 80307 DRUG TEST PRSMV CHEM ANLYZR: CPT

## 2021-01-11 PROCEDURE — 36415 COLL VENOUS BLD VENIPUNCTURE: CPT

## 2021-01-11 PROCEDURE — G8427 DOCREV CUR MEDS BY ELIG CLIN: HCPCS | Performed by: INTERNAL MEDICINE

## 2021-01-11 PROCEDURE — 99211 OFF/OP EST MAY X REQ PHY/QHP: CPT | Performed by: INTERNAL MEDICINE

## 2021-01-11 PROCEDURE — 80053 COMPREHEN METABOLIC PANEL: CPT

## 2021-01-11 RX ORDER — OXYCODONE AND ACETAMINOPHEN 10; 325 MG/1; MG/1
1 TABLET ORAL EVERY 6 HOURS PRN
COMMUNITY
End: 2021-01-11 | Stop reason: SDUPTHER

## 2021-01-11 RX ORDER — LORAZEPAM 1 MG/1
1 TABLET ORAL EVERY 6 HOURS PRN
COMMUNITY
End: 2021-10-11

## 2021-01-11 RX ORDER — GABAPENTIN 300 MG/1
300 CAPSULE ORAL 3 TIMES DAILY
Qty: 90 CAPSULE | Refills: 1 | Status: SHIPPED | OUTPATIENT
Start: 2021-01-11 | End: 2021-09-13 | Stop reason: CLARIF

## 2021-01-11 RX ORDER — OXYCODONE AND ACETAMINOPHEN 10; 325 MG/1; MG/1
1 TABLET ORAL EVERY 6 HOURS PRN
Qty: 120 TABLET | Refills: 0 | Status: SHIPPED | OUTPATIENT
Start: 2021-01-11 | End: 2021-02-09 | Stop reason: SDUPTHER

## 2021-01-11 NOTE — SIGNIFICANT EVENT
[x] [address] Occupational Therapy       [address] [facility]       Phone: [phone]       Fax: [phone] [] [facility] at [address] [place] [address]. [place], [place]       Phone: [phone]       Fax: [phone]          Occupational Therapy Cancel/No Show note    Date: 2021  Patient: Willard Newton  : 1960  MRN: [id number]    Cancels/No Shows to date: 3    For today's appointment patient:   Cancel    Reason given by patient:  Pt showed up for today's apt. OT called pt back and she states that she actually needed to cancel the apt bc her previous apt took to long and she needed to get her granddaughter to work by 4:30. Pt confirmed next apt.      Electronically signed by: Maria Antonia Bustillo OT

## 2021-01-11 NOTE — PROGRESS NOTES
Rob Bobo                                                                                                                  1/11/2021  MRN:   [id number]  YOB: 1960  PCP:                           Isa Falk MD  Referring Physician: No ref. provider found  Treating Physician Name: Jason Ferguson MD      Reason for visit:  Chief Complaint   Patient presents with    Follow-up     review status of disease    Other     mole on back was flat but is changing    Other     thick mucas in mouth and back of throat, also foamy white    Nausea     sometimes    Anorexia     eats only because she has to eat.  Numbness    Pain       Current problems:   IDC of Left Breast cancer, Triple negative, pT2,pN0,M0    Active and recent treatments:  Bilateral Mastectomy with left sentinel lymph node  Adjuvant chemotherapy using dose denseAC followed by T on 4/11/19--- 8/ /19    Summary of Case/History:    Rob Bobo a 61 y. o.female invasive carcinoma of the breast.     Patient initially felt a lump in her left breast for 23 months. She underwent screening mammogram which showed 2 cm mass in upper outer left breast.  Right breast do not have any concerning findings. Ultrasound of left breast conformed mass measuring 1.9 cm in the upper outer left breast.  Patient underwent biopsy on 1/23/19 which revealed invasive ductal carcinoma, grade 3, ER/[place] negative. HER-2 results are pending.     Patient has history of uterus/cervical cancer diagnosed in 1992. Patient states she underwent surgery for the cervical cancer. She does not recall getting any radiation therapy or chemotherapy. Patient states she has been interested in getting bilateral breast reduction due to cosmetic reasons however now with a diagnosis of breast cancer she wishes to proceed with bilateral mastectomy.     Patient has history of breast cancer in her mother as well as 3 paternal aunts.   Patient's mother also had cervical and uterine cancer.     Patient underwent bilateral mastectomy with left sentinel lymph node biopsy. Pathological stage was T2, N0, M0    Started chemotherapy for triple negative breast cancer using AC followed by T on 4/11/19    Interim History:    Patient presents to the clinic for a follow-up visit and to discuss further treatment plan. Patient was seen by lymphedema clinic who are working with her. Patient lower extremity swelling has much improved. Patient continues to be on pain medication. Denies hospitalization or ER visit. Neuropathy is stable. Denies noticing any lump or mass on her chest or in the armpit area. Patient urine drug screen was positive for oxycodone and cannabinoids. During this visit patient's allergy, social, medical, surgical history and medications were reviewed and updated.       Past Medical History:   Past Medical History:   Diagnosis Date    Arthritis     GENERALIZED ALL OVER RHEUMATOID AND OSTEO    Breast cancer ([address]     left breast    Cancer ([address] 1992    CERVICAL, UTERINE    Chronic back pain     scioliosis,  02/2019 WEARING A BACK BRACE    Degenerative disorder of bone     ALL OVER    Depression     Difficult intravenous access     HANDS AR BEST SITE    GERD (gastroesophageal reflux disease)     Hyperlipidemia     HX OF NO MEDS IN YEARS    Hypertension 2007    Pain management clinic    Insomnia     Neuropathy     hands and feet    Obesity     BMI -  43    Sleep apnea 2016    NO LONGER USES MACHINE    Snores     has been told by family that she stops breathing    Use of cane as ambulatory aid     Wears glasses     READING    Wears glasses     \"CHEATERS\"       Past Surgical History:     Past Surgical History:   Procedure Laterality Date    BREAST SURGERY Left 1990    CYST    BUNIONECTOMY Bilateral     CERVIX SURGERY  1992    COLONOSCOPY  10/13/2017    FOOT SURGERY Left 2014    PINS IN ALL TOES    FOOT SURGERY Left 5/27/2020    HARDWARE REMOVAL LEFT GREAT TOE- Not on file    Intimate partner violence     Fear of current or ex partner: Not on file     Emotionally abused: Not on file     Physically abused: Not on file     Forced sexual activity: Not on file   Other Topics Concern    Not on file   Social History Narrative    Not on file      Family History   Problem Relation Age of Onset    High Blood Pressure Mother     Cancer Mother         cervical and breast    Diabetes Father     Heart Attack Father     Heart Disease Maternal Aunt         times 2      Current Medications:     Current Outpatient Medications   Medication Sig Dispense Refill    oxyCODONE-acetaminophen (PERCOCET)  MG per tablet Take 1 tablet by mouth every 6 hours as needed for Pain.  LORazepam (ATIVAN) 1 MG tablet Take 1 mg by mouth every 6 hours as needed for Anxiety. Take 4 tab 2 hours prior to dental surgery      hydroCHLOROthiazide (HYDRODIURIL) 50 MG tablet TAKE 1 TABLET BY MOUTH DAILY 30 tablet 1    gabapentin (NEURONTIN) 300 MG capsule Take 1 capsule by mouth 3 times daily for 30 days. 90 capsule 3     No current facility-administered medications for this visit. Allergies:   Lisinopril, Bee venom, Amino acids, and Sulfa antibiotics    Review of Systems:    Constitutional: No fever or chills. No night sweats. Weight now stable  Eyes: No eye discharge, double vision, or eye pain   HEENT: negative for sore mouth, sore throat, hoarseness and voice change   Respiratory: URI with productive cough, sneezing, sinus drainage and congestion, runny eyes  Cardiovascular: negative for chest pain, dyspnea, palpitations, orthopnea, PND   Gastrointestinal: negative for nausea, vomiting, diarrhea, constipation, Dysphagia, hematemesis and hematochezia  Genitourinary: negative for frequency, dysuria, nocturia, urinary incontinence, and hematuria   Integument: negative for rash, skin lesions, bruises.   Nails now growing back slowly, toenail fungus  Hematologic/Lymphatic: negative for easy bruising, bleeding, lymphadenopathy, or petechiae   Endocrine: negative for heat or cold intolerance,weight changes, change in bowel habits and hair loss   Musculoskeletal: negative for pain, joint swelling; +arhtralgias and bone pain +myalgias   Neurological: negative for headaches, dizziness, seizures, weakness; + neuropathy is nearly resolved        Physical Exam:    Vitals: BP (!) 158/106   Pulse 75   Temp 98.2 °F (36.8 °C) (Oral)   Resp 18   Wt 251 lb 14.4 oz (114.3 kg)   LMP 02/11/1992   BMI 40.66 kg/m²   General appearance - well appearing, no in pain or distress  Mental status - AAO X3  Eyes - pupils equal and reactive, extraocular eye movements intact  Mouth - mucous membranes moist, pharynx normal without lesions  Neck - supple, no significant adenopathy  Lymphatics - no palpable lymphadenopathy, no hepatosplenomegaly  Chest - clear to auscultation, no wheezes, rales or rhonchi, symmetric air entry  Heart - normal rate, regular rhythm, normal S1, S2, no murmurs  Abdomen - soft, nontender, nondistended, no masses or organomegaly  Neurological - alert, oriented, normal speech, no focal findings or movement disorder noted; +neuropathy - stable  Extremities -bilateral ankle edema  Skin - normal coloration and turgor, no rashes, no suspicious skin lesions noted; toenail fungus  Breast- bilateral mastectomy, no masses or lumps, excess skin on right side - no lymphedema appreciated        DATA:  Lab Results   Component Value Date    WBC 8.3 01/11/2021    HGB 13.9 01/11/2021    HCT 44.3 01/11/2021    MCV 92.7 01/11/2021     01/11/2021       Chemistry        Component Value Date/Time     01/11/2021 1429    K 4.4 01/11/2021 1429     01/11/2021 1429    CO2 24 01/11/2021 1429    BUN 13 01/11/2021 1429    CREATININE 0.72 01/11/2021 1429        Component Value Date/Time    CALCIUM 9.5 01/11/2021 1429    ALKPHOS 98 01/11/2021 1429    AST 10 01/11/2021 1429    ALT 7 01/11/2021 1429    BILITOT 0.28 (L) 01/11/2021 1429          Results for Shukri Lundy (MRN [id number]) as of 1/24/2021 08:08   Ref. Range 1/11/2021 14:29   Amphetamine Screen, Ur Latest Ref Range: NEGATIVE  NEGATIVE   Barbiturate Screen, Ur Latest Ref Range: NEGATIVE  NEGATIVE   Benzodiazepine Screen, Urine Latest Ref Range: NEGATIVE  NEGATIVE   Buprenorphine Urine Latest Ref Range: NEGATIVE  NOT REPORTED   Cannabinoid Scrn, Ur Latest Ref Range: NEGATIVE  POSITIVE (A)   Cocaine Metabolite, Urine Latest Ref Range: NEGATIVE  NEGATIVE   Methadone Screen, Urine Latest Ref Range: NEGATIVE  NEGATIVE   Methamphetamine, Urine Latest Ref Range: NEGATIVE  NOT REPORTED   Oxycodone Screen, Ur Latest Ref Range: NEGATIVE  POSITIVE (A)   Propoxyphene, Urine Latest Ref Range: NEGATIVE  NOT REPORTED   Opiates, Urine Latest Ref Range: NEGATIVE  NEGATIVE   Tricyclic Antidepressants, Urine Latest Ref Range: NEGATIVE  NOT REPORTED   MDMA, Urine Latest Ref Range: NEGATIVE  NOT REPORTED       Ct lung screen :    Impression    No suspicious noncalcified lung nodule or mass.         LUNG RADS:    Per ACR Lung-RADS Version 1.0         Category 1, Negative (No nodules and definitely benign    nodules). Management:Continue annual lung screening with LDCT in 12    months.(probability of malignancy <1%).         RECOMMENDATIONS:    If you would like to register your patient with the [address] [place] [address] Program, please contact the Nurse Navigator at    [phone]).           Impression:  Invasive ductal carcinoma of left breast, triple negative, pT2,p N0, M0  Status post bilateral mastectomy and left sentinel lymph node biopsy  Personal history of gynecological malignancy (uterus/cervical cancer) status post surgery in 1992  Cancer related pain  Leg swelling  Anemia secondary to chemotherapy  Family history of malignancy  Chronic back pain  Hypertension  Obesity      Plan:  Reviewed available clinical data  Clinically patient is doing well and continues to have no evidence of any active disease  Continue monitoring for neuropathy. Continue Neurontin  O AHRS report reviewed. Urine tox screen reviewed. Patient was given refill on her Percocet. Patient counseled on use of pain medication  Reviewedhistory of breast cancer  Patient counseled tobacco cessation  DEXA scan every 2 years  Patient counseled on active lifestyle. Continue age-appropriate screening studies. Return to clinic in 3 months. Shaye Zhou        This note is created with the assistance of a speech recognition program.  While intending to generate a document that actually reflects the content of the visit, the document can still have some errors including those of syntax and sound a like substitutions which may escape proof reading. It such instances, actual meaning can be extrapolated by contextual diversion.

## 2021-01-13 ENCOUNTER — HOSPITAL ENCOUNTER (OUTPATIENT)
Dept: OCCUPATIONAL THERAPY | Age: 61
Setting detail: THERAPIES SERIES
Discharge: HOME | End: 2021-01-13
Payer: COMMERCIAL

## 2021-01-13 PROCEDURE — 97140 MANUAL THERAPY 1/> REGIONS: CPT

## 2021-01-13 PROCEDURE — 97535 SELF CARE MNGMENT TRAINING: CPT

## 2021-01-13 PROCEDURE — 97016 VASOPNEUMATIC DEVICE THERAPY: CPT

## 2021-01-13 NOTE — SIGNIFICANT EVENT
[x] [address] Occupational Therapy       [address] [facility], 1st Floor       Phone: [phone]       Fax: [phone] [] [facility] Occupational  Therapy at [address] [place] [address]. [place], [place]       Phone: [phone]       Fax: [phone]          Occupational Therapy Cancel/No Show note    Date: 2021  Patient: Vinh Peres  : 1960  MRN: [id number]    Cancels/No Shows to date: 11    For today's appointment patient:   No show    Reason given by patient:  Writer leaves  with pt informing of missed visit and confirms next visit.     Electronically signed by: Rocío Regalado OT

## 2021-01-13 NOTE — FLOWSHEET NOTE
TREATMENT LOCATION:   [x] [address]   [facility] [address]: [phone]  F: [phone] [] [address]: [phone]  F: [phone]        Lymphedema Services - Treatment Note of the Lower Extremity    Date:  2021  Patient: Lyle Braga  : 1960             MRN: [id number]  Referring Physician: Guerda Apodaca MD  Phone Number: [phone]   Fax Number:   Insurance: Rachel Fowler -   Medical Diagnosis: Malignant neoplasm of upper-outer quadrant of left breast in female, estrogen receptor negative ([facility] (C50.412,Z17.1 [ICD-10-CM]),    Cancer ([facility] [address] [C80.1]  CERVICAL, UTERINE     Onset Date: 10/2015                Visit# / total visits: ; Progress note for Medicare patient due at visit 10      Subjective: \"I won't do compression stockings because of my neuropathy. It's too hard for me to get them on. \"    Objective:   [x] Measurement         []Manual Lymph Drainage     [x] Bandaging  [x] Vasopneumatic pump  [x] Education               []Self MLD                             []Self Bandaging  [] Exercise                 [] Wound Care                       [] Hygiene      [] Elevation        Symptoms:   [x] Improving   []  Worsening  [] Same as previous treatment    Post-treatment symptom assessment for swelling, heaviness, tightness to the affected limb, chest or truncal area: relief noted with compression     Pain:  [x] YES    [] NO     Location: back, hands, feet      Pain Rating: ( 0-10 scale) : 7/10  Pain Description:  [] Achy,Worse at end of day  [] Painful with palpation [] Guarding, Tingling, Numbness  [] Hypersensitivity       Assessment : Patient would benefit from skilled occupational therapy services in order to become further educated on lymphedema causes and treatments as well as improve her overall quality of life. Pt arrives with compression pants donned that she has purchased since last visit. She reports they are at least 20% spandex per therapist recommendation at previous visit. However, they fit loosely distally. Pt's current choice of socks are providing a slight tourniquet effect mid-shin and pt is advised to eliminate articles of clothing that cause this phenomenon. Pt reports that bandages have been doffed since Sunday. Measurements indicate slight increase in RLE and decrease in LLE. Vasopneumatic pump trial this visit with pt utilizing basic pump on 45 mmHg x20 minutes with good tolerance noted. During pump trial, pt is educated on basic vs. Advanced pump, pump precautions, and home use. Pt is also educated on self-MLD in conjunction with use of pump. Skin care completed in way of applying Eucerine lotion to the B Jessica include the foot and leg. Stockinette is applied over top of the leg as a protective barrier from the lotion. Rosidal foam is placed on the leg from the lakes of feet to just below the knee. Comprilan bandage is then placed from the lakes of the feet to the base of the knee with approx 50% pull. Herringbone technique is used to progress up the leg and assist with venous return, and lymphatic flow production/ stimulating a suctioning effect. Lymphedema Contraindication Assessment:  [x] Age 60+-No MLD to neck           Restrictions/Precautions:           [x]? No blood pressure/blood draw in: [x]? LUE []? RUE     Fall Risk   []? No    [x]? Yes        If yes, intervention: Ambulate next to patient for safety.       PT HAS A HX OF UTERINE AND CERVICAL CANCER AS WELL AS L UE BREAST CANCER. Measurements Lower Extremity: Measurements taken from nail base of D2 digit.   They are circumferential.   Measurements in Centimeters Right Left   MTS            Dorsum    8 cm 20.5 22.1   Inframalleolar    13    31.4 32.8   Supramalleolar    17 28.0 29.6   10    24 24.6 27.3   20   34 27.2 28.5 30   44 36.4 38.7   Knee   51 36.4 38.4   10 cm above knee 48.0 48.0   Thigh-widest       Hip-widest       Total    Previous 1/6    Initial  252.5    248.6    234.6 265.4    273.5    254. 4         Education Included:  [x] General Lymphedema Information  [x] Self MLD [] Self Bandaging [] Kinesiotaping    [] Nutrition   [] Home Exercises   [] Skin/nail care   [] NLN Risk Reduction Practice handout    Learner: [x] Patient [] Spouse  []Other [] Family  Method: [x] Verbal [] Demonstration [] Handouts [] Exercise booklet  Response:  [x] Verbalized understanding [] Demonstrated understanding      [] Needs assist            [] No understanding  Knowledge of home program:  [] Good [x] Fair  [] Poor  Family can assist with programming: [] Yes  [] No  Instructions for patient:   [x] Verbal [] Demonstration  [] Written: Educational lymphedema hand outs,     [x] Education reviewed      Therapy Goals: LOWER EXTREMITY GOALS:      1. Pt will demonstrate compliance of maintaining lymphedema precautions to reduce the risks of infection and further exacerbations by the 3rd visit.     2. (Pt/Family) will demonstrate proper technique with bandaging and understand the principles and theory behind bandaging technique in order to assist with decreasing swelling up to ( 5+ ) cm.      3. Pt will demonstrate independence with decongestive exercise program in order to expedite fluid rerouting.     4. Pt will demonstrate competence with (traditional) lymphatic drainage massage in order to reroute lymphatic pathways for decreased swelling.     5.  Pt will demonstrate competence with kinesiotape technique to affected area(s) to assist with decreasing swelling.     6. Pt will demonstrate independence donning the device with safe and effective use of pneumatic pump in order for possible home use.     7. Pt will demonstrate safe and effective use of compression garment to maintain decreased size upon discharge.    8. Pt will demonstrate safe and effective use of alternative bandaging/ velcro devices to simplify and reduce size upon discharge.       Plan: Continue to address: [x]Bandaging  []Self Bandaging/Family Assist  [x]MLD  [x]Self Massage  [x]Exercise  [x]Education  [x]Obtain referral for garments  []Medical Hold  []Discharge to Home Program     Charges   Minutes   Units   Evaluation (72339)            Low            Moderate            High     Manual Therapy (62039): pre,intra or post hands-on MLD service to modulate pain, increase ROM, reduce swelling, inflammation, or restriction; improve contractile/noncontractile tissue extensibility 25 2   Therapeutic activities (33374): dynamic activities to improve functional performance with or w/o bandages with direct pt contact using multiple perameters e.g. balance, strength, ROM & multiple outcomes     Therapeutic Exercise (83952)-exercise for strength,endurance, ROM, flexibility (active, active-assist or passive)     Self care/home mgmt (37540)-pt educ re: self MLD for home program, self bandaging, do's/don'ts, activity guidelines 35 2   Vasopneumatic Pump (19904):  20 1   Total Treatment Time    60 5     Time In: 3:00 PM  Time Out: 4:00 PM      Electronically signed by Yan Reyes OT on 1/13/2021 at 3:00 PM

## 2021-01-18 ENCOUNTER — HOSPITAL ENCOUNTER (OUTPATIENT)
Dept: OCCUPATIONAL THERAPY | Age: 61
Setting detail: THERAPIES SERIES
Discharge: HOME | End: 2021-01-18
Payer: COMMERCIAL

## 2021-01-18 NOTE — SIGNIFICANT EVENT
[x] [address] [facility] [address] Occupational Therapy       [address] [facility], 1st [address]       Phone: [phone]       Fax: [phone] [] [facility] Occupational  Therapy at [address] [place] [address]. [place], [place]       Phone: [phone]       Fax: [phone]          Occupational Therapy Cancel/No Show note    Date: 2021  Patient: Darren Duffy  : 1960  MRN: [id number]    Cancels/No Shows to date: 11    For today's appointment patient:   Cancel    Reason given by patient: Pt called in to cancel today's apt stating she is in a lot of pain.      Electronically signed by: John Blank OT

## 2021-01-20 ENCOUNTER — HOSPITAL ENCOUNTER (OUTPATIENT)
Dept: OCCUPATIONAL THERAPY | Age: 61
Setting detail: THERAPIES SERIES
Discharge: HOME | End: 2021-01-20
Payer: COMMERCIAL

## 2021-01-20 PROCEDURE — 97140 MANUAL THERAPY 1/> REGIONS: CPT

## 2021-01-20 PROCEDURE — 97110 THERAPEUTIC EXERCISES: CPT

## 2021-01-20 PROCEDURE — 97535 SELF CARE MNGMENT TRAINING: CPT

## 2021-01-20 NOTE — FLOWSHEET NOTE
TREATMENT LOCATION:   [x] [address]   [facility]: [phone]  F: [phone] [] [address]: [phone]  F: [phone]        Lymphedema Services - Treatment Note of the Lower Extremity    Date:  2021  Patient: Rob Bobo  : 1960             MRN: [id number]  Referring Physician: Lencho Godinez MD  Phone Number: [phone]   Fax Number:   Insurance: [facility] -   Medical Diagnosis: Malignant neoplasm of upper-outer quadrant of left breast in female, estrogen receptor negative ([facility] (C50.412,Z17.1 [ICD-10-CM]),    Cancer ([facility] [C80.1]  CERVICAL, UTERINE     Onset Date: 10/2015                Visit# / total visits: ; Progress note for Medicare patient due at visit 10      Subjective: \" I am over these bandages and wearing compression. They hurt my feet. I have not had anything on since  and they swelled back up something terrible. \"     Objective:   [x] Measurement         []Manual Lymph Drainage     [x] Bandaging  [] Vasopneumatic pump  [x] Education               [x]Self MLD                             []Self Bandaging  [x] Exercise                 [] Wound Care                       [] Hygiene      [] Elevation        Symptoms:   [] Improving   [x]  Worsening  [] Same as previous treatment    Post-treatment symptom assessment for swelling, heaviness, tightness to the affected limb, chest or truncal area: relief noted with compression in place. Pain:  [x] YES    [] NO     Location: back, hands, feet      Pain Rating: ( 0-10 scale) : 7/10  Pain Description:  [] Achy,Worse at end of day  [] Painful with palpation [] Guarding, Tingling, Numbness  [] Hypersensitivity       Assessment : Patient would benefit from skilled occupational therapy services in order to become further educated on lymphedema causes and treatments as well as improve her overall quality of life. Pt arrives with only compression pants donned stating that her foot really started hurting so she has not been wearing her leg compression since Sunday. Extremity appears larger in size. New measurements are taken and confirmed findings. Pt is educated on the importance of wearing compression items as instructed to have success in this therapy. Pt states that she had been really busy around her house and having people stay with her so she lost motivation. Pt states she will attempt to get back on track this next week. OT reviews compression items pt would be able to purchase for post treatment use. Pt states that she would like to do that and hopes to have the funds available by February 1st. Pt is agreeable to be fitted at her last visit on 1/27 and will purchase from home to bring back in as a follow up. Skin care completed in way of applying Eucerine lotion to the B Jessica include the foot and leg. Stockinette is applied over top of the leg as a protective barrier from the lotion. Rosidal foam is placed on the leg from the lakes of feet to just below the knee. Comprilan bandage is then placed from the lakes of the feet to the base of the knee with approx 50% pull. Herringbone technique is used to progress up the leg and assist with venous return, and lymphatic flow production/ stimulating a suctioning effect. Lymphedema Contraindication Assessment:  [x] Age 60+-No MLD to neck           Restrictions/Precautions:           [x]? No blood pressure/blood draw in: [x]? LUE []? RUE     Fall Risk   []? No    [x]? Yes        If yes, intervention: Ambulate next to patient for safety.       PT HAS A HX OF UTERINE AND CERVICAL CANCER AS WELL AS L UE BREAST CANCER. Measurements Lower Extremity: Measurements taken from nail base of D2 digit.   They are circumferential.   Measurements in Centimeters Right Left   MTS            Dorsum    8 cm 22.0 24.0   Inframalleolar    13    33.5 34.5 Supramalleolar    17 28.0 29.5   10    24 25.5 28.3   20   34 30.5 34.0   30   44 35.0 39.0   Knee   51 35.5 37.0   10 cm above knee 48.0 48.0   Thigh-widest       Hip-widest       Total    Previous     Initial  258.0    252.5    248.6    234.6 274.3    265.4    273.5    254. 4         Education Included:  [x] General Lymphedema Information  [x] Self MLD [] Self Bandaging [] Kinesiotaping    [] Nutrition   [] Home Exercises   [] Skin/nail care   [] NLN Risk Reduction Practice handout    Learner: [x] Patient [] Spouse  []Other [] Family  Method: [x] Verbal [] Demonstration [] Handouts [] Exercise booklet  Response:  [x] Verbalized understanding [] Demonstrated understanding      [] Needs assist            [] No understanding  Knowledge of home program:  [] Good [x] Fair  [] Poor  Family can assist with programming: [] Yes  [] No  Instructions for patient:   [x] Verbal [] Demonstration  [] Written: Educational lymphedema hand outs,     [x] Education reviewed      Therapy Goals: LOWER EXTREMITY GOALS:      1. Pt will demonstrate compliance of maintaining lymphedema precautions to reduce the risks of infection and further exacerbations by the 3rd visit.     2. (Pt/Family) will demonstrate proper technique with bandaging and understand the principles and theory behind bandaging technique in order to assist with decreasing swelling up to ( 5+ ) cm.      3. Pt will demonstrate independence with decongestive exercise program in order to expedite fluid rerouting.     4. Pt will demonstrate competence with (traditional) lymphatic drainage massage in order to reroute lymphatic pathways for decreased swelling.     5.  Pt will demonstrate competence with kinesiotape technique to affected area(s) to assist with decreasing swelling.     6. Pt will demonstrate independence donning the device with safe and effective use of pneumatic pump in order for possible home use.    7. Pt will demonstrate safe and effective use of compression garment to maintain decreased size upon discharge.     8. Pt will demonstrate safe and effective use of alternative bandaging/ velcro devices to simplify and reduce size upon discharge. Plan: Continue to address: [x]Bandaging  []Self Bandaging/Family Assist  [x]MLD  [x]Self Massage  [x]Exercise  [x]Education  [x]Obtain referral for garments  []Medical Hold  []Discharge to Home Program     Charges   Minutes   Units   Evaluation (16730)            Low            Moderate            High     Manual Therapy (53179): pre,intra or post hands-on MLD service to modulate pain, increase ROM, reduce swelling, inflammation, or restriction; improve contractile/noncontractile tissue extensibility 25 2   Therapeutic activities (88352): dynamic activities to improve functional performance with or w/o bandages with direct pt contact using multiple perameters e.g. balance, strength, ROM & multiple outcomes     Therapeutic Exercise (39017)-exercise for strength,endurance, ROM, flexibility (active, active-assist or passive) 15 1   Self care/home mgmt (58581)-pt educ re: self MLD for home program, self bandaging, do's/don'ts, activity guidelines 30 2   Vasopneumatic Pump (00516):       Total Treatment Time    70 5     Time In: 1:00 PM  Time Out: 2:15 PM      Electronically signed by Seng Orozco OT on 1/20/2021 at 1:17 PM

## 2021-01-25 ENCOUNTER — HOSPITAL ENCOUNTER (OUTPATIENT)
Dept: OCCUPATIONAL THERAPY | Age: 61
Setting detail: THERAPIES SERIES
Discharge: HOME | End: 2021-01-25
Payer: COMMERCIAL

## 2021-01-25 PROCEDURE — 97535 SELF CARE MNGMENT TRAINING: CPT

## 2021-01-25 NOTE — FLOWSHEET NOTE
TREATMENT LOCATION:   [x] [address]   [facility]: [phone]  F: [phone] [] [address]: [phone]  F: [phone]        Lymphedema Services - Treatment Note of the Lower Extremity    Date:  2021  Patient: Mayi Reeder  : 1960             MRN: [id number]  Referring Physician: Miles Carroll MD  Phone Number: [phone]   Fax Number:   Insurance: [employer] -   Medical Diagnosis: Malignant neoplasm of upper-outer quadrant of left breast in female, estrogen receptor negative ([facility] (C50.412,Z17.1 [ICD-10-CM]),    Cancer ([facility] [C80.1]  CERVICAL, UTERINE     Onset Date: 10/2015                Visit# / total visits: ; Progress note for Medicare patient due at visit 10      Subjective: \" I do not feel good today, I do not want to be here. I only came to see about this pump and then I would like to leave. \"    Objective:   [x] Measurement         []Manual Lymph Drainage     [x] Bandaging  [] Vasopneumatic pump  [x] Education               [x]Self MLD                             []Self Bandaging  [x] Exercise                 [] Wound Care                       [] Hygiene      [] Elevation        Symptoms:   [] Improving   [x]  Worsening  [] Same as previous treatment    Post-treatment symptom assessment for swelling, heaviness, tightness to the affected limb, chest or truncal area: relief noted with compression in place. Pain:  [x] YES    [] NO     Location: back, hands, feet      Pain Rating: ( 0-10 scale) : 7/10  Pain Description:  [] Achy,Worse at end of day  [] Painful with palpation [] Guarding, Tingling, Numbness  [] Hypersensitivity       Assessment : Patient would benefit from skilled occupational therapy services in order to become further educated on lymphedema causes and treatments as well as improve her overall quality of life. Pt arrives with only compression pants donned stating that her foot really started hurting so she has not been wearing her leg compression since Sunday. Extremity appears larger in size visually. Pt declined     OT discusses options with the pumps with patient and has vendor on the phone to discuss insurance info. Pt state she is agreeable to move forward with trailing the pump at her last session, Wednesday. Pt is provided with the financial aid paperwork to fill out and return. OT reviews compression items pt would be able to purchase for post treatment use. Pt states that she would like to do that and hopes to have the funds available by February 1st. Pt is agreeable to be fitted at her last visit on 1/27 and will purchase from home to bring back in as a follow up. Lymphedema Contraindication Assessment:  [x] Age 60+-No MLD to neck           Restrictions/Precautions:           [x]? No blood pressure/blood draw in: [x]? LUE []? RUE     Fall Risk   []? No    [x]? Yes        If yes, intervention: Ambulate next to patient for safety.       PT HAS A HX OF UTERINE AND CERVICAL CANCER AS WELL AS L UE BREAST CANCER. Measurements Lower Extremity: Measurements taken from nail base of D2 digit. They are circumferential.   Measurements in Centimeters Right Left   MTS            Dorsum    8 cm 22.0 24.0   Inframalleolar    13    33.5 34.5   Supramalleolar    17 28.0 29.5   10    24 25.5 28.3   20   34 30.5 34.0   30   44 35.0 39.0   Knee   51 35.5 37.0   10 cm above knee 48.0 48.0   Thigh-widest       Hip-widest       Total    Previous     Initial  258.0    252.5    248.6    234.6 274.3    265.4    273.5    254. 4       Education Included:  [x] General Lymphedema Information  [x] Self MLD [] Self Bandaging [] Kinesiotaping    [] Nutrition   [] Home Exercises   [] Skin/nail care   [] NLN Risk Reduction Practice handout    Learner: [x] Patient [] Spouse  []Other [] Family Method: [x] Verbal [] Demonstration [] Handouts [] Exercise booklet  Response:  [x] Verbalized understanding [] Demonstrated understanding      [] Needs assist            [] No understanding  Knowledge of home program:  [] Good [x] Fair  [] Poor  Family can assist with programming: [] Yes  [] No  Instructions for patient:   [x] Verbal [] Demonstration  [] Written: Educational lymphedema hand outs,     [x] Education reviewed      Therapy Goals: LOWER EXTREMITY GOALS:      1. Pt will demonstrate compliance of maintaining lymphedema precautions to reduce the risks of infection and further exacerbations by the 3rd visit.     2. (Pt/Family) will demonstrate proper technique with bandaging and understand the principles and theory behind bandaging technique in order to assist with decreasing swelling up to ( 5+ ) cm.      3. Pt will demonstrate independence with decongestive exercise program in order to expedite fluid rerouting.     4. Pt will demonstrate competence with (traditional) lymphatic drainage massage in order to reroute lymphatic pathways for decreased swelling.     5. Pt will demonstrate competence with kinesiotape technique to affected area(s) to assist with decreasing swelling.     6. Pt will demonstrate independence donning the device with safe and effective use of pneumatic pump in order for possible home use.     7. Pt will demonstrate safe and effective use of compression garment to maintain decreased size upon discharge.     8. Pt will demonstrate safe and effective use of alternative bandaging/ velcro devices to simplify and reduce size upon discharge.       Plan: Continue to address: [x]Bandaging  []Self Bandaging/Family Assist  [x]MLD  [x]Self Massage  [x]Exercise  [x]Education  [x]Obtain referral for garments  []Medical Hold  []Discharge to Home Program     Charges   Minutes   Units   Evaluation (39378)            Low            Moderate            High Manual Therapy (07783): pre,intra or post hands-on MLD service to modulate pain, increase ROM, reduce swelling, inflammation, or restriction; improve contractile/noncontractile tissue extensibility     Therapeutic activities (59670): dynamic activities to improve functional performance with or w/o bandages with direct pt contact using multiple perameters e.g. balance, strength, ROM & multiple outcomes     Therapeutic Exercise (56523)-exercise for strength,endurance, ROM, flexibility (active, active-assist or passive)     Self care/home mgmt (32494)-pt educ re: self MLD for home program, self bandaging, do's/don'ts, activity guidelines 25 2   Vasopneumatic Pump (37649):       Total Treatment Time    25 2     Time In: 1:05 PM  Time Out: 1:30 PM      Electronically signed by Nia Montalvo OT on 1/25/2021 at 2:08 PM

## 2021-02-08 ENCOUNTER — HOSPITAL ENCOUNTER (OUTPATIENT)
Dept: OCCUPATIONAL THERAPY | Age: 61
Setting detail: THERAPIES SERIES
Discharge: HOME | End: 2021-02-08
Payer: COMMERCIAL

## 2021-02-08 DIAGNOSIS — C50.412 MALIGNANT NEOPLASM OF UPPER-OUTER QUADRANT OF LEFT BREAST IN FEMALE, ESTROGEN RECEPTOR NEGATIVE (HCC): ICD-10-CM

## 2021-02-08 DIAGNOSIS — E66.01 MORBID OBESITY DUE TO EXCESS CALORIES (HCC): ICD-10-CM

## 2021-02-08 DIAGNOSIS — F17.200 SMOKING: ICD-10-CM

## 2021-02-08 DIAGNOSIS — G62.9 NEUROPATHY: ICD-10-CM

## 2021-02-08 DIAGNOSIS — Z17.1 MALIGNANT NEOPLASM OF UPPER-OUTER QUADRANT OF LEFT BREAST IN FEMALE, ESTROGEN RECEPTOR NEGATIVE (HCC): ICD-10-CM

## 2021-02-08 NOTE — TELEPHONE ENCOUNTER
RECEIVED PHONE REQUEST FROM SARAH FOR REFILL OF PERCOCET.     LAST WRITTEN 01/11/21    PT IS DUE FOR DENTAL EXTRACTION TOMORROW AND NEW SHE WOULD \"BE FULL OF GAUZE AND YOU WOULDN'T UNDERSTAND ME IF I REQUEST THEN\"    Rosemary Desuoza MD FOLLOW UP 03/08/21    PEND TO MD

## 2021-02-09 RX ORDER — OXYCODONE AND ACETAMINOPHEN 10; 325 MG/1; MG/1
1 TABLET ORAL EVERY 6 HOURS PRN
Qty: 120 TABLET | Refills: 0 | Status: SHIPPED | OUTPATIENT
Start: 2021-02-09 | End: 2021-03-08 | Stop reason: SDUPTHER

## 2021-02-23 NOTE — DISCHARGE SUMMARY
[] [address] [facility] &  Therapy  [address]  [phone]  F: [phone] [x] [address]  [facility]   [address]  P: [phone]  F: [phone] [] [address] &  Therapy  [address]  P: [phone]  F: [phone] [] [address]  P: [phone]  F: [phone] [] [address]  [facility]   [address]   [place]: [phone]  F: [phone]      Physical Therapy Discharge Note    Date: 2021      Patient: Megan Narvaez  : 1960  MRN: [id number]    Physician: Dr. Dotson Arch: Lucía (30 max per year)   Medical Diagnosis: left breast CA, cancer related pain                     Rehab Codes: R60.0, M54.5, M79.1, M62.830, R2.2, R29.3, M62.81  R26.82, R53.83, G62.9  Onset Date: 2020 date of script (ongoing past few years)                            Next 's appt. :                                  Cancels/No Shows: 1/  Visit #   Date of initial visit: 2020             Date of final visit: 10-      Subjective: (PER VISIT ON 10-)  Pain:  [x]? Yes  []? No   Location: L side           Pain Rating: (0-10 scale) no rating given  Pain altered Tx:  [x]? No  []? Yes  Action:      Comments: pt comes in this date in good spirits. Presents with increased swelling in her L LE.  Is wearing he lymphedema wraps.        Objective: [] Aquatic Therapy                   [] Vasocompression/    [] Other:             Game Ready    Discharge Status:         [x] Pt to continue exercise/home instructions independently. [x] Therapy interrupted due to: Attempted to reach patient by phone, she did not return my calls. She was being seen in lymphedema and they also asked her if she wanted to continue PT to call us to schedule. Discharge at this time. Electronically signed by Vandana Cavanaugh PT on 2/23/2021 at 12:01 PM      If you have any questions or concerns, please don't hesitate to call.   Thank you for your referral.

## 2021-03-03 ENCOUNTER — HOSPITAL ENCOUNTER (OUTPATIENT)
Facility: MEDICAL CENTER | Age: 61
End: 2021-03-03
Payer: COMMERCIAL

## 2021-03-08 ENCOUNTER — OFFICE VISIT (OUTPATIENT)
Dept: ONCOLOGY | Age: 61
End: 2021-03-08
Payer: COMMERCIAL

## 2021-03-08 ENCOUNTER — HOSPITAL ENCOUNTER (OUTPATIENT)
Facility: MEDICAL CENTER | Age: 61
Discharge: HOME | End: 2021-03-08
Payer: COMMERCIAL

## 2021-03-08 ENCOUNTER — TELEPHONE (OUTPATIENT)
Dept: ONCOLOGY | Age: 61
End: 2021-03-08

## 2021-03-08 VITALS
RESPIRATION RATE: 16 BRPM | TEMPERATURE: 98.4 F | HEART RATE: 80 BPM | SYSTOLIC BLOOD PRESSURE: 179 MMHG | WEIGHT: 233.8 LBS | BODY MASS INDEX: 37.74 KG/M2 | DIASTOLIC BLOOD PRESSURE: 109 MMHG

## 2021-03-08 DIAGNOSIS — Z17.1 MALIGNANT NEOPLASM OF UPPER-OUTER QUADRANT OF LEFT BREAST IN FEMALE, ESTROGEN RECEPTOR NEGATIVE (HCC): Primary | ICD-10-CM

## 2021-03-08 DIAGNOSIS — F17.200 SMOKING: ICD-10-CM

## 2021-03-08 DIAGNOSIS — E66.01 MORBID OBESITY DUE TO EXCESS CALORIES (HCC): ICD-10-CM

## 2021-03-08 DIAGNOSIS — G62.9 NEUROPATHY: ICD-10-CM

## 2021-03-08 DIAGNOSIS — I10 ESSENTIAL HYPERTENSION: ICD-10-CM

## 2021-03-08 DIAGNOSIS — C50.412 MALIGNANT NEOPLASM OF UPPER-OUTER QUADRANT OF LEFT BREAST IN FEMALE, ESTROGEN RECEPTOR NEGATIVE (HCC): Primary | ICD-10-CM

## 2021-03-08 DIAGNOSIS — Z17.1 MALIGNANT NEOPLASM OF UPPER-OUTER QUADRANT OF LEFT BREAST IN FEMALE, ESTROGEN RECEPTOR NEGATIVE (HCC): ICD-10-CM

## 2021-03-08 DIAGNOSIS — G89.29 OTHER CHRONIC PAIN: ICD-10-CM

## 2021-03-08 DIAGNOSIS — C50.412 MALIGNANT NEOPLASM OF UPPER-OUTER QUADRANT OF LEFT BREAST IN FEMALE, ESTROGEN RECEPTOR NEGATIVE (HCC): ICD-10-CM

## 2021-03-08 LAB
ABSOLUTE EOS #: 0.13 K/UL (ref 0–0.44)
ABSOLUTE IMMATURE GRANULOCYTE: 0.02 K/UL (ref 0–0.3)
ABSOLUTE LYMPH #: 3.38 K/UL (ref 1.1–3.7)
ABSOLUTE MONO #: 0.91 K/UL (ref 0.1–1.2)
ALBUMIN SERPL-MCNC: 3.7 G/DL (ref 3.5–5.2)
ALBUMIN/GLOBULIN RATIO: ABNORMAL (ref 1–2.5)
ALP BLD-CCNC: 105 U/L (ref 35–104)
ALT SERPL-CCNC: 9 U/L (ref 5–33)
AMPHETAMINE SCREEN URINE: NEGATIVE
ANION GAP SERPL CALCULATED.3IONS-SCNC: 11 MMOL/L (ref 9–17)
AST SERPL-CCNC: 12 U/L
BARBITURATE SCREEN URINE: NEGATIVE
BASOPHILS # BLD: 1 % (ref 0–2)
BASOPHILS ABSOLUTE: 0.05 K/UL (ref 0–0.2)
BENZODIAZEPINE SCREEN, URINE: NEGATIVE
BILIRUB SERPL-MCNC: 0.16 MG/DL (ref 0.3–1.2)
BUN BLDV-MCNC: 13 MG/DL (ref 8–23)
BUN/CREAT BLD: 19 (ref 9–20)
BUPRENORPHINE URINE: ABNORMAL
CALCIUM SERPL-MCNC: 9.3 MG/DL (ref 8.6–10.4)
CANNABINOID SCREEN URINE: POSITIVE
CHLORIDE BLD-SCNC: 101 MMOL/L (ref 98–107)
CO2: 22 MMOL/L (ref 20–31)
COCAINE METABOLITE, URINE: NEGATIVE
CREAT SERPL-MCNC: 0.68 MG/DL (ref 0.5–0.9)
DIFFERENTIAL TYPE: ABNORMAL
EOSINOPHILS RELATIVE PERCENT: 1 % (ref 1–4)
GFR AFRICAN AMERICAN: >60 ML/MIN
GFR NON-AFRICAN AMERICAN: >60 ML/MIN
GFR SERPL CREATININE-BSD FRML MDRD: ABNORMAL ML/MIN/{1.73_M2}
GFR SERPL CREATININE-BSD FRML MDRD: ABNORMAL ML/MIN/{1.73_M2}
GLUCOSE BLD-MCNC: 111 MG/DL (ref 70–99)
HCT VFR BLD CALC: 44.9 % (ref 36.3–47.1)
HEMOGLOBIN: 14.4 G/DL (ref 11.9–15.1)
IMMATURE GRANULOCYTES: 0 %
LYMPHOCYTES # BLD: 37 % (ref 24–43)
MCH RBC QN AUTO: 29.6 PG (ref 25.2–33.5)
MCHC RBC AUTO-ENTMCNC: 32.1 G/DL (ref 28.4–34.8)
MCV RBC AUTO: 92.4 FL (ref 82.6–102.9)
MDMA URINE: ABNORMAL
METHADONE SCREEN, URINE: NEGATIVE
METHAMPHETAMINE, URINE: ABNORMAL
MONOCYTES # BLD: 10 % (ref 3–12)
NRBC AUTOMATED: 0 PER 100 WBC
OPIATES, URINE: NEGATIVE
OXYCODONE SCREEN URINE: POSITIVE
PDW BLD-RTO: 15.5 % (ref 11.8–14.4)
PHENCYCLIDINE, URINE: NEGATIVE
PLATELET # BLD: 186 K/UL (ref 138–453)
PLATELET ESTIMATE: ABNORMAL
PMV BLD AUTO: 9.6 FL (ref 8.1–13.5)
POTASSIUM SERPL-SCNC: 3.7 MMOL/L (ref 3.7–5.3)
PROPOXYPHENE, URINE: ABNORMAL
RBC # BLD: 4.86 M/UL (ref 3.95–5.11)
RBC # BLD: ABNORMAL 10*6/UL
SEG NEUTROPHILS: 51 % (ref 36–65)
SEGMENTED NEUTROPHILS ABSOLUTE COUNT: 4.7 K/UL (ref 1.5–8.1)
SODIUM BLD-SCNC: 134 MMOL/L (ref 135–144)
TEST INFORMATION: ABNORMAL
TOTAL PROTEIN: 6.7 G/DL (ref 6.4–8.3)
TRICYCLIC ANTIDEPRESSANTS, UR: ABNORMAL
WBC # BLD: 9.2 K/UL (ref 3.5–11.3)
WBC # BLD: ABNORMAL 10*3/UL

## 2021-03-08 PROCEDURE — 4004F PT TOBACCO SCREEN RCVD TLK: CPT | Performed by: INTERNAL MEDICINE

## 2021-03-08 PROCEDURE — 80307 DRUG TEST PRSMV CHEM ANLYZR: CPT

## 2021-03-08 PROCEDURE — 99211 OFF/OP EST MAY X REQ PHY/QHP: CPT | Performed by: INTERNAL MEDICINE

## 2021-03-08 PROCEDURE — G8427 DOCREV CUR MEDS BY ELIG CLIN: HCPCS | Performed by: INTERNAL MEDICINE

## 2021-03-08 PROCEDURE — 80053 COMPREHEN METABOLIC PANEL: CPT

## 2021-03-08 PROCEDURE — 99214 OFFICE O/P EST MOD 30 MIN: CPT | Performed by: INTERNAL MEDICINE

## 2021-03-08 PROCEDURE — 99212 OFFICE O/P EST SF 10 MIN: CPT

## 2021-03-08 PROCEDURE — 85025 COMPLETE CBC W/AUTO DIFF WBC: CPT

## 2021-03-08 PROCEDURE — 36415 COLL VENOUS BLD VENIPUNCTURE: CPT

## 2021-03-08 PROCEDURE — G8484 FLU IMMUNIZE NO ADMIN: HCPCS | Performed by: INTERNAL MEDICINE

## 2021-03-08 PROCEDURE — G8417 CALC BMI ABV UP PARAM F/U: HCPCS | Performed by: INTERNAL MEDICINE

## 2021-03-08 PROCEDURE — 3017F COLORECTAL CA SCREEN DOC REV: CPT | Performed by: INTERNAL MEDICINE

## 2021-03-08 RX ORDER — GABAPENTIN 600 MG/1
600 TABLET ORAL 2 TIMES DAILY
Qty: 60 TABLET | Refills: 2 | Status: SHIPPED | OUTPATIENT
Start: 2021-03-08 | End: 2021-09-13

## 2021-03-08 RX ORDER — OXYCODONE AND ACETAMINOPHEN 10; 325 MG/1; MG/1
1 TABLET ORAL EVERY 6 HOURS PRN
Qty: 120 TABLET | Refills: 0 | Status: SHIPPED | OUTPATIENT
Start: 2021-03-08 | End: 2021-04-07

## 2021-03-08 RX ORDER — HYDROCHLOROTHIAZIDE 50 MG/1
50 TABLET ORAL DAILY
Qty: 30 TABLET | Refills: 1 | Status: SHIPPED | OUTPATIENT
Start: 2021-03-08 | End: 2021-09-13 | Stop reason: SDUPTHER

## 2021-03-08 NOTE — TELEPHONE ENCOUNTER
Cholo Moran MD VISIT  RV IN 3 MTHS W/ LABS  REFERRAL TO PAIN MANAGEMENT  A REFERRAL WAS FAXED TO DR FRANCIS [facility] OFFICE @ [phone]  LABS CDP CMP 6/7/21  MD VISIT 6/7/21 @ 1:30PM  AVS PRINTED W/ INSTRUCTIONS

## 2021-03-08 NOTE — FLOWSHEET NOTE
May schedule in office with me next Tuesday or Wednesday at [facility] [] [facility]) [facility] &  Therapy  [address]  [phone]  F: [phone] [x] [address]  [facility]   [address]  P: [phone]  F: [phone] [] [address]  Therapy  [address] [place] [address]  P: [phone]  F: [phone] [] [address]  [facility]   [address]   [place]: [phone]  F: [phone]      Physical Therapy Daily Treatment Note    Date:  10/19/2020  Patient Name:  Yordy Ahuja    :  1960  MRN: [id number]  Physician: Dr. Lisa Alvarado: Hannah Alonzo (30 max per year)   Medical Diagnosis: left breast CA, cancer related pain                     Rehab Codes: R60.0, M54.5, M79.1, M62.830, R2.2, R29.3, M62.81  R26.82, R53.83, G62.9  Onset Date: 2020 date of script (ongoing past few years)                            Next 's appt.:      Cancels/No Shows: 1/0  Visit #       Subjective:    Pain:  [x] Yes  [] No Location: L side Pain Rating: (0-10 scale) 7/10 \"generalized pain\"  Pain altered Tx:  [x] No  [] Yes  Action:     Comments: Pt reports nothing new at this time. Has consultation later today with OT for lymphedema. Objective:  Modalities: massage to bilateral lumbar area of back, pt side lying on her left.    Precautions:  Exercises:  Exercise Reps/ Time Weight/ Level Comments   NuStep   12  L3              Standing - in // bars         Hip ext 20x each 2# Added weight 10/5/20   abd 20x each 2# Added weight 10/5/20   marching 20x each   2# Added weight 10/5/20   balance   SBA of PT   Feet apart  30 sec Eyes closed Arms across chest   Feet together 30 sec EC Arms across chest   On foam feet apart 30 sec EO    On foam feet together 30 sec EO    On foam feet apart, eyes shut 30 sec  1x slight loss    Tandem Stance 30 sec ea  Added 20, difficulty, multiple LOB               mat         Ankle pumps 20x each       Hip abd- supine, knees bent, butterfly position  10x each       Hip add pillow squeeze  20x      Heel slides  15x    orange slider, unable to fully extend left leg    SKTC with manual overpressure  5x  20 sec     SLR 5xea   knee slightly flexed   Other:      Specific Instructions for next treatment:    Treatment Charges: Mins Units   []  Modalities     X  Ther Exercise 39 2   []  Manual Therapy     []  Ther Activities     []  Aquatics     []  Vasocompression     [x]  Other massage 10 1   Total Treatment time 49 3       Assessment: [x] Progressing toward goals. Pt moves slowly through exercises today, but able to complete all with fair tolerance. Resumed SLR this date; pt able to complete 5 reps each LE. Ended with massage. [] No change. [] Other:   [x] Patient would continue to benefit from skilled physical therapy services in order to improve posture and core strength, improve balance, decrease fall risk, decrease back pain and increase activity tolerance with home tasks and grooming. End of October appt with lymphedema    Brief Fatigue Inventory (9- fully completed)  7/5/10  = Severe    STG: (to be met in 8 treatments)   1. ? Pain: 25% to 5/10 maximum in back, met 9-  2. Demonstrate 10 minutes of walking ability with upright posturing 9- able with household chores, not walking outside due to poor sidewalks. 3. ? Function: Ability to stand 10 minutes to cook a light meal, met 9-  4. Patient to be independent with home exercise program as demonstrated by performance with correct form without cues. Met 9-     LTG: (to be met in 16 treatments)  1. Improve TUG score by 4 seconds )20%)  2. Ability to sit to stand from a chair without use of arms to demonstrate improved LE and core strength  3. Able to sleep 3 hours uninterrupted by back pain  4.  Ability to stand upright without forward trunk flexion  5. Ability to get a supportive shoe on and off.        Patient goals:to stand up straight and walk right     Pt. Education:  [] Yes  [x] No  [] Reviewed Prior HEP/Ed  Method of Education: [] Verbal  [] Demo  [] Written  8- issued blue band for sitting scapular rows for postural strengthening  Comprehension of Education:  [] Verbalizes understanding. [] Demonstrates understanding. [] Needs review. [] Demonstrates/verbalizes HEP/Ed previously given. Plan: [x] Continue current frequency toward long and short term goals. [x] Specific Instructions for subsequent treatments: Continue to work on balance, LE strength and decreasing back pain.        Time In: 11:03am       Time Out: 12:15pm    Electronically signed by:  Senait Nielsen PTA

## 2021-03-08 NOTE — PROGRESS NOTES
Brock Weeks                                                                                                                  3/8/2021  MRN:   [id number]  YOB: 1960  PCP:                           Rory Tate MD  Referring Physician: No ref. provider found  Treating Physician Name: Katharina Crisostomo MD      Reason for visit:  Chief Complaint   Patient presents with    Follow-up     review status of disease    Numbness     getting worse    Pain    Other     question about donating plasma to daughter    Nausea    Other     cold all day but hot all night    Headache     3 times a week, severe at times    Dizziness     sometimes    Other     dry mouth with mask use. spit is foamy.  Results     bone scan       Current problems:   IDC of Left Breast cancer, Triple negative, pT2,pN0,M0    Active and recent treatments:  Bilateral Mastectomy with left sentinel lymph node  Adjuvant chemotherapy using dose denseAC followed by T on 4/11/19--- 8/ /19    Summary of Case/History:    Brock Weeks a 61 y. o.female invasive carcinoma of the breast.     Patient initially felt a lump in her left breast for 23 months. She underwent screening mammogram which showed 2 cm mass in upper outer left breast.  Right breast do not have any concerning findings. Ultrasound of left breast conformed mass measuring 1.9 cm in the upper outer left breast.  Patient underwent biopsy on 1/23/19 which revealed invasive ductal carcinoma, grade 3, ER/OH negative. HER-2 results are pending.     Patient has history of uterus/cervical cancer diagnosed in 1992. Patient states she underwent surgery for the cervical cancer. She does not recall getting any radiation therapy or chemotherapy.   Patient states she has been interested in getting bilateral breast reduction due to cosmetic reasons however now with a diagnosis of breast cancer she wishes to proceed with bilateral mastectomy.     Patient has history of breast cancer in her mother as well as 3 paternal aunts. Patient's mother also had cervical and uterine cancer.     Patient underwent bilateral mastectomy with left sentinel lymph node biopsy. Pathological stage was T2, N0, M0    Started chemotherapy for triple negative breast cancer using AC followed by T on 4/11/19    Interim History:    Patient presents to the clinic for a follow-up visit and to discuss results of her lab work-up and further treatment plan. Patient is complaining of worsening of back pain and right hip pain which radiates down into her thigh. She has not been able to establish care with a pain doctor. Denies hospitalization or ER visit. Denies noticing any mass or lump on her chest.  Denies any swollen lymph nodes. Drug screen is positive for pain medication and cannabinoids. Negative for any other drug. During this visit patient's allergy, social, medical, surgical history and medications were reviewed and updated.     Past Medical History:   Past Medical History:   Diagnosis Date    Arthritis     GENERALIZED ALL OVER RHEUMATOID AND OSTEO    Breast cancer ([address]     left breast    Cancer ([address] 1992    CERVICAL, UTERINE    Chronic back pain     scioliosis,  02/2019 WEARING A BACK BRACE    Degenerative disorder of bone     ALL OVER    Depression     Difficult intravenous access     HANDS AR BEST SITE    GERD (gastroesophageal reflux disease)     Hyperlipidemia     HX OF NO MEDS IN YEARS    Hypertension 2007    Pain management clinic    Insomnia     Neuropathy     hands and feet    Obesity     BMI -  43    Sleep apnea 2016    NO LONGER USES MACHINE    Snores     has been told by family that she stops breathing    Use of cane as ambulatory aid     Wears glasses     READING    Wears glasses     \"CHEATERS\"       Past Surgical History:     Past Surgical History:   Procedure Laterality Date    BREAST SURGERY Left 1990    CYST    BUNIONECTOMY Bilateral    [address] COLONOSCOPY  10/13/2017    FOOT SURGERY Left 2014    PINS IN ALL TOES    FOOT SURGERY Left 5/27/2020    HARDWARE REMOVAL LEFT GREAT TOE- performed by Linda Abreu DPM at [address] [facility] Bilateral    [address], [place]ERV[place]     MASTECTOMY Bilateral 2/12/2019    TOTAL MASTECTOMY performed by Aurelia Zuniga DO at [address], BILATERAL Bilateral 02/12/2019     TOTAL MASTECTOMY,  AXILLARY SENTINEL LYMPH NODE BIOPSY, FROZEN SECTION     CT COLSC FLX W/REMOVAL LESION BY HOT BX FORCEPS N/A 10/13/2017    COLONOSCOPY WITH BIOPSY AND POLYPECTOMY performed by Jennifer Jovel IV, DO at [address]     TUNNELED VENOUS PORT PLACEMENT  03/27/2019    X-RAY FOOT 3+VW      both       Patient Family Social History:     Social History     Socioeconomic History    Marital status: Single     Spouse name: Not on file    Number of children: Not on file    Years of education: Not on file    Highest education level: Not on file   Occupational History    Not on file   Social Needs    Financial resource strain: Not on file    Food insecurity     Worry: Not on file     Inability: Not on file   [employer] needs     Medical: Not on file     Non-medical: Not on file   Tobacco Use    Smoking status: Current Some Day Smoker     Packs/day: 0.25     Years: 41.00     Pack years: 10.25     Types: Cigarettes    Smokeless tobacco: Never Used    Tobacco comment: STATES SMOKES A PACK A WEEK   Substance and Sexual Activity    Alcohol use: No    Drug use: Yes     Frequency: 7.0 times per week     Types: Marijuana     Comment: LAST USE 02/10/2018, AND PAIN PILLS     Sexual activity: Not Currently   Lifestyle    Physical activity     Days per week: Not on file     Minutes per session: Not on file    Stress: Not on file   Relationships    Social connections     Talks on phone: Not on file     Gets together: Not on file     Attends Synagogue service: Not on file Active member of club or organization: Not on file     Attends meetings of clubs or organizations: Not on file     Relationship status: Not on file    Intimate partner violence     Fear of current or ex partner: Not on file     Emotionally abused: Not on file     Physically abused: Not on file     Forced sexual activity: Not on file   Other Topics Concern    Not on file   Social History Narrative    Not on file      Family History   Problem Relation Age of Onset    High Blood Pressure Mother     Cancer Mother         cervical and breast    Diabetes Father     Heart Attack Father     Heart Disease Maternal Aunt         times 2      Current Medications:     Current Outpatient Medications   Medication Sig Dispense Refill    oxyCODONE-acetaminophen (PERCOCET)  MG per tablet Take 1 tablet by mouth every 6 hours as needed for Pain for up to 30 days. 120 tablet 0    LORazepam (ATIVAN) 1 MG tablet Take 1 mg by mouth every 6 hours as needed for Anxiety. Take 4 tab 2 hours prior to dental surgery      gabapentin (NEURONTIN) 300 MG capsule Take 1 capsule by mouth 3 times daily for 30 days. 90 capsule 1    hydroCHLOROthiazide (HYDRODIURIL) 50 MG tablet TAKE 1 TABLET BY MOUTH DAILY 30 tablet 1     No current facility-administered medications for this visit. Allergies:   Lisinopril, Bee venom, Amino acids, and Sulfa antibiotics    Review of Systems:    Constitutional: No fever or chills. No night sweats.   Weight now stable  Eyes: No eye discharge, double vision, or eye pain   HEENT: negative for sore mouth, sore throat, hoarseness and voice change   Respiratory: URI with productive cough, sneezing, sinus drainage and congestion, runny eyes  Cardiovascular: negative for chest pain, dyspnea, palpitations, orthopnea, PND   Gastrointestinal: negative for nausea, vomiting, diarrhea, constipation, Dysphagia, hematemesis and hematochezia  Genitourinary: negative for frequency, dysuria, nocturia, urinary incontinence, and hematuria   Integument: negative for rash, skin lesions, bruises.   Nails now growing back slowly, toenail fungus  Hematologic/Lymphatic: negative for easy bruising, bleeding, lymphadenopathy, or petechiae   Endocrine: negative for heat or cold intolerance,weight changes, change in bowel habits and hair loss   Musculoskeletal: negative for pain, joint swelling; +arhtralgias and bone pain +myalgias, pain worsened  Neurological: negative for headaches, dizziness, seizures, weakness; + neuropathy is nearly resolved        Physical Exam:    Vitals: BP (!) 179/109   Pulse 80   Temp 98.4 °F (36.9 °C) (Oral)   Resp 16   Wt 233 lb 12.8 oz (106.1 kg)   LMP 02/11/1992   BMI 37.74 kg/m²   General appearance - well appearing, no in pain or distress  Mental status - AAO X3  Eyes - pupils equal and reactive, extraocular eye movements intact  Mouth - mucous membranes moist, pharynx normal without lesions  Neck - supple, no significant adenopathy  Lymphatics - no palpable lymphadenopathy, no hepatosplenomegaly  Chest - clear to auscultation, no wheezes, rales or rhonchi, symmetric air entry  Heart - normal rate, regular rhythm, normal S1, S2, no murmurs  Abdomen - soft, nontender, nondistended, no masses or organomegaly  Neurological - alert, oriented, normal speech, no focal findings or movement disorder noted; +neuropathy - stable  Extremities -bilateral ankle edema  Skin - normal coloration and turgor, no rashes, no suspicious skin lesions noted; toenail fungus  Breast- bilateral mastectomy, no masses or lumps, excess skin on right side - no lymphedema appreciated        DATA:  Lab Results   Component Value Date    WBC 9.2 03/08/2021    HGB 14.4 03/08/2021    HCT 44.9 03/08/2021    MCV 92.4 03/08/2021     03/08/2021       Chemistry        Component Value Date/Time     (L) 03/08/2021 1216    K 3.7 03/08/2021 1216     03/08/2021 1216    CO2 22 03/08/2021 1216    BUN 13 03/08/2021 1216 CREATININE 0.68 03/08/2021 1216        Component Value Date/Time    CALCIUM 9.3 03/08/2021 1216    ALKPHOS 105 (H) 03/08/2021 1216    AST 12 03/08/2021 1216    ALT 9 03/08/2021 1216    BILITOT 0.16 (L) 03/08/2021 1216          Results for Ilan Alcazar (MRN [id number]) as of 1/24/2021 08:08   Ref.  Range 1/11/2021 14:29   Amphetamine Screen, Ur Latest Ref Range: NEGATIVE  NEGATIVE   Barbiturate Screen, Ur Latest Ref Range: NEGATIVE  NEGATIVE   Benzodiazepine Screen, Urine Latest Ref Range: NEGATIVE  NEGATIVE   Buprenorphine Urine Latest Ref Range: NEGATIVE  NOT REPORTED   Cannabinoid Scrn, Ur Latest Ref Range: NEGATIVE  POSITIVE (A)   Cocaine Metabolite, Urine Latest Ref Range: NEGATIVE  NEGATIVE   Methadone Screen, Urine Latest Ref Range: NEGATIVE  NEGATIVE   Methamphetamine, Urine Latest Ref Range: NEGATIVE  NOT REPORTED   Oxycodone Screen, Ur Latest Ref Range: NEGATIVE  POSITIVE (A)   Propoxyphene, Urine Latest Ref Range: NEGATIVE  NOT REPORTED   Opiates, Urine Latest Ref Range: NEGATIVE  NEGATIVE   Tricyclic Antidepressants, Urine Latest Ref Range: NEGATIVE  NOT REPORTED   MDMA, Urine Latest Ref Range: NEGATIVE  NOT REPORTED       Narrative   EXAMINATION:   BONE DENSITOMETRY       11/25/2020 1:40 pm       TECHNIQUE:   A bone density dual x-ray absorptiometry (DEXA) scan was performed of the   lumbar spine, left hip, and left forearm on a [employer] system.       COMPARISON:   None.       HISTORY:   ORDERING SYSTEM PROVIDED HISTORY: Malignant neoplasm of upper-outer quadrant   of left breast in female, estrogen receptor negative ([facility]   TECHNOLOGIST PROVIDED HISTORY:   bone scan       FINDINGS:   LUMBAR SPINE:       The bone mineral density in the lumbar spine including the L1-L2 levels is   measured at 1.234 g/cm2, which corresponds to a T-score of 0.6 and a Z-score   of -0.1.  This is within the normal range by WHO criteria.       LEFT HIP:       The bone mineral density in the total hip is measured at 1.111 g/cm2   corresponding to a T-score of 0.8 and a Z-score of -0.1.  This is within the   normal range by WHO criteria.       The bone mineral density of the femoral neck is measured at 1.283 g/cm2   corresponding to a T-score of 1.8 and a Z-score of 1.3.  This is within the   normal range by [facility] criteria.       LEFT FOREARM:       The bone mineral density in 33% of the radius is measured at 0.846 g/cm2   corresponding to a T-score of -0.3 and a Z-score of -0.1.  This is within the   normal range by [facility] criteria.           Impression   Normal by WHO criteria.               Impression:  Invasive ductal carcinoma of left breast, triple negative, pT2,p N0, M0  Status post bilateral mastectomy and left sentinel lymph node biopsy  Personal history of gynecological malignancy (uterus/cervical cancer) status post surgery in 1992  Cancer related pain  Leg swelling  Anemia secondary to chemotherapy  Family history of malignancy  Chronic back pain  Hypertension  Obesity      Plan:  Reviewed available clinical data  Clinically patient continues to do well in regards to the breast cancer. Examination does not reveal any evidence of disease recurrence. Patient is having worsening back pain and hip pain. Patient pain is chronic and clinically my suspicion for pain caused by a bone metastasis is low. We will continue to monitor if pain continues to worsen we will order scans at that point  Continue to monitor for neuropathy. Will increase dose of Neurontin to 60 mg twice daily  Patient was also given prescription for Percocet  Reviewed results of urine drug screen  Patient counseled on use of pain medication. Patient has not been able to establish care with the pain physician. I will put in a new referral  Discussed natural history of breast cancer  DEXA scan reviewed was within normal range  Patient counseled tobacco cessation  Patient counseled on active lifestyle. Continue age-appropriate screening studies.   Return to clinic in 3 months. Chelsie Bernstein        This note is created with the assistance of a speech recognition program.  While intending to generate a document that actually reflects the content of the visit, the document can still have some errors including those of syntax and sound a like substitutions which may escape proof reading. It such instances, actual meaning can be extrapolated by contextual diversion.

## 2021-04-08 DIAGNOSIS — C50.412 MALIGNANT NEOPLASM OF UPPER-OUTER QUADRANT OF LEFT BREAST IN FEMALE, ESTROGEN RECEPTOR NEGATIVE (HCC): Primary | ICD-10-CM

## 2021-04-08 DIAGNOSIS — F17.200 SMOKING: ICD-10-CM

## 2021-04-08 DIAGNOSIS — G62.9 NEUROPATHY: ICD-10-CM

## 2021-04-08 DIAGNOSIS — E66.01 MORBID OBESITY DUE TO EXCESS CALORIES (HCC): ICD-10-CM

## 2021-04-08 DIAGNOSIS — Z17.1 MALIGNANT NEOPLASM OF UPPER-OUTER QUADRANT OF LEFT BREAST IN FEMALE, ESTROGEN RECEPTOR NEGATIVE (HCC): Primary | ICD-10-CM

## 2021-04-08 RX ORDER — OXYCODONE AND ACETAMINOPHEN 10; 325 MG/1; MG/1
1 TABLET ORAL EVERY 6 HOURS PRN
Qty: 120 TABLET | Refills: 0 | Status: SHIPPED | OUTPATIENT
Start: 2021-04-08 | End: 2021-05-08

## 2021-04-08 NOTE — TELEPHONE ENCOUNTER
PT TO GO TO PAIN MANAGEMENT PER MD NOTE. PT STATES APPT 4/22/2021. NO APPT IN [facility]?     ASKING FOR REFILL

## 2021-04-23 ENCOUNTER — TELEPHONE (OUTPATIENT)
Dept: ONCOLOGY | Age: 61
End: 2021-04-23

## 2021-04-23 DIAGNOSIS — G62.9 NEUROPATHY: ICD-10-CM

## 2021-04-23 DIAGNOSIS — G89.29 OTHER CHRONIC PAIN: Primary | ICD-10-CM

## 2021-04-23 NOTE — TELEPHONE ENCOUNTER
SARAH LEFT A BRIEF MESSAGE IN TRIAGE. \"I NEED TO HAVE DR MACE CALL ME ASAP, I HAVE ANOTHER APPT AT 2 SO I CAN TALK AFTER THAT\"    [phone] IS THE CONTACT NUMBER LEFT.

## 2021-04-27 NOTE — TELEPHONE ENCOUNTER
SARAH CALLING BACK AND IS NOT HAPPY THAT SHE HAS NOT HAD RETURN CALL.     SHE NOW STATES SHE SAW DR Betty Almeida AND HE WANTS TO DO AN EPIDURAL.    SARAH PLANS ON LEAVING FOR [facility] 05/08/21 AND \"I WILL NEED PAIN MEDS BEFORE I GO\"    PLEASE ADVISE

## 2021-04-28 RX ORDER — OXYCODONE AND ACETAMINOPHEN 10; 325 MG/1; MG/1
1 TABLET ORAL EVERY 6 HOURS PRN
Qty: 120 TABLET | Refills: 0 | Status: SHIPPED | OUTPATIENT
Start: 2021-05-07 | End: 2021-06-07 | Stop reason: SDUPTHER

## 2021-04-28 RX ORDER — GABAPENTIN 600 MG/1
600 TABLET ORAL 2 TIMES DAILY
Qty: 60 TABLET | Refills: 0 | Status: SHIPPED | OUTPATIENT
Start: 2021-05-07 | End: 2021-08-10 | Stop reason: SDUPTHER

## 2021-05-28 ENCOUNTER — HOSPITAL ENCOUNTER (OUTPATIENT)
Facility: MEDICAL CENTER | Age: 61
End: 2021-05-28
Payer: COMMERCIAL

## 2021-06-07 ENCOUNTER — HOSPITAL ENCOUNTER (OUTPATIENT)
Facility: MEDICAL CENTER | Age: 61
Discharge: HOME | End: 2021-06-07
Payer: COMMERCIAL

## 2021-06-07 ENCOUNTER — OFFICE VISIT (OUTPATIENT)
Dept: ONCOLOGY | Age: 61
End: 2021-06-07
Payer: COMMERCIAL

## 2021-06-07 ENCOUNTER — TELEPHONE (OUTPATIENT)
Dept: ONCOLOGY | Age: 61
End: 2021-06-07

## 2021-06-07 VITALS
TEMPERATURE: 95.2 F | HEART RATE: 63 BPM | DIASTOLIC BLOOD PRESSURE: 104 MMHG | WEIGHT: 244.9 LBS | BODY MASS INDEX: 39.53 KG/M2 | SYSTOLIC BLOOD PRESSURE: 144 MMHG

## 2021-06-07 DIAGNOSIS — C50.412 MALIGNANT NEOPLASM OF UPPER-OUTER QUADRANT OF LEFT BREAST IN FEMALE, ESTROGEN RECEPTOR NEGATIVE (HCC): Primary | ICD-10-CM

## 2021-06-07 DIAGNOSIS — Z17.1 MALIGNANT NEOPLASM OF UPPER-OUTER QUADRANT OF LEFT BREAST IN FEMALE, ESTROGEN RECEPTOR NEGATIVE (HCC): ICD-10-CM

## 2021-06-07 DIAGNOSIS — Z17.1 MALIGNANT NEOPLASM OF UPPER-OUTER QUADRANT OF LEFT BREAST IN FEMALE, ESTROGEN RECEPTOR NEGATIVE (HCC): Primary | ICD-10-CM

## 2021-06-07 DIAGNOSIS — E66.01 MORBID OBESITY DUE TO EXCESS CALORIES (HCC): ICD-10-CM

## 2021-06-07 DIAGNOSIS — G89.29 OTHER CHRONIC PAIN: ICD-10-CM

## 2021-06-07 DIAGNOSIS — G62.9 NEUROPATHY: ICD-10-CM

## 2021-06-07 DIAGNOSIS — F17.200 SMOKING: ICD-10-CM

## 2021-06-07 DIAGNOSIS — C50.412 MALIGNANT NEOPLASM OF UPPER-OUTER QUADRANT OF LEFT BREAST IN FEMALE, ESTROGEN RECEPTOR NEGATIVE (HCC): ICD-10-CM

## 2021-06-07 LAB
ABSOLUTE EOS #: 0.1 K/UL (ref 0–0.44)
ABSOLUTE IMMATURE GRANULOCYTE: 0.05 K/UL (ref 0–0.3)
ABSOLUTE LYMPH #: 2.61 K/UL (ref 1.1–3.7)
ABSOLUTE MONO #: 0.7 K/UL (ref 0.1–1.2)
ALBUMIN SERPL-MCNC: 4.1 G/DL (ref 3.5–5.2)
ALBUMIN/GLOBULIN RATIO: ABNORMAL (ref 1–2.5)
ALP BLD-CCNC: 106 U/L (ref 35–104)
ALT SERPL-CCNC: 8 U/L (ref 5–33)
AMPHETAMINE SCREEN URINE: NEGATIVE
ANION GAP SERPL CALCULATED.3IONS-SCNC: 12 MMOL/L (ref 9–17)
AST SERPL-CCNC: 11 U/L
BARBITURATE SCREEN URINE: NEGATIVE
BASOPHILS # BLD: 1 % (ref 0–2)
BASOPHILS ABSOLUTE: 0.05 K/UL (ref 0–0.2)
BENZODIAZEPINE SCREEN, URINE: NEGATIVE
BILIRUB SERPL-MCNC: 0.19 MG/DL (ref 0.3–1.2)
BUN BLDV-MCNC: 11 MG/DL (ref 8–23)
BUN/CREAT BLD: 9 (ref 9–20)
BUPRENORPHINE URINE: ABNORMAL
CALCIUM SERPL-MCNC: 9.3 MG/DL (ref 8.6–10.4)
CANNABINOID SCREEN URINE: POSITIVE
CHLORIDE BLD-SCNC: 103 MMOL/L (ref 98–107)
CO2: 24 MMOL/L (ref 20–31)
COCAINE METABOLITE, URINE: NEGATIVE
CREAT SERPL-MCNC: 1.23 MG/DL (ref 0.5–0.9)
DIFFERENTIAL TYPE: ABNORMAL
EOSINOPHILS RELATIVE PERCENT: 1 % (ref 1–4)
GFR AFRICAN AMERICAN: 54 ML/MIN
GFR NON-AFRICAN AMERICAN: 45 ML/MIN
GFR SERPL CREATININE-BSD FRML MDRD: ABNORMAL ML/MIN/{1.73_M2}
GFR SERPL CREATININE-BSD FRML MDRD: ABNORMAL ML/MIN/{1.73_M2}
GLUCOSE BLD-MCNC: 99 MG/DL (ref 70–99)
HCT VFR BLD CALC: 44.4 % (ref 36.3–47.1)
HEMOGLOBIN: 14.6 G/DL (ref 11.9–15.1)
IMMATURE GRANULOCYTES: 1 %
LYMPHOCYTES # BLD: 30 % (ref 24–43)
MCH RBC QN AUTO: 30.2 PG (ref 25.2–33.5)
MCHC RBC AUTO-ENTMCNC: 32.9 G/DL (ref 28.4–34.8)
MCV RBC AUTO: 91.9 FL (ref 82.6–102.9)
MDMA URINE: ABNORMAL
METHADONE SCREEN, URINE: NEGATIVE
METHAMPHETAMINE, URINE: ABNORMAL
MONOCYTES # BLD: 8 % (ref 3–12)
NRBC AUTOMATED: 0 PER 100 WBC
OPIATES, URINE: NEGATIVE
OXYCODONE SCREEN URINE: POSITIVE
PDW BLD-RTO: 15.3 % (ref 11.8–14.4)
PHENCYCLIDINE, URINE: NEGATIVE
PLATELET # BLD: 226 K/UL (ref 138–453)
PLATELET ESTIMATE: ABNORMAL
PMV BLD AUTO: 9.7 FL (ref 8.1–13.5)
POTASSIUM SERPL-SCNC: 3.9 MMOL/L (ref 3.7–5.3)
PROPOXYPHENE, URINE: ABNORMAL
RBC # BLD: 4.83 M/UL (ref 3.95–5.11)
RBC # BLD: ABNORMAL 10*6/UL
SEG NEUTROPHILS: 59 % (ref 36–65)
SEGMENTED NEUTROPHILS ABSOLUTE COUNT: 5.34 K/UL (ref 1.5–8.1)
SODIUM BLD-SCNC: 139 MMOL/L (ref 135–144)
TEST INFORMATION: ABNORMAL
TOTAL PROTEIN: 6.9 G/DL (ref 6.4–8.3)
TRICYCLIC ANTIDEPRESSANTS, UR: ABNORMAL
WBC # BLD: 8.9 K/UL (ref 3.5–11.3)
WBC # BLD: ABNORMAL 10*3/UL

## 2021-06-07 PROCEDURE — 85025 COMPLETE CBC W/AUTO DIFF WBC: CPT

## 2021-06-07 PROCEDURE — G8427 DOCREV CUR MEDS BY ELIG CLIN: HCPCS | Performed by: INTERNAL MEDICINE

## 2021-06-07 PROCEDURE — 4004F PT TOBACCO SCREEN RCVD TLK: CPT | Performed by: INTERNAL MEDICINE

## 2021-06-07 PROCEDURE — 80053 COMPREHEN METABOLIC PANEL: CPT

## 2021-06-07 PROCEDURE — 36415 COLL VENOUS BLD VENIPUNCTURE: CPT

## 2021-06-07 PROCEDURE — 3017F COLORECTAL CA SCREEN DOC REV: CPT | Performed by: INTERNAL MEDICINE

## 2021-06-07 PROCEDURE — 80307 DRUG TEST PRSMV CHEM ANLYZR: CPT

## 2021-06-07 PROCEDURE — G8417 CALC BMI ABV UP PARAM F/U: HCPCS | Performed by: INTERNAL MEDICINE

## 2021-06-07 PROCEDURE — 99214 OFFICE O/P EST MOD 30 MIN: CPT | Performed by: INTERNAL MEDICINE

## 2021-06-07 RX ORDER — IRON HEME POLYPEPTIDE/FOLIC AC 12-1MG
5000 TABLET ORAL WEEKLY
Qty: 13 CAPSULE | Refills: 1 | Status: SHIPPED | OUTPATIENT
Start: 2021-06-07 | End: 2021-09-13

## 2021-06-07 RX ORDER — OXYCODONE AND ACETAMINOPHEN 10; 325 MG/1; MG/1
1 TABLET ORAL EVERY 4 HOURS PRN
COMMUNITY
End: 2021-07-12 | Stop reason: SDUPTHER

## 2021-06-07 RX ORDER — OXYCODONE AND ACETAMINOPHEN 10; 325 MG/1; MG/1
1 TABLET ORAL EVERY 6 HOURS PRN
Qty: 120 TABLET | Refills: 0 | Status: SHIPPED | OUTPATIENT
Start: 2021-06-07 | End: 2021-07-07

## 2021-06-07 NOTE — TELEPHONE ENCOUNTER
Sky Hsu MD VISIT  [facility] W/ LABS  LABS CDP CMP URINE SCREEN 9/13/21  MD VISIT 9/13/21 @ 1:15PM  AVS PRINTED W/ INSTRUCTIONS AND GIVEN TO PT ON EXIT

## 2021-06-21 ENCOUNTER — HOSPITAL ENCOUNTER (OUTPATIENT)
Dept: OCCUPATIONAL THERAPY | Age: 61
Setting detail: THERAPIES SERIES
Discharge: HOME | End: 2021-06-21

## 2021-06-21 NOTE — DISCHARGE SUMMARY
TREATMENT LOCATION:   [] [facility] [address] [place], [place] [address]a 77: [phone]  F: [phone] [] [address]: [phone]  F: [phone]     Lymphedema Therapy Discharge Note    Date: 2021      Patient: Brant Quintero  : 1960  MRN: [id number]    Referring Physician: Dagoberto Forbes MD  Phone Number: [phone]   Fax Number:   Insurance: Nadine Bautista -   Medical Diagnosis: Malignant neoplasm of upper-outer quadrant of left breast in female, estrogen receptor negative ([facility] (C50.412,Z17.1 [ICD-10-CM])                Onset Date: 10/2015            Total visits attended: 7        Cancels/No shows: 10  ( last time on 21)  Date of initial visit: 10/19/2020                Date of final visit: 21      Subjective:  Refer to initial evaluation. Objective:  Refer to initial evaluation. Assessment:  Refer to initial evaluation. Treatment to Date:  [x] Therapeutic Exercise      [] Therapeutic Activity       [x] Instruction in Home Exercise Program                       [x] Manual Therapy  [x] Self-Care/Home Management  [x] Vasopneumatic Pump             [] Other:    Discharge Status:   [x] Pt to continue exercise/home instructions independently  [x] Pt has not called or returned to clinic in over 90 days. [] Therapy interrupted due to:  [] Pt has 2 or more no shows/cancels, is discontinued per our policy. [] Pt has completed prescribed number of treatment sessions. [] Other:                 Electronically signed by Ayaka Rowe OT on 2021 at 3:26 PM    If you have any questions or concerns, please don't hesitate to call. If patient would like to return to the clinic a new referral will be necessary. Thank you again for your referral and allowing us to assist in the care of this patient!

## 2021-06-25 NOTE — PROGRESS NOTES
Taylor Montalvo                                                                                                                  6/7/2021  MRN:   [id number]  YOB: 1960  PCP:                           Nir Huerta MD  Referring Physician: No ref. provider found  Treating Physician Name: Kirstin Dwyer MD      Reason for visit:  Chief Complaint   Patient presents with    Follow-up    Discuss Labs       Current problems:   IDC of Left Breast cancer, Triple negative, pT2,pN0,M0    Active and recent treatments:  Bilateral Mastectomy with left sentinel lymph node  Adjuvant chemotherapy using dose denseAC followed by T on 4/11/19--- 8/ /19    Summary of Case/History:    Taylor Montalvo a 61 y. o.female invasive carcinoma of the breast.     Patient initially felt a lump in her left breast for 23 months. She underwent screening mammogram which showed 2 cm mass in upper outer left breast.  Right breast do not have any concerning findings. Ultrasound of left breast conformed mass measuring 1.9 cm in the upper outer left breast.  Patient underwent biopsy on 1/23/19 which revealed invasive ductal carcinoma, grade 3, ER/ID negative. HER-2 results are pending.     Patient has history of uterus/cervical cancer diagnosed in 1992. Patient states she underwent surgery for the cervical cancer. She does not recall getting any radiation therapy or chemotherapy. Patient states she has been interested in getting bilateral breast reduction due to cosmetic reasons however now with a diagnosis of breast cancer she wishes to proceed with bilateral mastectomy.     Patient has history of breast cancer in her mother as well as 3 paternal aunts. Patient's mother also had cervical and uterine cancer.     Patient underwent bilateral mastectomy with left sentinel lymph node biopsy.   Pathological stage was T2, N0, M0    Started chemotherapy for triple negative breast cancer using AC followed by T on 4/11/19    Interim History:    Patient presents to the clinic for follow-up visit and to discuss results of lab work-up and other relevant clinical data. Denies hospitalization ER visit. Continues to complain of chronic back pain. Denies noticing any new lump chest wall or any swollen lymph nodes. Patient resubmitted urine for drug screen. During this visit patient's allergy, social, medical, surgical history and medications were reviewed and updated.     Past Medical History:   Past Medical History:   Diagnosis Date    Arthritis     GENERALIZED ALL OVER RHEUMATOID AND OSTEO    Breast cancer ([address]     left breast    Cancer ([address] 1992    CERVICAL, UTERINE    Chronic back pain     scioliosis,  02/2019 WEARING A BACK BRACE    Degenerative disorder of bone     ALL OVER    Depression     Difficult intravenous access     HANDS AR BEST SITE    GERD (gastroesophageal reflux disease)     Hyperlipidemia     HX OF NO MEDS IN YEARS    Hypertension 2007    Pain management clinic    Insomnia     Neuropathy     hands and feet    Obesity     BMI -  43    Sleep apnea 2016    NO LONGER USES MACHINE    Snores     has been told by family that she stops breathing    Use of cane as ambulatory aid     Wears glasses     READING    Wears glasses     \"CHEATERS\"       Past Surgical History:     Past Surgical History:   Procedure Laterality Date    BREAST SURGERY Left 1990    CYST    BUNIONECTOMY Bilateral     CERVIX SURGERY  1992    COLONOSCOPY  10/13/2017    FOOT SURGERY Left 2014    PINS IN ALL TOES    FOOT SURGERY Left 5/27/2020    HARDWARE REMOVAL LEFT GREAT TOE- performed by Lavina Gaucher, DPM at [address] [facility] Bilateral    [address], CERVIX     MASTECTOMY Bilateral 2/12/2019    TOTAL MASTECTOMY performed by Lu Avina DO at [address], BILATERAL Bilateral 02/12/2019     TOTAL MASTECTOMY,  AXILLARY SENTINEL LYMPH NODE BIOPSY, FROZEN SECTION     MO COLSC FLX W/REMOVAL LESION BY HOT BX FORCEPS N/A 10/13/2017    COLONOSCOPY WITH BIOPSY AND POLYPECTOMY performed by Jon Jovel IV, DO at [address]     TUNNELED VENOUS PORT PLACEMENT  03/27/2019    X-RAY FOOT 3+VW      both       Patient Family Social History:     Social History     Socioeconomic History    Marital status: Single     Spouse name: Not on file    Number of children: Not on file    Years of education: Not on file    Highest education level: Not on file   Occupational History    Not on file   Tobacco Use    Smoking status: Current Some Day Smoker     Packs/day: 0.25     Years: 41.00     Pack years: 10.25     Types: Cigarettes    Smokeless tobacco: Never Used    Tobacco comment: STATES SMOKES A PACK A WEEK   Vaping Use    Vaping Use: Never used   Substance and Sexual Activity    Alcohol use: No    Drug use: Yes     Frequency: 7.0 times per week     Types: Marijuana     Comment: LAST USE 02/10/2018, AND PAIN PILLS     Sexual activity: Not Currently   Other Topics Concern    Not on file   Social History Narrative    Not on file     Social Determinants of Health     Financial Resource Strain:     Difficulty of Paying Living Expenses:    Food Insecurity:     Worried About Running Out of Food in the Last Year:     Ran Out of Food in the Last Year:    Transportation Needs:     Lack of Transportation (Medical):      Lack of Transportation (Non-Medical):    Physical Activity:     Days of Exercise per Week:     Minutes of Exercise per Session:    Stress:     Feeling of Stress :    Social Connections:     Frequency of Communication with Friends and Family:     Frequency of Social Gatherings with Friends and Family:     Attends Sikhism Services:     Active Member of Clubs or Organizations:     Attends Club or Organization Meetings:     Marital Status:    Intimate Partner Violence:     Fear of Current or Ex-Partner:     Emotionally Abused:     Physically Abused:  Sexually Abused:       Family History   Problem Relation Age of Onset    High Blood Pressure Mother     Cancer Mother         cervical and breast    Diabetes Father     Heart Attack Father     Heart Disease Maternal Aunt         times 2      Current Medications:     Current Outpatient Medications   Medication Sig Dispense Refill    oxyCODONE-acetaminophen (PERCOCET)  MG per tablet Take 1 tablet by mouth every 4 hours as needed for Pain.  oxyCODONE-acetaminophen (PERCOCET)  MG per tablet Take 1 tablet by mouth every 6 hours as needed for Pain for up to 30 days. Intended supply: 30 days 120 tablet 0    vitamin D (DIALYVITE VITAMIN D 5000) 125 MCG (5000 UT) CAPS capsule Take 1 capsule by mouth once a week 13 capsule 1    hydroCHLOROthiazide (HYDRODIURIL) 50 MG tablet Take 1 tablet by mouth daily 30 tablet 1    gabapentin (NEURONTIN) 600 MG tablet Take 1 tablet by mouth 2 times daily for 30 days. 60 tablet 2    gabapentin (NEURONTIN) 600 MG tablet Take 1 tablet by mouth 2 times daily for 30 days. 60 tablet 0    LORazepam (ATIVAN) 1 MG tablet Take 1 mg by mouth every 6 hours as needed for Anxiety. Take 4 tab 2 hours prior to dental surgery (Patient not taking: Reported on 6/7/2021)      gabapentin (NEURONTIN) 300 MG capsule Take 1 capsule by mouth 3 times daily for 30 days. (Patient not taking: Reported on 6/7/2021) 90 capsule 1     No current facility-administered medications for this visit. Allergies:   Lisinopril, Bee venom, Amino acids, and Sulfa antibiotics    Review of Systems:    Constitutional: No fever or chills. No night sweats.   Weight now stable  Eyes: No eye discharge, double vision, or eye pain   HEENT: negative for sore mouth, sore throat, hoarseness and voice change   Respiratory: URI with productive cough, sneezing, sinus drainage and congestion, runny eyes  Cardiovascular: negative for chest pain, dyspnea, palpitations, orthopnea, PND   Gastrointestinal: negative for nausea, vomiting, diarrhea, constipation, Dysphagia, hematemesis and hematochezia  Genitourinary: negative for frequency, dysuria, nocturia, urinary incontinence, and hematuria   Integument: negative for rash, skin lesions, bruises.   Nails now growing back slowly, toenail fungus  Hematologic/Lymphatic: negative for easy bruising, bleeding, lymphadenopathy, or petechiae   Endocrine: negative for heat or cold intolerance,weight changes, change in bowel habits and hair loss   Musculoskeletal: negative for pain, joint swelling; +arhtralgias and bone pain +myalgias, pain worsened  Neurological: negative for headaches, dizziness, seizures, weakness; + neuropathy is nearly resolved        Physical Exam:    Vitals: BP (!) 144/104   Pulse 63   Temp 95.2 °F (35.1 °C) (Temporal)   Wt 244 lb 14.4 oz (111.1 kg)   LMP 02/11/1992   BMI 39.53 kg/m²   General appearance - well appearing, no in pain or distress  Mental status - AAO X3  Eyes - pupils equal and reactive, extraocular eye movements intact  Mouth - mucous membranes moist, pharynx normal without lesions  Neck - supple, no significant adenopathy  Lymphatics - no palpable lymphadenopathy, no hepatosplenomegaly  Chest - clear to auscultation, no wheezes, rales or rhonchi, symmetric air entry  Heart - normal rate, regular rhythm, normal S1, S2, no murmurs  Abdomen - soft, nontender, nondistended, no masses or organomegaly  Neurological - alert, oriented, normal speech, no focal findings or movement disorder noted; +neuropathy - stable  Extremities -bilateral ankle edema  Skin - normal coloration and turgor, no rashes, no suspicious skin lesions noted; toenail fungus  Breast- bilateral mastectomy, no masses or lumps, excess skin on right side - no lymphedema appreciated        DATA:  Lab Results   Component Value Date    WBC 8.9 06/07/2021    HGB 14.6 06/07/2021    HCT 44.4 06/07/2021    MCV 91.9 06/07/2021     06/07/2021       Chemistry        Component Value Date/Time     06/07/2021 1314    K 3.9 06/07/2021 1314     06/07/2021 1314    CO2 24 06/07/2021 1314    BUN 11 06/07/2021 1314    CREATININE 1.23 (H) 06/07/2021 1314        Component Value Date/Time    CALCIUM 9.3 06/07/2021 1314    ALKPHOS 106 (H) 06/07/2021 1314    AST 11 06/07/2021 1314    ALT 8 06/07/2021 1314    BILITOT 0.19 (L) 06/07/2021 1314          Results for Guera Shaikh (MRN [id number]) as of 1/24/2021 08:08   Ref.  Range 1/11/2021 14:29   Amphetamine Screen, Ur Latest Ref Range: NEGATIVE  NEGATIVE   Barbiturate Screen, Ur Latest Ref Range: NEGATIVE  NEGATIVE   Benzodiazepine Screen, Urine Latest Ref Range: NEGATIVE  NEGATIVE   Buprenorphine Urine Latest Ref Range: NEGATIVE  NOT REPORTED   Cannabinoid Scrn, Ur Latest Ref Range: NEGATIVE  POSITIVE (A)   Cocaine Metabolite, Urine Latest Ref Range: NEGATIVE  NEGATIVE   Methadone Screen, Urine Latest Ref Range: NEGATIVE  NEGATIVE   Methamphetamine, Urine Latest Ref Range: NEGATIVE  NOT REPORTED   Oxycodone Screen, Ur Latest Ref Range: NEGATIVE  POSITIVE (A)   Propoxyphene, Urine Latest Ref Range: NEGATIVE  NOT REPORTED   Opiates, Urine Latest Ref Range: NEGATIVE  NEGATIVE   Tricyclic Antidepressants, Urine Latest Ref Range: NEGATIVE  NOT REPORTED   MDMA, Urine Latest Ref Range: NEGATIVE  NOT REPORTED       Narrative   EXAMINATION:   BONE DENSITOMETRY       11/25/2020 1:40 pm       TECHNIQUE:   A bone density dual x-ray absorptiometry (DEXA) scan was performed of the   lumbar spine, left hip, and left forearm on a [employer] system.       COMPARISON:   None.       HISTORY:   ORDERING SYSTEM PROVIDED HISTORY: Malignant neoplasm of upper-outer quadrant   of left breast in female, estrogen receptor negative ([facility] [address]   TECHNOLOGIST PROVIDED HISTORY:   bone scan       FINDINGS:   LUMBAR SPINE:       The bone mineral density in the lumbar spine including the L1-L2 levels is   measured at 1.234 g/cm2, which corresponds to a T-score of 0.6 and a Z-score of -0.1.  This is within the normal range by WHO criteria.       LEFT HIP:       The bone mineral density in the total hip is measured at 1.111 g/cm2   corresponding to a T-score of 0.8 and a Z-score of -0.1.  This is within the   normal range by [facility] criteria.       The bone mineral density of the femoral neck is measured at 1.283 g/cm2   corresponding to a T-score of 1.8 and a Z-score of 1.3.  This is within the   normal range by [facility] criteria.       LEFT FOREARM:       The bone mineral density in 33% of the radius is measured at 0.846 g/cm2   corresponding to a T-score of -0.3 and a Z-score of -0.1.  This is within the   normal range by [facility] criteria.           Impression   Normal by WHO criteria.               Impression:  Invasive ductal carcinoma of left breast, triple negative, pT2,p N0, M0  Status post bilateral mastectomy and left sentinel lymph node biopsy  Personal history of gynecological malignancy (uterus/cervical cancer) status post surgery in 1992  Cancer related pain  Leg swelling  Anemia secondary to chemotherapy  Family history of malignancy  Chronic back pain  Hypertension  Obesity      Plan:  Reviewed available clinical data  Patient clinically continues to do well. Examination does not reveal any evidence of renal disease recurrence  Clinically also patient does not have any evidence of disease recurrence  Discussed natural history of breast cancer   Patient pain is chronic and clinically my suspicion for pain caused by a bone metastasis is low. We will continue to monitor if pain continues to worsen we will order scans at that point  Continue to monitor for neuropathy. Continue current pain medication regimen with Neurontin and Percocet. Patient has a pain contract  Patient has seen a couple of different pain doctors but they have refused to take over pain management for the patient. Patient counseled tobacco cessation  Patient counseled on active lifestyle.   Continue age-appropriate

## 2021-07-08 ENCOUNTER — TELEPHONE (OUTPATIENT)
Dept: ONCOLOGY | Age: 61
End: 2021-07-08

## 2021-07-08 NOTE — TELEPHONE ENCOUNTER
RECEIVED VM FROM SARAH STATING ,  I WOULD LIKE A RETURN CALL FROM DR Paulino Fung.      CONTACT NUMBER

## 2021-07-12 DIAGNOSIS — C50.412 MALIGNANT NEOPLASM OF UPPER-OUTER QUADRANT OF LEFT BREAST IN FEMALE, ESTROGEN RECEPTOR NEGATIVE (HCC): Primary | ICD-10-CM

## 2021-07-12 DIAGNOSIS — G89.29 OTHER CHRONIC PAIN: ICD-10-CM

## 2021-07-12 DIAGNOSIS — Z17.1 MALIGNANT NEOPLASM OF UPPER-OUTER QUADRANT OF LEFT BREAST IN FEMALE, ESTROGEN RECEPTOR NEGATIVE (HCC): Primary | ICD-10-CM

## 2021-07-12 RX ORDER — OXYCODONE AND ACETAMINOPHEN 10; 325 MG/1; MG/1
1 TABLET ORAL EVERY 6 HOURS PRN
Qty: 120 TABLET | Refills: 0 | Status: SHIPPED | OUTPATIENT
Start: 2021-07-12 | End: 2021-08-11 | Stop reason: SDUPTHER

## 2021-08-10 DIAGNOSIS — G89.29 OTHER CHRONIC PAIN: ICD-10-CM

## 2021-08-10 DIAGNOSIS — Z17.1 MALIGNANT NEOPLASM OF UPPER-OUTER QUADRANT OF LEFT BREAST IN FEMALE, ESTROGEN RECEPTOR NEGATIVE (HCC): ICD-10-CM

## 2021-08-10 DIAGNOSIS — G62.9 NEUROPATHY: ICD-10-CM

## 2021-08-10 DIAGNOSIS — C50.412 MALIGNANT NEOPLASM OF UPPER-OUTER QUADRANT OF LEFT BREAST IN FEMALE, ESTROGEN RECEPTOR NEGATIVE (HCC): ICD-10-CM

## 2021-08-10 NOTE — TELEPHONE ENCOUNTER
RECEIVED PHONE REQUEST FROM SARAH FOR REFILL OF PERCOCET 10/325 & GABAPENTIN. PERCOCET #120 LAST WRITTEN 07/12/21    MD FOLLOW UP 09/13/21    PEND TO MD FOR REVIEW.

## 2021-08-11 RX ORDER — GABAPENTIN 600 MG/1
600 TABLET ORAL 2 TIMES DAILY
Qty: 60 TABLET | Refills: 5 | Status: SHIPPED | OUTPATIENT
Start: 2021-08-11 | End: 2021-09-13 | Stop reason: SDUPTHER

## 2021-08-11 RX ORDER — OXYCODONE AND ACETAMINOPHEN 10; 325 MG/1; MG/1
1 TABLET ORAL EVERY 6 HOURS PRN
Qty: 120 TABLET | Refills: 0 | Status: SHIPPED | OUTPATIENT
Start: 2021-08-11 | End: 2021-09-10

## 2021-09-02 ENCOUNTER — HOSPITAL ENCOUNTER (OUTPATIENT)
Facility: MEDICAL CENTER | Age: 61
End: 2021-09-02
Payer: COMMERCIAL

## 2021-09-13 ENCOUNTER — HOSPITAL ENCOUNTER (OUTPATIENT)
Facility: MEDICAL CENTER | Age: 61
Discharge: HOME | End: 2021-09-13
Payer: COMMERCIAL

## 2021-09-13 ENCOUNTER — OFFICE VISIT (OUTPATIENT)
Dept: ONCOLOGY | Age: 61
End: 2021-09-13
Payer: COMMERCIAL

## 2021-09-13 ENCOUNTER — TELEPHONE (OUTPATIENT)
Dept: ONCOLOGY | Age: 61
End: 2021-09-13

## 2021-09-13 VITALS
WEIGHT: 238.7 LBS | SYSTOLIC BLOOD PRESSURE: 179 MMHG | TEMPERATURE: 96.9 F | DIASTOLIC BLOOD PRESSURE: 117 MMHG | RESPIRATION RATE: 20 BRPM | HEART RATE: 67 BPM | BODY MASS INDEX: 38.53 KG/M2

## 2021-09-13 DIAGNOSIS — E66.01 MORBID OBESITY DUE TO EXCESS CALORIES (HCC): ICD-10-CM

## 2021-09-13 DIAGNOSIS — G62.9 NEUROPATHY: ICD-10-CM

## 2021-09-13 DIAGNOSIS — F17.200 SMOKING: ICD-10-CM

## 2021-09-13 DIAGNOSIS — G89.29 OTHER CHRONIC PAIN: ICD-10-CM

## 2021-09-13 DIAGNOSIS — Z17.1 MALIGNANT NEOPLASM OF UPPER-OUTER QUADRANT OF LEFT BREAST IN FEMALE, ESTROGEN RECEPTOR NEGATIVE (HCC): Primary | ICD-10-CM

## 2021-09-13 DIAGNOSIS — C50.412 MALIGNANT NEOPLASM OF UPPER-OUTER QUADRANT OF LEFT BREAST IN FEMALE, ESTROGEN RECEPTOR NEGATIVE (HCC): ICD-10-CM

## 2021-09-13 DIAGNOSIS — C50.412 MALIGNANT NEOPLASM OF UPPER-OUTER QUADRANT OF LEFT BREAST IN FEMALE, ESTROGEN RECEPTOR NEGATIVE (HCC): Primary | ICD-10-CM

## 2021-09-13 DIAGNOSIS — I10 ESSENTIAL HYPERTENSION: ICD-10-CM

## 2021-09-13 DIAGNOSIS — Z17.1 MALIGNANT NEOPLASM OF UPPER-OUTER QUADRANT OF LEFT BREAST IN FEMALE, ESTROGEN RECEPTOR NEGATIVE (HCC): ICD-10-CM

## 2021-09-13 LAB
ABSOLUTE EOS #: 0.11 K/UL (ref 0–0.44)
ABSOLUTE IMMATURE GRANULOCYTE: 0.05 K/UL (ref 0–0.3)
ABSOLUTE LYMPH #: 2.91 K/UL (ref 1.1–3.7)
ABSOLUTE MONO #: 0.58 K/UL (ref 0.1–1.2)
ALBUMIN SERPL-MCNC: 3.6 G/DL (ref 3.5–5.2)
ALBUMIN/GLOBULIN RATIO: ABNORMAL (ref 1–2.5)
ALP BLD-CCNC: 111 U/L (ref 35–104)
ALT SERPL-CCNC: 8 U/L (ref 5–33)
AMPHETAMINE SCREEN URINE: NEGATIVE
ANION GAP SERPL CALCULATED.3IONS-SCNC: 13 MMOL/L (ref 9–17)
AST SERPL-CCNC: 11 U/L
BARBITURATE SCREEN URINE: NEGATIVE
BASOPHILS # BLD: 1 % (ref 0–2)
BASOPHILS ABSOLUTE: 0.06 K/UL (ref 0–0.2)
BENZODIAZEPINE SCREEN, URINE: NEGATIVE
BILIRUB SERPL-MCNC: 0.34 MG/DL (ref 0.3–1.2)
BUN BLDV-MCNC: 14 MG/DL (ref 8–23)
BUN/CREAT BLD: 18 (ref 9–20)
BUPRENORPHINE URINE: ABNORMAL
CALCIUM SERPL-MCNC: 9.4 MG/DL (ref 8.6–10.4)
CANNABINOID SCREEN URINE: POSITIVE
CHLORIDE BLD-SCNC: 104 MMOL/L (ref 98–107)
CO2: 21 MMOL/L (ref 20–31)
COCAINE METABOLITE, URINE: NEGATIVE
CREAT SERPL-MCNC: 0.76 MG/DL (ref 0.5–0.9)
DIFFERENTIAL TYPE: ABNORMAL
EOSINOPHILS RELATIVE PERCENT: 1 % (ref 1–4)
GFR AFRICAN AMERICAN: >60 ML/MIN
GFR NON-AFRICAN AMERICAN: >60 ML/MIN
GFR SERPL CREATININE-BSD FRML MDRD: ABNORMAL ML/MIN/{1.73_M2}
GFR SERPL CREATININE-BSD FRML MDRD: ABNORMAL ML/MIN/{1.73_M2}
GLUCOSE BLD-MCNC: 146 MG/DL (ref 70–99)
HCT VFR BLD CALC: 42.7 % (ref 36.3–47.1)
HEMOGLOBIN: 13.9 G/DL (ref 11.9–15.1)
IMMATURE GRANULOCYTES: 1 %
LYMPHOCYTES # BLD: 32 % (ref 24–43)
MCH RBC QN AUTO: 29.4 PG (ref 25.2–33.5)
MCHC RBC AUTO-ENTMCNC: 32.6 G/DL (ref 28.4–34.8)
MCV RBC AUTO: 90.5 FL (ref 82.6–102.9)
MDMA URINE: ABNORMAL
METHADONE SCREEN, URINE: NEGATIVE
METHAMPHETAMINE, URINE: ABNORMAL
MONOCYTES # BLD: 6 % (ref 3–12)
NRBC AUTOMATED: 0 PER 100 WBC
OPIATES, URINE: NEGATIVE
OXYCODONE SCREEN URINE: POSITIVE
PDW BLD-RTO: 14.6 % (ref 11.8–14.4)
PHENCYCLIDINE, URINE: NEGATIVE
PLATELET # BLD: 168 K/UL (ref 138–453)
PLATELET ESTIMATE: ABNORMAL
PMV BLD AUTO: 9.5 FL (ref 8.1–13.5)
POTASSIUM SERPL-SCNC: 3.8 MMOL/L (ref 3.7–5.3)
PROPOXYPHENE, URINE: ABNORMAL
RBC # BLD: 4.72 M/UL (ref 3.95–5.11)
RBC # BLD: ABNORMAL 10*6/UL
SEG NEUTROPHILS: 59 % (ref 36–65)
SEGMENTED NEUTROPHILS ABSOLUTE COUNT: 5.42 K/UL (ref 1.5–8.1)
SODIUM BLD-SCNC: 138 MMOL/L (ref 135–144)
TEST INFORMATION: ABNORMAL
TOTAL PROTEIN: 6.7 G/DL (ref 6.4–8.3)
TRICYCLIC ANTIDEPRESSANTS, UR: ABNORMAL
WBC # BLD: 9.1 K/UL (ref 3.5–11.3)
WBC # BLD: ABNORMAL 10*3/UL

## 2021-09-13 PROCEDURE — 36415 COLL VENOUS BLD VENIPUNCTURE: CPT

## 2021-09-13 PROCEDURE — G8427 DOCREV CUR MEDS BY ELIG CLIN: HCPCS | Performed by: INTERNAL MEDICINE

## 2021-09-13 PROCEDURE — 99214 OFFICE O/P EST MOD 30 MIN: CPT | Performed by: INTERNAL MEDICINE

## 2021-09-13 PROCEDURE — 3017F COLORECTAL CA SCREEN DOC REV: CPT | Performed by: INTERNAL MEDICINE

## 2021-09-13 PROCEDURE — G8417 CALC BMI ABV UP PARAM F/U: HCPCS | Performed by: INTERNAL MEDICINE

## 2021-09-13 PROCEDURE — 4004F PT TOBACCO SCREEN RCVD TLK: CPT | Performed by: INTERNAL MEDICINE

## 2021-09-13 PROCEDURE — 80307 DRUG TEST PRSMV CHEM ANLYZR: CPT

## 2021-09-13 PROCEDURE — 99211 OFF/OP EST MAY X REQ PHY/QHP: CPT | Performed by: INTERNAL MEDICINE

## 2021-09-13 PROCEDURE — 85025 COMPLETE CBC W/AUTO DIFF WBC: CPT

## 2021-09-13 PROCEDURE — 80053 COMPREHEN METABOLIC PANEL: CPT

## 2021-09-13 RX ORDER — OXYCODONE AND ACETAMINOPHEN 10; 325 MG/1; MG/1
1 TABLET ORAL EVERY 6 HOURS PRN
Qty: 120 TABLET | Refills: 0 | Status: SHIPPED | OUTPATIENT
Start: 2021-09-13 | End: 2021-10-11 | Stop reason: SDUPTHER

## 2021-09-13 RX ORDER — GABAPENTIN 600 MG/1
600 TABLET ORAL 2 TIMES DAILY
Qty: 60 TABLET | Refills: 1 | Status: SHIPPED | OUTPATIENT
Start: 2021-09-13 | End: 2021-10-11 | Stop reason: SDUPTHER

## 2021-09-13 RX ORDER — HYDROCHLOROTHIAZIDE 50 MG/1
50 TABLET ORAL DAILY
Qty: 30 TABLET | Refills: 1 | Status: SHIPPED | OUTPATIENT
Start: 2021-09-13 | End: 2021-11-12

## 2021-09-13 NOTE — PROGRESS NOTES
Leonidas Other                                                                                                                  9/13/2021  MRN:   [id number]  YOB: 1960  PCP:                           Jignesh Bell MD  Referring Physician: No ref. provider found  Treating Physician Name: Sadia Farley MD      Reason for visit:  Chief Complaint   Patient presents with    Follow-up    Discuss Labs    Medication Refill       Current problems:   IDC of Left Breast cancer, Triple negative, pT2,pN0,M0    Active and recent treatments:  Bilateral Mastectomy with left sentinel lymph node  Adjuvant chemotherapy using dose denseAC followed by T on 4/11/19--- 8/ /19    Summary of Case/History:    Leonidas Villaseñor a 61 y. o.female invasive carcinoma of the breast.     Patient initially felt a lump in her left breast for 23 months. She underwent screening mammogram which showed 2 cm mass in upper outer left breast.  Right breast do not have any concerning findings. Ultrasound of left breast conformed mass measuring 1.9 cm in the upper outer left breast.  Patient underwent biopsy on 1/23/19 which revealed invasive ductal carcinoma, grade 3, ER/[place] negative. HER-2 results are pending.     Patient has history of uterus/cervical cancer diagnosed in 1992. Patient states she underwent surgery for the cervical cancer. She does not recall getting any radiation therapy or chemotherapy. Patient states she has been interested in getting bilateral breast reduction due to cosmetic reasons however now with a diagnosis of breast cancer she wishes to proceed with bilateral mastectomy.     Patient has history of breast cancer in her mother as well as 3 paternal aunts. Patient's mother also had cervical and uterine cancer.     Patient underwent bilateral mastectomy with left sentinel lymph node biopsy.   Pathological stage was T2, N0, M0    Started chemotherapy for triple negative breast cancer using AC followed by T on 4/11/19    Interim History:    Patient presents to the clinic for follow-up visit and to discuss results of her lab work-up and other relevant data. Denies noticing any mass on her chest.  Denies noticing any swollen glands. Complains of bone pain which is gradually worsening. Continues to struggle with pain. During this visit patient's allergy, social, medical, surgical history and medications were reviewed and updated.     Past Medical History:   Past Medical History:   Diagnosis Date    Arthritis     GENERALIZED ALL OVER RHEUMATOID AND OSTEO    Breast cancer ([address]     left breast    Cancer ([address] 1992    CERVICAL, UTERINE    Chronic back pain     scioliosis,  02/2019 WEARING A BACK BRACE    Degenerative disorder of bone     ALL OVER    Depression     Difficult intravenous access     HANDS AR BEST SITE    GERD (gastroesophageal reflux disease)     Hyperlipidemia     HX OF NO MEDS IN YEARS    Hypertension 2007    Pain management clinic    Insomnia     Neuropathy     hands and feet    Obesity     BMI -  43    Sleep apnea 2016    NO LONGER USES MACHINE    Snores     has been told by family that she stops breathing    Use of cane as ambulatory aid     Wears glasses     READING    Wears glasses     \"CHEATERS\"       Past Surgical History:     Past Surgical History:   Procedure Laterality Date    BREAST SURGERY Left 1990    CYST    BUNIONECTOMY Bilateral     CERVIX SURGERY  1992    COLONOSCOPY  10/13/2017    FOOT SURGERY Left 2014    PINS IN ALL TOES    FOOT SURGERY Left 5/27/2020    HARDWARE REMOVAL LEFT GREAT TOE- performed by Abimael Ventura DPM at [facility] Bilateral    [address], CERVIX     MASTECTOMY Bilateral 2/12/2019    TOTAL MASTECTOMY performed by Aimee Ledesma DO at [facility], BILATERAL Bilateral 02/12/2019     TOTAL MASTECTOMY,  AXILLARY SENTINEL LYMPH NODE BIOPSY, FROZEN SECTION     CA COLSC FLX W/REMOVAL LESION BY HOT BX FORCEPS N/A 10/13/2017    COLONOSCOPY WITH BIOPSY AND POLYPECTOMY performed by Sujatha Jovel IV, DO at [facility] TUNNELED VENOUS PORT PLACEMENT  03/27/2019    X-RAY FOOT 3+VW      both       Patient Family Social History:     Social History     Socioeconomic History    Marital status: Single     Spouse name: Not on file    Number of children: Not on file    Years of education: Not on file    Highest education level: Not on file   Occupational History    Not on file   Tobacco Use    Smoking status: Current Some Day Smoker     Packs/day: 0.25     Years: 41.00     Pack years: 10.25     Types: Cigarettes    Smokeless tobacco: Never Used    Tobacco comment: STATES SMOKES A PACK A WEEK   Vaping Use    Vaping Use: Never used   Substance and Sexual Activity    Alcohol use: No    Drug use: Yes     Frequency: 7.0 times per week     Types: Marijuana     Comment: LAST USE 02/10/2018, AND PAIN PILLS     Sexual activity: Not Currently   Other Topics Concern    Not on file   Social History Narrative    Not on file     Social Determinants of Health     Financial Resource Strain:     Difficulty of Paying Living Expenses:    Food Insecurity:     Worried About Running Out of Food in the Last Year:     Ran Out of Food in the Last Year:    Transportation Needs:     Lack of Transportation (Medical):      Lack of Transportation (Non-Medical):    Physical Activity:     Days of Exercise per Week:     Minutes of Exercise per Session:    Stress:     Feeling of Stress :    Social Connections:     Frequency of Communication with Friends and Family:     Frequency of Social Gatherings with Friends and Family:     Attends [employer] Services:     Active Member of Clubs or Organizations:     Attends Club or Organization Meetings:     Marital Status:    Intimate Partner Violence:     Fear of Current or Ex-Partner:     Emotionally Abused:     Physically Abused:     Sexually Abused:       Family History   Problem Relation Age of Onset    High Blood Pressure Mother     Cancer Mother         cervical and breast    Diabetes Father     Heart Attack Father     Heart Disease Maternal Aunt         times 2      Current Medications:     Current Outpatient Medications   Medication Sig Dispense Refill    gabapentin (NEURONTIN) 600 MG tablet Take 1 tablet by mouth 2 times daily for 30 days. 60 tablet 5    hydroCHLOROthiazide (HYDRODIURIL) 50 MG tablet Take 1 tablet by mouth daily (Patient taking differently: Take 50 mg by mouth daily Taking 3 times a week) 30 tablet 1    LORazepam (ATIVAN) 1 MG tablet Take 1 mg by mouth every 6 hours as needed for Anxiety. Take 4 tab 2 hours prior to dental surgery (Patient not taking: Reported on 6/7/2021)       No current facility-administered medications for this visit. Allergies:   Lisinopril, Bee venom, Amino acids, and Sulfa antibiotics    Review of Systems:    Constitutional: No fever or chills. No night sweats. Weight now stable  Eyes: No eye discharge, double vision, or eye pain   HEENT: negative for sore mouth, sore throat, hoarseness and voice change   Respiratory: URI with productive cough, sneezing, sinus drainage and congestion, runny eyes  Cardiovascular: negative for chest pain, dyspnea, palpitations, orthopnea, PND   Gastrointestinal: negative for nausea, vomiting, diarrhea, constipation, Dysphagia, hematemesis and hematochezia  Genitourinary: negative for frequency, dysuria, nocturia, urinary incontinence, and hematuria   Integument: negative for rash, skin lesions, bruises.   Nails now growing back slowly, toenail fungus  Hematologic/Lymphatic: negative for easy bruising, bleeding, lymphadenopathy, or petechiae   Endocrine: negative for heat or cold intolerance,weight changes, change in bowel habits and hair loss   Musculoskeletal: negative for pain, joint swelling; +arhtralgias and bone pain +myalgias, pain worsened  Neurological: negative for headaches, dizziness, seizures, weakness; + neuropathy is nearly resolved        Physical Exam:    Vitals: BP (!) 179/117   Pulse 67   Temp 96.9 °F (36.1 °C) (Temporal)   Resp 20   Wt 238 lb 11.2 oz (108.3 kg)   LMP 02/11/1992   BMI 38.53 kg/m²   General appearance - well appearing, no in pain or distress  Mental status - AAO X3  Eyes - pupils equal and reactive, extraocular eye movements intact  Mouth - mucous membranes moist, pharynx normal without lesions  Neck - supple, no significant adenopathy  Lymphatics - no palpable lymphadenopathy, no hepatosplenomegaly  Chest - clear to auscultation, no wheezes, rales or rhonchi, symmetric air entry  Heart - normal rate, regular rhythm, normal S1, S2, no murmurs  Abdomen - soft, nontender, nondistended, no masses or organomegaly  Neurological - alert, oriented, normal speech, no focal findings or movement disorder noted; +neuropathy - stable  Extremities -bilateral ankle edema  Skin - normal coloration and turgor, no rashes, no suspicious skin lesions noted; toenail fungus  Breast- bilateral mastectomy, no masses or lumps, excess skin on right side - no lymphedema appreciated        DATA:  Lab Results   Component Value Date    WBC 9.1 09/13/2021    HGB 13.9 09/13/2021    HCT 42.7 09/13/2021    MCV 90.5 09/13/2021     09/13/2021       Chemistry        Component Value Date/Time     09/13/2021 1249    K 3.8 09/13/2021 1249     09/13/2021 1249    CO2 21 09/13/2021 1249    BUN 14 09/13/2021 1249    CREATININE 0.76 09/13/2021 1249        Component Value Date/Time    CALCIUM 9.4 09/13/2021 1249    ALKPHOS 111 (H) 09/13/2021 1249    AST 11 09/13/2021 1249    ALT 8 09/13/2021 1249    BILITOT 0.34 09/13/2021 1249          Results for Noemi Bailon (MRN [id number]) as of 1/24/2021 08:08   Ref.  Range 1/11/2021 14:29   Amphetamine Screen, Ur Latest Ref Range: NEGATIVE  NEGATIVE   Barbiturate Screen, Ur Latest Ref Range: NEGATIVE  NEGATIVE Benzodiazepine Screen, Urine Latest Ref Range: NEGATIVE  NEGATIVE   Buprenorphine Urine Latest Ref Range: NEGATIVE  NOT REPORTED   Cannabinoid Scrn, Ur Latest Ref Range: NEGATIVE  POSITIVE (A)   Cocaine Metabolite, Urine Latest Ref Range: NEGATIVE  NEGATIVE   Methadone Screen, Urine Latest Ref Range: NEGATIVE  NEGATIVE   Methamphetamine, Urine Latest Ref Range: NEGATIVE  NOT REPORTED   Oxycodone Screen, Ur Latest Ref Range: NEGATIVE  POSITIVE (A)   Propoxyphene, Urine Latest Ref Range: NEGATIVE  NOT REPORTED   Opiates, Urine Latest Ref Range: NEGATIVE  NEGATIVE   Tricyclic Antidepressants, Urine Latest Ref Range: NEGATIVE  NOT REPORTED   MDMA, Urine Latest Ref Range: NEGATIVE  NOT REPORTED       Narrative   EXAMINATION:   BONE DENSITOMETRY       11/25/2020 1:40 pm       TECHNIQUE:   A bone density dual x-ray absorptiometry (DEXA) scan was performed of the   lumbar spine, left hip, and left forearm on a [employer] system.       COMPARISON:   None.       HISTORY:   ORDERING SYSTEM PROVIDED HISTORY: Malignant neoplasm of upper-outer quadrant   of left breast in female, estrogen receptor negative (HCC)   TECHNOLOGIST PROVIDED HISTORY:   bone scan       FINDINGS:   LUMBAR SPINE:       The bone mineral density in the lumbar spine including the L1-L2 levels is   measured at 1.234 g/cm2, which corresponds to a T-score of 0.6 and a Z-score   of -0.1.  This is within the normal range by WHO criteria.       LEFT HIP:       The bone mineral density in the total hip is measured at 1.111 g/cm2   corresponding to a T-score of 0.8 and a Z-score of -0.1.  This is within the   normal range by [facility] criteria.       The bone mineral density of the femoral neck is measured at 1.283 g/cm2   corresponding to a T-score of 1.8 and a Z-score of 1.3.  This is within the   normal range by WHO criteria.       LEFT FOREARM:       The bone mineral density in 33% of the radius is measured at 0.846 g/cm2   corresponding to a T-score of -0.3 and a Z-score of -0.1.  This is within the   normal range by WHO criteria.           Impression   Normal by WHO criteria.               Impression:  Invasive ductal carcinoma of left breast, triple negative, pT2,p N0, M0  Status post bilateral mastectomy and left sentinel lymph node biopsy  Personal history of gynecological malignancy (uterus/cervical cancer) status post surgery in 1992  Cancer related pain  Leg swelling  Anemia secondary to chemotherapy  Family history of malignancy  Chronic back pain  Hypertension  Obesity      Plan:  Reviewed available clinical data  Discussed natural history of triple negative breast cancer  Patient is complaining of worsening bone pain. Given patient had triple negative breast cancer will obtain bone scan  Patient counseled on use of pain medication  Examination is unremarkable and did not show any lymphadenopathy or chest mass  Continue to monitor for neuropathy. Continue current pain medication regimen with Neurontin and Percocet. Patient has a pain contract  Patient has seen a couple of different pain doctors but they have refused to take over pain management for the patient. Patient counseled tobacco cessation  Patient counseled on active lifestyle. Continue age-appropriate screening studies. Return to clinic to review results of bone scan         Javed Escobedo MD        This note is created with the assistance of a speech recognition program.  While intending to generate a document that actually reflects the content of the visit, the document can still have some errors including those of syntax and sound a like substitutions which may escape proof reading. It such instances, actual meaning can be extrapolated by contextual diversion.

## 2021-09-24 ENCOUNTER — HOSPITAL ENCOUNTER (OUTPATIENT)
Dept: NUCLEAR MEDICINE | Age: 61
Discharge: HOME | End: 2021-09-26
Payer: COMMERCIAL

## 2021-09-24 DIAGNOSIS — C50.412 MALIGNANT NEOPLASM OF UPPER-OUTER QUADRANT OF LEFT BREAST IN FEMALE, ESTROGEN RECEPTOR NEGATIVE (HCC): ICD-10-CM

## 2021-09-24 DIAGNOSIS — Z17.1 MALIGNANT NEOPLASM OF UPPER-OUTER QUADRANT OF LEFT BREAST IN FEMALE, ESTROGEN RECEPTOR NEGATIVE (HCC): ICD-10-CM

## 2021-09-24 PROCEDURE — A9503 TC99M MEDRONATE: HCPCS | Performed by: INTERNAL MEDICINE

## 2021-09-24 PROCEDURE — 78306 BONE IMAGING WHOLE BODY: CPT

## 2021-09-24 PROCEDURE — 3430000000 HC RX DIAGNOSTIC RADIOPHARMACEUTICAL: Performed by: INTERNAL MEDICINE

## 2021-09-24 RX ORDER — TC 99M MEDRONATE 20 MG/10ML
23.8 INJECTION, POWDER, LYOPHILIZED, FOR SOLUTION INTRAVENOUS
Status: COMPLETED | OUTPATIENT
Start: 2021-09-24 | End: 2021-09-24

## 2021-09-24 RX ADMIN — TC 99M MEDRONATE 23.8 MILLICURIE: 20 INJECTION, POWDER, LYOPHILIZED, FOR SOLUTION INTRAVENOUS at 10:35

## 2021-10-04 ENCOUNTER — HOSPITAL ENCOUNTER (OUTPATIENT)
Facility: MEDICAL CENTER | Age: 61
End: 2021-10-04
Payer: COMMERCIAL

## 2021-10-11 ENCOUNTER — TELEPHONE (OUTPATIENT)
Dept: ONCOLOGY | Age: 61
End: 2021-10-11

## 2021-10-11 ENCOUNTER — OFFICE VISIT (OUTPATIENT)
Dept: ONCOLOGY | Age: 61
End: 2021-10-11
Payer: COMMERCIAL

## 2021-10-11 VITALS
TEMPERATURE: 97.2 F | WEIGHT: 237.5 LBS | BODY MASS INDEX: 38.33 KG/M2 | RESPIRATION RATE: 18 BRPM | SYSTOLIC BLOOD PRESSURE: 135 MMHG | DIASTOLIC BLOOD PRESSURE: 88 MMHG | HEART RATE: 78 BPM

## 2021-10-11 DIAGNOSIS — G89.29 OTHER CHRONIC PAIN: ICD-10-CM

## 2021-10-11 DIAGNOSIS — G62.9 NEUROPATHY: ICD-10-CM

## 2021-10-11 DIAGNOSIS — C50.412 MALIGNANT NEOPLASM OF UPPER-OUTER QUADRANT OF LEFT BREAST IN FEMALE, ESTROGEN RECEPTOR NEGATIVE (HCC): Primary | ICD-10-CM

## 2021-10-11 DIAGNOSIS — M25.551 HIP PAIN, RIGHT: ICD-10-CM

## 2021-10-11 DIAGNOSIS — Z17.1 MALIGNANT NEOPLASM OF UPPER-OUTER QUADRANT OF LEFT BREAST IN FEMALE, ESTROGEN RECEPTOR NEGATIVE (HCC): Primary | ICD-10-CM

## 2021-10-11 PROCEDURE — G8417 CALC BMI ABV UP PARAM F/U: HCPCS | Performed by: INTERNAL MEDICINE

## 2021-10-11 PROCEDURE — 4004F PT TOBACCO SCREEN RCVD TLK: CPT | Performed by: INTERNAL MEDICINE

## 2021-10-11 PROCEDURE — 99211 OFF/OP EST MAY X REQ PHY/QHP: CPT | Performed by: INTERNAL MEDICINE

## 2021-10-11 PROCEDURE — 99214 OFFICE O/P EST MOD 30 MIN: CPT | Performed by: INTERNAL MEDICINE

## 2021-10-11 PROCEDURE — G8428 CUR MEDS NOT DOCUMENT: HCPCS | Performed by: INTERNAL MEDICINE

## 2021-10-11 PROCEDURE — 3017F COLORECTAL CA SCREEN DOC REV: CPT | Performed by: INTERNAL MEDICINE

## 2021-10-11 PROCEDURE — G8484 FLU IMMUNIZE NO ADMIN: HCPCS | Performed by: INTERNAL MEDICINE

## 2021-10-11 RX ORDER — GABAPENTIN 600 MG/1
600 TABLET ORAL 2 TIMES DAILY
Qty: 60 TABLET | Refills: 1 | Status: SHIPPED | OUTPATIENT
Start: 2021-10-11 | End: 2021-11-09 | Stop reason: SDUPTHER

## 2021-10-11 RX ORDER — OXYCODONE AND ACETAMINOPHEN 10; 325 MG/1; MG/1
1 TABLET ORAL EVERY 6 HOURS PRN
Qty: 120 TABLET | Refills: 0 | Status: SHIPPED | OUTPATIENT
Start: 2021-10-11 | End: 2021-11-09 | Stop reason: SDUPTHER

## 2021-10-11 NOTE — PROGRESS NOTES
Justin Jones                                                                                                                  10/11/2021  MRN:   [id number]  YOB: 1960  PCP:                           David Ruiz MD  Referring Physician: No ref. provider found  Treating Physician Name: Katey Dsouza MD      Reason for visit:  Chief Complaint   Patient presents with    Follow-up    Results     Bone Scan    Medication Refill       Current problems:   IDC of Left Breast cancer, Triple negative, pT2,pN0,M0    Active and recent treatments:  Bilateral Mastectomy with left sentinel lymph node  Adjuvant chemotherapy using dose denseAC followed by T on 4/11/19--- 8/ /19    Summary of Case/History:    Justin Jones a 61 y. o.female invasive carcinoma of the breast.     Patient initially felt a lump in her left breast for 23 months. She underwent screening mammogram which showed 2 cm mass in upper outer left breast.  Right breast do not have any concerning findings. Ultrasound of left breast conformed mass measuring 1.9 cm in the upper outer left breast.  Patient underwent biopsy on 1/23/19 which revealed invasive ductal carcinoma, grade 3, ER/[place] negative. HER-2 results are pending.     Patient has history of uterus/cervical cancer diagnosed in 1992. Patient states she underwent surgery for the cervical cancer. She does not recall getting any radiation therapy or chemotherapy. Patient states she has been interested in getting bilateral breast reduction due to cosmetic reasons however now with a diagnosis of breast cancer she wishes to proceed with bilateral mastectomy.     Patient has history of breast cancer in her mother as well as 3 paternal aunts. Patient's mother also had cervical and uterine cancer.     Patient underwent bilateral mastectomy with left sentinel lymph node biopsy.   Pathological stage was T2, N0, M0    Started chemotherapy for triple negative breast cancer using AC followed by Emotionally Abused:     Physically Abused:     Sexually Abused:       Family History   Problem Relation Age of Onset    High Blood Pressure Mother     Cancer Mother         cervical and breast    Diabetes Father     Heart Attack Father     Heart Disease Maternal Aunt         times 2      Current Medications:     Current Outpatient Medications   Medication Sig Dispense Refill    gabapentin (NEURONTIN) 600 MG tablet Take 1 tablet by mouth 2 times daily for 30 days. 60 tablet 1    oxyCODONE-acetaminophen (PERCOCET)  MG per tablet Take 1 tablet by mouth every 6 hours as needed for Pain for up to 30 days. 120 tablet 0    hydroCHLOROthiazide (HYDRODIURIL) 50 MG tablet Take 1 tablet by mouth daily 30 tablet 1     No current facility-administered medications for this visit. Allergies:   Lisinopril, Bee venom, Amino acids, and Sulfa antibiotics    Review of Systems:    Constitutional: No fever or chills. No night sweats. Weight now stable  Eyes: No eye discharge, double vision, or eye pain   HEENT: negative for sore mouth, sore throat, hoarseness and voice change   Respiratory: URI with productive cough, sneezing, sinus drainage and congestion, runny eyes  Cardiovascular: negative for chest pain, dyspnea, palpitations, orthopnea, PND   Gastrointestinal: negative for nausea, vomiting, diarrhea, constipation, Dysphagia, hematemesis and hematochezia  Genitourinary: negative for frequency, dysuria, nocturia, urinary incontinence, and hematuria   Integument: negative for rash, skin lesions, bruises.   Nails now growing back slowly, toenail fungus  Hematologic/Lymphatic: negative for easy bruising, bleeding, lymphadenopathy, or petechiae   Endocrine: negative for heat or cold intolerance,weight changes, change in bowel habits and hair loss   Musculoskeletal: negative for pain, joint swelling; +arhtralgias and bone pain +myalgias, pain worsened  Neurological: negative for headaches, dizziness, seizures, weakness; + neuropathy is nearly resolved        Physical Exam:    Vitals: /88   Pulse 78   Temp 97.2 °F (36.2 °C) (Temporal)   Resp 18   Wt 237 lb 8 oz (107.7 kg)   LMP 02/11/1992   BMI 38.33 kg/m²   General appearance - well appearing, no in pain or distress  Mental status - AAO X3  Eyes - pupils equal and reactive, extraocular eye movements intact  Mouth - mucous membranes moist, pharynx normal without lesions  Neck - supple, no significant adenopathy  Lymphatics - no palpable lymphadenopathy, no hepatosplenomegaly  Chest - clear to auscultation, no wheezes, rales or rhonchi, symmetric air entry  Heart - normal rate, regular rhythm, normal S1, S2, no murmurs  Abdomen - soft, nontender, nondistended, no masses or organomegaly  Neurological - alert, oriented, normal speech, no focal findings or movement disorder noted; +neuropathy - stable  Extremities -bilateral ankle edema  Skin - normal coloration and turgor, no rashes, no suspicious skin lesions noted; toenail fungus  Breast- bilateral mastectomy, no masses or lumps, excess skin on right side - no lymphedema appreciated        DATA:  Lab Results   Component Value Date    WBC 9.1 09/13/2021    HGB 13.9 09/13/2021    HCT 42.7 09/13/2021    MCV 90.5 09/13/2021     09/13/2021       Chemistry        Component Value Date/Time     09/13/2021 1249    K 3.8 09/13/2021 1249     09/13/2021 1249    CO2 21 09/13/2021 1249    BUN 14 09/13/2021 1249    CREATININE 0.76 09/13/2021 1249        Component Value Date/Time    CALCIUM 9.4 09/13/2021 1249    ALKPHOS 111 (H) 09/13/2021 1249    AST 11 09/13/2021 1249    ALT 8 09/13/2021 1249    BILITOT 0.34 09/13/2021 1249          Results for Petrona Smith (MRN [id number]) as of 1/24/2021 08:08   Ref.  Range 1/11/2021 14:29   Amphetamine Screen, Ur Latest Ref Range: NEGATIVE  NEGATIVE   Barbiturate Screen, Ur Latest Ref Range: NEGATIVE  NEGATIVE   Benzodiazepine Screen, Urine Latest Ref Range: NEGATIVE NEGATIVE   Buprenorphine Urine Latest Ref Range: NEGATIVE  NOT REPORTED   Cannabinoid Scrn, Ur Latest Ref Range: NEGATIVE  POSITIVE (A)   Cocaine Metabolite, Urine Latest Ref Range: NEGATIVE  NEGATIVE   Methadone Screen, Urine Latest Ref Range: NEGATIVE  NEGATIVE   Methamphetamine, Urine Latest Ref Range: NEGATIVE  NOT REPORTED   Oxycodone Screen, Ur Latest Ref Range: NEGATIVE  POSITIVE (A)   Propoxyphene, Urine Latest Ref Range: NEGATIVE  NOT REPORTED   Opiates, Urine Latest Ref Range: NEGATIVE  NEGATIVE   Tricyclic Antidepressants, Urine Latest Ref Range: NEGATIVE  NOT REPORTED   MDMA, Urine Latest Ref Range: NEGATIVE  NOT REPORTED     NM BONE SCAN WHOLE BODY    Result Date: 9/24/2021  EXAMINATION: WHOLE BODY BONE SCAN  9/24/2021 TECHNIQUE: The patient was injected intravenously with unknown mCi of 99 mTc MDP and scintigraphy of the entire skeleton was performed approximately three hours later. COMPARISON: Patient did not have previous imaging studies for comparison. HISTORY: ORDERING SYSTEM PROVIDED HISTORY: Malignant neoplasm of upper-outer quadrant of left breast in female, estrogen receptor negative ([facility] [address] TECHNOLOGIST PROVIDED HISTORY: bone pain , breast cancer Reason for Exam: breast cancer Acuity: Unknown Type of Exam: Unknown FINDINGS: No abnormal foci of radiotracer uptake to suggest metastatic disease. Foci of activity in the shoulders, knees and posterior facets on the right at T10 and on the left at T7. Are most likely degenerative. The remainder of the osseous structures and soft tissues are unremarkable. Physiologic activity is present in the skeletal and renal collecting systems. The isotopic activity in the shoulders and knees are almost certainly degenerative. Suspect degenerative isotopic uptake in the posterior facet on the right at T10 and on the left at T7. These foci as early metastases is unlikely.        Impression:  Invasive ductal carcinoma of left breast, triple negative, pT2,p N0, M0  Status post bilateral mastectomy and left sentinel lymph node biopsy  Personal history of gynecological malignancy (uterus/cervical cancer) status post surgery in 1992  Cancer related pain  Leg swelling  Anemia secondary to chemotherapy  Family history of malignancy  Chronic back pain  Hypertension  Obesity      Plan:  Reviewed available clinical data  Discussed natural history of triple negative breast cancer  Reviewed results of bone scan which is negative for any suspicion for metastasis. Patient counseled on use of pain medication  Examination is unremarkable and did not show any lymphadenopathy or chest mass  Continue to monitor for neuropathy. Continue current pain medication regimen with Neurontin and Percocet. Patient has a pain contract  Patient has seen a couple of different pain doctors but they have refused to take over pain management for the patient. We will continue to do random urine drug screens  Patient counseled tobacco cessation  Patient counseled on active lifestyle. Continue age-appropriate screening studies. Return to clinic in 3 months         Ken Newton MD        This note is created with the assistance of a speech recognition program.  While intending to generate a document that actually reflects the content of the visit, the document can still have some errors including those of syntax and sound a like substitutions which may escape proof reading. It such instances, actual meaning can be extrapolated by contextual diversion.

## 2021-10-11 NOTE — TELEPHONE ENCOUNTER
Shawn Patel MD VISIT  RV IN 3 MTHS W/ LABS  URINE DRUG SCREEN ON RV  LABS CDP CMP URINE DRUG SCREEN ORDERS GIVEN TO PT ON EXIT  MD VISIT 1/10/22 @ 2PM  SCRIPTS SENT TO PT PHARMACY  AVS PRINTED W/ INSTRUCTIONS AND GIVEN TO PT ON EXIT

## 2021-11-02 ENCOUNTER — OFFICE VISIT (OUTPATIENT)
Dept: INTERNAL MEDICINE CLINIC | Age: 61
End: 2021-11-02
Payer: COMMERCIAL

## 2021-11-02 VITALS
HEIGHT: 66 IN | TEMPERATURE: 96.9 F | BODY MASS INDEX: 37.86 KG/M2 | DIASTOLIC BLOOD PRESSURE: 80 MMHG | SYSTOLIC BLOOD PRESSURE: 120 MMHG | HEART RATE: 71 BPM | RESPIRATION RATE: 18 BRPM | OXYGEN SATURATION: 99 % | WEIGHT: 235.6 LBS

## 2021-11-02 DIAGNOSIS — M54.40 CHRONIC MIDLINE LOW BACK PAIN WITH SCIATICA, SCIATICA LATERALITY UNSPECIFIED: Primary | ICD-10-CM

## 2021-11-02 DIAGNOSIS — E66.9 OBESITY (BMI 35.0-39.9 WITHOUT COMORBIDITY): ICD-10-CM

## 2021-11-02 DIAGNOSIS — E78.00 HIGH CHOLESTEROL: ICD-10-CM

## 2021-11-02 DIAGNOSIS — Z71.3 DIETARY COUNSELING: ICD-10-CM

## 2021-11-02 DIAGNOSIS — M06.9 RHEUMATOID ARTHRITIS OF FOOT, UNSPECIFIED LATERALITY, UNSPECIFIED WHETHER RHEUMATOID FACTOR PRESENT (HCC): ICD-10-CM

## 2021-11-02 DIAGNOSIS — G89.29 CHRONIC MIDLINE LOW BACK PAIN WITH SCIATICA, SCIATICA LATERALITY UNSPECIFIED: Primary | ICD-10-CM

## 2021-11-02 PROCEDURE — 96160 PT-FOCUSED HLTH RISK ASSMT: CPT | Performed by: INTERNAL MEDICINE

## 2021-11-02 PROCEDURE — G8427 DOCREV CUR MEDS BY ELIG CLIN: HCPCS | Performed by: INTERNAL MEDICINE

## 2021-11-02 PROCEDURE — 3017F COLORECTAL CA SCREEN DOC REV: CPT | Performed by: INTERNAL MEDICINE

## 2021-11-02 PROCEDURE — 4004F PT TOBACCO SCREEN RCVD TLK: CPT | Performed by: INTERNAL MEDICINE

## 2021-11-02 PROCEDURE — G8482 FLU IMMUNIZE ORDER/ADMIN: HCPCS | Performed by: INTERNAL MEDICINE

## 2021-11-02 PROCEDURE — 99214 OFFICE O/P EST MOD 30 MIN: CPT | Performed by: INTERNAL MEDICINE

## 2021-11-02 PROCEDURE — G8417 CALC BMI ABV UP PARAM F/U: HCPCS | Performed by: INTERNAL MEDICINE

## 2021-11-02 SDOH — ECONOMIC STABILITY: FOOD INSECURITY: WITHIN THE PAST 12 MONTHS, YOU WORRIED THAT YOUR FOOD WOULD RUN OUT BEFORE YOU GOT MONEY TO BUY MORE.: NEVER TRUE

## 2021-11-02 SDOH — ECONOMIC STABILITY: FOOD INSECURITY: WITHIN THE PAST 12 MONTHS, THE FOOD YOU BOUGHT JUST DIDN'T LAST AND YOU DIDN'T HAVE MONEY TO GET MORE.: NEVER TRUE

## 2021-11-02 ASSESSMENT — PATIENT HEALTH QUESTIONNAIRE - PHQ9
1. LITTLE INTEREST OR PLEASURE IN DOING THINGS: 1
SUM OF ALL RESPONSES TO PHQ QUESTIONS 1-9: 2
SUM OF ALL RESPONSES TO PHQ9 QUESTIONS 1 & 2: 2
2. FEELING DOWN, DEPRESSED OR HOPELESS: 1
SUM OF ALL RESPONSES TO PHQ QUESTIONS 1-9: 2
SUM OF ALL RESPONSES TO PHQ QUESTIONS 1-9: 2

## 2021-11-02 ASSESSMENT — SOCIAL DETERMINANTS OF HEALTH (SDOH): HOW HARD IS IT FOR YOU TO PAY FOR THE VERY BASICS LIKE FOOD, HOUSING, MEDICAL CARE, AND HEATING?: NOT HARD AT ALL

## 2021-11-02 NOTE — PROGRESS NOTES
Ruth Perez is a 61 y.o. female who presents for   Chief Complaint   Patient presents with   Vishnu Bautista Doctor     discuss pain and joint relief medication Omega XL; discuss PT    Health Maintenance     hep c, hiv    and follow up of chronic medical problems. Patient Active Problem List   Diagnosis    Idiopathic angioedema    HTN (hypertension)    Back pain    Morbid obesity with BMI of 45.0-49.9, adult ([address]    Morbid obesity due to excess calories ([address]    Smoking    Need for vaccination    Carcinoma of upper-outer quadrant of left breast in female, estrogen receptor negative ([address]    Status post mastectomy, bilateral    S/P mastectomy, bilateral    Malignant neoplasm of upper-outer quadrant of left breast in female, estrogen receptor negative ([address]     HPI  Here to establish as a new patient and had an previous PCP who was giving her the pain medications but the doctor lost his license according to the patient and she has seen multiple other doctors but not happy and patient is single and have 4 children and 9 grandchildren and has a history of childhood rheumatoid arthritis and had back surgery done in 2018 and her first foot surgery done at the age of 15 and patient smokes 1 pack of cigarettes in 5 days and does not drink alcohol but uses marijuana and patient uses cane for ambulation and 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 60 tablet 1    oxyCODONE-acetaminophen (PERCOCET)  MG per tablet Take 1 tablet by mouth every 6 hours as needed for Pain for up to 30 days. 120 tablet 0    hydroCHLOROthiazide (HYDRODIURIL) 50 MG tablet Take 1 tablet by mouth daily 30 tablet 1     No current facility-administered medications for this visit.        Allergies   Allergen Reactions    Lisinopril Anaphylaxis and Swelling     swelling    Bee Venom Swelling     NO TROUBLE BREATHING    Amino Acids     Sulfa Antibiotics Itching, Rash and Other (See Comments)     REDNESS       Past Medical History:   Diagnosis Date    Arthritis     GENERALIZED ALL OVER RHEUMATOID AND OSTEO    Breast cancer ([address]     left breast    Cancer ([address] 1992    CERVICAL, UTERINE    Chronic back pain     scioliosis,  02/2019 WEARING A BACK BRACE    Degenerative disorder of bone     ALL OVER    Depression     Difficult intravenous access     HANDS AR BEST SITE    GERD (gastroesophageal reflux disease)     Hyperlipidemia     HX OF NO MEDS IN YEARS    Hypertension 2007    Pain management clinic    Insomnia     Neuropathy     hands and feet    Obesity     BMI -  43    Sleep apnea 2016    NO LONGER USES MACHINE    Snores     has been told by family that she stops breathing    Use of cane as ambulatory aid     Wears glasses     READING    Wears glasses     \"CHEATERS\"       Past Surgical History:   Procedure Laterality Date    BREAST SURGERY Left 1990    CYST    BUNIONECTOMY Bilateral     CERVIX SURGERY  1992    COLONOSCOPY  10/13/2017    FOOT SURGERY Left 2014    PINS IN ALL TOES    FOOT SURGERY Left 5/27/2020    HARDWARE REMOVAL LEFT GREAT TOE- performed by Evy Mosquera DPM at [address] [facility] Bilateral    [facility] Bilateral 2/12/2019    TOTAL MASTECTOMY performed by Kristen Cui DO at [facility], BILATERAL Bilateral 02/12/2019     TOTAL MASTECTOMY,  AXILLARY SENTINEL LYMPH NODE BIOPSY, FROZEN SECTION     ME COLSC FLX W/REMOVAL LESION BY HOT BX FORCEPS N/A 10/13/2017    COLONOSCOPY WITH BIOPSY AND POLYPECTOMY performed by Scott Jovel IV, DO at [facility] TUNNELED VENOUS PORT PLACEMENT  03/27/2019    X-RAY FOOT 3+VW      both       Family History   Problem Relation Age of Onset    High Blood Pressure Mother    Keyur Workman Cancer Mother         cervical and breast    Diabetes Father     Heart Attack Father     Heart Disease Maternal Aunt         times 2     ROS   Constitutional:  Negative for fatigue, loss of appetite and unexpected weight change   HEENT            : Negative for neck stiffness and pain, no congestion or sinus pressure   Eyes                : No visual disturbance or pain   Cardiovascular: No chest pain or palpitations or leg swelling   Respiratory      : Negative for cough, shortness of breath or wheezing   Gastrointestinal: Negative for abdominal pain, constipation or diarrhea and bloating No nausea or vomiting   Genitourinary:     No urgency or frequency, no burning or hematuria   Musculoskeletal: Positive for arthralgias, back pain or myalgias   Skin                  : Negative for rash or erythema   Neurological    : Negative for dizziness, weakness, tremors ,light headedness or syncope   Psychiatric       : Negative for dysphoric mood, sleep disturbances, nervous or anxious, or decreased concentration   All other review of systems was negative    Objective  Physical Examination:    Nursing note reviewed    /80 (Site: Right Lower Arm, Position: Sitting, Cuff Size: Medium Adult)   Pulse 71   Temp 96.9 °F (36.1 °C) (Temporal)   Resp 18   Ht 5' 5.98\" (1.676 m)   Wt 235 lb 9.6 oz (106.9 kg)   LMP 02/11/1992   SpO2 99%   Breastfeeding No   BMI 38.05 kg/m²   BP Readings from Last 3 Encounters:   11/02/21 120/80   10/11/21 135/88   09/13/21 (!) 179/117         Constitutional:  Mae Khan is oriented to place, person and time ,appears well-developed and well-nourished  HEENT:  Atraumatic and normocephalic, external ears normal bilaterally, nose normal no oropharyngeal exudate and is clear and moist  Eyes:  EOCM normal; conjunctivae normal; PERRLA bilaterally  Neck:  Normal range of motion, neck supple, no JVD and no thyromegaly  Cardiovascular:  RRR, normal heart sounds and intact distal pulses  Pulmonary:  effort normal and breath sounds normal bilaterally,no wheezes or rales, no respiratory distress  Abdominal:  Soft, non-tender; normal bowel sounds, no masses  Musculoskeletal: Very limited range of motion and no edema or tenderness bilaterally  No lymphadenopathy  Neurological:  alert, oriented, and normal reflexes bilaterally  Skin: warm and dry  Psychiatric:  normal mood and effect; behavior normal.    Labs:   Lab Results   Component Value Date    LABA1C 5.5 01/04/2018     Lab Results   Component Value Date    CHOL 164 07/26/2017     Lab Results   Component Value Date    HDL 50 07/26/2017     No results found for: [facility]  Lab Results   Component Value Date    TRIG 90 07/26/2017     No components found for: [place], [place]  Lab Results   Component Value Date    WBC 9.1 09/13/2021    HGB 13.9 09/13/2021    HCT 42.7 09/13/2021    MCV 90.5 09/13/2021     09/13/2021     Lab Results   Component Value Date    INR 1.0 03/27/2019    PROTIME 10.3 03/27/2019     Lab Results   Component Value Date    GLUCOSE 146 (H) 09/13/2021    CREATININE 0.76 09/13/2021    BUN 14 09/13/2021     09/13/2021    K 3.8 09/13/2021     09/13/2021    CO2 21 09/13/2021     Lab Results   Component Value Date    ALT 8 09/13/2021    AST 11 09/13/2021    ALKPHOS 111 (H) 09/13/2021    BILITOT 0.34 09/13/2021     Lab Results   Component Value Date    LABPROT 7.0 08/27/2012    LABALBU 3.6 09/13/2021     Lab Results   Component Value Date    TSH 1.54 06/23/2014     Assessment:  1. Chronic midline low back pain with sciatica, sciatica laterality unspecified    2. Rheumatoid arthritis of foot, unspecified laterality, unspecified whether rheumatoid factor present ([facility]    3. Morbid obesity with BMI of 45.0-49.9, adult ([facility]    4.  High cholesterol        Plan:  Patient had chronic back pain issues and had surgery done in 2018 and has seen multiple pain management and did not help or and is currently taking pain medications and gabapentin which is helping her to calm down but not completely resolving the pain and patient agreeable to do physical therapy which did help her in the past and a referral was made for back pain  Patient states that that she had a history of childhood rheumatoid arthritis and had multiple foot surgeries and the first surgery was done by the age of 15 but never saw a rheumatologist according to the patient so we will do a rheumatoid factor AC sed rate and patient never had any cholesterol checkup and new labs ordered to check for cholesterol CBC CMP magnesium vitamin D  Patient had a DEXA scan done last year which showed normal bone density and had a nuclear medicine bone scan to evaluate for any occult lesion on the right hip and was negative and patient had a history of breast carcinoma on the left and had a chemotherapy and patient had bilateral mastectomy done and since the chemotherapy patient developed neuropathy and patient is being followed by Hematology and Oncology and office notes from the recent visit reviewed  Patient had a lung screening CT done last year and was negative  Review in 6 weeks           1. Shayna Kumar received counseling on the following healthy behaviors: nutrition and exercise    2. Prior labs and health maintenance reviewed. 3.  Discussed use, benefit, and side effects of prescribed medications. Barriers to medication compliance addressed. All her questions were answered. Pt voiced understanding. Shayna Kumar will continue current medications, diet and exercise. No orders of the defined types were placed in this encounter. Completed Refills               Requested Prescriptions      No prescriptions requested or ordered in this encounter     4. Patient given educational materials - see patient instructions    5. Was a self-tracking handout given in paper form or via [employer]?   NO    Orders Placed This Encounter   Procedures    Comprehensive Metabolic Panel     Standing Status:   Future     Standing Expiration Date:   11/2/2022    CBC     Standing Status:   Future     Standing Expiration Date:   11/2/2022    Magnesium     Standing Status:   Future     Standing Expiration Date:   11/2/2022    Uric Acid     Standing Status:   Future     Standing Expiration Date:   11/2/2022    Rheumatoid Factor     Standing Status:   Future     Standing Expiration Date:   11/2/2022    TSH without Reflex     Standing Status:   Future     Standing Expiration Date:   11/2/2022    Lipid Panel     Standing Status:   Future     Standing Expiration Date:   11/2/2022     Order Specific Question:   Is Patient Fasting?/# of Hours     Answer:   12    AC     Standing Status:   Future     Standing Expiration Date:   11/2/2022    Sedimentation rate, manual     Standing Status:   Future     Standing Expiration Date:   11/2/2022    CK     Standing Status:   Future     Standing Expiration Date:   11/2/2022    Vitamin B12     Standing Status:   Future     Standing Expiration Date:   11/2/2022   [facility] Physical Therapy [facility]     Referral Priority:   Routine     Referral Type:   Eval and Treat     Referral Reason:   Specialty Services Required     Requested Specialty:   Physical Therapy     Number of Visits Requested:   1     Return in about 6 weeks (around 12/14/2021). Patient voiced understanding and agreed to treatment plan. Electronically signed by Olga Grant MD on 11/2/2021 at 1:19 PM    This note is created with a voice recognition program and while intend to generate a document that accurately reflects the content of the visit, no guarantee can be provided that every mistake has been identified and corrected by editing. BMI was elevated today, and weight loss plan recommended is : daily exercise regimen.

## 2021-11-03 NOTE — PATIENT INSTRUCTIONS

## 2021-11-09 DIAGNOSIS — C50.412 MALIGNANT NEOPLASM OF UPPER-OUTER QUADRANT OF LEFT BREAST IN FEMALE, ESTROGEN RECEPTOR NEGATIVE (HCC): ICD-10-CM

## 2021-11-09 DIAGNOSIS — G89.29 OTHER CHRONIC PAIN: ICD-10-CM

## 2021-11-09 DIAGNOSIS — Z17.1 MALIGNANT NEOPLASM OF UPPER-OUTER QUADRANT OF LEFT BREAST IN FEMALE, ESTROGEN RECEPTOR NEGATIVE (HCC): ICD-10-CM

## 2021-11-09 DIAGNOSIS — G62.9 NEUROPATHY: ICD-10-CM

## 2021-11-09 NOTE — TELEPHONE ENCOUNTER
RECEIVED VM REQUEST FROM SARAH FOR GABAPENTIN & PERCOCET 10/325 REFILLS. BOTH WERE LAST WRITTEN 10/11/21    MD FOLLOW UP 01/10/22  PEND TO MD FOR REVIEW.

## 2021-11-10 ENCOUNTER — HOSPITAL ENCOUNTER (OUTPATIENT)
Dept: PHYSICAL THERAPY | Facility: CLINIC | Age: 61
Setting detail: THERAPIES SERIES
Discharge: HOME | End: 2021-11-10
Payer: COMMERCIAL

## 2021-11-10 PROCEDURE — 97161 PT EVAL LOW COMPLEX 20 MIN: CPT

## 2021-11-10 RX ORDER — GABAPENTIN 600 MG/1
600 TABLET ORAL 2 TIMES DAILY
Qty: 60 TABLET | Refills: 6 | Status: SHIPPED | OUTPATIENT
Start: 2021-11-10 | End: 2022-02-07 | Stop reason: SDUPTHER

## 2021-11-10 RX ORDER — OXYCODONE AND ACETAMINOPHEN 10; 325 MG/1; MG/1
1 TABLET ORAL EVERY 6 HOURS PRN
Qty: 120 TABLET | Refills: 0 | Status: SHIPPED | OUTPATIENT
Start: 2021-11-10 | End: 2021-12-06 | Stop reason: SDUPTHER

## 2021-11-10 NOTE — CONSULTS
[] [facility]) - [facility] &  Therapy  [address]  [phone]  F: [phone] [x] [address]  [facility]   [address]  P: [phone]  F: [phone] [] [address] &  Therapy  [address]  P: [phone]  F: [phone] [] [address]  P: [phone]  F: [phone] [] [address]  [address] [facility]   [address]   [place]: [phone]  F: [phone]      Physical Therapy Spine Evaluation    Date:  11/10/2021  Patient: Lea Patterson  : 1960  MRN: [id number]  Physician: Ana Laura Rocha MD  Insurance: [facility] [place] [employer] (Hulen Cure after eval)  Medical Diagnosis: M54.40, G89.29- Chronic midline low back pain with sciatica, sciatica laterality unspecified Rehab Codes: M54.40, M62.81, R26.89, M79.604  Onset Date: referral 21   Next 's appt. : tbd    Subjective:   CC: 61 y.o. female presents to physical therapy with complaint of chronic low back pain. HPI: (onset date)  Pain began several years prior with insidious onset. Patient reports diagnosis of degenerative bone disease several years ago. Patient had a back surgery in 2018 and felt relief temporarily. Patient underwent chemo and a double mastectomy in 2019 d/t breast cancer diagnosis and felt her back pain increased significantly following this procedure. Pain is described as constant and static. Pain will radiate from low back through R hip and knee which began earlier this year. Pain increases upon first waking up then will increase throughout the day. Patient reports if she is feeling good one day she tends to overdo it leading to her inability to stand or walk for several days following.  She lives alone and will occasionally need to keep a bucket near her bed Independent  [] Assist Difficulty putting on shoes and socks    Transfer/mobility [x] Independent  [] Assist [x] Independent  [] Assist Needed assistance once to stand up from the floor    Feeding [x] Independent  [] Assist [x] Independent  [] Assist    Toileting [x] Independent  [] Assist [x] Independent  [] Assist    Driving [x] Independent  [] Assist [x] Independent  [] Assist    Housekeeping [x] Independent  [] Assist [x]Modified Independent  [] Assist Will avoid certain activities during flare ups    Grocery shop/meal prep [x] Independent  [] Assist [x] Modified Independent  [] Assist Difficulty lifting and carrying groceries      Gait Prior level of function Current level of function    [] Independent  [x] Assist [] Independent  [x] Assist   Device: [] Independent [] Independent    [x] Straight Cane [] Quad cane [x] Straight Cane [] Quad cane    [] Standard walker [] Rolling walker   [] 4 wheeled walker [] Standard walker [] Rolling walker   [] 4 wheeled walker    [] Wheelchair [] Wheelchair       Symptoms:    Pain present? Yes   Location Low back, RLE   Pain Rating currently 6/10   Pain at worst 10/10   Pain at best 6/10   Description of pain Constant, sharp, shooting, stabbing, burning, aching,    Altered Sensation N/T BLEs    What makes it worse Cold, certain positions    What makes it better Heat, medications    Symptom progression Static    Sleep Disturbed            Objective:   STRENGTH  ROM    Left Right Cervical    L1-2 Hip Flex 4 3+ Flexion    Hip Abd 3- 3- Extension    L3-4 Knee Ext 4+ 4 Rotation L R   L4 Ankle DF 4+ 4+ Sidebend L R   L5 EHL   Retraction    S1 Plant.  Flex 4+ 4+ Lumbar    Abdominals poor poor Flexion Limited 50% painful   Erector Spinae poor poor Extension Limited 75% painful      Rotation L- Limited 15% painful R- Limited 15% painful      Sidebend L- Limited 25% painful R- limited 25% painful      UE/LE        Hip flexion L- 90 with pain R- 90 with pain TESTS (+/-) LEFT RIGHT Not Tested   SLR [] sit [] supine + + []   Hamstring (SLR) +58 +35 []   SKTC Increased back pain  Increased hip pain  []   DKTC unable unable []   Slump/Dural - - []   SI JT - - []   VICKI + + []   Aidee Tests ? Pain ? Pain No Change Not Tested   RFIS [] [] [x] []   JARROD [x] [] [] []   RFIL [] [] [x] []   REIL [x] [] [] []   Rep Prot [] [] [] []   Rep Retract [] [] [] []       OBSERVATION No Deficit Deficit Not Tested Comments   Posture       Forward Head [] [x] []    Rounded Shoulders [] [x] []    Kyphosis [] [x] []    Lordosis [] [x] [] Reduced lumbar lordosis    Lateral Shift [] [] []    Scoliosis [] [] []    Iliac Crest [] [] []    PSIS [] [] []    ASIS [] [] []    Genu Valgus [] [] []    Genu Varus [] [] []    Genu Recurvatum [] [] []    Pronation [] [] []    Supination [] [] []    Leg Length Discrp [] [] []    Slumped Sitting [] [x] []    Palpation [] [x] [] Increased tenderness to spinous processes L1-L5, erector spinae, bilateral piriformis   Sensation [] [x] [] Numbness/tingling to bilateral feet globally    Edema [x] [] []    Neurological [x] [] []        Functional Test:  Score:    Oswestry 42/50 or 84% functionally impaired    LEFI 10/80 or 87.5% functionally impaired      Comments:  Gait: ambulates with straight cane, flexed forward trunk positioning, R foot ER, decreased gait speed, decreased stride length bilaterally, antalgic gait   Patient unable to stand with upright trunk positioning   Balance- impaired, NBOS EC 30 seconds with increased A-P postural sway    Assessment:    61 y.o. female presents to physical therapy with chronic LBP with radicular symptoms through RLE, reduced BLE and core strength globally, reduced lumbar and hip mobility, and impaired gait and balance. Patient lives alone in an apartment and is able to complete most daily activities with modified independence.  Patient is currently ambulating with a straight cane, but does present with reduced safety awareness with functional activities such as reaching and holding the door handle to stand up from seated position. At this time patient does not present with a directional preference but will continue to monitor. Patient would benefit from skilled physical therapy services in order to: reduce low back pain and radicular symptoms, improve BLE and core strength, and increase hip and lumbar mobility to improve gait and balance for improved safety and independence with all daily activities. Problems:    [x] ? Pain: up to 10/10 pain low back and RLE   [x] ? ROM: reduced lumbar ROM globally, impaired hip mobility   [x] ? Strength: reduced core and BLE strength globally   [x] ? Function: impaired function indicated by Oswestry score of 84% and LEFI score of 87.5% impaired   [x] Other:  Impaired gait and balance     STG: (to be met in 8 treatments)  1. ? Pain: Pt to report maximal low back and RLE pain levels at 6/10 or less to improve tolerance to therapeutic exercise progressions. 2. ? ROM: Pt to present with lumbar AROM at 15% limited or less without increased pain to ease difficulty with dressing LEs.   a. Pt to demonstrate improved BLE flexibility with passive SLR at least 65 degrees to improve LE mobility for transfers and dressing. 3. ? Strength: Pt to increase bilateral hip flexion and knee strength to at least 4+/5 to improve independence with gait and stair climbing.   a. Pt to increase bilateral hip abduction and extension strength to at least 4-/5 to improve independence with gait and stair climbing.   b. Pt to demonstrate improved core strength with ability to complete TA + SL march 10x without increased pain. 4. Patient to be independent with home exercise program as demonstrated by performance with correct form without cues. 5. Demonstrate Knowledge of fall prevention    LTG: (to be met in 12 treatments)  1.  Pt to report maximal low back and RLE pain levels to 3/10 or less to improve independence with household chores and self care activities. 2. Pt to demonstrate improved functional mobility with LEFI and Oswestry scores of 55% impaired or less. 3. Pt to be able to ambulate at least 150 feet with single point cane demonstrating improved safety awareness and reduced gait mechanics. 4. Pt to be able to maintain tandem stance for 20\" with improved postural stability and without increased pain. Patient goals: \"to be able to move better\"    Rehab Potential:  [] Good  [x] Fair  [] Poor   Suggested Professional Referral:  [x] No  [] Yes:  Barriers to Goal Achievement:  [] No  [x] Yes: chronicity of symptoms  Domestic Concerns:  [x] No  [] Yes:    Pt. Education:  [x] Plans/Goals, Risks/Benefits discussed  [] Home exercise program  Method of Education: [x] Verbal  [] Demo  [] Written   Comprehension of Education:  [x] Verbalizes understanding. [] Demonstrates understanding. [] Needs Review. [] Demonstrates/verbalizes understanding of HEP/Ed previously given. Treatment Plan:  [x] Therapeutic Exercise   35916  [] Iontophoresis: 4 mg/mL Dexamethasone Sodium Phosphate  mAmin  09644   [] Therapeutic Activity  15822 [] Vasopneumatic cold with compression  56234    [x] Gait Training   46970 [] Ultrasound   46153   [x] Neuromuscular Re-education  61803 [] Electrical Stimulation Unattended  81722   [x] Manual Therapy  23915 [] Electrical Stimulation Attended  07422   [x] Instruction in HEP  [] Lumbar/Cervical Traction  10496   [] Aquatic Therapy   83336 [x] Cold/hotpack    [] Massage   69980      [] Dry Needling, 1 or 2 muscles  78819   [] Biofeedback, first 15 minutes   33417  [] Biofeedback, additional 15 minutes   75583 [] Dry Needling, 3 or more muscles  48743     [x]  Medication allergies reviewed for use of    Dexamethasone Sodium Phosphate 4mg/ml     with iontophoresis treatments. Pt is not allergic.     Frequency:  2 x/week for 12 visits    Todays Treatment:  Modalities: Precautions:  Exercises:  Exercise Reps/ Time Weight/ Level Comments                                 Other:    Specific Instructions for next treatment: Provide HEP, progress lumbar and hip mobility per pt tolerance, progress core and hip strengthening globally, balance and postural stability, hot pack prn       Evaluation Complexity:  History (Personal factors, comorbidities) [] 0 [x] 1-2 [] 3+   Exam (limitations, restrictions) [] 1-2 [] 3 [x] 4+   Clinical presentation (progression) [x] Stable [] Evolving  [] Unstable   Decision Making [x] Low [] Moderate [] High    [x] Low Complexity [] Moderate Complexity [] High Complexity       Treatment Charges: Mins Units   [x] Evaluation       [x]  Low       []  Moderate       []  High 35 1   []  Modalities     []  Ther Exercise     []  Manual Therapy     []  Ther Activities     []  Aquatics     []  Vasocompression     []  Other       TOTAL TREATMENT TIME: 35 minutes     Time in: 2:15 pm      Time out: 2:50 pm    Electronically signed by: Kathleen Fischer PT        Physician Signature:________________________________Date:__________________  By signing above or cosigning this note, I have reviewed this plan of care and certify a need for medically necessary rehabilitation services.      *PLEASE SIGN ABOVE AND FAX BACK ALL PAGES*

## 2021-11-11 DIAGNOSIS — I10 ESSENTIAL HYPERTENSION: ICD-10-CM

## 2021-11-11 DIAGNOSIS — C50.412 MALIGNANT NEOPLASM OF UPPER-OUTER QUADRANT OF LEFT BREAST IN FEMALE, ESTROGEN RECEPTOR NEGATIVE (HCC): ICD-10-CM

## 2021-11-11 DIAGNOSIS — Z17.1 MALIGNANT NEOPLASM OF UPPER-OUTER QUADRANT OF LEFT BREAST IN FEMALE, ESTROGEN RECEPTOR NEGATIVE (HCC): ICD-10-CM

## 2021-11-11 DIAGNOSIS — E66.01 MORBID OBESITY DUE TO EXCESS CALORIES (HCC): ICD-10-CM

## 2021-11-11 DIAGNOSIS — G62.9 NEUROPATHY: ICD-10-CM

## 2021-11-11 DIAGNOSIS — F17.200 SMOKING: ICD-10-CM

## 2021-11-12 NOTE — TELEPHONE ENCOUNTER
RECEIVED INTERFACE REQUEST FROM [facility] FOR REFILL OF HCTZ.     PEND TO MD RIZO FOLLOW UP 01/10/22  PEND TO MD FOR REVIEW

## 2021-11-16 RX ORDER — HYDROCHLOROTHIAZIDE 50 MG/1
50 TABLET ORAL DAILY
Qty: 30 TABLET | Refills: 11 | Status: SHIPPED | OUTPATIENT
Start: 2021-11-16 | End: 2022-10-31 | Stop reason: SDUPTHER

## 2021-11-18 ENCOUNTER — HOSPITAL ENCOUNTER (OUTPATIENT)
Dept: PHYSICAL THERAPY | Facility: CLINIC | Age: 61
Setting detail: THERAPIES SERIES
Discharge: HOME | End: 2021-11-18
Payer: COMMERCIAL

## 2021-11-18 PROCEDURE — 97110 THERAPEUTIC EXERCISES: CPT

## 2021-11-18 PROCEDURE — 97116 GAIT TRAINING THERAPY: CPT

## 2021-11-18 NOTE — FLOWSHEET NOTE
[] [facility]  [address]  [phone]  F: [phone] [x] [address]  [facility] 36   [address]  P: [phone]  F: [phone] [] [address]  Therapy  [address]  P: [phone]  F: [phone] [] [address]  P: [phone]  F: [phone] [] [address]  [facility]   [address]   [place]: [phone]  F: [phone]      Physical Therapy Daily Treatment Note    Date:  2021  Patient Name:  Jose Kramer    :  1960  MRN: [id number]  Physician: Anushka Alvarez MD              Insurance:  - [place] [employer] ([facility] after eval)  Medical Diagnosis: M54.40, G89.29- Chronic midline low back pain with sciatica, sciatica laterality unspecified Rehab Codes: M54.40, M62.81, R26.89, M79.604  Onset Date: referral 21                          Next 's appt. : tbd  Visit# / total visits: 2        Cancels/No Shows: 0/0    Subjective:    Pain:  [x] Yes  [] No Location: low back Pain Rating: (0-10 scale) 10/10  Pain altered Tx:  [x] No  [] Yes  Action:  Comments: Pt reports she doesn't think she is going to get any better because \"it has been too long\". Pt states she is doing stretches (her own stretches)  though and those help. Objective:       IICVYM Treatment:  Modalities:   Precautions:  Exercises:  Exercise Reps/ Time Weight/ Level Comments   Nustep  5 min L1 Added                Seated           Seated phsyioball rollouts   10\"x5 ea   Fwd and lat   Added          Supine ELEVATED with wedge      TrA 10x5\"  Added    LTR 10x ea  Added    TrA hip fall out 10x ea  Added              Piriformis stretch 20\"x3  Added     Hamstring stretch   20\"x3   Added   Standing      Calf stretch 20\"x3  Added 11/18               Gait training with 4 point cane x 9' Cues to stand up right, cane at right side, cuing for sequencing 30\" x 2     Extensive edu on dangers of ambulating with cane in front and bilateral hands               Other: Slow through exercises      Specific Instructions for next treatment: progress lumbar and hip mobility per pt tolerance, progress core and hip strengthening globally, balance and postural stability, hot pack prn         Treatment Charges: Mins Units   []  Modalities     [x]  Ther Exercise 33 2   []  Manual Therapy     []  Ther Activities     []  Aquatics     []  Vasocompression     [x]  Gait 9 1   Total Treatment time 42 3       Assessment: [x] Progressing toward goals. Initiated treatment this date with nustep warm up followed by mat exercises. Difficulty completing exercises due to pain and lack of focus. Slow throughout completion though above completed and an HEP provided. Poor core control and difficulty maintaining pelvic neutral for TrA strengthening. Attempted to educate patient on the importance of strengthening to help improve endurance and overall posture. Provided HEP this date with exercises and stretches. Also practiced gait training this date as patient wants to ambulate with her cane in front of her and in a flexed forward position. Posture improved with 4 pronged cane at side providing more stability. [] No change. [] Other:    [x] Patient would continue to benefit from skilled physical therapy services in order to: reduce low back pain and radicular symptoms, improve BLE and core strength, and increase hip and lumbar mobility to improve gait and  balance for improved safety and independence with all daily activities.         STG: (to be met in 8 treatments)  1. ? Pain: Pt to report maximal low back and RLE pain levels at 6/10 or less to improve tolerance to therapeutic exercise progressions.   2. ? ROM: Pt to present with lumbar AROM at 15% limited or less without increased pain to ease difficulty with dressing LEs.   a. Pt to demonstrate improved BLE flexibility with passive SLR at least 65 degrees to improve LE mobility for transfers and dressing. 3. ? Strength: Pt to increase bilateral hip flexion and knee strength to at least 4+/5 to improve independence with gait and stair climbing.   a. Pt to increase bilateral hip abduction and extension strength to at least 4-/5 to improve independence with gait and stair climbing.   b. Pt to demonstrate improved core strength with ability to complete TA + SL march 10x without increased pain. 4. Patient to be independent with home exercise program as demonstrated by performance with correct form without cues. 5. Demonstrate Knowledge of fall prevention     LTG: (to be met in 12 treatments)  1. Pt to report maximal low back and RLE pain levels to 3/10 or less to improve independence with household chores and self care activities. 2. Pt to demonstrate improved functional mobility with LEFI and Oswestry scores of 55% impaired or less. 3. Pt to be able to ambulate at least 150 feet with single point cane demonstrating improved safety awareness and reduced gait mechanics. 4. Pt to be able to maintain tandem stance for 20\" with improved postural stability and without increased pain.         Patient goals: \"to be able to move better\"    Pt. Education:  [x] Yes  [] No  [] Reviewed Prior HEP/Ed  Method of Education: [x] Verbal  [x] Demo  [x] Written   Access Code: [email or web address]  URL: [email or web address]  Date: 11/18/2021  Prepared by: Elizabeth Piedra    Exercises  Supine Transversus Abdominis Bracing - Hands on Stomach - 1 x daily - 7 x weekly - 3 sets - 10 reps  Supine Lower Trunk Rotation - 1 x daily - 7 x weekly - 3 sets - 10 reps  Supine Transversus Abdominis Bracing with Double Leg Fallout - 1 x daily - 7 x weekly - 3 sets - 10 reps  Supine March - 1 x daily - 7 x weekly - 3 sets - 10 reps  Supine Hamstring Stretch with Strap - 1 x daily - 7 x weekly - 3 reps - 20 hold  Supine Piriformis Stretch with Foot on Ground - 1 x daily - 7 x weekly - 3 reps - 20 hold      Comprehension of Education:  [x] Verbalizes understanding. [] Demonstrates understanding. [x] Needs review: pt does not believe PT will work and feels it is \"too late'  [] Demonstrates/verbalizes HEP/Ed previously given. Plan: [x] Continue current frequency toward long and short term goals.     [] Specific Instructions for subsequent treatments:  progress lumbar and hip mobility per pt tolerance, progress core and hip strengthening globally, balance and postural stability, hot pack prn       Time In: 2:05pm            Time Out: 3:01pm  Electronically signed by:  Simone Rodriguez PTA

## 2021-11-29 ENCOUNTER — HOSPITAL ENCOUNTER (OUTPATIENT)
Dept: PHYSICAL THERAPY | Facility: CLINIC | Age: 61
Setting detail: THERAPIES SERIES
Discharge: HOME | End: 2021-11-29
Payer: COMMERCIAL

## 2021-11-29 PROCEDURE — 97116 GAIT TRAINING THERAPY: CPT

## 2021-11-29 PROCEDURE — 97110 THERAPEUTIC EXERCISES: CPT

## 2021-11-29 NOTE — FLOWSHEET NOTE
[] [facility]  [address]  [phone]  F: [phone] [x] [address]  [facility] 36   [address]  P: [phone]  F: [phone] [] [address]  Therapy  [address] [facility]  P: [phone]  F: [phone] [] [address]  P: [phone]  F: [phone] [] [address]  [facility]   [address]   [place]: [phone]  F: [phone]      Physical Therapy Daily Treatment Note    Date:  2021  Patient Name:  Vijaya Pleitez    :  1960  MRN: [id number]  Physician: Marilee Villaseñor MD              Insurance: [employer] ([facility] after eval)  Medical Diagnosis: M54.40, G89.29- Chronic midline low back pain with sciatica, sciatica laterality unspecified Rehab Codes: M54.40, M62.81, R26.89, M79.604  Onset Date: referral 21                          Next 's appt. : tbd  Visit# / total visits: 3        Cancels/No Shows: 0/0    Subjective:    Pain:  [x] Yes  [] No Location: low back Pain Rating: (0-10 scale) 10/10  Pain altered Tx:  [x] No  [] Yes  Action:  Comments: Pt reports she \"has been messed up since \" pt reports she did all of the cooking, grocery shopping, and cleaning up since she lives alone and was in so much pain she hasn't been able to complete her exercises or get out of bed until  afternoon. Objective:       AYRFKK Treatment:  Modalities:   Precautions:  Exercises:  Exercise Reps/ Time Weight/ Level Comments   Nustep  6 min L1 Added                Seated          Seated phsyioball rollouts  10\"x5 ea   Fwd and lat   Added    Sciatic nerve glides 10x3\"  Added              Supine ELEVATED with wedge      TrA 10x5\"  Added    LTR 10x ea Added 11/18   TrA hip fall out 15x ea Lime Added 11/18, Added resistance 11/29   Orpah Aroda 10x5\"   Added 11/29   TA + March 10xea  Added 11/29   Piriformis stretch 20\"x3  Added 11/18   Hamstring stretch  20\"x3   Added 11/18          Standing      Calf stretch 20\"x3  Added 11/18               Gait training with 4 point cane x 8' Cues to stand up right, cane at right side, cuing for sequencing 30\" x 2     Extensive edu on dangers of ambulating with cane in front and bilateral hands- encouraged pt to look into ambulating with rollater or RW               Other: Slow through exercises      Specific Instructions for next treatment: progress lumbar and hip mobility per pt tolerance, progress core and hip strengthening globally, balance and postural stability, hot pack prn         Treatment Charges: Mins Units   []  Modalities     [x]  Ther Exercise 44 2   []  Manual Therapy     []  Ther Activities     []  Aquatics     []  Vasocompression     [x]  Gait 8 1   Total Treatment time 52 3       Assessment: [x] Progressing toward goals. Initiated treatment this date with nustep warm up followed by mat exercises. Progressed mat table hip and core strengthening per pt tolerance. Cues provided to emphasize appropriate breathing patterns with core bracing with fair carryover. Reviewed HEP, pt demonstrates good compliance to HEP with ability to complete previous exercises with good recall and minimal cueing for appropriate technique. Ended session with gait training to review appropriate mechanics as pt continues to ambulate through clinic flexed forward with cane in bilateral hands. Encouraged pt to look into RW or rollater for safety if she prefers centered position of AD.     [] No change.      [] Other:    [x] Patient would continue to benefit from skilled physical therapy services in order to: reduce low back pain and radicular symptoms, improve BLE and core strength, and increase hip and lumbar mobility to improve gait and balance for improved safety and independence with all daily activities.         STG: (to be met in 8 treatments)  1. ? Pain: Pt to report maximal low back and RLE pain levels at 6/10 or less to improve tolerance to therapeutic exercise progressions. 2. ? ROM: Pt to present with lumbar AROM at 15% limited or less without increased pain to ease difficulty with dressing LEs.   a. Pt to demonstrate improved BLE flexibility with passive SLR at least 65 degrees to improve LE mobility for transfers and dressing. 3. ? Strength: Pt to increase bilateral hip flexion and knee strength to at least 4+/5 to improve independence with gait and stair climbing.   a. Pt to increase bilateral hip abduction and extension strength to at least 4-/5 to improve independence with gait and stair climbing.   b. Pt to demonstrate improved core strength with ability to complete TA + SL march 10x without increased pain. 4. Patient to be independent with home exercise program as demonstrated by performance with correct form without cues. 5. Demonstrate Knowledge of fall prevention     LTG: (to be met in 12 treatments)  1. Pt to report maximal low back and RLE pain levels to 3/10 or less to improve independence with household chores and self care activities. 2. Pt to demonstrate improved functional mobility with LEFI and Oswestry scores of 55% impaired or less. 3. Pt to be able to ambulate at least 150 feet with single point cane demonstrating improved safety awareness and reduced gait mechanics. 4. Pt to be able to maintain tandem stance for 20\" with improved postural stability and without increased pain.         Patient goals: \"to be able to move better\"    Pt. Education:  [x] Yes  [] No  [] Reviewed Prior HEP/Ed  Method of Education: [x] Verbal  [x] Demo  [] Written   Access Code: [email or web address]  URL: [email or web address]  Date: 11/18/2021  Prepared by: Dayan Chew    Exercises  Supine Transversus Abdominis Bracing - Hands on Stomach - 1 x daily - 7 x weekly - 3 sets - 10 reps  Supine Lower Trunk Rotation - 1 x daily - 7 x weekly - 3 sets - 10 reps  Supine Transversus Abdominis Bracing with Double Leg Fallout - 1 x daily - 7 x weekly - 3 sets - 10 reps  Supine March - 1 x daily - 7 x weekly - 3 sets - 10 reps  Supine Hamstring Stretch with Strap - 1 x daily - 7 x weekly - 3 reps - 20 hold  Supine Piriformis Stretch with Foot on Ground - 1 x daily - 7 x weekly - 3 reps - 20 hold      Comprehension of Education:  [] Verbalizes understanding. [] Demonstrates understanding. [] Needs review: pt does not believe PT will work and feels it is \"too late'  [x] Demonstrates/verbalizes HEP/Ed previously given. Plan: [x] Continue current frequency toward long and short term goals. [x] Specific Instructions for subsequent treatments:  progress lumbar and hip mobility per pt tolerance, progress core and hip strengthening globally, balance and postural stability, hot pack prn       Time In: 3:02 pm            Time Out: 4:00 pm    Electronically signed by:   Tamera Hughes, PT

## 2021-12-01 ENCOUNTER — HOSPITAL ENCOUNTER (OUTPATIENT)
Dept: PHYSICAL THERAPY | Facility: CLINIC | Age: 61
Setting detail: THERAPIES SERIES
Discharge: HOME | End: 2021-12-01
Payer: COMMERCIAL

## 2021-12-01 NOTE — FLOWSHEET NOTE
[] [facility]) [facility] &  Therapy  [address]    [phone]  F: [phone]   [x] [address]  [facility] 36   [address]  P: [phone]  F: [phone]  [] [address]  [address]  P: [phone]  F: [phone] [] [address]  P: [phone]  F: [phone]  [] [address] [facility]   [address]   [place]: [phone]  F: [phone]   [] [place] [id number] [facility] [address]  [place]: [phone]   F: [phone]     Physical Therapy Cancel/No Show note    Date: 2021  Patient: Hellen Rodriguez  : 1960  MRN: [id number]    Cancels/No Shows to date: 1 cx /0 ns    For today's appointment patient:    [x]  Cancelled    [] Rescheduled appointment    [] No-show     Reason given by patient:    []  Patient ill    []  Conflicting appointment    [x] No transportation      [] Conflict with work    [] No reason given    [] Weather related    [] COVID-19    [] Other:      Comments:        [x] Next appointment was confirmed    Electronically signed by:  Patricia Arana PT

## 2021-12-02 ENCOUNTER — HOSPITAL ENCOUNTER (OUTPATIENT)
Dept: PHYSICAL THERAPY | Facility: CLINIC | Age: 61
Setting detail: THERAPIES SERIES
Discharge: HOME | End: 2021-12-02
Payer: COMMERCIAL

## 2021-12-02 PROCEDURE — 97110 THERAPEUTIC EXERCISES: CPT

## 2021-12-02 NOTE — FLOWSHEET NOTE
[] [facility]  [address]  [phone]  F: [phone] [x] [address]  [employer] 36   [address]  P: [phone]  F: [phone] [] [address]  Therapy  [address]  P: [phone]  F: [phone] [] [address]  P: [phone]  F: [phone] [] [address]  [facility]   [address]   [place]: [phone]  F: [phone]      Physical Therapy Daily Treatment Note    Date:  2021  Patient Name:  Lea Pattersno    :  1960  MRN: [id number]  Physician: Ana Laura Rocha MD              Insurance: [facility] [place] [employer] ([facility]ulen Cure after eval)  Medical Diagnosis: M54.40, G89.29- Chronic midline low back pain with sciatica, sciatica laterality unspecified Rehab Codes: M54.40, M62.81, R26.89, M79.604  Onset Date: referral 21                          Next 's appt. : tbd  Visit# / total visits:         Cancels/No Shows: 1/0    Subjective:    Pain:  [x] Yes  [] No Location: low back Pain Rating: (0-10 scale) 10/10  Pain altered Tx:  [x] No  [] Yes  Action:    Comments: Pt reports increased pain for about 2 hours after last visit, but the next day she felt great. High pain again today. Objective:       Todays Treatment:  Modalities:   Precautions:  Exercises:  Exercise Reps/ Time Weight/ Level Comments   Nustep  5 min L1 Added                Seated          Seated phsyioball rollouts  10\"x5 ea   Fwd and lat   Added    Sciatic nerve glides 10x3\"  Added    Marches             Supine ELEVATED with wedge   Able to lay flat    TrA 10x5\"  Added    LTR 10x ea  Added    TrA hip fall out 15x ea Lime Added , Added resistance    Ball Squeezes 10x5\"   Added    TA + March 10xea  Added 11/29   Piriformis stretch 20\"x3  Added 11/18   Hamstring stretch  20\"x3   Added 11/18          Standing      Calf stretch 20\"x3  Added 11/18               Gait training with 4 point cane x 8' Cues to stand up right, cane at right side, cuing for sequencing 30\" x 2     Extensive edu on dangers of ambulating with cane in front and bilateral hands- encouraged pt to look into ambulating with rollater or RW               Other: Slow through exercises      Specific Instructions for next treatment: progress lumbar and hip mobility per pt tolerance, progress core and hip strengthening globally, balance and postural stability, hot pack prn         Treatment Charges: Mins Units   []  Modalities     [x]  Ther Exercise 45 3   []  Manual Therapy     []  Ther Activities     []  Aquatics     []  Vasocompression     [x]  Gait     Total Treatment time 45 3       Assessment: [x] Progressing toward goals. Continued with charted exercises today. Pt able to lay flat for the supine activities with no increased pain noted. Pt did need help with supine to sit transition. Pt need to stand for a bit before starting to walk due to LB pain. Pt noted feeling fine at the end of the session. [] No change. [] Other:    [x] Patient would continue to benefit from skilled physical therapy services in order to: reduce low back pain and radicular symptoms, improve BLE and core strength, and increase hip and lumbar mobility to improve gait and  balance for improved safety and independence with all daily activities.         STG: (to be met in 8 treatments)  1. ? Pain: Pt to report maximal low back and RLE pain levels at 6/10 or less to improve tolerance to therapeutic exercise progressions. 2. ? ROM: Pt to present with lumbar AROM at 15% limited or less without increased pain to ease difficulty with dressing LEs.   a.  Pt to demonstrate improved BLE flexibility with passive SLR at least 65 degrees to improve LE mobility for Education:  [] Verbalizes understanding. [] Demonstrates understanding. [] Needs review: pt does not believe PT will work and feels it is \"too late'  [x] Demonstrates/verbalizes HEP/Ed previously given. Plan: [x] Continue current frequency toward long and short term goals.     [x] Specific Instructions for subsequent treatments:  progress lumbar and hip mobility per pt tolerance, progress core and hip strengthening globally, balance and postural stability, hot pack prn       Time In: 3:59 pm            Time Out: 4:50 pm    Electronically signed by:  Papito Saini PTA

## 2021-12-06 DIAGNOSIS — G89.29 OTHER CHRONIC PAIN: ICD-10-CM

## 2021-12-06 DIAGNOSIS — C50.412 MALIGNANT NEOPLASM OF UPPER-OUTER QUADRANT OF LEFT BREAST IN FEMALE, ESTROGEN RECEPTOR NEGATIVE (HCC): ICD-10-CM

## 2021-12-06 DIAGNOSIS — Z17.1 MALIGNANT NEOPLASM OF UPPER-OUTER QUADRANT OF LEFT BREAST IN FEMALE, ESTROGEN RECEPTOR NEGATIVE (HCC): ICD-10-CM

## 2021-12-06 RX ORDER — OXYCODONE AND ACETAMINOPHEN 10; 325 MG/1; MG/1
1 TABLET ORAL EVERY 6 HOURS PRN
Qty: 120 TABLET | Refills: 0 | Status: SHIPPED | OUTPATIENT
Start: 2021-12-06 | End: 2022-01-06 | Stop reason: SDUPTHER

## 2021-12-06 NOTE — TELEPHONE ENCOUNTER
RECEIVED VM FROM SARAH REQUESTING REFILL OF PERCOCET & GABAPENTIN. BOTH WERE LAST WRITTEN 11/10/21  GABAPENTIN HAS 6 REFILLS AVAILABLE.     PERCOCET #120 WRITTEN 11/10/21    PEND TO MD FOR REVIEW  MD FOLLOW UP 01/10/22

## 2021-12-07 ENCOUNTER — HOSPITAL ENCOUNTER (OUTPATIENT)
Dept: PHYSICAL THERAPY | Facility: CLINIC | Age: 61
Setting detail: THERAPIES SERIES
Discharge: HOME | End: 2021-12-07
Payer: COMMERCIAL

## 2021-12-07 PROCEDURE — 97110 THERAPEUTIC EXERCISES: CPT

## 2021-12-07 NOTE — FLOWSHEET NOTE
[] [facility].  [address]  [phone]  F: [phone] [x] [address]  [facility] 36   [address]  P: [phone]  F: [phone] [] [address]  Therapy  [address] [facility]  P: [phone]  F: [phone] [] [address]  P: [phone]  F: [phone] [] [address]  [facility]   [address]   [place]: [phone]  F: [phone]      Physical Therapy Daily Treatment Note    Date:  2021  Patient Name:  Dillon Dominguez    :  1960  MRN: [id number]  Physician: Noris Miller MD              Insurance: [facility] [place] Medicaid ([employer] after eval)  Medical Diagnosis: M54.40, G89.29- Chronic midline low back pain with sciatica, sciatica laterality unspecified Rehab Codes: M54.40, M62.81, R26.89, M79.604  Onset Date: referral 21                          Next 's appt. : tbd  Visit# / total visits:         Cancels/No Shows: 1/0    Subjective:    Pain:  [x] Yes  [] No Location: low back Pain Rating: (0-10 scale) 10/10  Pain altered Tx:  [x] No  [] Yes  Action:    Comments: Pt reports she did have some increased pain after last visit, she blames on the small mat table. She states she is doing better today. Objective:       Todays Treatment:  Modalities:   Precautions:  Exercises:  Exercise Reps/ Time Weight/ Level Comments   Nustep  5 min L1 Added                Seated          Seated phsyioball rollouts  10\"x5 ea   Fwd and lat   Added    Sciatic nerve glides 10x3\"  Added    Marches             Supine ELEVATED with wedge   Able to lay flat    TrA 10x5\"  Added    LTR 10x ea  Added    TrA hip fall out 15x ea Lime Added , Added resistance    Kala Siobhan 15x5\"   Added 11/29   TA + March 15xea  Added 11/29   Piriformis stretch 20\"x3  Added 11/18   Hamstring stretch  20\"x3   Added 11/18          Standing      Calf stretch 20\"x3  Added 11/18               Gait training with 4 point cane x 8' Cues to stand up right, cane at right side, cuing for sequencing 30\" x 2     Extensive edu on dangers of ambulating with cane in front and bilateral hands- encouraged pt to look into ambulating with rollater or RW               Other: Slow through exercises      Specific Instructions for next treatment: progress lumbar and hip mobility per pt tolerance, progress core and hip strengthening globally, balance and postural stability, hot pack prn         Treatment Charges: Mins Units   []  Modalities     [x]  Ther Exercise 45 3   []  Manual Therapy     []  Ther Activities     []  Aquatics     []  Vasocompression     [x]  Gait     Total Treatment time 45 3       Assessment: [x] Progressing toward goals. Better tolerance to charted exercises today. Able to increase reps with some exercises today. Pt declined trying new exercises today due to being tired. [] No change. [] Other:    [x] Patient would continue to benefit from skilled physical therapy services in order to: reduce low back pain and radicular symptoms, improve BLE and core strength, and increase hip and lumbar mobility to improve gait and  balance for improved safety and independence with all daily activities.         STG: (to be met in 8 treatments)  1. ? Pain: Pt to report maximal low back and RLE pain levels at 6/10 or less to improve tolerance to therapeutic exercise progressions. 2. ? ROM: Pt to present with lumbar AROM at 15% limited or less without increased pain to ease difficulty with dressing LEs.   a. Pt to demonstrate improved BLE flexibility with passive SLR at least 65 degrees to improve LE mobility for transfers and dressing.    3. ? Strength: Pt to increase bilateral hip flexion and knee strength to at least 4+/5 to improve independence with gait and stair climbing.   a. Pt to increase bilateral hip abduction and extension strength to at least 4-/5 to improve independence with gait and stair climbing.   b. Pt to demonstrate improved core strength with ability to complete TA + SL march 10x without increased pain. 4. Patient to be independent with home exercise program as demonstrated by performance with correct form without cues. 5. Demonstrate Knowledge of fall prevention     LTG: (to be met in 12 treatments)  1. Pt to report maximal low back and RLE pain levels to 3/10 or less to improve independence with household chores and self care activities. 2. Pt to demonstrate improved functional mobility with LEFI and Oswestry scores of 55% impaired or less. 3. Pt to be able to ambulate at least 150 feet with single point cane demonstrating improved safety awareness and reduced gait mechanics. 4. Pt to be able to maintain tandem stance for 20\" with improved postural stability and without increased pain.         Patient goals: \"to be able to move better\"    Pt. Education:  [x] Yes  [] No  [] Reviewed Prior HEP/Ed  Method of Education: [x] Verbal  [x] Demo  [] Written   Access Code: [email or web address]  URL: [email or web address]  Date: 11/18/2021  Prepared by: Sharon Scruggs    Exercises  Supine Transversus Abdominis Bracing - Hands on Stomach - 1 x daily - 7 x weekly - 3 sets - 10 reps  Supine Lower Trunk Rotation - 1 x daily - 7 x weekly - 3 sets - 10 reps  Supine Transversus Abdominis Bracing with Double Leg Fallout - 1 x daily - 7 x weekly - 3 sets - 10 reps  Supine March - 1 x daily - 7 x weekly - 3 sets - 10 reps  Supine Hamstring Stretch with Strap - 1 x daily - 7 x weekly - 3 reps - 20 hold  Supine Piriformis Stretch with Foot on Ground - 1 x daily - 7 x weekly - 3 reps - 20 hold      Comprehension of Education:  [] Verbalizes understanding. [] Demonstrates understanding.   [] Needs review: pt does not believe PT will work and feels it is \"too late'  [x] Demonstrates/verbalizes HEP/Ed previously given. Plan: [x] Continue current frequency toward long and short term goals.     [x] Specific Instructions for subsequent treatments:  progress lumbar and hip mobility per pt tolerance, progress core and hip strengthening globally, balance and postural stability, hot pack prn       Time In: 3:55 pm            Time Out: 4:45 pm    Electronically signed by:  Sherman Jaramillo PTA

## 2021-12-09 ENCOUNTER — HOSPITAL ENCOUNTER (OUTPATIENT)
Dept: PHYSICAL THERAPY | Facility: CLINIC | Age: 61
Setting detail: THERAPIES SERIES
Discharge: HOME | End: 2021-12-09
Payer: COMMERCIAL

## 2021-12-09 PROCEDURE — 97110 THERAPEUTIC EXERCISES: CPT

## 2021-12-09 NOTE — FLOWSHEET NOTE
[] [facility]  [address]  [phone]  F: [phone] [x] [address]  [facility]   [address]  P: [phone]  F: [phone] [] [address]  Therapy  [address] [facility]  P: [phone]  F: [phone] [] [address]  P: [phone]  F: [phone] [] [address]  [facility]   [address]   [place]: [phone]  F: [phone]      Physical Therapy Daily Treatment Note    Date:  2021  Patient Name:  Ebenezer Almanza    :  1960  MRN: [id number]  Physician: Renata Joel MD              Insurance: [facility] [place] Medicaid (12v authorized 11/10-21)  Medical Diagnosis: M54.40, G89.29- Chronic midline low back pain with sciatica, sciatica laterality unspecified Rehab Codes: M54.40, M62.81, R26.89, M79.604  Onset Date: referral 21                          Next 's appt. : tbd  Visit# / total visits:         Cancels/No Shows: 1/0    Subjective:    Pain:  [x] Yes  [] No Location: low back Pain Rating: (0-10 scale) not rated/10  Pain altered Tx:  [x] No  [] Yes  Action:    Comments: Pt does not rate pain this date noting its always at a 10 at baseline. Pt does report that she has noticed her strides are getting longer which is good. Pt notes some soreness following therapy sessions. Objective:     UKJXYJ Treatment:  Modalities:   Precautions:  Exercises:  Exercise Reps/ Time Weight/ Level Comments   Nustep  6 min L1 Added                Seated          Seated phsyioball rollouts  10\"x5 ea   Fwd and lat   Added    Sciatic nerve glides 10x3\"  Added    Marches  20xea  Added 12/         Supine ELEVATED with wedge   Able to lay flat    TrA 10x5\"  Added    LTR 10x ea Added 11/18   TrA hip fall out 20x ea Lime Added 11/18, Added resistance 11/29   Ball Squeezes 15x5\"   Added 11/29   TA + March 15xea  Added 11/29   Piriformis stretch 20\"x3  Added 11/18   Hamstring stretch  20\"x3   Added 11/18    PPT  10x5\"  Added 12/9   SLR 10x AA Added 12/9- encouraged TA activation         Standing      Calf stretch 20\"x3  Added 11/18               Gait training with 4 point cane   Cues to stand up right, cane at right side, cuing for sequencing 30\" x 2     Extensive edu on dangers of ambulating with cane in front and bilateral hands- encouraged pt to look into ambulating with rollater or RW               Other: Slow through exercises      Specific Instructions for next treatment: progress lumbar and hip mobility per pt tolerance, progress core and hip strengthening globally, balance and postural stability, hot pack prn         Treatment Charges: Mins Units   []  Modalities     [x]  Ther Exercise 49 3   []  Manual Therapy     []  Ther Activities     []  Aquatics     []  Vasocompression     [x]  Gait     Total Treatment time 49 3       Assessment: [x] Progressing toward goals. Initiated session with nustep warm up followed by charted exercises. Able to progress exercises with PPT, AA SLR, and seated marches. Progressed repetitions of previous exercises as able. Pt moves slowly through exercise progressions and requires excess cueing to remain on task. Is able to complete all exercises without reported increase in pain and does note reduction in fatigue compared to previous visits. Plan to continue to progress standing and functional strengthening as able in upcoming sessions. [] No change.      [] Other:    [x] Patient would continue to benefit from skilled physical therapy services in order to: reduce low back pain and radicular symptoms, improve BLE and core strength, and increase hip and lumbar mobility to improve gait and  balance for improved safety and independence with all daily activities.         STG: (to be met in 8 treatments)  1. ? Pain: Pt to report maximal low back and RLE pain levels at 6/10 or less to improve tolerance to therapeutic exercise progressions. 2. ? ROM: Pt to present with lumbar AROM at 15% limited or less without increased pain to ease difficulty with dressing LEs.   a. Pt to demonstrate improved BLE flexibility with passive SLR at least 65 degrees to improve LE mobility for transfers and dressing. 3. ? Strength: Pt to increase bilateral hip flexion and knee strength to at least 4+/5 to improve independence with gait and stair climbing.   a. Pt to increase bilateral hip abduction and extension strength to at least 4-/5 to improve independence with gait and stair climbing.   b. Pt to demonstrate improved core strength with ability to complete TA + SL march 10x without increased pain. 4. Patient to be independent with home exercise program as demonstrated by performance with correct form without cues. 5. Demonstrate Knowledge of fall prevention     LTG: (to be met in 12 treatments)  1. Pt to report maximal low back and RLE pain levels to 3/10 or less to improve independence with household chores and self care activities. 2. Pt to demonstrate improved functional mobility with LEFI and Oswestry scores of 55% impaired or less. 3. Pt to be able to ambulate at least 150 feet with single point cane demonstrating improved safety awareness and reduced gait mechanics. 4. Pt to be able to maintain tandem stance for 20\" with improved postural stability and without increased pain.         Patient goals: \"to be able to move better\"    Pt. Education:  [x] Yes  [] No  [] Reviewed Prior HEP/Ed  Method of Education: [x] Verbal  [x] Demo  [] Written   Access Code: [email or web address]  URL: [email or web address]  Date: 11/18/2021  Prepared by: Rae Sow    Exercises  Supine Transversus Abdominis Bracing - Hands on Stomach - 1 x daily - 7 x weekly - 3 sets - 10 reps  Supine Lower Trunk Rotation - 1 x daily - 7 x weekly - 3 sets - 10 reps  Supine Transversus Abdominis Bracing with Double Leg Fallout - 1 x daily - 7 x weekly - 3 sets - 10 reps  Supine March - 1 x daily - 7 x weekly - 3 sets - 10 reps  Supine Hamstring Stretch with Strap - 1 x daily - 7 x weekly - 3 reps - 20 hold  Supine Piriformis Stretch with Foot on Ground - 1 x daily - 7 x weekly - 3 reps - 20 hold      Comprehension of Education:  [] Verbalizes understanding. [] Demonstrates understanding. [] Needs review: pt does not believe PT will work and feels it is \"too late'  [x] Demonstrates/verbalizes HEP/Ed previously given. Plan: [x] Continue current frequency toward long and short term goals.     [x] Specific Instructions for subsequent treatments:  progress lumbar and hip mobility per pt tolerance, progress core and hip strengthening globally, balance and postural stability, hot pack prn       Time In: 3:00 pm            Time Out: 4:54 pm    Electronically signed by:  Candelaria Wilson PT

## 2021-12-14 ENCOUNTER — HOSPITAL ENCOUNTER (OUTPATIENT)
Dept: PHYSICAL THERAPY | Facility: CLINIC | Age: 61
Setting detail: THERAPIES SERIES
Discharge: HOME | End: 2021-12-14
Payer: COMMERCIAL

## 2021-12-14 NOTE — FLOWSHEET NOTE
[] [facility]) - [facility] &  Therapy  [address]    [phone]  F: [phone]   [x] [address] [facility]  [facility]"" 36   [address]  P: [phone]  F: [phone]  [] [address]  Therapy  [address] [facility]  P: [phone]  F: [phone] [] [address]  P: [phone]  F: [phone]  [] [address]  [address] [facility] [address]   [address]   [place]: [phone]  F: [phone]   [] [facility]  [address] [facility] [address]  [place]: [phone]   F: [phone]     Physical Therapy Cancel/No Show note    Date: 2021  Patient: Jamel Aguirre  : 1960  MRN: [id number]    Cancels/No Shows to date: 20    For today's appointment patient:    [x]  Cancelled    [] Rescheduled appointment    [] No-show     Reason given by patient:    []  Patient ill    []  Conflicting appointment    [] No transportation      [] Conflict with work    [] No reason given    [] Weather related    [] COVID-19    [x] Other:      Comments:        [x] Next appointment was confirmed    Electronically signed by: Latoya Gallardo PTA

## 2021-12-16 ENCOUNTER — HOSPITAL ENCOUNTER (OUTPATIENT)
Dept: PHYSICAL THERAPY | Facility: CLINIC | Age: 61
Setting detail: THERAPIES SERIES
Discharge: HOME | End: 2021-12-16
Payer: COMMERCIAL

## 2021-12-16 PROCEDURE — 97110 THERAPEUTIC EXERCISES: CPT

## 2021-12-16 NOTE — FLOWSHEET NOTE
[] [facility]  [address]  [phone]  F: [phone] [x] [address]  [facility] 36   [address]  P: [phone]  F: [phone] [] [address]  Therapy  [address] [facility] [address]  P: [phone]  F: [phone] [] [address]  P: [phone]  F: [phone] [] [address]  [facility]   [address]   [place]: [phone]  F: [phone]      Physical Therapy Daily Treatment Note    Date:  2021  Patient Name:  Ebenezer Almanza    :  1960  MRN: [id number]  Physician: Renata Joel MD              Insurance: [facility] [place] Medicaid (12v authorized 11/10-21)  Medical Diagnosis: M54.40, G89.29- Chronic midline low back pain with sciatica, sciatica laterality unspecified Rehab Codes: M54.40, M62.81, R26.89, M79.604  Onset Date: referral 21                          Next 's appt. : tbd  Visit# / total visits:         Cancels/No Shows: 1/0    Subjective:    Pain:  [x] Yes  [] No Location: low back Pain Rating: (0-10 scale) not rated/10  Pain altered Tx:  [x] No  [] Yes  Action:    Comments: Pt arrives 10 minutes late to appointment. Pt notes that she feels a little better today. Otherwise no new changes. Objective:     Todays Treatment:  Modalities:   Precautions:  Exercises:  Exercise Reps/ Time Weight/ Level Comments   Nustep  5 min L1 Added                Seated          Seated phsyioball rollouts  10\"x5 ea   Fwd and lat   Added    Sciatic nerve glides 10x3\"  Added   20xea  Added    LAQ  10x  Added          Supine ELEVATED with wedge   Able to lay flat    TrA 10x5\"  Added    LTR 10x ea  Added    TrA hip fall out 20x ea Lime Added , Added resistance 11/29   Bobbi Sawant 15x5\"   Added 11/29   TA + March 15xea  Added 11/29   Piriformis stretch 20\"x3  Added 11/18   Hamstring stretch  20\"x3   Added 11/18    PPT  10x5\"  Added 12/9   SLR 10x AA Added 12/9- encouraged TA activation         Sidelying       Hip abduction  10xea  Added 12/16   Clamshells 10xea  Added 12/16         Standing      Calf stretch 20\"x3  Added 11/18               Gait training with 4 point cane   Cues to stand up right, cane at right side, cuing for sequencing 30\" x 2     Extensive edu on dangers of ambulating with cane in front and bilateral hands- encouraged pt to look into ambulating with rollater or RW               Other: Slow through exercises      Specific Instructions for next treatment: progress lumbar and hip mobility per pt tolerance, progress core and hip strengthening globally, balance and postural stability, hot pack prn       STRENGTH   ROM     Left Right Cervical     L1-2 Hip Flex 4+ 4 Flexion     Hip Abd 4- 4- Extension     L3-4 Knee Ext 4+ 4+ Rotation L R   L4 Ankle DF 4+ 4+ Sidebend L R   L5 EHL     Retraction     S1 Plant. Flex 4+ 4+ Lumbar     Abdominals fair fair Flexion Limited 25% painful   Erector Spinae poor poor Extension Limited 50% painful         Rotation L- WFL R- WFL         Sidebend L- WFL  R- WFL         UE/LE             Hip flexion L- 95 with pain R- 95 with pain                                                             TESTS (+/-) LEFT RIGHT Not Tested   Hamstring (SLR) 87 89 []?              Treatment Charges: Mins Units   []  Modalities     [x]  Ther Exercise 45 3   []  Manual Therapy     []  Ther Activities     []  Aquatics     []  Vasocompression     [x]  Gait     Total Treatment time 45 3       Assessment: [x] Progressing toward goals. Initiated session with nustep warm up followed by reassessment of progress towards goals.  Pt is demonstrating continued low back pain with difficulty rating on 0-10 scale despite use of visual analogue scale does not approximately 7/10 on average but describes it as \"all over\". Pt reports that she feels she is able to wake up and get out of bed easier than she could previously, and has been able to walk easier with bigger strides. Pt demonstrates improved hip and knee strength globally, hip and lumbar mobility, and core strength. At this time patient is limited most by lumbar flexion and extension range of motion and hip extension and abduction strength. Progressed with clamshells and sidelying hip abduction this date. Pt continues to be challenged by hip strengthening exercises however is able to tolerate despite fatigue. [] No change. [] Other:    [x] Patient would continue to benefit from skilled physical therapy services in order to: reduce low back pain and radicular symptoms, improve BLE and core strength, and increase hip and lumbar mobility to improve gait and  balance for improved safety and independence with all daily activities.         STG: (to be met in 8 treatments) Reassessed by primary PT 12/16/21  1. ? Pain: Pt to report maximal low back and RLE pain levels at 6/10 or less to improve tolerance to therapeutic exercise progressions. Ongoing- at 7/10 according to visual analog scale   2. ? ROM: Pt to present with lumbar AROM at 15% limited or less without increased pain to ease difficulty with dressing LEs. Ongoing   a. Pt to demonstrate improved BLE flexibility with passive SLR at least 65 degrees to improve LE mobility for transfers and dressing. MET   3. ? Strength: Pt to increase bilateral hip flexion and knee strength to at least 4+/5 to improve independence with gait and stair climbing. Partially met- limited most in R hip flexion and R knee flexion    a. Pt to increase bilateral hip abduction and extension strength to at least 4-/5 to improve independence with gait and stair climbing.  MET   b. Pt to demonstrate improved core strength with ability to complete TA + SL march 10x without increased pain. MET   4. Patient to be independent with home exercise program as demonstrated by performance with correct form without cues. MET- pt notes she breaks them up throughout the day but has been compliant   5. Demonstrate Knowledge of fall prevention MET- handout provided      LTG: (to be met in 12 treatments)  1. Pt to report maximal low back and RLE pain levels to 3/10 or less to improve independence with household chores and self care activities. 2. Pt to demonstrate improved functional mobility with LEFI and Oswestry scores of 55% impaired or less. 3. Pt to be able to ambulate at least 150 feet with single point cane demonstrating improved safety awareness and reduced gait mechanics. 4. Pt to be able to maintain tandem stance for 20\" with improved postural stability and without increased pain.         Patient goals: \"to be able to move better\"    Pt. Education:  [x] Yes  [] No  [x] Reviewed Prior HEP/Ed  Method of Education: [x] Verbal  [x] Demo  [] Written   Access Code: [email or web address]  URL: [email or web address]  Date: 11/18/2021  Prepared by: Olen Scheuermann    Exercises  Supine Transversus Abdominis Bracing - Hands on Stomach - 1 x daily - 7 x weekly - 3 sets - 10 reps  Supine Lower Trunk Rotation - 1 x daily - 7 x weekly - 3 sets - 10 reps  Supine Transversus Abdominis Bracing with Double Leg Fallout - 1 x daily - 7 x weekly - 3 sets - 10 reps  Supine March - 1 x daily - 7 x weekly - 3 sets - 10 reps  Supine Hamstring Stretch with Strap - 1 x daily - 7 x weekly - 3 reps - 20 hold  Supine Piriformis Stretch with Foot on Ground - 1 x daily - 7 x weekly - 3 reps - 20 hold      Comprehension of Education:  [] Verbalizes understanding. [] Demonstrates understanding. [] Needs review: pt does not believe PT will work and feels it is \"too late'  [x] Demonstrates/verbalizes HEP/Ed previously given. Plan: [x] Continue current frequency toward long and short term goals.     [x] Specific Instructions for subsequent treatments:  progress lumbar and hip mobility per pt tolerance, progress core and hip strengthening globally, balance and postural stability, hot pack prn       Time In: 3:10 pm            Time Out: 4:00 pm    Electronically signed by:  Kerry Leung PT

## 2021-12-21 ENCOUNTER — HOSPITAL ENCOUNTER (OUTPATIENT)
Dept: PHYSICAL THERAPY | Facility: CLINIC | Age: 61
Setting detail: THERAPIES SERIES
Discharge: HOME | End: 2021-12-21
Payer: COMMERCIAL

## 2021-12-21 NOTE — FLOWSHEET NOTE
[] [facility]  [address]    [phone]  F: [phone]   [x] [address]  Located [facility] 36   [address]  P: [phone]  F: [phone]  [] [address]  Therapy  [address] [facility]  P: [phone]  F: [phone] [] [address]  P: [phone]  F: [phone]  [] [address]  [facility]   [address]   [place]: [phone]  F: [phone]   [] [facility]  [address] [facility] [address]  [place]: [phone]   F: [phone]     Physical Therapy Cancel/No Show note    Date: 2021  Patient: Bryan Sneed  : 1960  MRN: [id number]    Cancels/No Shows to date:     For today's appointment patient:    []  Cancelled    [] Rescheduled appointment    [] No-show    [x] other: pt missed appt due to misinformation from the .  Not counted against the pt      Reason given by patient:    []  Patient ill    []  Conflicting appointment    [] No transportation      [] Conflict with work    [] No reason given    [] Weather related    [] [id number]    [] Other:      Comments:        [x] Next appointment was confirmed  @ 6pm    Electronically signed by: Betty Gallegos PTA

## 2021-12-23 ENCOUNTER — HOSPITAL ENCOUNTER (OUTPATIENT)
Dept: PHYSICAL THERAPY | Facility: CLINIC | Age: 61
Setting detail: THERAPIES SERIES
Discharge: HOME | End: 2021-12-23
Payer: COMMERCIAL

## 2021-12-23 NOTE — FLOWSHEET NOTE
[] [facility] ([facility]) [facility] &  Therapy  [address]    [phone]  F: [phone]   [x] [address]  [facility] 36   [address]  P: [phone]  F: [phone]  [] [address] &  Therapy  [address]  P: [phone]  F: [phone] [] [address]  P: [phone]  F: [phone]  [] [address]  [facility]   [address]   [place]: [phone]  F: [phone]   [] [place] [address] [facility] [address]  [place]: [phone]   F: [phone]     Physical Therapy Cancel/No Show note    Date: 2021  Patient: Marcelle Faith  : 1960  MRN: [id number]    Cancels/No Shows to date:    For today's appointment patient:    [x]  Cancelled    [] Rescheduled appointment    [] No-show     Reason given by patient:    []  Patient ill    []  Conflicting appointment    [] No transportation      [] Conflict with work    [] No reason given    [] Weather related    [] COVID-19    [x] Other:   Stuck in traffic- left voice mail message   Comments:        [x] Next appointment was NOT confirmed     Electronically signed by: Mari Mo PTA

## 2021-12-28 ENCOUNTER — HOSPITAL ENCOUNTER (OUTPATIENT)
Dept: PHYSICAL THERAPY | Facility: CLINIC | Age: 61
Setting detail: THERAPIES SERIES
Discharge: HOME | End: 2021-12-28
Payer: COMMERCIAL

## 2021-12-28 PROCEDURE — 97110 THERAPEUTIC EXERCISES: CPT

## 2021-12-28 NOTE — FLOWSHEET NOTE
[] [address]  [address]  [phone]  F: [phone] [x] [address]  [facility] 36   [address]  P: [phone]  F: [phone] [] [address] &  Therapy  [address]  P: [phone]  F: [phone] [] [address]  P: [phone]  F: [phone] [] [address]  [facility]   [address]   [place]: [phone]  F: [phone]      Physical Therapy Daily Treatment Note    Date:  2021  Patient Name:  Alba Mcleod    :  1960  MRN: [id number]  Physician: Gloria Nowak MD              Insurance: [facility] [place] Medicaid (12v authorized 11/10-21)  Medical Diagnosis: M54.40, G89.29- Chronic midline low back pain with sciatica, sciatica laterality unspecified Rehab Codes: M54.40, M62.81, R26.89, M79.604  Onset Date: referral 21                          Next 's appt. : tbd  Visit# / total visits:         Cancels/No Shows: 4/0    Subjective:    Pain:  [x] Yes  [] No Location: low back Pain Rating: (0-10 scale) 8/10  Pain altered Tx:  [x] No  [] Yes  Action:    Comments: Pt reports she has been hurting from not attending therapy for a week. Pt  states her low back is not as painful as her R hip this date. Objective:     KCCZKP Treatment:  Modalities:   Precautions:  Exercises:  Exercise Reps/ Time Weight/ Level Comments   Nustep  5 min L1 Added                Seated          Seated phsyioball rollouts  10\"x5 ea   Fwd and lat   Added    Sciatic nerve glides 10x3\"  Added   20xea  Added    LAQ  10x  Added          Supine ELEVATED with wedge   Able to lay flat    TrA 10x5\"  Added    LTR 10x ea  Added 11/18   TrA hip fall out 20x ea Lime Added 11/18, Added resistance 11/29   Ball Squeezes 15x5\"   Added 11/29   TA + March 15xea  Added 11/29   Piriformis stretch 20\"x3  Added 11/18   Hamstring stretch  20\"x3   Added 11/18    PPT  10x5\"  Added 12/9   SLR 10x AA Added 12/9- encouraged TA activation         Sidelying       Hip abduction  10xea  Added 12/16   Clamshells 10xea  Added 12/16         Standing      Calf stretch 20\"x3  Added 11/18         Gait training with front wheeled walker  25'  Added 12/28    Gait training with 4 point cane   Cues to stand up right, cane at right side, cuing for sequencing 30\" x 2     Extensive edu on dangers of ambulating with cane in front and bilateral hands- encouraged pt to look into ambulating with rollater or RW               Other: Slow through exercises      Specific Instructions for next treatment: progress lumbar and hip mobility per pt tolerance, progress core and hip strengthening globally, balance and postural stability, hot pack prn       STRENGTH   ROM     Left Right Cervical     L1-2 Hip Flex 4+ 4 Flexion     Hip Abd 4- 4- Extension     L3-4 Knee Ext 4+ 4+ Rotation L R   L4 Ankle DF 4+ 4+ Sidebend L R   L5 EHL     Retraction     S1 Plant. Flex 4+ 4+ Lumbar     Abdominals fair fair Flexion Limited 25% painful   Erector Spinae poor poor Extension Limited 50% painful         Rotation L- WFL R- WFL         Sidebend L- WFL  R- WFL         UE/LE             Hip flexion L- 95 with pain R- 95 with pain                                                             TESTS (+/-) LEFT RIGHT Not Tested   Hamstring (SLR) 87 89 []?              Treatment Charges: Mins Units   []  Modalities     [x]  Ther Exercise 47 3   []  Manual Therapy     []  Ther Activities     []  Aquatics     []  Vasocompression     []  Gait     Total Treatment time 47 3       Assessment: [x] Progressing toward goals. Initiated session with nustep warm up followed by mat table exercises.  Verbal cueing was required to stay on task and to transition into the next exercises secondary to talkative nature. Implemented gait training with wheeled walker for a short distance to focus on increased step length and heel/toe off with good pace and focus. Pt denies increase of pain post session. [] No change. [] Other:    [x] Patient would continue to benefit from skilled physical therapy services in order to: reduce low back pain and radicular symptoms, improve BLE and core strength, and increase hip and lumbar mobility to improve gait and  balance for improved safety and independence with all daily activities.         STG: (to be met in 8 treatments) Reassessed by primary PT 12/16/21  1. ? Pain: Pt to report maximal low back and RLE pain levels at 6/10 or less to improve tolerance to therapeutic exercise progressions. Ongoing- at 7/10 according to visual analog scale   2. ? ROM: Pt to present with lumbar AROM at 15% limited or less without increased pain to ease difficulty with dressing LEs. Ongoing   a. Pt to demonstrate improved BLE flexibility with passive SLR at least 65 degrees to improve LE mobility for transfers and dressing. MET   3. ? Strength: Pt to increase bilateral hip flexion and knee strength to at least 4+/5 to improve independence with gait and stair climbing. Partially met- limited most in R hip flexion and R knee flexion    a. Pt to increase bilateral hip abduction and extension strength to at least 4-/5 to improve independence with gait and stair climbing. MET   b. Pt to demonstrate improved core strength with ability to complete TA + SL march 10x without increased pain. MET   4. Patient to be independent with home exercise program as demonstrated by performance with correct form without cues. MET- pt notes she breaks them up throughout the day but has been compliant   5. Demonstrate Knowledge of fall prevention MET- handout provided      LTG: (to be met in 12 treatments)  1.  Pt to report maximal low back and RLE pain levels to 3/10 or less to improve independence with household chores and self care activities. 2. Pt to demonstrate improved functional mobility with LEFI and Oswestry scores of 55% impaired or less. 3. Pt to be able to ambulate at least 150 feet with single point cane demonstrating improved safety awareness and reduced gait mechanics. 4. Pt to be able to maintain tandem stance for 20\" with improved postural stability and without increased pain.         Patient goals: \"to be able to move better\"    Pt. Education:  [x] Yes  [] No  [] Reviewed Prior HEP/Ed  Method of Education: [x] Verbal  [] Demo  [] Written   Access Code: [email or web address]  URL: [email or web address]  Date: 11/18/2021  Prepared by: Priyanka Alvarez    Exercises  Supine Transversus Abdominis Bracing - Hands on Stomach - 1 x daily - 7 x weekly - 3 sets - 10 reps  Supine Lower Trunk Rotation - 1 x daily - 7 x weekly - 3 sets - 10 reps  Supine Transversus Abdominis Bracing with Double Leg Fallout - 1 x daily - 7 x weekly - 3 sets - 10 reps  Supine March - 1 x daily - 7 x weekly - 3 sets - 10 reps  Supine Hamstring Stretch with Strap - 1 x daily - 7 x weekly - 3 reps - 20 hold  Supine Piriformis Stretch with Foot on Ground - 1 x daily - 7 x weekly - 3 reps - 20 hold      Comprehension of Education:  [] Verbalizes understanding. [] Demonstrates understanding. [] Needs review: pt does not believe PT will work and feels it is \"too late'  [x] Demonstrates/verbalizes HEP/Ed previously given. Plan: [x] Continue current frequency toward long and short term goals.     [x] Specific Instructions for subsequent treatments:  progress lumbar and hip mobility per pt tolerance, progress core and hip strengthening globally, balance and postural stability, hot pack prn        Time In: 2:58 pm            Time Out: 3:50 pm    Electronically signed by:  John Barr PTA

## 2021-12-30 ENCOUNTER — HOSPITAL ENCOUNTER (OUTPATIENT)
Dept: PHYSICAL THERAPY | Facility: CLINIC | Age: 61
Setting detail: THERAPIES SERIES
Discharge: HOME | End: 2021-12-30
Payer: COMMERCIAL

## 2021-12-30 PROCEDURE — 97110 THERAPEUTIC EXERCISES: CPT

## 2021-12-30 NOTE — FLOWSHEET NOTE
[] [address]  [address]  [phone]  F: [phone] [x] [address]  [facility] 36   [address]  P: [phone]  F: [phone] [] [address] &  Therapy  [address]  P: [phone]  F: [phone] [] [address]  P: [phone]  F: [phone] [] [address]  [facility]   [address]   [place]: [phone]  F: [phone]      Physical Therapy Daily Treatment Note    Date:  2021  Patient Name:  Lake Montelongo    :  1960  MRN: [id number]  Physician: Andrey Miller MD              Insurance: [facility] [place] Medicaid (12v authorized 11/10-21)  Medical Diagnosis: M54.40, G89.29- Chronic midline low back pain with sciatica, sciatica laterality unspecified Rehab Codes: M54.40, M62.81, R26.89, M79.604  Onset Date: referral 21                          Next 's appt. : tbd  Visit# / total visits:         Cancels/No Shows: 4/0    Subjective:    Pain:  [x] Yes  [] No Location: low back Pain Rating: (0-10 scale) 6/10  Pain altered Tx:  [x] No  [] Yes  Action:    Comments: Pt arrives noting pain present in R groin and LB this date. States she has been sleeping with pillow between her knees which helps reduce sx's.      Objective:  Modalities:   Precautions:  Exercises:  Exercise Reps/ Time Weight/ Level Comments   Nustep  5 min L1 Added                Seated          Seated phsyioball rollouts  10\"x5 ea   Fwd and lat   Added    Sciatic nerve glides 10x3\"  Added   20xea  Added    LAQ  15x  Added          Supine ELEVATED with wedge   Able to lay flat    TrA 10x5\"  Added    LTR 10x ea  Added    TrA hip fall out 15x5\" ea Lime Added , Added resistance 11/29   Sandra Kermit 15x5\"   Added 11/29   TA + March 15xea  Added 11/29   Piriformis stretch 20\"x3  Added 11/18   Hamstring stretch  20\"x3   Added 11/18    PPT  10x5\"  Added 12/9   SLR 10x AA Added 12/9- encouraged TA activation         Sidelying       Hip abduction  10xea  Added 12/16   Clamshells 10xea  Added 12/16         Standing      Calf stretch 20\"x3  Added 11/18         Gait training with front wheeled walker  60'  Added 12/28    NBOS on floor  NBOS on foam  X30\"  x20\" // bars  Added 12/30   Tandem stance  X20\" ea // bars  Added 12/30   Side stepping  5x ea // bars  Added 12/30   Gait training with 4 point cane   Cues to stand up right, cane at right side, cuing for sequencing 30\" x 2     Extensive edu on dangers of ambulating with cane in front and bilateral hands- encouraged pt to look into ambulating with rollater or RW               Other: Slow through exercises      Specific Instructions for next treatment: progress lumbar and hip mobility per pt tolerance, progress core and hip strengthening globally, balance and postural stability, hot pack prn       STRENGTH   ROM     Left Right Cervical     L1-2 Hip Flex 4+ 4 Flexion     Hip Abd 4- 4- Extension     L3-4 Knee Ext 4+ 4+ Rotation L R   L4 Ankle DF 4+ 4+ Sidebend L R   L5 EHL     Retraction     S1 Plant. Flex 4+ 4+ Lumbar     Abdominals fair fair Flexion Limited 25% painful   Erector Spinae poor poor Extension Limited 50% painful         Rotation L- WFL R- WFL         Sidebend L- WFL  R- WFL         UE/LE             Hip flexion L- 95 with pain R- 95 with pain                                                             TESTS (+/-) LEFT RIGHT Not Tested   Hamstring (SLR) 87 89 []?              Treatment Charges: Mins Units   []  Modalities     [x]  Ther Exercise 55 4   []  Manual Therapy     []  Ther Activities     []  Aquatics     []  Vasocompression     []  Gait     Total Treatment time 55 4       Assessment: [x] Progressing toward goals. of fall prevention MET- handout provided      LTG: (to be met in 12 treatments)  1. Pt to report maximal low back and RLE pain levels to 3/10 or less to improve independence with household chores and self care activities. 2. Pt to demonstrate improved functional mobility with LEFI and Oswestry scores of 55% impaired or less. 3. Pt to be able to ambulate at least 150 feet with single point cane demonstrating improved safety awareness and reduced gait mechanics. 4. Pt to be able to maintain tandem stance for 20\" with improved postural stability and without increased pain.         Patient goals: \"to be able to move better\"    Pt. Education:  [x] Yes  [] No  [] Reviewed Prior HEP/Ed  Method of Education: [x] Verbal  [x] Demo  [] Written   Access Code: [email or web address]  URL: [email or web address] [email or web address]  Date: 11/18/2021  Prepared by: Jihan Peralta    Exercises  Supine Transversus Abdominis Bracing - Hands on Stomach - 1 x daily - 7 x weekly - 3 sets - 10 reps  Supine Lower Trunk Rotation - 1 x daily - 7 x weekly - 3 sets - 10 reps  Supine Transversus Abdominis Bracing with Double Leg Fallout - 1 x daily - 7 x weekly - 3 sets - 10 reps  Supine March - 1 x daily - 7 x weekly - 3 sets - 10 reps  Supine Hamstring Stretch with Strap - 1 x daily - 7 x weekly - 3 reps - 20 hold  Supine Piriformis Stretch with Foot on Ground - 1 x daily - 7 x weekly - 3 reps - 20 hold      Comprehension of Education:  [] Verbalizes understanding. [] Demonstrates understanding. [] Needs review: pt does not believe PT will work and feels it is \"too late'  [x] Demonstrates/verbalizes HEP/Ed previously given. Plan: [x] Continue current frequency toward long and short term goals.     [x] Specific Instructions for subsequent treatments:  progress lumbar and hip mobility per pt tolerance, progress core and hip strengthening globally, balance and postural stability, hot pack prn        Time In: 3:05 pm         Time Out: 4:05 pm    Electronically signed by:  Jayce Castorena, VINNIE

## 2022-01-04 ENCOUNTER — HOSPITAL ENCOUNTER (OUTPATIENT)
Dept: PHYSICAL THERAPY | Facility: CLINIC | Age: 62
Setting detail: THERAPIES SERIES
Discharge: HOME | End: 2022-01-04
Payer: COMMERCIAL

## 2022-01-04 NOTE — FLOWSHEET NOTE
[] [address]  [address]    [phone]  F: [phone]   [x] [address] [facility] 36   [address]  P: [phone]  F: [phone]  [] [facility] &  Therapy  [address] [facility]  P: [phone]  F: [phone] [] [address] [facility]  P: [phone]  F: [phone]  [] [address]  [address] [facility] [address]   [address]   [place]: [phone]  F: [phone]   [] [facility]  [address] [facility] [address]  [place]: [phone]   F: [phone]     Physical Therapy Cancel/No Show note    Date: 2022  Patient: Mao Monte  : 1960  MRN: [id number]    Cancels/No Shows to date: - not counted against patient    For today's appointment patient:    [x]  Cancelled    [] Rescheduled appointment    [] No-show     Reason given by patient:    []  Patient ill    []  Conflicting appointment    [] No transportation      [] Conflict with work    [] No reason given    [] Weather related    [] [id number]    [x] Other:   Awaiting [facility] visit extension. Comments:        [x] Next appointment was confirmed for Thursday if approval is received.       Electronically signed by: Lauren Nicole PTA

## 2022-01-06 ENCOUNTER — HOSPITAL ENCOUNTER (OUTPATIENT)
Dept: PHYSICAL THERAPY | Facility: CLINIC | Age: 62
Setting detail: THERAPIES SERIES
Discharge: HOME | End: 2022-01-06
Payer: COMMERCIAL

## 2022-01-06 DIAGNOSIS — Z17.1 MALIGNANT NEOPLASM OF UPPER-OUTER QUADRANT OF LEFT BREAST IN FEMALE, ESTROGEN RECEPTOR NEGATIVE (HCC): ICD-10-CM

## 2022-01-06 DIAGNOSIS — G89.29 OTHER CHRONIC PAIN: ICD-10-CM

## 2022-01-06 DIAGNOSIS — C50.412 MALIGNANT NEOPLASM OF UPPER-OUTER QUADRANT OF LEFT BREAST IN FEMALE, ESTROGEN RECEPTOR NEGATIVE (HCC): ICD-10-CM

## 2022-01-06 RX ORDER — OXYCODONE AND ACETAMINOPHEN 10; 325 MG/1; MG/1
1 TABLET ORAL EVERY 6 HOURS PRN
Qty: 120 TABLET | Refills: 0 | Status: SHIPPED | OUTPATIENT
Start: 2022-01-06 | End: 2022-02-05

## 2022-01-06 NOTE — FLOWSHEET NOTE
[] [facility]) - [facility] &  Therapy  [address]    [phone]  F: [phone]   [x] [address] [employer] [address]   [address]  P: [phone]  F: [phone]  [] [address] &  Therapy  [address] [facility] [address]  P: [phone]  F: [phone] [] [address]  P: [phone]  F: [phone]  [] [address]  [address] [facility] 70   [address]   [place]: [phone]  F: [phone]   [] [facility]  [address] [facility] [address]  [place]: [phone]   F: [phone]     Physical Therapy Cancel/No Show note    Date: 2022  Patient: Robert Obregon  : 1960  MRN: [id number]    Cancels/No Shows to date:      For today's appointment patient:    [x]  Cancelled    [] Rescheduled appointment    [] No-show     Reason given by patient:    []  Patient ill     []  Conflicting appointment    [] No transportation      [] Conflict with work    [] No reason given    [] Weather related     [] COVID-19    [x] Other:      Comments:  Pt exposed to [employer] waiting for test results.        [x] Next appointment was confirmed      Electronically signed by: Alma Delia Jose PTA

## 2022-01-06 NOTE — TELEPHONE ENCOUNTER
Message from Anny Peralta, requesting a refill on Percocet and gabapentin   Last refill 12-6-21 for percocet  Pt received gabapentin with 6 refills on 11-10-21  Sent to md for review

## 2022-01-10 ENCOUNTER — OFFICE VISIT (OUTPATIENT)
Dept: ONCOLOGY | Age: 62
End: 2022-01-10
Payer: COMMERCIAL

## 2022-01-10 ENCOUNTER — TELEPHONE (OUTPATIENT)
Dept: ONCOLOGY | Age: 62
End: 2022-01-10

## 2022-01-10 ENCOUNTER — HOSPITAL ENCOUNTER (OUTPATIENT)
Facility: MEDICAL CENTER | Age: 62
Discharge: HOME | End: 2022-01-10
Payer: COMMERCIAL

## 2022-01-10 VITALS
RESPIRATION RATE: 20 BRPM | HEART RATE: 70 BPM | TEMPERATURE: 98 F | DIASTOLIC BLOOD PRESSURE: 102 MMHG | BODY MASS INDEX: 37.87 KG/M2 | WEIGHT: 234.5 LBS | SYSTOLIC BLOOD PRESSURE: 157 MMHG

## 2022-01-10 DIAGNOSIS — F17.200 SMOKING: ICD-10-CM

## 2022-01-10 DIAGNOSIS — Z17.1 MALIGNANT NEOPLASM OF UPPER-OUTER QUADRANT OF LEFT BREAST IN FEMALE, ESTROGEN RECEPTOR NEGATIVE (HCC): ICD-10-CM

## 2022-01-10 DIAGNOSIS — G62.9 NEUROPATHY: ICD-10-CM

## 2022-01-10 DIAGNOSIS — Z17.1 MALIGNANT NEOPLASM OF UPPER-OUTER QUADRANT OF LEFT BREAST IN FEMALE, ESTROGEN RECEPTOR NEGATIVE (HCC): Primary | ICD-10-CM

## 2022-01-10 DIAGNOSIS — E66.01 MORBID OBESITY DUE TO EXCESS CALORIES (HCC): ICD-10-CM

## 2022-01-10 DIAGNOSIS — G89.29 OTHER CHRONIC PAIN: ICD-10-CM

## 2022-01-10 DIAGNOSIS — C50.412 MALIGNANT NEOPLASM OF UPPER-OUTER QUADRANT OF LEFT BREAST IN FEMALE, ESTROGEN RECEPTOR NEGATIVE (HCC): ICD-10-CM

## 2022-01-10 DIAGNOSIS — C50.412 MALIGNANT NEOPLASM OF UPPER-OUTER QUADRANT OF LEFT BREAST IN FEMALE, ESTROGEN RECEPTOR NEGATIVE (HCC): Primary | ICD-10-CM

## 2022-01-10 LAB
ABSOLUTE EOS #: 0.18 K/UL (ref 0–0.44)
ABSOLUTE IMMATURE GRANULOCYTE: 0.03 K/UL (ref 0–0.3)
ABSOLUTE LYMPH #: 4.2 K/UL (ref 1.1–3.7)
ABSOLUTE MONO #: 0.93 K/UL (ref 0.1–1.2)
ALBUMIN SERPL-MCNC: 3.5 G/DL (ref 3.5–5.2)
ALBUMIN/GLOBULIN RATIO: ABNORMAL (ref 1–2.5)
ALP BLD-CCNC: 99 U/L (ref 35–104)
ALT SERPL-CCNC: 8 U/L (ref 5–33)
AMPHETAMINE SCREEN URINE: NEGATIVE
ANION GAP SERPL CALCULATED.3IONS-SCNC: 11 MMOL/L (ref 9–17)
AST SERPL-CCNC: 8 U/L
BARBITURATE SCREEN URINE: NEGATIVE
BASOPHILS # BLD: 1 % (ref 0–2)
BASOPHILS ABSOLUTE: 0.07 K/UL (ref 0–0.2)
BENZODIAZEPINE SCREEN, URINE: NEGATIVE
BILIRUB SERPL-MCNC: 0.12 MG/DL (ref 0.3–1.2)
BUN BLDV-MCNC: 18 MG/DL (ref 8–23)
BUN/CREAT BLD: 25 (ref 9–20)
BUPRENORPHINE URINE: ABNORMAL
CALCIUM SERPL-MCNC: 9.2 MG/DL (ref 8.6–10.4)
CANNABINOID SCREEN URINE: NEGATIVE
CHLORIDE BLD-SCNC: 101 MMOL/L (ref 98–107)
CO2: 25 MMOL/L (ref 20–31)
COCAINE METABOLITE, URINE: NEGATIVE
CREAT SERPL-MCNC: 0.71 MG/DL (ref 0.5–0.9)
DIFFERENTIAL TYPE: ABNORMAL
EOSINOPHILS RELATIVE PERCENT: 2 % (ref 1–4)
GFR AFRICAN AMERICAN: >60 ML/MIN
GFR NON-AFRICAN AMERICAN: >60 ML/MIN
GFR SERPL CREATININE-BSD FRML MDRD: ABNORMAL ML/MIN/{1.73_M2}
GFR SERPL CREATININE-BSD FRML MDRD: ABNORMAL ML/MIN/{1.73_M2}
GLUCOSE BLD-MCNC: 79 MG/DL (ref 70–99)
HCT VFR BLD CALC: 44.2 % (ref 36.3–47.1)
HEMOGLOBIN: 13.5 G/DL (ref 11.9–15.1)
IMMATURE GRANULOCYTES: 0 %
LYMPHOCYTES # BLD: 40 % (ref 24–43)
MCH RBC QN AUTO: 29 PG (ref 25.2–33.5)
MCHC RBC AUTO-ENTMCNC: 30.5 G/DL (ref 28.4–34.8)
MCV RBC AUTO: 94.8 FL (ref 82.6–102.9)
MDMA URINE: ABNORMAL
METHADONE SCREEN, URINE: NEGATIVE
METHAMPHETAMINE, URINE: ABNORMAL
MONOCYTES # BLD: 9 % (ref 3–12)
NRBC AUTOMATED: 0 PER 100 WBC
OPIATES, URINE: NEGATIVE
OXYCODONE SCREEN URINE: POSITIVE
PDW BLD-RTO: 15 % (ref 11.8–14.4)
PHENCYCLIDINE, URINE: NEGATIVE
PLATELET # BLD: 198 K/UL (ref 138–453)
PLATELET ESTIMATE: ABNORMAL
PMV BLD AUTO: 9.5 FL (ref 8.1–13.5)
POTASSIUM SERPL-SCNC: 3.8 MMOL/L (ref 3.7–5.3)
PROPOXYPHENE, URINE: ABNORMAL
RBC # BLD: 4.66 M/UL (ref 3.95–5.11)
RBC # BLD: ABNORMAL 10*6/UL
SEG NEUTROPHILS: 48 % (ref 36–65)
SEGMENTED NEUTROPHILS ABSOLUTE COUNT: 5.13 K/UL (ref 1.5–8.1)
SODIUM BLD-SCNC: 137 MMOL/L (ref 135–144)
TEST INFORMATION: ABNORMAL
TOTAL PROTEIN: 6.6 G/DL (ref 6.4–8.3)
TRICYCLIC ANTIDEPRESSANTS, UR: ABNORMAL
WBC # BLD: 10.5 K/UL (ref 3.5–11.3)
WBC # BLD: ABNORMAL 10*3/UL

## 2022-01-10 PROCEDURE — 4004F PT TOBACCO SCREEN RCVD TLK: CPT | Performed by: INTERNAL MEDICINE

## 2022-01-10 PROCEDURE — 85025 COMPLETE CBC W/AUTO DIFF WBC: CPT

## 2022-01-10 PROCEDURE — 99214 OFFICE O/P EST MOD 30 MIN: CPT | Performed by: INTERNAL MEDICINE

## 2022-01-10 PROCEDURE — 3017F COLORECTAL CA SCREEN DOC REV: CPT | Performed by: INTERNAL MEDICINE

## 2022-01-10 PROCEDURE — G8427 DOCREV CUR MEDS BY ELIG CLIN: HCPCS | Performed by: INTERNAL MEDICINE

## 2022-01-10 PROCEDURE — 36415 COLL VENOUS BLD VENIPUNCTURE: CPT

## 2022-01-10 PROCEDURE — G8417 CALC BMI ABV UP PARAM F/U: HCPCS | Performed by: INTERNAL MEDICINE

## 2022-01-10 PROCEDURE — 99211 OFF/OP EST MAY X REQ PHY/QHP: CPT | Performed by: INTERNAL MEDICINE

## 2022-01-10 PROCEDURE — G8482 FLU IMMUNIZE ORDER/ADMIN: HCPCS | Performed by: INTERNAL MEDICINE

## 2022-01-10 PROCEDURE — 80053 COMPREHEN METABOLIC PANEL: CPT

## 2022-01-10 PROCEDURE — 80307 DRUG TEST PRSMV CHEM ANLYZR: CPT

## 2022-01-10 NOTE — PROGRESS NOTES
Bhavna Domínguez                                                                                                                  1/10/2022  MRN:   [id number]  YOB: 1960  PCP:                           Dante Mcnamara MD  Referring Physician: No ref. provider found  Treating Physician Name: Yan Butterfield MD      Reason for visit:  Chief Complaint   Patient presents with    Follow-up     review lab       Current problems:   IDC of Left Breast cancer, Triple negative, pT2,pN0,M0    Active and recent treatments:  Bilateral Mastectomy with left sentinel lymph node  Adjuvant chemotherapy using dose denseAC followed by T on 4/11/19--- 8/ /19    Summary of Case/History:    Bhavna Daughters a 64 y. o.female invasive carcinoma of the breast.     Patient initially felt a lump in her left breast for 2-3 months. She underwent screening mammogram which showed 2 cm mass in upper outer left breast.  Right breast do not have any concerning findings. Ultrasound of left breast conformed mass measuring 1.9 cm in the upper outer left breast.  Patient underwent biopsy on 1/23/19 which revealed invasive ductal carcinoma, grade 3, ER/ME negative. HER-2 results are pending.     Patient has history of uterus/cervical cancer diagnosed in 1992. Patient states she underwent surgery for the cervical cancer. She does not recall getting any radiation therapy or chemotherapy. Patient states she has been interested in getting bilateral breast reduction due to cosmetic reasons however now with a diagnosis of breast cancer she wishes to proceed with bilateral mastectomy.     Patient has history of breast cancer in her mother as well as 3 paternal aunts. Patient's mother also had cervical and uterine cancer.     Patient underwent bilateral mastectomy with left sentinel lymph node biopsy.   Pathological stage was T2, N0, M0    Started chemotherapy for triple negative breast cancer using AC followed by T on 4/11/19    Interim History:    Patient presents to the clinic for follow-up visit and to discuss results of her lab work-up and other relevant clinical data for breast cancer surveillance. Her pain persists but has improved with physical therapy, her stride and range of motion of have improved, she would like to continue with exercises on her own but does not have equipment at home. She had fall in the interim that did not result in injury but she needed help getting up. She continues to smoke very intermittently. She has established with PCP. During this visit patient's allergy, social, medical, surgical history and medications were reviewed and updated.     Past Medical History:   Past Medical History:   Diagnosis Date    Arthritis     GENERALIZED ALL OVER RHEUMATOID AND OSTEO    Breast cancer ([address]     left breast    Cancer ([address] 1992    CERVICAL, UTERINE    Chronic back pain     scioliosis,  02/2019 WEARING A BACK BRACE    Degenerative disorder of bone     ALL OVER    Depression     Difficult intravenous access     HANDS AR BEST SITE    GERD (gastroesophageal reflux disease)     Hyperlipidemia     HX OF NO MEDS IN YEARS    Hypertension 2007    Pain management clinic    Insomnia     Neuropathy     hands and feet    Obesity     BMI -  43    Sleep apnea 2016    NO LONGER USES MACHINE    Snores     has been told by family that she stops breathing    Use of cane as ambulatory aid     Wears glasses     READING    Wears glasses     \"CHEATERS\"       Past Surgical History:     Past Surgical History:   Procedure Laterality Date    BREAST SURGERY Left 1990    CYST    BUNIONECTOMY Bilateral     CERVIX SURGERY  1992    COLONOSCOPY  10/13/2017    FOOT SURGERY Left 2014    PINS IN ALL TOES    FOOT SURGERY Left 5/27/2020    HARDWARE REMOVAL LEFT GREAT TOE- performed by Carmencita Molina DPM at [address] [facility] Bilateral    [address], CERVIX     MASTECTOMY Bilateral 2/12/2019    TOTAL MASTECTOMY performed by Carlos Alberto Choi DO at [address], BILATERAL Bilateral 02/12/2019     TOTAL MASTECTOMY,  AXILLARY SENTINEL LYMPH NODE BIOPSY, FROZEN SECTION     MI COLSC FLX W/REMOVAL LESION BY HOT BX FORCEPS N/A 10/13/2017    COLONOSCOPY WITH BIOPSY AND POLYPECTOMY performed by Troy Jovel IV, DO at [address]     TUNNELED VENOUS PORT PLACEMENT  03/27/2019    X-RAY FOOT 3+VW      both       Patient Family Social History:     Social History     Socioeconomic History    Marital status: Single     Spouse name: Not on file    Number of children: Not on file    Years of education: Not on file    Highest education level: Not on file   Occupational History    Not on file   Tobacco Use    Smoking status: Current Some Day Smoker     Packs/day: 0.25     Years: 41.00     Pack years: 10.25     Types: Cigarettes     Start date: 1980    Smokeless tobacco: Never Used    Tobacco comment: STATES SMOKES A PACK A WEEK   Vaping Use    Vaping Use: Never used   Substance and Sexual Activity    Alcohol use: No    Drug use: Yes     Frequency: 7.0 times per week     Types: Marijuana (Weed)     Comment: LAST USE 02/10/2018, AND PAIN PILLS     Sexual activity: Not Currently   Other Topics Concern    Not on file   Social History Narrative    Not on file     Social Determinants of Health     Financial Resource Strain: Low Risk     Difficulty of Paying Living Expenses: Not hard at all   Food Insecurity: No Food Insecurity    Worried About [address] in the Last Year: Never true    [address] in the Last Year: Never true   Transportation Needs:     Lack of Transportation (Medical): Not on file    Lack of Transportation (Non-Medical):  Not on file   Physical Activity:     Days of Exercise per Week: Not on file    Minutes of Exercise per Session: Not on file   Stress:     Feeling of Stress : Not on file   Social Connections:     Frequency of Communication with Friends and Family: Not on file    Frequency of Social Gatherings with Friends and Family: Not on file    Attends Hoahaoism Services: Not on file    Active Member of Clubs or Organizations: Not on file    Attends Club or Organization Meetings: Not on file    Marital Status: Not on file   Intimate Partner Violence:     Fear of Current or Ex-Partner: Not on file    Emotionally Abused: Not on file    Physically Abused: Not on file    Sexually Abused: Not on file   Housing Stability:     Unable to Pay for Housing in the Last Year: Not on file    Number of [place] in the Last Year: Not on file    Unstable Housing in the Last Year: Not on file      Family History   Problem Relation Age of Onset    High Blood Pressure Mother     Cancer Mother         cervical and breast    Diabetes Father     Heart Attack Father     Heart Disease Maternal Aunt         times 2      Current Medications:     Current Outpatient Medications   Medication Sig Dispense Refill    oxyCODONE-acetaminophen (PERCOCET)  MG per tablet Take 1 tablet by mouth every 6 hours as needed for Pain for up to 30 days. 120 tablet 0    hydroCHLOROthiazide (HYDRODIURIL) 50 MG tablet TAKE 1 TABLET BY MOUTH DAILY 30 tablet 11    gabapentin (NEURONTIN) 600 MG tablet Take 1 tablet by mouth 2 times daily for 30 days. 60 tablet 6     No current facility-administered medications for this visit. Allergies:   Lisinopril, Bee venom, Amino acids, and Sulfa antibiotics    Review of Systems:    Constitutional: No fever or chills. No night sweats.   Weight now stable  Eyes: No eye discharge, double vision, or eye pain   HEENT: negative for sore mouth, sore throat, hoarseness and voice change   Respiratory: URI with productive cough, sneezing, sinus drainage and congestion, runny eyes  Cardiovascular: negative for chest pain, dyspnea, palpitations, orthopnea, PND   Gastrointestinal: negative for nausea, vomiting, diarrhea, constipation, Dysphagia, hematemesis and hematochezia  Genitourinary: negative for frequency, dysuria, nocturia, urinary incontinence, and hematuria   Integument: negative for rash, skin lesions, bruises.   Nails now growing back slowly, toenail fungus  Hematologic/Lymphatic: negative for easy bruising, bleeding, lymphadenopathy, or petechiae   Endocrine: negative for heat or cold intolerance,weight changes, change in bowel habits and hair loss   Musculoskeletal: negative for pain, joint swelling; +arhtralgias and bone pain +myalgias, improved  Neurological: negative for headaches, dizziness, seizures, weakness; + neuropathy is nearly resolved        Physical Exam:    Vitals: BP (!) 157/102 (Site: Left Lower Arm, Position: Sitting, Cuff Size: Medium Adult)   Pulse 70   Temp 98 °F (36.7 °C) (Oral)   Resp 20   Wt 234 lb 8 oz (106.4 kg)   LMP 02/11/1992   BMI 37.87 kg/m²   General appearance - well appearing, no in pain or distress  Mental status - AAO X3  Eyes - pupils equal and reactive, extraocular eye movements intact  Mouth - mucous membranes moist, pharynx normal without lesions  Neck - supple, no significant adenopathy  Lymphatics - no palpable lymphadenopathy, no hepatosplenomegaly  Chest - clear to auscultation, no wheezes, rales or rhonchi, symmetric air entry  Heart - normal rate, regular rhythm, normal S1, S2, no murmurs  Abdomen - soft, nontender, nondistended, no masses or organomegaly  Neurological - alert, oriented, normal speech, no focal findings or movement disorder noted; +neuropathy - stable  Extremities -bilateral ankle edema  Skin - normal coloration and turgor, no rashes, no suspicious skin lesions noted; toenail fungus  Breast- bilateral mastectomy, no masses or lumps, excess skin on right side - no lymphedema appreciated        DATA:  Lab Results   Component Value Date    WBC 10.5 01/10/2022    HGB 13.5 01/10/2022    HCT 44.2 01/10/2022    MCV 94.8 01/10/2022     01/10/2022       Chemistry Component Value Date/Time     01/10/2022 1419    K 3.8 01/10/2022 1419     01/10/2022 1419    CO2 25 01/10/2022 1419    BUN 18 01/10/2022 1419    CREATININE 0.71 01/10/2022 1419        Component Value Date/Time    CALCIUM 9.2 01/10/2022 1419    ALKPHOS 99 01/10/2022 1419    AST 8 01/10/2022 1419    ALT 8 01/10/2022 1419    BILITOT 0.12 (L) 01/10/2022 1419          Results for Mraielena Rush (MRN [id number]) as of 1/24/2021 08:08   Ref. Range 1/11/2021 14:29   Amphetamine Screen, Ur Latest Ref Range: NEGATIVE  NEGATIVE   Barbiturate Screen, Ur Latest Ref Range: NEGATIVE  NEGATIVE   Benzodiazepine Screen, Urine Latest Ref Range: NEGATIVE  NEGATIVE   Buprenorphine Urine Latest Ref Range: NEGATIVE  NOT REPORTED   Cannabinoid Scrn, Ur Latest Ref Range: NEGATIVE  POSITIVE (A)   Cocaine Metabolite, Urine Latest Ref Range: NEGATIVE  NEGATIVE   Methadone Screen, Urine Latest Ref Range: NEGATIVE  NEGATIVE   Methamphetamine, Urine Latest Ref Range: NEGATIVE  NOT REPORTED   Oxycodone Screen, Ur Latest Ref Range: NEGATIVE  POSITIVE (A)   Propoxyphene, Urine Latest Ref Range: NEGATIVE  NOT REPORTED   Opiates, Urine Latest Ref Range: NEGATIVE  NEGATIVE   Tricyclic Antidepressants, Urine Latest Ref Range: NEGATIVE  NOT REPORTED   MDMA, Urine Latest Ref Range: NEGATIVE  NOT REPORTED     No results found. Impression:  Invasive ductal carcinoma of left breast, triple negative, pT2,p N0, M0  Status post bilateral mastectomy and left sentinel lymph node biopsy  Personal history of gynecological malignancy (uterus/cervical cancer) status post surgery in 1992  Cancer related pain  Leg swelling  Anemia secondary to chemotherapy  Family history of malignancy  Chronic back pain  Hypertension  Obesity      Plan: We reviewed her lab work which shows counts and electrolytes are in range. Clinically she is doing well with no symptoms of recurrence and remains in remission. We continue with surveillance per the guidelines.  We discussed her progression with physical therapy, I am writing referral for gym membership for her to continue on her own. I encouraged her to pursue full smoking cessation. Reviewed patient's pain medication. Continue ongoing management for patient's chronic pain. We will continue to do random urine checks. Reviewed patient's urine tox screen which is inconsistent with patient's OARRS report. Return in 3 months. Scribe Attestation   This note was created by Celia Billy acting as scribe for the physician signing this note  Electronically Signed  Celia Billy, 1/10/2022  Scribe, [employer]. Attending Attestation   Note was reviewed and edited. I am in agreement with the note as entered      Chastity Tenorio MD              This note is created with the assistance of a speech recognition program.  While intending to generate a document that actually reflects the content of the visit, the document can still have some errors including those of syntax and sound a like substitutions which may escape proof reading. It such instances, actual meaning can be extrapolated by contextual diversion.

## 2022-01-10 NOTE — TELEPHONE ENCOUNTER
SARAH ARRIVES AMBULATORY FOR MD VISIT  DR Josue Núñez IN TO SEE PATIENT  ORDERS RECEIVED  RV 3 MONTHS WITH LABS AT   LABS CDP CMP URINE DRUG SCREEN 04/11/22  MD VISIT 04/11/22 @2:15PM  REFERRAL FOR [employer] [facility], PT NOT INTERESTED IN [employer].  PAMPHLET GIVEN ON THE [facility] TO PATIENT BY EMEKA BROWN PRINTED AND GIVEN TO PATIENT WITH INSTRUCTIONS  PATIENT DISCHARGED AMBULATORY

## 2022-01-11 ENCOUNTER — HOSPITAL ENCOUNTER (OUTPATIENT)
Dept: PHYSICAL THERAPY | Facility: CLINIC | Age: 62
Setting detail: THERAPIES SERIES
Discharge: HOME | End: 2022-01-11
Payer: COMMERCIAL

## 2022-01-11 PROCEDURE — 97110 THERAPEUTIC EXERCISES: CPT

## 2022-01-11 NOTE — FLOWSHEET NOTE
[] [address]  [address]  [phone]  F: [phone] [x] [address]  [facility] [address]   [address]  P: [phone]  F: [phone] [] [address] &  Therapy  [address] [facility]  P: [phone]  F: [phone] [] [address]  P: [phone]  F: [phone] [] [address]  [facility]   [address]   [place]: [phone]  F: [phone]      Physical Therapy Daily Treatment Note    Date:  2022  Patient Name:  Henrry Kiser    :  1960  MRN: [id number]  Physician: Maco Posey MD              Insurance: [facility] [place] [employer] (12v authorized 11/10-22)  Medical Diagnosis: M54.40, G89.29- Chronic midline low back pain with sciatica, sciatica laterality unspecified   Rehab Codes: M54.40, M62.81, R26.89, M79.604  Onset Date: referral 21                          Next 's appt. : tbd  Visit# / total visits: 10/12        Cancels/No Shows: 5/0    Subjective:    Pain:  [x] Yes  [] No Location: low back Pain Rating: (0-10 scale) 9/10  Pain altered Tx:  [] No  [x] Yes  Action: limited exercises, gave HP to end session    Comments: pt with higher pain levels at this time. Pt slipped yesterday on a patch of ice, did not fall, but feels it diony her body.      Objective:   Modalities:   Precautions:  Exercises: bolded only 22 d/t increased pain  Exercise Reps/ Time Weight/ Level Comments   Nustep  6 min L1 Added                Seated          Seated phsyioball rollouts  10\"x5 ea   Fwd and lat   Added    Sciatic nerve glides 10x3\"  Added   20xea  Added    LAQ  15x  Added          Supine hooklying    Able to lay flat    TrA 10x5\"  Added    LTR 10x ea  Added  TrA hip fall out 15x5\" ea Lime Added 11/18, Added resistance 11/29   Valetta Castorena 15x5\"   Added 11/29   TA + March 15xea  Added 11/29   Piriformis stretch 20\"x3  Added 11/18   Hamstring stretch  20\"x3   Added 11/18    PPT  10x5\"  Added 12/9   SLR 10x AA Added 12/9- encouraged TA activation         Sidelying       Hip abduction  10xea  Added 12/16   Clamshells 10xea  Added 12/16         Standing      Calf stretch 20\"x3  Added 11/18         Gait training with front wheeled walker  60'  Added 12/28    NBOS on floor  NBOS on foam  X30\"  x20\" // bars  Added 12/30   Tandem stance  X20\" ea // bars  Added 12/30   Side stepping  5x ea // bars  Added 12/30   Gait training with 4 point cane   Cues to stand up right, cane at right side, cuing for sequencing 30\" x 2     Extensive edu on dangers of ambulating with cane in front and bilateral hands- encouraged pt to look into ambulating with rollater or RW               Other: Slow through exercises, antalgic gait, difficulty walking short distances      Specific Instructions for next treatment: progress lumbar and hip mobility per pt tolerance, progress core and hip strengthening globally, balance and postural stability, hot pack prn       STRENGTH   ROM     Left Right Cervical     L1-2 Hip Flex 4+ 4 Flexion     Hip Abd 4- 4- Extension     L3-4 Knee Ext 4+ 4+ Rotation L R   L4 Ankle DF 4+ 4+ Sidebend L R   L5 EHL     Retraction     S1 Plant.  Flex 4+ 4+ Lumbar     Abdominals fair fair Flexion Limited 25% painful   Erector Spinae poor poor Extension Limited 50% painful         Rotation L- WFL R- WFL         Sidebend L- WFL  R- WFL         UE/LE             Hip flexion L- 95 with pain R- 95 with pain                                                             TESTS (+/-) LEFT RIGHT Not Tested   Hamstring (SLR) 87 89 []?              Treatment Charges: Mins Units   [x]  Modalities- HP 15 0   [x]  Ther Exercise 42 3   []  Manual Therapy     []  Ther Activities     []  Aquatics []  Vasocompression     []  Gait     Total Treatment time 42 3       Assessment: [] Progressing toward goals. [] No change. [x] Other: Started with lumbar stretches using stability ball d/t Nusteps being in use. Followed stretches with warm up. Pt moving at a slower pace this date, and struggling to walk shorter distances d/t higher pain. Completed exercises in supine, then ended with HP for pain relief. [x] Patient would continue to benefit from skilled physical therapy services in order to: reduce low back pain and radicular symptoms, improve BLE and core strength, and increase hip and lumbar mobility to improve gait and  balance for improved safety and independence with all daily activities.         STG: (to be met in 8 treatments) Reassessed by primary PT 12/16/21  1. ? Pain: Pt to report maximal low back and RLE pain levels at 6/10 or less to improve tolerance to therapeutic exercise progressions. Ongoing- at 7/10 according to visual analog scale   2. ? ROM: Pt to present with lumbar AROM at 15% limited or less without increased pain to ease difficulty with dressing LEs. Ongoing   a. Pt to demonstrate improved BLE flexibility with passive SLR at least 65 degrees to improve LE mobility for transfers and dressing. MET   3. ? Strength: Pt to increase bilateral hip flexion and knee strength to at least 4+/5 to improve independence with gait and stair climbing. Partially met- limited most in R hip flexion and R knee flexion    a. Pt to increase bilateral hip abduction and extension strength to at least 4-/5 to improve independence with gait and stair climbing. MET   b. Pt to demonstrate improved core strength with ability to complete TA + SL march 10x without increased pain. MET   4. Patient to be independent with home exercise program as demonstrated by performance with correct form without cues. MET- pt notes she breaks them up throughout the day but has been compliant   5.  Demonstrate Knowledge of fall prevention MET- handout provided      LTG: (to be met in 12 treatments)  1. Pt to report maximal low back and RLE pain levels to 3/10 or less to improve independence with household chores and self care activities. 2. Pt to demonstrate improved functional mobility with LEFI and Oswestry scores of 55% impaired or less. 3. Pt to be able to ambulate at least 150 feet with single point cane demonstrating improved safety awareness and reduced gait mechanics. 4. Pt to be able to maintain tandem stance for 20\" with improved postural stability and without increased pain.         Patient goals: \"to be able to move better\"    Pt. Education:  [] Yes  [x] No  [] Reviewed Prior HEP/Ed  Method of Education: [] Verbal  [] Demo  [] Written   Access Code: [email or web address]  URL: [email or web address]  Date: 11/18/2021  Prepared by: Larayne Hamman    Exercises  Supine Transversus Abdominis Bracing - Hands on Stomach - 1 x daily - 7 x weekly - 3 sets - 10 reps  Supine Lower Trunk Rotation - 1 x daily - 7 x weekly - 3 sets - 10 reps  Supine Transversus Abdominis Bracing with Double Leg Fallout - 1 x daily - 7 x weekly - 3 sets - 10 reps  Supine March - 1 x daily - 7 x weekly - 3 sets - 10 reps  Supine Hamstring Stretch with Strap - 1 x daily - 7 x weekly - 3 reps - 20 hold  Supine Piriformis Stretch with Foot on Ground - 1 x daily - 7 x weekly - 3 reps - 20 hold      Comprehension of Education:  [] Verbalizes understanding. [] Demonstrates understanding. [x] Needs review: for proper log rolling technique  [] Demonstrates/verbalizes HEP/Ed previously given. Plan: [x] Continue current frequency toward long and short term goals.     [x] Specific Instructions for subsequent treatments:  progress lumbar and hip mobility per pt tolerance, progress core and hip strengthening globally, balance and postural stability, hot pack prn        Time In: 3:05pm         Time Out: 4:08pm    Electronically signed by:  Wilma Cui PTA

## 2022-01-18 ENCOUNTER — HOSPITAL ENCOUNTER (OUTPATIENT)
Dept: PHYSICAL THERAPY | Facility: CLINIC | Age: 62
Setting detail: THERAPIES SERIES
Discharge: HOME | End: 2022-01-18
Payer: COMMERCIAL

## 2022-01-18 NOTE — FLOWSHEET NOTE
[] [facility]) [facility] &  Therapy  [address]    [phone]  F: [phone]   [x] [email or web address]  [facility]   [address]  P: [phone]  F: [phone]  [] [place]  [address]  P: [phone]  F: [phone] [] [address]  P: [phone]  F: [phone]  [] [address]  [facility]   [address]   [place]: [phone]  F: [phone]   [] [facility]  [address] [facility] [address]  [place]: [phone]   F: [phone]     Physical Therapy Cancel/No Show note    Date: 2022  Patient: Korey Ellis  : 1960  MRN: [id number]    Cancels/No Shows to date:     For today's appointment patient:    [x]  Cancelled    [] Rescheduled appointment    [] No-show     Reason given by patient:    []  Patient ill    []  Conflicting appointment    [] No transportation      [] Conflict with work    [x] No reason given    [] Weather related    [] COVID-19    [] Other:      Comments:  Pt LVM to cancel appointment       [x] Next appointment was NOT  confirmed    Electronically signed by: Lucila Moreno PT

## 2022-02-07 DIAGNOSIS — C50.412 MALIGNANT NEOPLASM OF UPPER-OUTER QUADRANT OF LEFT BREAST IN FEMALE, ESTROGEN RECEPTOR NEGATIVE (HCC): ICD-10-CM

## 2022-02-07 DIAGNOSIS — G89.29 OTHER CHRONIC PAIN: ICD-10-CM

## 2022-02-07 DIAGNOSIS — G62.9 NEUROPATHY: ICD-10-CM

## 2022-02-07 DIAGNOSIS — Z17.1 MALIGNANT NEOPLASM OF UPPER-OUTER QUADRANT OF LEFT BREAST IN FEMALE, ESTROGEN RECEPTOR NEGATIVE (HCC): ICD-10-CM

## 2022-02-07 RX ORDER — OXYCODONE AND ACETAMINOPHEN 10; 325 MG/1; MG/1
1 TABLET ORAL EVERY 6 HOURS PRN
Qty: 120 TABLET | Refills: 0 | Status: SHIPPED | OUTPATIENT
Start: 2022-02-07 | End: 2022-03-08 | Stop reason: SDUPTHER

## 2022-02-07 RX ORDER — GABAPENTIN 600 MG/1
600 TABLET ORAL 2 TIMES DAILY
Qty: 60 TABLET | Refills: 6 | Status: SHIPPED | OUTPATIENT
Start: 2022-02-07 | End: 2022-03-08 | Stop reason: SDUPTHER

## 2022-03-07 DIAGNOSIS — C50.412 MALIGNANT NEOPLASM OF UPPER-OUTER QUADRANT OF LEFT BREAST IN FEMALE, ESTROGEN RECEPTOR NEGATIVE (HCC): ICD-10-CM

## 2022-03-07 DIAGNOSIS — G62.9 NEUROPATHY: ICD-10-CM

## 2022-03-07 DIAGNOSIS — G89.29 OTHER CHRONIC PAIN: ICD-10-CM

## 2022-03-07 DIAGNOSIS — Z17.1 MALIGNANT NEOPLASM OF UPPER-OUTER QUADRANT OF LEFT BREAST IN FEMALE, ESTROGEN RECEPTOR NEGATIVE (HCC): ICD-10-CM

## 2022-03-08 RX ORDER — OXYCODONE AND ACETAMINOPHEN 10; 325 MG/1; MG/1
1 TABLET ORAL EVERY 6 HOURS PRN
Qty: 120 TABLET | Refills: 0 | Status: SHIPPED | OUTPATIENT
Start: 2022-03-08 | End: 2022-04-05 | Stop reason: SDUPTHER

## 2022-03-08 RX ORDER — GABAPENTIN 600 MG/1
600 TABLET ORAL 2 TIMES DAILY
Qty: 60 TABLET | Refills: 6 | Status: SHIPPED | OUTPATIENT
Start: 2022-03-08 | End: 2022-04-04 | Stop reason: SDUPTHER

## 2022-03-29 ENCOUNTER — TELEPHONE (OUTPATIENT)
Dept: INTERNAL MEDICINE CLINIC | Age: 62
End: 2022-03-29

## 2022-03-29 NOTE — TELEPHONE ENCOUNTER
----- Message from Carlos Goodrich sent at 3/29/2022 11:35 AM EDT -----  Subject: Referral Request    QUESTIONS   Reason for referral request? Patient wants to verify if she has to have   labs drawn before her annual physical on 4/13/2022? Please reach out to   patient. Has the physician seen you for this condition before? No   Preferred Specialist (if applicable)? Do you already have an appointment scheduled? Yes  Select Scheduled Date? 2022-04-13  Select Scheduled Physician? Additional Information for Provider?   ---------------------------------------------------------------------------  --------------  CALL BACK INFO  What is the best way for the office to contact you? OK to leave message on   voicemail  Preferred Call Back Phone Number?  [phone]

## 2022-03-29 NOTE — TELEPHONE ENCOUNTER
I did give her a lab slip on 11-21 when she was here for first time and she never did that blood work and ask her to get it done before the next visit

## 2022-04-04 DIAGNOSIS — G89.29 OTHER CHRONIC PAIN: ICD-10-CM

## 2022-04-04 DIAGNOSIS — C50.412 MALIGNANT NEOPLASM OF UPPER-OUTER QUADRANT OF LEFT BREAST IN FEMALE, ESTROGEN RECEPTOR NEGATIVE (HCC): ICD-10-CM

## 2022-04-04 DIAGNOSIS — G62.9 NEUROPATHY: ICD-10-CM

## 2022-04-04 DIAGNOSIS — Z17.1 MALIGNANT NEOPLASM OF UPPER-OUTER QUADRANT OF LEFT BREAST IN FEMALE, ESTROGEN RECEPTOR NEGATIVE (HCC): ICD-10-CM

## 2022-04-05 ENCOUNTER — TELEPHONE (OUTPATIENT)
Dept: INFUSION THERAPY | Facility: MEDICAL CENTER | Age: 62
End: 2022-04-05

## 2022-04-05 RX ORDER — GABAPENTIN 600 MG/1
600 TABLET ORAL 2 TIMES DAILY
Qty: 60 TABLET | Refills: 6 | Status: SHIPPED | OUTPATIENT
Start: 2022-04-05 | End: 2022-07-25

## 2022-04-05 RX ORDER — OXYCODONE AND ACETAMINOPHEN 10; 325 MG/1; MG/1
1 TABLET ORAL EVERY 6 HOURS PRN
Qty: 120 TABLET | Refills: 0 | Status: SHIPPED | OUTPATIENT
Start: 2022-04-05 | End: 2022-05-05

## 2022-04-05 NOTE — TELEPHONE ENCOUNTER
Left voice mail with patient informing her that both Percocet and Neurontin have been approved and can be picked up from her pharmacy.

## 2022-04-05 NOTE — TELEPHONE ENCOUNTER
Returned call to patient. She is concerned that she called yesterday for pain medication refill but had not been received by her pharmacy. Patient informed that refill request was forwarded to Dr. River Dickinson yesterday and may take several days to complete. Patient also asked if she would be okay to have labs drawn Friday for her Monday appointment with Dr. River Dickinson. Patient informed would be okay to draw labs Friday. Patient understanding of above information.

## 2022-04-06 ENCOUNTER — HOSPITAL ENCOUNTER (OUTPATIENT)
Facility: MEDICAL CENTER | Age: 62
End: 2022-04-06
Payer: COMMERCIAL

## 2022-04-11 ENCOUNTER — HOSPITAL ENCOUNTER (OUTPATIENT)
Facility: MEDICAL CENTER | Age: 62
Discharge: HOME | End: 2022-04-11
Payer: COMMERCIAL

## 2022-04-11 ENCOUNTER — HOSPITAL ENCOUNTER (OUTPATIENT)
Age: 62
Setting detail: SPECIMEN
Discharge: HOME | End: 2022-04-11
Payer: COMMERCIAL

## 2022-04-11 ENCOUNTER — TELEPHONE (OUTPATIENT)
Dept: ONCOLOGY | Age: 62
End: 2022-04-11

## 2022-04-11 ENCOUNTER — OFFICE VISIT (OUTPATIENT)
Dept: ONCOLOGY | Age: 62
End: 2022-04-11
Payer: COMMERCIAL

## 2022-04-11 VITALS
BODY MASS INDEX: 38 KG/M2 | RESPIRATION RATE: 20 BRPM | WEIGHT: 235.3 LBS | SYSTOLIC BLOOD PRESSURE: 174 MMHG | HEART RATE: 75 BPM | TEMPERATURE: 96.8 F | DIASTOLIC BLOOD PRESSURE: 98 MMHG

## 2022-04-11 DIAGNOSIS — G89.29 OTHER CHRONIC PAIN: ICD-10-CM

## 2022-04-11 DIAGNOSIS — M06.9 RHEUMATOID ARTHRITIS OF FOOT, UNSPECIFIED LATERALITY, UNSPECIFIED WHETHER RHEUMATOID FACTOR PRESENT (HCC): ICD-10-CM

## 2022-04-11 DIAGNOSIS — F17.200 SMOKING: ICD-10-CM

## 2022-04-11 DIAGNOSIS — M54.40 CHRONIC MIDLINE LOW BACK PAIN WITH SCIATICA, SCIATICA LATERALITY UNSPECIFIED: ICD-10-CM

## 2022-04-11 DIAGNOSIS — G89.29 CHRONIC MIDLINE LOW BACK PAIN WITH SCIATICA, SCIATICA LATERALITY UNSPECIFIED: ICD-10-CM

## 2022-04-11 DIAGNOSIS — Z17.1 MALIGNANT NEOPLASM OF UPPER-OUTER QUADRANT OF LEFT BREAST IN FEMALE, ESTROGEN RECEPTOR NEGATIVE (HCC): ICD-10-CM

## 2022-04-11 DIAGNOSIS — Z17.1 MALIGNANT NEOPLASM OF UPPER-OUTER QUADRANT OF LEFT BREAST IN FEMALE, ESTROGEN RECEPTOR NEGATIVE (HCC): Primary | ICD-10-CM

## 2022-04-11 DIAGNOSIS — C50.412 MALIGNANT NEOPLASM OF UPPER-OUTER QUADRANT OF LEFT BREAST IN FEMALE, ESTROGEN RECEPTOR NEGATIVE (HCC): Primary | ICD-10-CM

## 2022-04-11 DIAGNOSIS — E78.00 HIGH CHOLESTEROL: ICD-10-CM

## 2022-04-11 DIAGNOSIS — C50.412 MALIGNANT NEOPLASM OF UPPER-OUTER QUADRANT OF LEFT BREAST IN FEMALE, ESTROGEN RECEPTOR NEGATIVE (HCC): ICD-10-CM

## 2022-04-11 DIAGNOSIS — E66.9 OBESITY (BMI 35.0-39.9 WITHOUT COMORBIDITY): ICD-10-CM

## 2022-04-11 DIAGNOSIS — G62.9 NEUROPATHY: ICD-10-CM

## 2022-04-11 LAB
ABSOLUTE EOS #: 0.19 K/UL (ref 0–0.44)
ABSOLUTE IMMATURE GRANULOCYTE: 0.02 K/UL (ref 0–0.3)
ABSOLUTE LYMPH #: 3.59 K/UL (ref 1.1–3.7)
ABSOLUTE MONO #: 0.73 K/UL (ref 0.1–1.2)
ALBUMIN SERPL-MCNC: 3.8 G/DL (ref 3.5–5.2)
ALBUMIN SERPL-MCNC: 4 G/DL (ref 3.5–5.2)
ALBUMIN/GLOBULIN RATIO: 1.5 (ref 1–2.5)
ALP BLD-CCNC: 102 U/L (ref 35–104)
ALP BLD-CCNC: 92 U/L (ref 35–104)
ALT SERPL-CCNC: 7 U/L (ref 5–33)
ALT SERPL-CCNC: 8 U/L (ref 5–33)
AMPHETAMINE SCREEN URINE: NEGATIVE
ANION GAP SERPL CALCULATED.3IONS-SCNC: 12 MMOL/L (ref 9–17)
ANION GAP SERPL CALCULATED.3IONS-SCNC: 15 MMOL/L (ref 9–17)
AST SERPL-CCNC: 11 U/L
AST SERPL-CCNC: 12 U/L
BARBITURATE SCREEN URINE: NEGATIVE
BASOPHILS # BLD: 1 % (ref 0–2)
BASOPHILS ABSOLUTE: 0.05 K/UL (ref 0–0.2)
BENZODIAZEPINE SCREEN, URINE: NEGATIVE
BILIRUB SERPL-MCNC: 0.15 MG/DL (ref 0.3–1.2)
BILIRUB SERPL-MCNC: 0.21 MG/DL (ref 0.3–1.2)
BUN BLDV-MCNC: 17 MG/DL (ref 8–23)
BUN BLDV-MCNC: 18 MG/DL (ref 8–23)
BUN/CREAT BLD: 19 (ref 9–20)
CALCIUM SERPL-MCNC: 9.3 MG/DL (ref 8.6–10.4)
CALCIUM SERPL-MCNC: 9.5 MG/DL (ref 8.6–10.4)
CANNABINOID SCREEN URINE: NEGATIVE
CHLORIDE BLD-SCNC: 101 MMOL/L (ref 98–107)
CHLORIDE BLD-SCNC: 98 MMOL/L (ref 98–107)
CHOLESTEROL/HDL RATIO: 3.9
CHOLESTEROL: 204 MG/DL
CO2: 22 MMOL/L (ref 20–31)
CO2: 25 MMOL/L (ref 20–31)
COCAINE METABOLITE, URINE: NEGATIVE
CREAT SERPL-MCNC: 0.89 MG/DL (ref 0.5–0.9)
CREAT SERPL-MCNC: 0.92 MG/DL (ref 0.5–0.9)
EOSINOPHILS RELATIVE PERCENT: 2 % (ref 1–4)
GFR AFRICAN AMERICAN: >60 ML/MIN
GFR AFRICAN AMERICAN: >60 ML/MIN
GFR NON-AFRICAN AMERICAN: >60 ML/MIN
GFR NON-AFRICAN AMERICAN: >60 ML/MIN
GFR SERPL CREATININE-BSD FRML MDRD: ABNORMAL ML/MIN/{1.73_M2}
GFR SERPL CREATININE-BSD FRML MDRD: ABNORMAL ML/MIN/{1.73_M2}
GLUCOSE BLD-MCNC: 89 MG/DL (ref 70–99)
GLUCOSE BLD-MCNC: 89 MG/DL (ref 70–99)
HCT VFR BLD CALC: 42.2 % (ref 36.3–47.1)
HCT VFR BLD CALC: 42.3 % (ref 36.3–47.1)
HDLC SERPL-MCNC: 52 MG/DL
HEMOGLOBIN: 13.5 G/DL (ref 11.9–15.1)
HEMOGLOBIN: 13.6 G/DL (ref 11.9–15.1)
IMMATURE GRANULOCYTES: 0 %
LDL CHOLESTEROL: 125 MG/DL (ref 0–130)
LYMPHOCYTES # BLD: 41 % (ref 24–43)
MAGNESIUM: 2 MG/DL (ref 1.6–2.6)
MCH RBC QN AUTO: 29.1 PG (ref 25.2–33.5)
MCH RBC QN AUTO: 29.2 PG (ref 25.2–33.5)
MCHC RBC AUTO-ENTMCNC: 32 G/DL (ref 28.4–34.8)
MCHC RBC AUTO-ENTMCNC: 32.2 G/DL (ref 28.4–34.8)
MCV RBC AUTO: 90.9 FL (ref 82.6–102.9)
MCV RBC AUTO: 91 FL (ref 82.6–102.9)
METHADONE SCREEN, URINE: POSITIVE
MONOCYTES # BLD: 8 % (ref 3–12)
NRBC AUTOMATED: 0 PER 100 WBC
NRBC AUTOMATED: 0 PER 100 WBC
OPIATES, URINE: NEGATIVE
OXYCODONE SCREEN URINE: POSITIVE
PDW BLD-RTO: 15.1 % (ref 11.8–14.4)
PDW BLD-RTO: 15.2 % (ref 11.8–14.4)
PHENCYCLIDINE, URINE: NEGATIVE
PLATELET # BLD: 203 K/UL (ref 138–453)
PLATELET # BLD: 204 K/UL (ref 138–453)
PMV BLD AUTO: 9.5 FL (ref 8.1–13.5)
PMV BLD AUTO: 9.5 FL (ref 8.1–13.5)
POTASSIUM SERPL-SCNC: 3.9 MMOL/L (ref 3.7–5.3)
POTASSIUM SERPL-SCNC: 4.1 MMOL/L (ref 3.7–5.3)
RBC # BLD: 4.64 M/UL (ref 3.95–5.11)
RBC # BLD: 4.65 M/UL (ref 3.95–5.11)
RBC # BLD: ABNORMAL 10*6/UL
RHEUMATOID FACTOR: 10.4 IU/ML
SEDIMENTATION RATE, ERYTHROCYTE: 42 MM/HR (ref 0–30)
SEG NEUTROPHILS: 48 % (ref 36–65)
SEGMENTED NEUTROPHILS ABSOLUTE COUNT: 4.12 K/UL (ref 1.5–8.1)
SODIUM BLD-SCNC: 135 MMOL/L (ref 135–144)
SODIUM BLD-SCNC: 138 MMOL/L (ref 135–144)
TEST INFORMATION: ABNORMAL
TOTAL CK: 40 U/L (ref 26–192)
TOTAL PROTEIN: 6.7 G/DL (ref 6.4–8.3)
TOTAL PROTEIN: 6.7 G/DL (ref 6.4–8.3)
TRIGL SERPL-MCNC: 133 MG/DL
TSH SERPL DL<=0.05 MIU/L-ACNC: 1.93 UIU/ML (ref 0.3–5)
URIC ACID: 6.6 MG/DL (ref 2.4–5.7)
VITAMIN B-12: 409 PG/ML (ref 232–1245)
WBC # BLD: 8.7 K/UL (ref 3.5–11.3)
WBC # BLD: 8.8 K/UL (ref 3.5–11.3)

## 2022-04-11 PROCEDURE — 83735 ASSAY OF MAGNESIUM: CPT

## 2022-04-11 PROCEDURE — 85027 COMPLETE CBC AUTOMATED: CPT

## 2022-04-11 PROCEDURE — 4004F PT TOBACCO SCREEN RCVD TLK: CPT | Performed by: INTERNAL MEDICINE

## 2022-04-11 PROCEDURE — 3017F COLORECTAL CA SCREEN DOC REV: CPT | Performed by: INTERNAL MEDICINE

## 2022-04-11 PROCEDURE — 86038 ANTINUCLEAR ANTIBODIES: CPT

## 2022-04-11 PROCEDURE — 99211 OFF/OP EST MAY X REQ PHY/QHP: CPT

## 2022-04-11 PROCEDURE — 99214 OFFICE O/P EST MOD 30 MIN: CPT | Performed by: INTERNAL MEDICINE

## 2022-04-11 PROCEDURE — 85025 COMPLETE CBC W/AUTO DIFF WBC: CPT

## 2022-04-11 PROCEDURE — 36415 COLL VENOUS BLD VENIPUNCTURE: CPT

## 2022-04-11 PROCEDURE — G8427 DOCREV CUR MEDS BY ELIG CLIN: HCPCS | Performed by: INTERNAL MEDICINE

## 2022-04-11 PROCEDURE — 84443 ASSAY THYROID STIM HORMONE: CPT

## 2022-04-11 PROCEDURE — 84550 ASSAY OF BLOOD/URIC ACID: CPT

## 2022-04-11 PROCEDURE — 86431 RHEUMATOID FACTOR QUANT: CPT

## 2022-04-11 PROCEDURE — 80307 DRUG TEST PRSMV CHEM ANLYZR: CPT

## 2022-04-11 PROCEDURE — 82550 ASSAY OF CK (CPK): CPT

## 2022-04-11 PROCEDURE — G8417 CALC BMI ABV UP PARAM F/U: HCPCS | Performed by: INTERNAL MEDICINE

## 2022-04-11 PROCEDURE — 80053 COMPREHEN METABOLIC PANEL: CPT

## 2022-04-11 PROCEDURE — 86225 DNA ANTIBODY NATIVE: CPT

## 2022-04-11 PROCEDURE — 85652 RBC SED RATE AUTOMATED: CPT

## 2022-04-11 PROCEDURE — 80061 LIPID PANEL: CPT

## 2022-04-11 PROCEDURE — 82607 VITAMIN B-12: CPT

## 2022-04-11 NOTE — TELEPHONE ENCOUNTER
Aimee Valadez MD VISIT  DR MACE IN TO SEE PATIENT  ORDERS RECEIVED  RV 3 MONTHS WITH URINE DRUG SCREEN  NO LABS PER DR MACE  URINE DRUG SCREEN 07/25/22  MD VISIT 07/25/22 @2:15PM  AVS PRINTED AND GIVEN TO PATIENT WITH INSTRUCTIONS  PATIENT DISCHARGED AMBULATORY

## 2022-04-11 NOTE — PROGRESS NOTES
Nickie Nieto                                                                                                                  4/11/2022  MRN:   [id number]  YOB: 1960  PCP:                           Farnaz Nolasco MD  Referring Physician: No ref. provider found  Treating Physician Name: Ludivina Peres MD      Reason for visit:  Chief Complaint   Patient presents with    Follow-up   Discussed treatment plan. Current problems:   IDC of Left Breast cancer, Triple negative, pT2,pN0,M0  Chronic pain    Active and recent treatments:  Bilateral Mastectomy with left sentinel lymph node  Adjuvant chemotherapy using dose denseAC followed by T on 4/11/19--- 8/ /19    Summary of Case/History:    Nickie Nieto a 64 y. o.female invasive carcinoma of the breast.     Patient initially felt a lump in her left breast for 2-3 months. She underwent screening mammogram which showed 2 cm mass in upper outer left breast.  Right breast do not have any concerning findings. Ultrasound of left breast conformed mass measuring 1.9 cm in the upper outer left breast.  Patient underwent biopsy on 1/23/19 which revealed invasive ductal carcinoma, grade 3, ER/LA negative. HER-2 results are pending.     Patient has history of uterus/cervical cancer diagnosed in 1992. Patient states she underwent surgery for the cervical cancer. She does not recall getting any radiation therapy or chemotherapy. Patient states she has been interested in getting bilateral breast reduction due to cosmetic reasons however now with a diagnosis of breast cancer she wishes to proceed with bilateral mastectomy.     Patient has history of breast cancer in her mother as well as 3 paternal aunts. Patient's mother also had cervical and uterine cancer.     Patient underwent bilateral mastectomy with left sentinel lymph node biopsy.   Pathological stage was T2, N0, M0    Started chemotherapy for triple negative breast cancer using AC followed by T on 4/11/19    Interim History:    Patient presents to the clinic for a follow-up visit and to discuss results of her lab work-up and other relevant clinical data. Patient complains of joint pains. She has not establish care with a primary care physician. Denies hospitalization or ER visit. Denies noticing any swollen glands. She continues to smoke very intermittently. During this visit patient's allergy, social, medical, surgical history and medications were reviewed and updated.     Past Medical History:   Past Medical History:   Diagnosis Date    Arthritis     GENERALIZED ALL OVER RHEUMATOID AND OSTEO    Breast cancer ([address]     left breast    Cancer ([address] 1992    CERVICAL, UTERINE    Chronic back pain     scioliosis,  02/2019 WEARING A BACK BRACE    Degenerative disorder of bone     ALL OVER    Depression     Difficult intravenous access     HANDS AR BEST SITE    GERD (gastroesophageal reflux disease)     Hyperlipidemia     HX OF NO MEDS IN YEARS    Hypertension 2007    Pain management clinic    Insomnia     Neuropathy     hands and feet    Obesity     BMI -  43    Sleep apnea 2016    NO LONGER USES MACHINE    Snores     has been told by family that she stops breathing    Use of cane as ambulatory aid     Wears glasses     READING    Wears glasses     \"CHEATERS\"       Past Surgical History:     Past Surgical History:   Procedure Laterality Date    BREAST SURGERY Left 1990    CYST    BUNIONECTOMY Bilateral     CERVIX SURGERY  1992    COLONOSCOPY  10/13/2017    FOOT SURGERY Left 2014    PINS IN ALL TOES    FOOT SURGERY Left 5/27/2020    HARDWARE REMOVAL LEFT GREAT TOE- performed by Alisia Ayala DPM at [address] [place] [address] Bilateral    [address], CERVIX     MASTECTOMY Bilateral 2/12/2019    TOTAL MASTECTOMY performed by Tiesha Spicer DO at [facility], BILATERAL Bilateral 02/12/2019     TOTAL MASTECTOMY,  AXILLARY SENTINEL LYMPH NODE rash, skin lesions, bruises.   Nails now growing back slowly, toenail fungus  Hematologic/Lymphatic: negative for easy bruising, bleeding, lymphadenopathy, or petechiae   Endocrine: negative for heat or cold intolerance,weight changes, change in bowel habits and hair loss   Musculoskeletal: negative for pain, joint swelling; +arhtralgias and bone pain +myalgias, improved  Neurological: negative for headaches, dizziness, seizures, weakness; + neuropathy is nearly resolved        Physical Exam:    Vitals: BP (!) 174/98 (Site: Right Lower Arm, Position: Sitting, Cuff Size: Small Adult)   Pulse 75   Temp 96.8 °F (36 °C) (Temporal)   Resp 20   Wt 235 lb 4.8 oz (106.7 kg)   LMP 02/11/1992   BMI 38.00 kg/m²   General appearance - well appearing, no in pain or distress  Mental status - AAO X3  Eyes - pupils equal and reactive, extraocular eye movements intact  Mouth - mucous membranes moist, pharynx normal without lesions  Neck - supple, no significant adenopathy  Lymphatics - no palpable lymphadenopathy, no hepatosplenomegaly  Chest - clear to auscultation, no wheezes, rales or rhonchi, symmetric air entry  Heart - normal rate, regular rhythm, normal S1, S2, no murmurs  Abdomen - soft, nontender, nondistended, no masses or organomegaly  Neurological - alert, oriented, normal speech, no focal findings or movement disorder noted; +neuropathy - stable  Extremities -bilateral ankle edema  Skin - normal coloration and turgor, no rashes, no suspicious skin lesions noted; toenail fungus  Breast- bilateral mastectomy, no masses or lumps, excess skin on right side - no lymphedema appreciated        DATA:  Lab Results   Component Value Date    WBC 8.8 04/11/2022    HGB 13.6 04/11/2022    HCT 42.3 04/11/2022    MCV 91.0 04/11/2022     04/11/2022       Chemistry        Component Value Date/Time     04/11/2022 1341    K 4.1 04/11/2022 1341     04/11/2022 1341    CO2 25 04/11/2022 1341    BUN 17 04/11/2022 1341 CREATININE 0.89 04/11/2022 1341        Component Value Date/Time    CALCIUM 9.3 04/11/2022 1341    ALKPHOS 92 04/11/2022 1341    AST 11 04/11/2022 1341    ALT 7 04/11/2022 1341    BILITOT 0.15 (L) 04/11/2022 1341          Results for Emmie Hickman (MRN [id number]) as of 1/24/2021 08:08   Ref. Range 1/11/2021 14:29   Amphetamine Screen, Ur Latest Ref Range: NEGATIVE  NEGATIVE   Barbiturate Screen, Ur Latest Ref Range: NEGATIVE  NEGATIVE   Benzodiazepine Screen, Urine Latest Ref Range: NEGATIVE  NEGATIVE   Buprenorphine Urine Latest Ref Range: NEGATIVE  NOT REPORTED   Cannabinoid Scrn, Ur Latest Ref Range: NEGATIVE  POSITIVE (A)   Cocaine Metabolite, Urine Latest Ref Range: NEGATIVE  NEGATIVE   Methadone Screen, Urine Latest Ref Range: NEGATIVE  NEGATIVE   Methamphetamine, Urine Latest Ref Range: NEGATIVE  NOT REPORTED   Oxycodone Screen, Ur Latest Ref Range: NEGATIVE  POSITIVE (A)   Propoxyphene, Urine Latest Ref Range: NEGATIVE  NOT REPORTED   Opiates, Urine Latest Ref Range: NEGATIVE  NEGATIVE   Tricyclic Antidepressants, Urine Latest Ref Range: NEGATIVE  NOT REPORTED   MDMA, Urine Latest Ref Range: NEGATIVE  NOT REPORTED     No results found. Impression:  Invasive ductal carcinoma of left breast, triple negative, pT2,p N0, M0  Status post bilateral mastectomy and left sentinel lymph node biopsy  Personal history of gynecological malignancy (uterus/cervical cancer) status post surgery in 1992  Cancer related pain  Leg swelling  Anemia secondary to chemotherapy  Family history of malignancy  Chronic back pain  Hypertension  Obesity      Plan:  Personally reviewed results of lab work-up and other relevant clinical data. Clinically patient is doing well in regards to the breast cancer no clinical evidence of any disease recurrence. Continue surveillance per NCCN guidelines. Patient continues to have chronic joint pain. I reviewed patient's OARRS report.   Patient urine drug screen is in consistency with OARRS report. Patient counseled on tobacco cessation . patient counseled on use of pain medication. Encouraged to take pain medication only when she needed and also to wean down the dose over time. Return in 3 months. Brock Rogers MD              This note is created with the assistance of a speech recognition program.  While intending to generate a document that actually reflects the content of the visit, the document can still have some errors including those of syntax and sound a like substitutions which may escape proof reading. It such instances, actual meaning can be extrapolated by contextual diversion.

## 2022-04-12 LAB
ANTI DNA DOUBLE STRANDED: <0.5 IU/ML
ANTI-NUCLEAR ANTIBODY (ANA): NEGATIVE
ENA ANTIBODIES SCREEN: 0.2 U/ML

## 2022-04-13 ENCOUNTER — OFFICE VISIT (OUTPATIENT)
Dept: INTERNAL MEDICINE CLINIC | Age: 62
End: 2022-04-13
Payer: COMMERCIAL

## 2022-04-13 VITALS
HEART RATE: 70 BPM | RESPIRATION RATE: 16 BRPM | TEMPERATURE: 96.8 F | SYSTOLIC BLOOD PRESSURE: 138 MMHG | BODY MASS INDEX: 37.77 KG/M2 | OXYGEN SATURATION: 98 % | DIASTOLIC BLOOD PRESSURE: 84 MMHG | HEIGHT: 66 IN | WEIGHT: 235 LBS

## 2022-04-13 DIAGNOSIS — G89.29 CHRONIC MIDLINE LOW BACK PAIN WITH SCIATICA, SCIATICA LATERALITY UNSPECIFIED: ICD-10-CM

## 2022-04-13 DIAGNOSIS — M54.40 CHRONIC MIDLINE LOW BACK PAIN WITH SCIATICA, SCIATICA LATERALITY UNSPECIFIED: ICD-10-CM

## 2022-04-13 DIAGNOSIS — E78.00 HIGH CHOLESTEROL: ICD-10-CM

## 2022-04-13 DIAGNOSIS — E79.0 ELEVATED URIC ACID IN BLOOD: Primary | ICD-10-CM

## 2022-04-13 PROCEDURE — 3017F COLORECTAL CA SCREEN DOC REV: CPT | Performed by: INTERNAL MEDICINE

## 2022-04-13 PROCEDURE — G8427 DOCREV CUR MEDS BY ELIG CLIN: HCPCS | Performed by: INTERNAL MEDICINE

## 2022-04-13 PROCEDURE — G8417 CALC BMI ABV UP PARAM F/U: HCPCS | Performed by: INTERNAL MEDICINE

## 2022-04-13 PROCEDURE — 4004F PT TOBACCO SCREEN RCVD TLK: CPT | Performed by: INTERNAL MEDICINE

## 2022-04-13 PROCEDURE — 99214 OFFICE O/P EST MOD 30 MIN: CPT | Performed by: INTERNAL MEDICINE

## 2022-04-13 RX ORDER — ALLOPURINOL 100 MG/1
100 TABLET ORAL DAILY
Qty: 30 TABLET | Refills: 5 | Status: SHIPPED | OUTPATIENT
Start: 2022-04-13 | End: 2022-11-01

## 2022-04-13 ASSESSMENT — PATIENT HEALTH QUESTIONNAIRE - PHQ9
SUM OF ALL RESPONSES TO PHQ QUESTIONS 1-9: 0
SUM OF ALL RESPONSES TO PHQ9 QUESTIONS 1 & 2: 0
1. LITTLE INTEREST OR PLEASURE IN DOING THINGS: 0
2. FEELING DOWN, DEPRESSED OR HOPELESS: 0
SUM OF ALL RESPONSES TO PHQ QUESTIONS 1-9: 0

## 2022-04-15 NOTE — PROGRESS NOTES
Pop Palafox is a 64 y.o. female who presents for   Chief Complaint   Patient presents with    Annual Exam     htn.  Other     pt states shes been having dry mouth for about a month. pt states she thinks it might be a side effect from chemo.  Health Maintenance     hep c, hiv, pneumo. and follow up of chronic medical problems. Patient Active Problem List   Diagnosis    Idiopathic angioedema    HTN (hypertension)    Back pain    Morbid obesity with BMI of 45.0-49.9, adult ([address]    Morbid obesity due to excess calories ([address]    Smoking    Need for vaccination    Carcinoma of upper-outer quadrant of left breast in female, estrogen receptor negative ([address]    Status post mastectomy, bilateral    S/P mastectomy, bilateral    Malignant neoplasm of upper-outer quadrant of left breast in female, estrogen receptor negative ([address]     HPI  Here for follow-up on lab work denies any new complaints    Current Outpatient Medications   Medication Sig Dispense Refill    allopurinol (ZYLOPRIM) 100 MG tablet Take 1 tablet by mouth daily 30 tablet 5    oxyCODONE-acetaminophen (PERCOCET)  MG per tablet Take 1 tablet by mouth every 6 hours as needed for Pain for up to 30 days. 120 tablet 0    gabapentin (NEURONTIN) 600 MG tablet Take 1 tablet by mouth 2 times daily for 30 days. 60 tablet 6    hydroCHLOROthiazide (HYDRODIURIL) 50 MG tablet TAKE 1 TABLET BY MOUTH DAILY 30 tablet 11     No current facility-administered medications for this visit.        Allergies   Allergen Reactions    Lisinopril Anaphylaxis and Swelling     swelling    Bee Venom Swelling     NO TROUBLE BREATHING    Amino Acids     Sulfa Antibiotics Itching, Rash and Other (See Comments)     REDNESS       Past Medical History:   Diagnosis Date    Arthritis     GENERALIZED ALL OVER RHEUMATOID AND OSTEO    Breast cancer ([address]     left breast    Cancer ([address] 1992    CERVICAL, UTERINE    Chronic back pain     scioliosis,  02/2019 WEARING A BACK BRACE    Degenerative disorder of bone     ALL OVER    Depression     Difficult intravenous access     HANDS AR BEST SITE    GERD (gastroesophageal reflux disease)     Hyperlipidemia     HX OF NO MEDS IN YEARS    Hypertension 2007    Pain management clinic    Insomnia     Neuropathy     hands and feet    Obesity     BMI -  43    Sleep apnea 2016    NO LONGER USES MACHINE    Snores     has been told by family that she stops breathing    Use of cane as ambulatory aid     Wears glasses     READING    Wears glasses     \"CHEATERS\"       Past Surgical History:   Procedure Laterality Date    BREAST SURGERY Left 1990    CYST    BUNIONECTOMY Bilateral     CERVIX SURGERY  1992    COLONOSCOPY  10/13/2017    FOOT SURGERY Left 2014    PINS IN ALL TOES    FOOT SURGERY Left 5/27/2020    HARDWARE REMOVAL LEFT GREAT TOE- performed by Kendy Allison DPM at [facility] Bilateral    [address], CERVIX     MASTECTOMY Bilateral 2/12/2019    TOTAL MASTECTOMY performed by Katherine Serrato DO at [facility], BILATERAL Bilateral 02/12/2019     TOTAL MASTECTOMY,  AXILLARY SENTINEL LYMPH NODE BIOPSY, FROZEN SECTION     RI COLSC FLX W/REMOVAL LESION BY HOT BX FORCEPS N/A 10/13/2017    COLONOSCOPY WITH BIOPSY AND POLYPECTOMY performed by Maritza Jovel IV, DO at [facility] TUNNELED VENOUS PORT PLACEMENT  03/27/2019    X-RAY FOOT 3+VW      both       Family History   Problem Relation Age of Onset    High Blood Pressure Mother     Cancer Mother         cervical and breast    Diabetes Father     Heart Attack Father     Heart Disease Maternal Aunt         times 2     ROS   Constitutional:  Negative for fatigue, loss of appetite and unexpected weight change   HEENT            : Negative for neck stiffness and pain, no congestion or sinus pressure   Eyes                : No visual disturbance or pain   Cardiovascular: No chest pain or palpitations or leg swelling   Respiratory      : Negative for cough, shortness of breath or wheezing   Gastrointestinal: Negative for abdominal pain, constipation or diarrhea and bloating No nausea or vomiting   Genitourinary:     No urgency or frequency, no burning or hematuria   Musculoskeletal: Positive for arthralgias, back pain or myalgias   Skin                  : Negative for rash or erythema   Neurological    : Negative for dizziness, weakness, tremors ,light headedness or syncope   Psychiatric       : Negative for dysphoric mood, sleep disturbances, nervous or anxious, or decreased concentration   All other review of systems was negative    Objective  Physical Examination:    Nursing note reviewed    /84 (Site: Left Lower Arm, Position: Sitting, Cuff Size: Small Adult)   Pulse 70   Temp 96.8 °F (36 °C) (Temporal)   Resp 16   Ht 5' 5.98\" (1.676 m)   Wt 235 lb (106.6 kg)   LMP 02/11/1992   SpO2 98%   BMI 37.95 kg/m²   BP Readings from Last 3 Encounters:   04/13/22 138/84   04/11/22 (!) 174/98   01/10/22 (!) 157/102         Constitutional:  Renny Munoz is oriented to place, person and time ,appears well-developed and well-nourished  HEENT:  Atraumatic and normocephalic, external ears normal bilaterally, nose normal no oropharyngeal exudate and is clear and moist  Eyes:  EOCM normal; conjunctivae normal; PERRLA bilaterally  Neck:  Normal range of motion, neck supple, no JVD and no thyromegaly  Cardiovascular:  RRR, normal heart sounds and intact distal pulses  Pulmonary:  effort normal and breath sounds normal bilaterally,no wheezes or rales, no respiratory distress  Abdominal:  Soft, non-tender; normal bowel sounds, no masses  Musculoskeletal: Limited range of motion and no edema or tenderness bilaterally  No lymphadenopathy  Neurological:  alert, oriented, and normal reflexes bilaterally  Skin: warm and dry  Psychiatric:  normal mood and effect; behavior normal.    Labs:   Lab Results Component Value Date    LABA1C 5.5 01/04/2018     Lab Results   Component Value Date    CHOL 204 (H) 04/11/2022     Lab Results   Component Value Date    HDL 52 04/11/2022     No results found for: [address]  Lab Results   Component Value Date    TRIG 133 04/11/2022     No components found for: [place], [place]  Lab Results   Component Value Date    WBC 8.8 04/11/2022    HGB 13.6 04/11/2022    HCT 42.3 04/11/2022    MCV 91.0 04/11/2022     04/11/2022     Lab Results   Component Value Date    INR 1.0 03/27/2019    PROTIME 10.3 03/27/2019     Lab Results   Component Value Date    GLUCOSE 89 04/11/2022    CREATININE 0.92 (H) 04/11/2022    BUN 18 04/11/2022     04/11/2022    K 3.9 04/11/2022    CL 98 04/11/2022    CO2 22 04/11/2022     Lab Results   Component Value Date    ALT 8 04/11/2022    AST 12 04/11/2022    ALKPHOS 102 04/11/2022    BILITOT 0.21 (L) 04/11/2022     Lab Results   Component Value Date    LABPROT 7.0 08/27/2012    LABALBU 4.0 04/11/2022     Lab Results   Component Value Date    TSH 1.93 04/11/2022     Assessment:  1. Elevated uric acid in blood    2. Chronic midline low back pain with sciatica, sciatica laterality unspecified    3. High cholesterol        Plan:  Patient's lab work reviewed and were reasonable and cholesterol is stable and last cholesterol 204 and HDL 52  Patient back pain is stable and following with the pain management  Patient had elevated uric acid and so started on allopurinol 100 mg daily and patient was advised to repeat lab work in 4 weeks including uric acid and also sedimentation rate which was slightly elevated  Patient states that she had a recent colonoscopy done  Review as scheduled           1. Keena Bueno received counseling on the following healthy behaviors: nutrition, exercise and tobacco cessation    2. Prior labs and health maintenance reviewed. 3.  Discussed use, benefit, and side effects of prescribed medications. Barriers to medication compliance addressed. All her questions were answered. Pt voiced understanding. Rena Bishop will continue current medications, diet and exercise. Orders Placed This Encounter   Medications    allopurinol (ZYLOPRIM) 100 MG tablet     Sig: Take 1 tablet by mouth daily     Dispense:  30 tablet     Refill:  5          Completed Refills               Requested Prescriptions     Signed Prescriptions Disp Refills    allopurinol (ZYLOPRIM) 100 MG tablet 30 tablet 5     Sig: Take 1 tablet by mouth daily     4. Patient given educational materials - see patient instructions    5. Was a self-tracking handout given in paper form or via [employer]? NO    Orders Placed This Encounter   Procedures    Sedimentation rate, manual     Standing Status:   Future     Standing Expiration Date:   4/13/2023    Uric Acid     Standing Status:   Future     Standing Expiration Date:   4/13/2023     Return in about 6 months (around 10/13/2022). Patient voiced understanding and agreed to treatment plan. Electronically signed by Allegra Zapata MD on 4/15/2022 at 9:07 AM    This note is created with a voice recognition program and while intend to generate a document that accurately reflects the content of the visit, no guarantee can be provided that every mistake has been identified and corrected by editing.

## 2022-05-02 DIAGNOSIS — G89.29 OTHER CHRONIC PAIN: ICD-10-CM

## 2022-05-02 DIAGNOSIS — C50.412 MALIGNANT NEOPLASM OF UPPER-OUTER QUADRANT OF LEFT BREAST IN FEMALE, ESTROGEN RECEPTOR NEGATIVE (HCC): ICD-10-CM

## 2022-05-02 DIAGNOSIS — Z17.1 MALIGNANT NEOPLASM OF UPPER-OUTER QUADRANT OF LEFT BREAST IN FEMALE, ESTROGEN RECEPTOR NEGATIVE (HCC): ICD-10-CM

## 2022-05-02 RX ORDER — OXYCODONE AND ACETAMINOPHEN 10; 325 MG/1; MG/1
1 TABLET ORAL EVERY 6 HOURS PRN
Qty: 120 TABLET | Refills: 0 | Status: CANCELLED | OUTPATIENT
Start: 2022-05-02 | End: 2022-06-01

## 2022-06-03 DIAGNOSIS — Z17.1 MALIGNANT NEOPLASM OF UPPER-OUTER QUADRANT OF LEFT BREAST IN FEMALE, ESTROGEN RECEPTOR NEGATIVE (HCC): ICD-10-CM

## 2022-06-03 DIAGNOSIS — C50.412 MALIGNANT NEOPLASM OF UPPER-OUTER QUADRANT OF LEFT BREAST IN FEMALE, ESTROGEN RECEPTOR NEGATIVE (HCC): ICD-10-CM

## 2022-06-03 RX ORDER — OXYCODONE AND ACETAMINOPHEN 10; 325 MG/1; MG/1
1 TABLET ORAL EVERY 6 HOURS PRN
Qty: 120 TABLET | Refills: 0 | Status: SHIPPED | OUTPATIENT
Start: 2022-06-06 | End: 2022-07-01 | Stop reason: SDUPTHER

## 2022-06-03 NOTE — TELEPHONE ENCOUNTER
Patient call requesting pain medication refill. Last prescribed 5/6/22. Writer updates today refill date to 6/6/22. MD visit scheduled for 7/25/22. Routed to MD for review.

## 2022-07-01 DIAGNOSIS — C50.412 MALIGNANT NEOPLASM OF UPPER-OUTER QUADRANT OF LEFT BREAST IN FEMALE, ESTROGEN RECEPTOR NEGATIVE (HCC): ICD-10-CM

## 2022-07-01 DIAGNOSIS — Z17.1 MALIGNANT NEOPLASM OF UPPER-OUTER QUADRANT OF LEFT BREAST IN FEMALE, ESTROGEN RECEPTOR NEGATIVE (HCC): ICD-10-CM

## 2022-07-05 RX ORDER — OXYCODONE AND ACETAMINOPHEN 10; 325 MG/1; MG/1
1 TABLET ORAL EVERY 6 HOURS PRN
Qty: 120 TABLET | Refills: 0 | Status: SHIPPED | OUTPATIENT
Start: 2022-07-06 | End: 2022-08-03 | Stop reason: SDUPTHER

## 2022-07-25 ENCOUNTER — HOSPITAL ENCOUNTER (OUTPATIENT)
Age: 62
Setting detail: SPECIMEN
Discharge: HOME | End: 2022-07-25
Payer: COMMERCIAL

## 2022-07-25 ENCOUNTER — OFFICE VISIT (OUTPATIENT)
Dept: ONCOLOGY | Age: 62
End: 2022-07-25
Payer: COMMERCIAL

## 2022-07-25 ENCOUNTER — TELEPHONE (OUTPATIENT)
Dept: ONCOLOGY | Age: 62
End: 2022-07-25

## 2022-07-25 VITALS
BODY MASS INDEX: 37.53 KG/M2 | TEMPERATURE: 95.6 F | SYSTOLIC BLOOD PRESSURE: 142 MMHG | WEIGHT: 232.4 LBS | HEART RATE: 72 BPM | DIASTOLIC BLOOD PRESSURE: 82 MMHG | RESPIRATION RATE: 20 BRPM

## 2022-07-25 DIAGNOSIS — G62.9 NEUROPATHY: ICD-10-CM

## 2022-07-25 DIAGNOSIS — F17.200 SMOKING: ICD-10-CM

## 2022-07-25 DIAGNOSIS — C50.412 MALIGNANT NEOPLASM OF UPPER-OUTER QUADRANT OF LEFT BREAST IN FEMALE, ESTROGEN RECEPTOR NEGATIVE (HCC): ICD-10-CM

## 2022-07-25 DIAGNOSIS — Z17.1 MALIGNANT NEOPLASM OF UPPER-OUTER QUADRANT OF LEFT BREAST IN FEMALE, ESTROGEN RECEPTOR NEGATIVE (HCC): Primary | ICD-10-CM

## 2022-07-25 DIAGNOSIS — G89.29 OTHER CHRONIC PAIN: ICD-10-CM

## 2022-07-25 DIAGNOSIS — Z17.1 MALIGNANT NEOPLASM OF UPPER-OUTER QUADRANT OF LEFT BREAST IN FEMALE, ESTROGEN RECEPTOR NEGATIVE (HCC): ICD-10-CM

## 2022-07-25 DIAGNOSIS — C50.412 MALIGNANT NEOPLASM OF UPPER-OUTER QUADRANT OF LEFT BREAST IN FEMALE, ESTROGEN RECEPTOR NEGATIVE (HCC): Primary | ICD-10-CM

## 2022-07-25 LAB
ABSOLUTE EOS #: 0.19 K/UL (ref 0–0.44)
ABSOLUTE IMMATURE GRANULOCYTE: 0.04 K/UL (ref 0–0.3)
ABSOLUTE LYMPH #: 3.58 K/UL (ref 1.1–3.7)
ABSOLUTE MONO #: 0.76 K/UL (ref 0.1–1.2)
ALBUMIN SERPL-MCNC: 3.9 G/DL (ref 3.5–5.2)
ALP BLD-CCNC: 112 U/L (ref 35–104)
ALT SERPL-CCNC: 6 U/L (ref 5–33)
AMPHETAMINE SCREEN URINE: NEGATIVE
ANION GAP SERPL CALCULATED.3IONS-SCNC: 11 MMOL/L (ref 9–17)
AST SERPL-CCNC: 9 U/L
BARBITURATE SCREEN URINE: NEGATIVE
BASOPHILS # BLD: 1 % (ref 0–2)
BASOPHILS ABSOLUTE: 0.08 K/UL (ref 0–0.2)
BENZODIAZEPINE SCREEN, URINE: NEGATIVE
BILIRUB SERPL-MCNC: 0.27 MG/DL (ref 0.3–1.2)
BUN BLDV-MCNC: 29 MG/DL (ref 8–23)
BUN/CREAT BLD: 23 (ref 9–20)
CALCIUM SERPL-MCNC: 9.3 MG/DL (ref 8.6–10.4)
CANNABINOID SCREEN URINE: NEGATIVE
CHLORIDE BLD-SCNC: 100 MMOL/L (ref 98–107)
CO2: 26 MMOL/L (ref 20–31)
COCAINE METABOLITE, URINE: NEGATIVE
CREAT SERPL-MCNC: 1.24 MG/DL (ref 0.5–0.9)
EOSINOPHILS RELATIVE PERCENT: 2 % (ref 1–4)
FENTANYL URINE: NEGATIVE
GFR AFRICAN AMERICAN: 53 ML/MIN
GFR NON-AFRICAN AMERICAN: 44 ML/MIN
GFR SERPL CREATININE-BSD FRML MDRD: ABNORMAL ML/MIN/{1.73_M2}
GLUCOSE BLD-MCNC: 121 MG/DL (ref 70–99)
HCT VFR BLD CALC: 44.7 % (ref 36.3–47.1)
HEMOGLOBIN: 14.1 G/DL (ref 11.9–15.1)
IMMATURE GRANULOCYTES: 0 %
LYMPHOCYTES # BLD: 35 % (ref 24–43)
MCH RBC QN AUTO: 29.3 PG (ref 25.2–33.5)
MCHC RBC AUTO-ENTMCNC: 31.5 G/DL (ref 28.4–34.8)
MCV RBC AUTO: 92.7 FL (ref 82.6–102.9)
METHADONE SCREEN, URINE: NEGATIVE
MONOCYTES # BLD: 8 % (ref 3–12)
NRBC AUTOMATED: 0 PER 100 WBC
OPIATES, URINE: NEGATIVE
OXYCODONE SCREEN URINE: POSITIVE
PDW BLD-RTO: 15.9 % (ref 11.8–14.4)
PHENCYCLIDINE, URINE: NEGATIVE
PLATELET # BLD: 239 K/UL (ref 138–453)
PMV BLD AUTO: 9.1 FL (ref 8.1–13.5)
POTASSIUM SERPL-SCNC: 4.4 MMOL/L (ref 3.7–5.3)
RBC # BLD: 4.82 M/UL (ref 3.95–5.11)
RBC # BLD: ABNORMAL 10*6/UL
SEG NEUTROPHILS: 54 % (ref 36–65)
SEGMENTED NEUTROPHILS ABSOLUTE COUNT: 5.5 K/UL (ref 1.5–8.1)
SODIUM BLD-SCNC: 137 MMOL/L (ref 135–144)
TEST INFORMATION: ABNORMAL
TOTAL PROTEIN: 7 G/DL (ref 6.4–8.3)
WBC # BLD: 10.2 K/UL (ref 3.5–11.3)

## 2022-07-25 PROCEDURE — 80053 COMPREHEN METABOLIC PANEL: CPT

## 2022-07-25 PROCEDURE — 4004F PT TOBACCO SCREEN RCVD TLK: CPT | Performed by: INTERNAL MEDICINE

## 2022-07-25 PROCEDURE — G8427 DOCREV CUR MEDS BY ELIG CLIN: HCPCS | Performed by: INTERNAL MEDICINE

## 2022-07-25 PROCEDURE — G8417 CALC BMI ABV UP PARAM F/U: HCPCS | Performed by: INTERNAL MEDICINE

## 2022-07-25 PROCEDURE — 99211 OFF/OP EST MAY X REQ PHY/QHP: CPT | Performed by: INTERNAL MEDICINE

## 2022-07-25 PROCEDURE — 85025 COMPLETE CBC W/AUTO DIFF WBC: CPT

## 2022-07-25 PROCEDURE — 36415 COLL VENOUS BLD VENIPUNCTURE: CPT

## 2022-07-25 PROCEDURE — 99214 OFFICE O/P EST MOD 30 MIN: CPT | Performed by: INTERNAL MEDICINE

## 2022-07-25 PROCEDURE — 80307 DRUG TEST PRSMV CHEM ANLYZR: CPT

## 2022-07-25 PROCEDURE — 3017F COLORECTAL CA SCREEN DOC REV: CPT | Performed by: INTERNAL MEDICINE

## 2022-07-25 RX ORDER — GABAPENTIN 600 MG/1
600 TABLET ORAL 3 TIMES DAILY
Qty: 90 TABLET | Refills: 0 | Status: SHIPPED | OUTPATIENT
Start: 2022-07-25 | End: 2022-09-01 | Stop reason: SDUPTHER

## 2022-07-25 NOTE — TELEPHONE ENCOUNTER
Kaden Caruso MD VISIT  DR Ronnie Mojica IN TO SEE PATIENT  ORDERS RECEIVED  RV 3 MONTHS WITH LABS  LABS CDP CMP URINE DRUG SCREEN 10/17/22  MD VISIT 10/17/22 @2:15PM  SCRIPT SENT TO PATIENT'S PHARMACY  AVS  PRINTED AND GIVEN TO PATIENT WITH INSTRUCTIONS  PATIENT DISCHARGED AMBULATORY

## 2022-07-25 NOTE — PROGRESS NOTES
Marcelle Faith                                                                                                                  7/25/2022  MRN:   [id number]  YOB: 1960  PCP:                           Jessie Moeller MD  Referring Physician: No ref. provider found  Treating Physician Name: Suzette Ulloa MD      Reason for visit:  Chief Complaint   Patient presents with    Follow-up    Results   Discussed treatment plan. Current problems:   IDC of Left Breast cancer, Triple negative, pT2,pN0,M0  Chronic pain    Active and recent treatments:  Bilateral Mastectomy with left sentinel lymph node  Adjuvant chemotherapy using dose denseAC followed by T on 4/11/19--- 8/ /19    Summary of Case/History:    Marcelle Faith a 64 y. o.female invasive carcinoma of the breast.     Patient initially felt a lump in her left breast for 2-3 months. She underwent screening mammogram which showed 2 cm mass in upper outer left breast.  Right breast do not have any concerning findings. Ultrasound of left breast conformed mass measuring 1.9 cm in the upper outer left breast.  Patient underwent biopsy on 1/23/19 which revealed invasive ductal carcinoma, grade 3, ER/WI negative. HER-2 results are pending. Patient has history of uterus/cervical cancer diagnosed in 12. Patient states she underwent surgery for the cervical cancer. She does not recall getting any radiation therapy or chemotherapy. Patient states she has been interested in getting bilateral breast reduction due to cosmetic reasons however now with a diagnosis of breast cancer she wishes to proceed with bilateral mastectomy. Patient has history of breast cancer in her mother as well as 3 paternal aunts. Patient's mother also had cervical and uterine cancer. Patient underwent bilateral mastectomy with left sentinel lymph node biopsy.   Pathological stage was T2, N0, M0    Started chemotherapy for triple negative breast cancer using AC followed by T on 4/11/19    Interim History:    Patient presents to the clinic for a follow-up visit and to discuss results of his lab work-up. She complains of worsening neuropathy. States that she is now dropping things from her hands. Denies noticing any mass or lump on her chest.    During this visit patient's allergy, social, medical, surgical history and medications were reviewed and updated.     Past Medical History:   Past Medical History:   Diagnosis Date    Arthritis     GENERALIZED ALL OVER RHEUMATOID AND OSTEO    Breast cancer ([address]     left breast    Cancer ([address] 1992    CERVICAL, UTERINE    Chronic back pain     scioliosis,  02/2019 WEARING A BACK BRACE    Degenerative disorder of bone     ALL OVER    Depression     Difficult intravenous access     HANDS AR BEST SITE    GERD (gastroesophageal reflux disease)     Hyperlipidemia     HX OF NO MEDS IN YEARS    Hypertension 2007    Pain management clinic    Insomnia     Neuropathy     hands and feet    Obesity     BMI -  43    Sleep apnea 2016    NO LONGER USES MACHINE    Snores     has been told by family that she stops breathing    Use of cane as ambulatory aid     Wears glasses     READING    Wears glasses     \"CHEATERS\"       Past Surgical History:     Past Surgical History:   Procedure Laterality Date    BREAST SURGERY Left 1990    CYST    BUNIONECTOMY Bilateral     CERVIX SURGERY  1992    COLONOSCOPY  10/13/2017    FOOT SURGERY Left 2014    PINS IN ALL TOES    FOOT SURGERY Left 5/27/2020    HARDWARE REMOVAL LEFT GREAT TOE- performed by Prem Miguel DPM at [address] Bilateral     HYSTERECTOMY ([facility]  [facility], CERVIX     MASTECTOMY Bilateral 2/12/2019    TOTAL MASTECTOMY performed by Himanshu Dunne DO at [address] [facility] [place], BILATERAL Bilateral 02/12/2019     TOTAL MASTECTOMY,  AXILLARY SENTINEL LYMPH NODE BIOPSY, FROZEN SECTION     MT COLSC FLX W/REMOVAL LESION BY HOT BX FORCEPS N/A 10/13/2017 COLONOSCOPY WITH BIOPSY AND POLYPECTOMY performed by Wagner Jovel IV, DO at [address]    TUNNELED VENOUS PORT PLACEMENT  03/27/2019    X-RAY FOOT 3+VW      both       Patient Family Social History:     Social History     Socioeconomic History    Marital status: Single     Spouse name: Not on file    Number of children: Not on file    Years of education: Not on file    Highest education level: Not on file   Occupational History    Not on file   Tobacco Use    Smoking status: Some Days     Packs/day: 0.25     Years: 41.00     Pack years: 10.25     Types: Cigarettes     Start date: 36    Smokeless tobacco: Never    Tobacco comments:     STATES SMOKES A PACK A WEEK   Vaping Use    Vaping Use: Never used   Substance and Sexual Activity    Alcohol use: No    Drug use: Yes     Frequency: 7.0 times per week     Types: Marijuana (Weed)     Comment: LAST USE 02/10/2018, AND PAIN PILLS     Sexual activity: Not Currently   Other Topics Concern    Not on file   Social History Narrative    Not on file     Social Determinants of Health     Financial Resource Strain: Low Risk     Difficulty of Paying Living Expenses: Not hard at all   Food Insecurity: No Food Insecurity    Worried About Running Out of Food in the Last Year: Never true    Ran Out of Food in the Last Year: Never true   Transportation Needs: Not on file   Physical Activity: Not on file   Stress: Not on file   Social Connections: Not on file   Intimate Partner Violence: Not on file   Housing Stability: Not on file      Family History   Problem Relation Age of Onset    High Blood Pressure Mother     Cancer Mother         cervical and breast    Diabetes Father     Heart Attack Father     Heart Disease Maternal Aunt         times 2      Current Medications:     Current Outpatient Medications   Medication Sig Dispense Refill    oxyCODONE-acetaminophen (PERCOCET)  MG per tablet Take 1 tablet by mouth every 6 hours as needed for Pain for up to 30 days. 120 tablet 0    allopurinol (ZYLOPRIM) 100 MG tablet Take 1 tablet by mouth daily 30 tablet 5    hydroCHLOROthiazide (HYDRODIURIL) 50 MG tablet TAKE 1 TABLET BY MOUTH DAILY 30 tablet 11    gabapentin (NEURONTIN) 600 MG tablet Take 1 tablet by mouth 2 times daily for 30 days. 60 tablet 6     No current facility-administered medications for this visit. Allergies:   Lisinopril, Bee venom, Amino acids, and Sulfa antibiotics    Review of Systems:    Constitutional: No fever or chills. No night sweats. Weight now stable  Eyes: No eye discharge, double vision, or eye pain   HEENT: negative for sore mouth, sore throat, hoarseness and voice change   Respiratory: URI with productive cough, sneezing, sinus drainage and congestion, runny eyes  Cardiovascular: negative for chest pain, dyspnea, palpitations, orthopnea, PND   Gastrointestinal: negative for nausea, vomiting, diarrhea, constipation, Dysphagia, hematemesis and hematochezia  Genitourinary: negative for frequency, dysuria, nocturia, urinary incontinence, and hematuria   Integument: negative for rash, skin lesions, bruises.   Nails now growing back slowly, toenail fungus  Hematologic/Lymphatic: negative for easy bruising, bleeding, lymphadenopathy, or petechiae   Endocrine: negative for heat or cold intolerance,weight changes, change in bowel habits and hair loss   Musculoskeletal: negative for pain, joint swelling; +arhtralgias and bone pain +myalgias, improved  Neurological: negative for headaches, dizziness, seizures, weakness; + neuropathy is nearly resolved        Physical Exam:    Vitals: BP (!) 142/82 (Site: Left Upper Arm, Position: Sitting, Cuff Size: Large Adult)   Pulse 72   Temp (!) 95.6 °F (35.3 °C) (Temporal)   Resp 20   Wt 232 lb 6.4 oz (105.4 kg)   LMP 02/11/1992   BMI 37.53 kg/m²   General appearance - well appearing, no in pain or distress  Mental status - AAO X3  Eyes - pupils equal and reactive, extraocular eye movements intact  Mouth - mucous membranes moist, pharynx normal without lesions  Neck - supple, no significant adenopathy  Lymphatics - no palpable lymphadenopathy, no hepatosplenomegaly  Chest - clear to auscultation, no wheezes, rales or rhonchi, symmetric air entry  Heart - normal rate, regular rhythm, normal S1, S2, no murmurs  Abdomen - soft, nontender, nondistended, no masses or organomegaly  Neurological - alert, oriented, normal speech, no focal findings or movement disorder noted; +neuropathy - stable  Extremities -bilateral ankle edema  Skin - normal coloration and turgor, no rashes, no suspicious skin lesions noted; toenail fungus  Breast- bilateral mastectomy, no masses or lumps, excess skin on right side - no lymphedema appreciated        DATA:  Lab Results   Component Value Date    WBC 10.2 07/25/2022    HGB 14.1 07/25/2022    HCT 44.7 07/25/2022    MCV 92.7 07/25/2022     07/25/2022       Chemistry        Component Value Date/Time     07/25/2022 1412    K 4.4 07/25/2022 1412     07/25/2022 1412    CO2 26 07/25/2022 1412    BUN 29 (H) 07/25/2022 1412    CREATININE 1.24 (H) 07/25/2022 1412        Component Value Date/Time    CALCIUM 9.3 07/25/2022 1412    ALKPHOS 112 (H) 07/25/2022 1412    AST 9 07/25/2022 1412    ALT 6 07/25/2022 1412    BILITOT 0.27 (L) 07/25/2022 1412          Results for Marimar Wallace (MRN [id number]) as of 1/24/2021 08:08   Ref.  Range 1/11/2021 14:29   Amphetamine Screen, Ur Latest Ref Range: NEGATIVE  NEGATIVE   Barbiturate Screen, Ur Latest Ref Range: NEGATIVE  NEGATIVE   Benzodiazepine Screen, Urine Latest Ref Range: NEGATIVE  NEGATIVE   Buprenorphine Urine Latest Ref Range: NEGATIVE  NOT REPORTED   Cannabinoid Scrn, Ur Latest Ref Range: NEGATIVE  POSITIVE (A)   Cocaine Metabolite, Urine Latest Ref Range: NEGATIVE  NEGATIVE   Methadone Screen, Urine Latest Ref Range: NEGATIVE  NEGATIVE   Methamphetamine, Urine Latest Ref Range: NEGATIVE  NOT REPORTED   Oxycodone Screen, Ur

## 2022-08-02 DIAGNOSIS — C50.412 MALIGNANT NEOPLASM OF UPPER-OUTER QUADRANT OF LEFT BREAST IN FEMALE, ESTROGEN RECEPTOR NEGATIVE (HCC): ICD-10-CM

## 2022-08-02 DIAGNOSIS — Z17.1 MALIGNANT NEOPLASM OF UPPER-OUTER QUADRANT OF LEFT BREAST IN FEMALE, ESTROGEN RECEPTOR NEGATIVE (HCC): ICD-10-CM

## 2022-08-03 RX ORDER — OXYCODONE AND ACETAMINOPHEN 10; 325 MG/1; MG/1
1 TABLET ORAL EVERY 6 HOURS PRN
Qty: 120 TABLET | Refills: 0 | Status: SHIPPED | OUTPATIENT
Start: 2022-08-03 | End: 2022-09-01 | Stop reason: SDUPTHER

## 2022-09-01 DIAGNOSIS — C50.412 MALIGNANT NEOPLASM OF UPPER-OUTER QUADRANT OF LEFT BREAST IN FEMALE, ESTROGEN RECEPTOR NEGATIVE (HCC): ICD-10-CM

## 2022-09-01 DIAGNOSIS — G89.29 OTHER CHRONIC PAIN: ICD-10-CM

## 2022-09-01 DIAGNOSIS — G62.9 NEUROPATHY: ICD-10-CM

## 2022-09-01 DIAGNOSIS — Z17.1 MALIGNANT NEOPLASM OF UPPER-OUTER QUADRANT OF LEFT BREAST IN FEMALE, ESTROGEN RECEPTOR NEGATIVE (HCC): ICD-10-CM

## 2022-09-01 RX ORDER — GABAPENTIN 600 MG/1
600 TABLET ORAL 3 TIMES DAILY
Qty: 90 TABLET | Refills: 0 | Status: SHIPPED | OUTPATIENT
Start: 2022-09-01 | End: 2022-10-03

## 2022-09-01 RX ORDER — OXYCODONE AND ACETAMINOPHEN 10; 325 MG/1; MG/1
1 TABLET ORAL EVERY 6 HOURS PRN
Qty: 120 TABLET | Refills: 0 | Status: SHIPPED | OUTPATIENT
Start: 2022-09-01 | End: 2022-10-01

## 2022-10-03 DIAGNOSIS — Z17.1 MALIGNANT NEOPLASM OF UPPER-OUTER QUADRANT OF LEFT BREAST IN FEMALE, ESTROGEN RECEPTOR NEGATIVE (HCC): ICD-10-CM

## 2022-10-03 DIAGNOSIS — C50.412 MALIGNANT NEOPLASM OF UPPER-OUTER QUADRANT OF LEFT BREAST IN FEMALE, ESTROGEN RECEPTOR NEGATIVE (HCC): Primary | ICD-10-CM

## 2022-10-03 DIAGNOSIS — Z17.1 MALIGNANT NEOPLASM OF UPPER-OUTER QUADRANT OF LEFT BREAST IN FEMALE, ESTROGEN RECEPTOR NEGATIVE (HCC): Primary | ICD-10-CM

## 2022-10-03 DIAGNOSIS — G62.9 NEUROPATHY: ICD-10-CM

## 2022-10-03 DIAGNOSIS — C50.412 MALIGNANT NEOPLASM OF UPPER-OUTER QUADRANT OF LEFT BREAST IN FEMALE, ESTROGEN RECEPTOR NEGATIVE (HCC): ICD-10-CM

## 2022-10-03 DIAGNOSIS — G89.29 OTHER CHRONIC PAIN: ICD-10-CM

## 2022-10-03 RX ORDER — GABAPENTIN 600 MG/1
TABLET ORAL
Qty: 90 TABLET | Refills: 2 | Status: SHIPPED | OUTPATIENT
Start: 2022-10-03 | End: 2022-11-02

## 2022-10-03 RX ORDER — OXYCODONE AND ACETAMINOPHEN 10; 325 MG/1; MG/1
1 TABLET ORAL EVERY 6 HOURS PRN
Qty: 120 TABLET | Refills: 0 | Status: SHIPPED | OUTPATIENT
Start: 2022-10-03 | End: 2022-10-31 | Stop reason: SDUPTHER

## 2022-10-24 ENCOUNTER — TELEPHONE (OUTPATIENT)
Dept: ONCOLOGY | Age: 62
End: 2022-10-24

## 2022-10-24 NOTE — TELEPHONE ENCOUNTER
PATIENT CALLED AND LEFT A VM AT 11:57 AM TO CANCEL APPOINTMENT DUE TO FAMILY ISSUE. PATIENT IS RESCHEDULED 10/31/22. PATIENT KNOWS TO GET BLOOD WORK DONE BEFORE APPOINTMENT.

## 2022-10-31 ENCOUNTER — TELEPHONE (OUTPATIENT)
Dept: ONCOLOGY | Age: 62
End: 2022-10-31

## 2022-10-31 ENCOUNTER — HOSPITAL ENCOUNTER (OUTPATIENT)
Age: 62
Setting detail: SPECIMEN
Discharge: HOME | End: 2022-10-31
Payer: COMMERCIAL

## 2022-10-31 ENCOUNTER — OFFICE VISIT (OUTPATIENT)
Dept: ONCOLOGY | Age: 62
End: 2022-10-31
Payer: COMMERCIAL

## 2022-10-31 VITALS
TEMPERATURE: 97.5 F | WEIGHT: 228 LBS | DIASTOLIC BLOOD PRESSURE: 88 MMHG | SYSTOLIC BLOOD PRESSURE: 133 MMHG | RESPIRATION RATE: 18 BRPM | BODY MASS INDEX: 36.82 KG/M2 | HEART RATE: 55 BPM

## 2022-10-31 DIAGNOSIS — G62.9 NEUROPATHY: ICD-10-CM

## 2022-10-31 DIAGNOSIS — C50.412 MALIGNANT NEOPLASM OF UPPER-OUTER QUADRANT OF LEFT BREAST IN FEMALE, ESTROGEN RECEPTOR NEGATIVE (HCC): ICD-10-CM

## 2022-10-31 DIAGNOSIS — C50.412 MALIGNANT NEOPLASM OF UPPER-OUTER QUADRANT OF LEFT BREAST IN FEMALE, ESTROGEN RECEPTOR NEGATIVE (HCC): Primary | ICD-10-CM

## 2022-10-31 DIAGNOSIS — E66.01 MORBID OBESITY DUE TO EXCESS CALORIES (HCC): ICD-10-CM

## 2022-10-31 DIAGNOSIS — F17.200 SMOKING: ICD-10-CM

## 2022-10-31 DIAGNOSIS — I10 ESSENTIAL HYPERTENSION: ICD-10-CM

## 2022-10-31 DIAGNOSIS — Z17.1 MALIGNANT NEOPLASM OF UPPER-OUTER QUADRANT OF LEFT BREAST IN FEMALE, ESTROGEN RECEPTOR NEGATIVE (HCC): ICD-10-CM

## 2022-10-31 DIAGNOSIS — Z17.1 MALIGNANT NEOPLASM OF UPPER-OUTER QUADRANT OF LEFT BREAST IN FEMALE, ESTROGEN RECEPTOR NEGATIVE (HCC): Primary | ICD-10-CM

## 2022-10-31 LAB
ABSOLUTE EOS #: 0.22 K/UL (ref 0–0.44)
ABSOLUTE IMMATURE GRANULOCYTE: 0.02 K/UL (ref 0–0.3)
ABSOLUTE LYMPH #: 3.31 K/UL (ref 1.1–3.7)
ABSOLUTE MONO #: 0.76 K/UL (ref 0.1–1.2)
ALBUMIN SERPL-MCNC: 3.7 G/DL (ref 3.5–5.2)
ALP BLD-CCNC: 121 U/L (ref 35–104)
ALT SERPL-CCNC: <5 U/L (ref 5–33)
AMPHETAMINE SCREEN URINE: NEGATIVE
ANION GAP SERPL CALCULATED.3IONS-SCNC: 10 MMOL/L (ref 9–17)
AST SERPL-CCNC: 11 U/L
BARBITURATE SCREEN URINE: NEGATIVE
BASOPHILS # BLD: 1 % (ref 0–2)
BASOPHILS ABSOLUTE: 0.08 K/UL (ref 0–0.2)
BENZODIAZEPINE SCREEN, URINE: NEGATIVE
BILIRUB SERPL-MCNC: 0.2 MG/DL (ref 0.3–1.2)
BUN BLDV-MCNC: 20 MG/DL (ref 8–23)
BUN/CREAT BLD: 25 (ref 9–20)
CALCIUM SERPL-MCNC: 9.5 MG/DL (ref 8.6–10.4)
CANNABINOID SCREEN URINE: POSITIVE
CHLORIDE BLD-SCNC: 101 MMOL/L (ref 98–107)
CO2: 29 MMOL/L (ref 20–31)
COCAINE METABOLITE, URINE: NEGATIVE
CREAT SERPL-MCNC: 0.8 MG/DL (ref 0.5–0.9)
EOSINOPHILS RELATIVE PERCENT: 2 % (ref 1–4)
FENTANYL URINE: NEGATIVE
GFR SERPL CREATININE-BSD FRML MDRD: >60 ML/MIN/1.73M2
GLUCOSE BLD-MCNC: 93 MG/DL (ref 70–99)
HCT VFR BLD CALC: 43.3 % (ref 36.3–47.1)
HEMOGLOBIN: 13.6 G/DL (ref 11.9–15.1)
IMMATURE GRANULOCYTES: 0 %
LYMPHOCYTES # BLD: 37 % (ref 24–43)
MCH RBC QN AUTO: 28.9 PG (ref 25.2–33.5)
MCHC RBC AUTO-ENTMCNC: 31.4 G/DL (ref 28.4–34.8)
MCV RBC AUTO: 91.9 FL (ref 82.6–102.9)
METHADONE SCREEN, URINE: NEGATIVE
MONOCYTES # BLD: 8 % (ref 3–12)
NRBC AUTOMATED: 0 PER 100 WBC
OPIATES, URINE: NEGATIVE
OXYCODONE SCREEN URINE: POSITIVE
PDW BLD-RTO: 14.6 % (ref 11.8–14.4)
PHENCYCLIDINE, URINE: NEGATIVE
PLATELET # BLD: 199 K/UL (ref 138–453)
PMV BLD AUTO: 9.1 FL (ref 8.1–13.5)
POTASSIUM SERPL-SCNC: 3.8 MMOL/L (ref 3.7–5.3)
RBC # BLD: 4.71 M/UL (ref 3.95–5.11)
RBC # BLD: ABNORMAL 10*6/UL
SEG NEUTROPHILS: 52 % (ref 36–65)
SEGMENTED NEUTROPHILS ABSOLUTE COUNT: 4.65 K/UL (ref 1.5–8.1)
SODIUM BLD-SCNC: 140 MMOL/L (ref 135–144)
TEST INFORMATION: ABNORMAL
TOTAL PROTEIN: 6.9 G/DL (ref 6.4–8.3)
WBC # BLD: 9 K/UL (ref 3.5–11.3)

## 2022-10-31 PROCEDURE — 85025 COMPLETE CBC W/AUTO DIFF WBC: CPT

## 2022-10-31 PROCEDURE — 3078F DIAST BP <80 MM HG: CPT | Performed by: INTERNAL MEDICINE

## 2022-10-31 PROCEDURE — 99211 OFF/OP EST MAY X REQ PHY/QHP: CPT | Performed by: INTERNAL MEDICINE

## 2022-10-31 PROCEDURE — G8484 FLU IMMUNIZE NO ADMIN: HCPCS | Performed by: INTERNAL MEDICINE

## 2022-10-31 PROCEDURE — 3017F COLORECTAL CA SCREEN DOC REV: CPT | Performed by: INTERNAL MEDICINE

## 2022-10-31 PROCEDURE — 80053 COMPREHEN METABOLIC PANEL: CPT

## 2022-10-31 PROCEDURE — 36415 COLL VENOUS BLD VENIPUNCTURE: CPT

## 2022-10-31 PROCEDURE — G8417 CALC BMI ABV UP PARAM F/U: HCPCS | Performed by: INTERNAL MEDICINE

## 2022-10-31 PROCEDURE — 99214 OFFICE O/P EST MOD 30 MIN: CPT | Performed by: INTERNAL MEDICINE

## 2022-10-31 PROCEDURE — 3074F SYST BP LT 130 MM HG: CPT | Performed by: INTERNAL MEDICINE

## 2022-10-31 PROCEDURE — 80307 DRUG TEST PRSMV CHEM ANLYZR: CPT

## 2022-10-31 PROCEDURE — 4004F PT TOBACCO SCREEN RCVD TLK: CPT | Performed by: INTERNAL MEDICINE

## 2022-10-31 PROCEDURE — G8427 DOCREV CUR MEDS BY ELIG CLIN: HCPCS | Performed by: INTERNAL MEDICINE

## 2022-10-31 RX ORDER — HYDROCHLOROTHIAZIDE 50 MG/1
50 TABLET ORAL DAILY
Qty: 30 TABLET | Refills: 11 | Status: SHIPPED | OUTPATIENT
Start: 2022-10-31

## 2022-10-31 RX ORDER — OXYCODONE AND ACETAMINOPHEN 10; 325 MG/1; MG/1
1 TABLET ORAL EVERY 6 HOURS PRN
Qty: 120 TABLET | Refills: 0 | Status: SHIPPED | OUTPATIENT
Start: 2022-10-31 | End: 2022-11-29 | Stop reason: SDUPTHER

## 2022-10-31 NOTE — TELEPHONE ENCOUNTER
Casper Sandoval MD VISIT  Rv in 3 months with labs and urine drug screen on rv   LABS CDP CMP URINE DRUG SCREEN 1/23/23  MD VISIT 1/23/23 @ 1PM  AVS PRINTED W/ INSTRUCTIONS AND GIVEN TO PT ON EXIT

## 2022-11-01 RX ORDER — ALLOPURINOL 100 MG/1
100 TABLET ORAL DAILY
Qty: 30 TABLET | Refills: 4 | Status: SHIPPED | OUTPATIENT
Start: 2022-11-01

## 2022-11-01 NOTE — PROGRESS NOTES
Baldemar Smiley                                                                                                                  10/31/2022  MRN:   [id number]  YOB: 1960  PCP:                           Reyes Crump MD  Referring Physician: No ref. provider found  Treating Physician Name: Carlos Cantu MD      Reason for visit:  Chief Complaint   Patient presents with    Follow-up     Does not feel well     Pain     All over     Other     Open sores on bra line ( chest )     Fall     10 days ago, wheel broke on rolling walker   Wants a prescription for a rolling walker     Peripheral Neuropathy     Hands and feet    Discussed treatment plan. Current problems:   IDC of Left Breast cancer, Triple negative, pT2,pN0,M0  Chronic pain    Active and recent treatments:  Bilateral Mastectomy with left sentinel lymph node  Adjuvant chemotherapy using dose denseAC followed by T on 4/11/19--- 8/ /19    Summary of Case/History:    Baldemar Smiley a 64 y. o.female invasive carcinoma of the breast.     Patient initially felt a lump in her left breast for 2-3 months. She underwent screening mammogram which showed 2 cm mass in upper outer left breast.  Right breast do not have any concerning findings. Ultrasound of left breast conformed mass measuring 1.9 cm in the upper outer left breast.  Patient underwent biopsy on 1/23/19 which revealed invasive ductal carcinoma, grade 3, ER/UT negative. HER-2 results are pending. Patient has history of uterus/cervical cancer diagnosed in 12. Patient states she underwent surgery for the cervical cancer. She does not recall getting any radiation therapy or chemotherapy. Patient states she has been interested in getting bilateral breast reduction due to cosmetic reasons however now with a diagnosis of breast cancer she wishes to proceed with bilateral mastectomy. Patient has history of breast cancer in her mother as well as 3 paternal aunts.   Patient's mother also had cervical and uterine cancer. Patient underwent bilateral mastectomy with left sentinel lymph node biopsy. Pathological stage was T2, N0, M0    Started chemotherapy for triple negative breast cancer using AC followed by T on 4/11/19    Interim History:    Patient presents to the clinic for a follow-up visit and for active surveillance of breast cancer. Complains of worsening of back pain. Patient is currently in a wheelchair. She is reluctant to undergo surgery for the back pain. Denies noticing any lump or mass on the chest.  Continues to be on pain medication. During this visit patient's allergy, social, medical, surgical history and medications were reviewed and updated.     Past Medical History:   Past Medical History:   Diagnosis Date    Arthritis     GENERALIZED ALL OVER RHEUMATOID AND OSTEO    Breast cancer ([address]     left breast    Cancer ([address] 1992    CERVICAL, UTERINE    Chronic back pain     scioliosis,  02/2019 WEARING A BACK BRACE    Degenerative disorder of bone     ALL OVER    Depression     Difficult intravenous access     HANDS AR BEST SITE    GERD (gastroesophageal reflux disease)     Hyperlipidemia     HX OF NO MEDS IN YEARS    Hypertension 2007    Pain management clinic    Insomnia     Neuropathy     hands and feet    Obesity     BMI -  43    Sleep apnea 2016    NO LONGER USES MACHINE    Snores     has been told by family that she stops breathing    Use of cane as ambulatory aid     Wears glasses     READING    Wears glasses     \"CHEATERS\"       Past Surgical History:     Past Surgical History:   Procedure Laterality Date    BREAST SURGERY Left 1990    CYST    BUNIONECTOMY Bilateral     CERVIX SURGERY  1992    COLONOSCOPY  10/13/2017    FOOT SURGERY Left 2014    PINS IN ALL TOES    FOOT SURGERY Left 5/27/2020    HARDWARE REMOVAL LEFT GREAT TOE- performed by Scott Orlando DPM at [address] Bilateral     HYSTERECTOMY ([facility])  1992 UTERUS, CERVIX     MASTECTOMY Bilateral 2/12/2019    TOTAL MASTECTOMY performed by Sena Jimenes DO at [address], BILATERAL Bilateral 02/12/2019     TOTAL MASTECTOMY,  AXILLARY SENTINEL LYMPH NODE BIOPSY, FROZEN SECTION     NE COLSC FLX W/REMOVAL LESION BY HOT BX FORCEPS N/A 10/13/2017    COLONOSCOPY WITH BIOPSY AND POLYPECTOMY performed by Darryl Jovel IV, DO at [address]    TUNNELED VENOUS PORT PLACEMENT  03/27/2019    X-RAY FOOT 3+VW      both       Patient Family Social History:     Social History     Socioeconomic History    Marital status: Single     Spouse name: Not on file    Number of children: Not on file    Years of education: Not on file    Highest education level: Not on file   Occupational History    Not on file   Tobacco Use    Smoking status: Some Days     Packs/day: 0.25     Years: 41.00     Pack years: 10.25     Types: Cigarettes     Start date: 1980    Smokeless tobacco: Never    Tobacco comments:     STATES SMOKES A PACK A WEEK   Vaping Use    Vaping Use: Never used   Substance and Sexual Activity    Alcohol use: No    Drug use: Yes     Frequency: 7.0 times per week     Types: Marijuana (Weed)     Comment: LAST USE 02/10/2018, AND PAIN PILLS     Sexual activity: Not Currently   Other Topics Concern    Not on file   Social History Narrative    Not on file     Social Determinants of Health     Financial Resource Strain: Low Risk     Difficulty of Paying Living Expenses: Not hard at all   Food Insecurity: No Food Insecurity    Worried About Running Out of Food in the Last Year: Never true    Ran Out of Food in the Last Year: Never true   Transportation Needs: Not on file   Physical Activity: Not on file   Stress: Not on file   Social Connections: Not on file   Intimate Partner Violence: Not on file   Housing Stability: Not on file      Family History   Problem Relation Age of Onset    High Blood Pressure Mother     Cancer Mother         cervical and breast    Diabetes Father     Heart Attack Father     Heart Disease Maternal Aunt         times 2      Current Medications:     Current Outpatient Medications   Medication Sig Dispense Refill    oxyCODONE-acetaminophen (PERCOCET)  MG per tablet Take 1 tablet by mouth every 6 hours as needed for Pain for up to 30 days. 120 tablet 0    hydroCHLOROthiazide (HYDRODIURIL) 50 MG tablet Take 1 tablet by mouth daily 30 tablet 11    Calcium Carbonate-Vitamin D (OYSTER SHELL CALCIUM/D) 500-200 MG-UNIT TABS Take 1 tablet by mouth 2 times daily 180 tablet 1    gabapentin (NEURONTIN) 600 MG tablet TAKE 1 TABLET BY MOUTH 3 TIMES DAILY 90 tablet 2    allopurinol (ZYLOPRIM) 100 MG tablet Take 1 tablet by mouth daily 30 tablet 5     No current facility-administered medications for this visit. Allergies:   Lisinopril, Bee venom, Amino acids, and Sulfa antibiotics    Review of Systems:    Constitutional: No fever or chills. No night sweats. Weight now stable  Eyes: No eye discharge, double vision, or eye pain   HEENT: negative for sore mouth, sore throat, hoarseness and voice change   Respiratory: URI with productive cough, sneezing, sinus drainage and congestion, runny eyes  Cardiovascular: negative for chest pain, dyspnea, palpitations, orthopnea, PND   Gastrointestinal: negative for nausea, vomiting, diarrhea, constipation, Dysphagia, hematemesis and hematochezia  Genitourinary: negative for frequency, dysuria, nocturia, urinary incontinence, and hematuria   Integument: negative for rash, skin lesions, bruises.   Nails now growing back slowly, toenail fungus  Hematologic/Lymphatic: negative for easy bruising, bleeding, lymphadenopathy, or petechiae   Endocrine: negative for heat or cold intolerance,weight changes, change in bowel habits and hair loss   Musculoskeletal: negative for pain, joint swelling; +arhtralgias and bone pain +myalgias, improved  Neurological: negative for headaches, dizziness, seizures, weakness; + neuropathy is nearly resolved        Physical Exam:    Vitals: /88   Pulse 55   Temp 97.5 °F (36.4 °C) (Oral)   Resp 18   Wt 228 lb (103.4 kg)   LMP 02/11/1992   BMI 36.82 kg/m²   General appearance - well appearing, no in pain or distress  Mental status - AAO X3  Eyes - pupils equal and reactive, extraocular eye movements intact  Mouth - mucous membranes moist, pharynx normal without lesions  Neck - supple, no significant adenopathy  Lymphatics - no palpable lymphadenopathy, no hepatosplenomegaly  Chest - clear to auscultation, no wheezes, rales or rhonchi, symmetric air entry  Heart - normal rate, regular rhythm, normal S1, S2, no murmurs  Abdomen - soft, nontender, nondistended, no masses or organomegaly  Neurological - alert, oriented, normal speech, no focal findings or movement disorder noted; +neuropathy - stable  Extremities -bilateral ankle edema  Skin - normal coloration and turgor, no rashes, no suspicious skin lesions noted;  Breast- bilateral mastectomy, no masses or lumps, excess skin on right side - no lymphedema appreciated        DATA:  Lab Results   Component Value Date    WBC 9.0 10/31/2022    HGB 13.6 10/31/2022    HCT 43.3 10/31/2022    MCV 91.9 10/31/2022     10/31/2022       Chemistry        Component Value Date/Time     10/31/2022 0903    K 3.8 10/31/2022 0903     10/31/2022 0903    CO2 29 10/31/2022 0903    BUN 20 10/31/2022 0903    CREATININE 0.80 10/31/2022 0903        Component Value Date/Time    CALCIUM 9.5 10/31/2022 0903    ALKPHOS 121 (H) 10/31/2022 0903    AST 11 10/31/2022 0903    ALT <5 (L) 10/31/2022 0903    BILITOT 0.2 (L) 10/31/2022 0903          Results for Andre Asencio (MRN [id number]) as of 1/24/2021 08:08   Ref.  Range 1/11/2021 14:29   Amphetamine Screen, Ur Latest Ref Range: NEGATIVE  NEGATIVE   Barbiturate Screen, Ur Latest Ref Range: NEGATIVE  NEGATIVE   Benzodiazepine Screen, Urine Latest Ref Range: NEGATIVE  NEGATIVE   Buprenorphine Urine Latest Ref Range: NEGATIVE  NOT REPORTED   Cannabinoid Scrn, Ur Latest Ref Range: NEGATIVE  POSITIVE (A)   Cocaine Metabolite, Urine Latest Ref Range: NEGATIVE  NEGATIVE   Methadone Screen, Urine Latest Ref Range: NEGATIVE  NEGATIVE   Methamphetamine, Urine Latest Ref Range: NEGATIVE  NOT REPORTED   Oxycodone Screen, Ur Latest Ref Range: NEGATIVE  POSITIVE (A)   Propoxyphene, Urine Latest Ref Range: NEGATIVE  NOT REPORTED   Opiates, Urine Latest Ref Range: NEGATIVE  NEGATIVE   Tricyclic Antidepressants, Urine Latest Ref Range: NEGATIVE  NOT REPORTED   MDMA, Urine Latest Ref Range: NEGATIVE  NOT REPORTED     No results found. Impression:  Invasive ductal carcinoma of left breast, triple negative, pT2,p N0, M0  Status post bilateral mastectomy and left sentinel lymph node biopsy  Personal history of gynecological malignancy (uterus/cervical cancer) status post surgery in 1992  Cancer related pain  Leg swelling  Anemia secondary to chemotherapy  Family history of malignancy  Chronic back pain  Hypertension  Obesity      Plan:  Personally reviewed results of lab work-up and other relevant clinical data. Continue surveillance of breast cancer. Patient clinically remains in remission  Readdressed patient's chronic back pain. Patient is reluctant to undergo surgery  Continue gabapentin for neuropathy. Patient counseled on use of pain medication. Patient was given refill on Percocet. We will continue to check periodic urine tox screens. Patient counseled on tobacco cessation . Return in 3 months. Endy Frausto MD              This note is created with the assistance of a speech recognition program.  While intending to generate a document that actually reflects the content of the visit, the document can still have some errors including those of syntax and sound a like substitutions which may escape proof reading. It such instances, actual meaning can be extrapolated by contextual diversion.

## 2022-11-18 ENCOUNTER — HOSPITAL ENCOUNTER (EMERGENCY)
Age: 62
Discharge: HOME | End: 2022-11-18
Attending: EMERGENCY MEDICINE
Payer: COMMERCIAL

## 2022-11-18 VITALS
WEIGHT: 220 LBS | DIASTOLIC BLOOD PRESSURE: 101 MMHG | SYSTOLIC BLOOD PRESSURE: 142 MMHG | BODY MASS INDEX: 35.53 KG/M2 | TEMPERATURE: 98.6 F | RESPIRATION RATE: 18 BRPM | HEART RATE: 73 BPM | OXYGEN SATURATION: 100 %

## 2022-11-18 DIAGNOSIS — R11.2 NAUSEA VOMITING AND DIARRHEA: Primary | ICD-10-CM

## 2022-11-18 DIAGNOSIS — R19.7 NAUSEA VOMITING AND DIARRHEA: Primary | ICD-10-CM

## 2022-11-18 LAB
ABSOLUTE EOS #: 0.1 K/UL (ref 0–0.44)
ABSOLUTE IMMATURE GRANULOCYTE: 0.05 K/UL (ref 0–0.3)
ABSOLUTE LYMPH #: 4.04 K/UL (ref 1.1–3.7)
ABSOLUTE MONO #: 0.72 K/UL (ref 0.1–1.2)
ALBUMIN SERPL-MCNC: 4.1 G/DL (ref 3.5–5.2)
ALP BLD-CCNC: 115 U/L (ref 35–104)
ALT SERPL-CCNC: 6 U/L (ref 5–33)
ANION GAP SERPL CALCULATED.3IONS-SCNC: 12 MMOL/L (ref 9–17)
AST SERPL-CCNC: 10 U/L
BASOPHILS # BLD: 1 % (ref 0–2)
BASOPHILS ABSOLUTE: 0.06 K/UL (ref 0–0.2)
BILIRUB SERPL-MCNC: 0.3 MG/DL (ref 0.3–1.2)
BUN BLDV-MCNC: 16 MG/DL (ref 8–23)
BUN/CREAT BLD: 24 (ref 9–20)
CALCIUM SERPL-MCNC: 9.9 MG/DL (ref 8.6–10.4)
CHLORIDE BLD-SCNC: 103 MMOL/L (ref 98–107)
CO2: 25 MMOL/L (ref 20–31)
CREAT SERPL-MCNC: 0.67 MG/DL (ref 0.5–0.9)
DATE, STOOL #1: ABNORMAL
EOSINOPHILS RELATIVE PERCENT: 1 % (ref 1–4)
GFR SERPL CREATININE-BSD FRML MDRD: >60 ML/MIN/1.73M2
GLUCOSE BLD-MCNC: 81 MG/DL (ref 70–99)
HCT VFR BLD CALC: 41.7 % (ref 36.3–47.1)
HEMOCCULT SP1 STL QL: POSITIVE
HEMOGLOBIN: 13.3 G/DL (ref 11.9–15.1)
IMMATURE GRANULOCYTES: 0 %
LACTIC ACID: 1.3 MMOL/L (ref 0.5–2.2)
LYMPHOCYTES # BLD: 35 % (ref 24–43)
MCH RBC QN AUTO: 29.2 PG (ref 25.2–33.5)
MCHC RBC AUTO-ENTMCNC: 31.9 G/DL (ref 28.4–34.8)
MCV RBC AUTO: 91.6 FL (ref 82.6–102.9)
MONOCYTES # BLD: 6 % (ref 3–12)
NRBC AUTOMATED: 0 PER 100 WBC
PDW BLD-RTO: 15.2 % (ref 11.8–14.4)
PLATELET # BLD: 211 K/UL (ref 138–453)
PMV BLD AUTO: 9.5 FL (ref 8.1–13.5)
POTASSIUM SERPL-SCNC: 3.8 MMOL/L (ref 3.7–5.3)
RBC # BLD: 4.55 M/UL (ref 3.95–5.11)
RBC # BLD: ABNORMAL 10*6/UL
SEG NEUTROPHILS: 57 % (ref 36–65)
SEGMENTED NEUTROPHILS ABSOLUTE COUNT: 6.74 K/UL (ref 1.5–8.1)
SODIUM BLD-SCNC: 140 MMOL/L (ref 135–144)
TOTAL PROTEIN: 7.1 G/DL (ref 6.4–8.3)
WBC # BLD: 11.7 K/UL (ref 3.5–11.3)

## 2022-11-18 PROCEDURE — 96374 THER/PROPH/DIAG INJ IV PUSH: CPT

## 2022-11-18 PROCEDURE — 99284 EMERGENCY DEPT VISIT MOD MDM: CPT

## 2022-11-18 PROCEDURE — 6360000002 HC RX W HCPCS: Performed by: EMERGENCY MEDICINE

## 2022-11-18 PROCEDURE — 80053 COMPREHEN METABOLIC PANEL: CPT

## 2022-11-18 PROCEDURE — 82270 OCCULT BLOOD FECES: CPT

## 2022-11-18 PROCEDURE — 85025 COMPLETE CBC W/AUTO DIFF WBC: CPT

## 2022-11-18 PROCEDURE — 83605 ASSAY OF LACTIC ACID: CPT

## 2022-11-18 PROCEDURE — 2580000003 HC RX 258: Performed by: EMERGENCY MEDICINE

## 2022-11-18 RX ORDER — LOPERAMIDE HYDROCHLORIDE 2 MG/1
2 CAPSULE ORAL 4 TIMES DAILY PRN
Qty: 12 CAPSULE | Refills: 0 | Status: SHIPPED | OUTPATIENT
Start: 2022-11-18 | End: 2022-11-28

## 2022-11-18 RX ORDER — ONDANSETRON 4 MG/1
4 TABLET, ORALLY DISINTEGRATING ORAL EVERY 8 HOURS PRN
Qty: 12 TABLET | Refills: 0 | Status: SHIPPED | OUTPATIENT
Start: 2022-11-18 | End: 2022-11-18 | Stop reason: SDUPTHER

## 2022-11-18 RX ORDER — 0.9 % SODIUM CHLORIDE 0.9 %
500 INTRAVENOUS SOLUTION INTRAVENOUS ONCE
Status: COMPLETED | OUTPATIENT
Start: 2022-11-18 | End: 2022-11-18

## 2022-11-18 RX ORDER — ONDANSETRON 4 MG/1
4 TABLET, ORALLY DISINTEGRATING ORAL EVERY 8 HOURS PRN
Qty: 12 TABLET | Refills: 0 | Status: SHIPPED | OUTPATIENT
Start: 2022-11-18

## 2022-11-18 RX ORDER — CIPROFLOXACIN 500 MG/1
500 TABLET, FILM COATED ORAL 2 TIMES DAILY
Qty: 10 TABLET | Refills: 0 | Status: SHIPPED | OUTPATIENT
Start: 2022-11-18 | End: 2022-11-18 | Stop reason: SDUPTHER

## 2022-11-18 RX ORDER — ONDANSETRON 2 MG/ML
4 INJECTION INTRAMUSCULAR; INTRAVENOUS ONCE
Status: COMPLETED | OUTPATIENT
Start: 2022-11-18 | End: 2022-11-18

## 2022-11-18 RX ORDER — CIPROFLOXACIN 500 MG/1
500 TABLET, FILM COATED ORAL 2 TIMES DAILY
Qty: 10 TABLET | Refills: 0 | Status: SHIPPED | OUTPATIENT
Start: 2022-11-18 | End: 2022-11-23

## 2022-11-18 RX ORDER — LOPERAMIDE HYDROCHLORIDE 2 MG/1
2 CAPSULE ORAL 4 TIMES DAILY PRN
Qty: 12 CAPSULE | Refills: 0 | Status: SHIPPED | OUTPATIENT
Start: 2022-11-18 | End: 2022-11-18 | Stop reason: SDUPTHER

## 2022-11-18 RX ADMIN — SODIUM CHLORIDE 500 ML: 9 INJECTION, SOLUTION INTRAVENOUS at 17:21

## 2022-11-18 RX ADMIN — ONDANSETRON 4 MG: 2 INJECTION INTRAMUSCULAR; INTRAVENOUS at 17:21

## 2022-11-18 ASSESSMENT — ENCOUNTER SYMPTOMS
DIARRHEA: 1
CHEST TIGHTNESS: 0
COLOR CHANGE: 0
SINUS PAIN: 0
ABDOMINAL DISTENTION: 0
VOMITING: 1
SHORTNESS OF BREATH: 0
APNEA: 0
EYES NEGATIVE: 1
CONSTIPATION: 0
NAUSEA: 1

## 2022-11-18 ASSESSMENT — PAIN DESCRIPTION - LOCATION: LOCATION: ABDOMEN

## 2022-11-18 ASSESSMENT — PAIN DESCRIPTION - DESCRIPTORS: DESCRIPTORS: SHARP

## 2022-11-18 ASSESSMENT — PAIN - FUNCTIONAL ASSESSMENT: PAIN_FUNCTIONAL_ASSESSMENT: 0-10

## 2022-11-18 ASSESSMENT — PAIN DESCRIPTION - FREQUENCY: FREQUENCY: INTERMITTENT

## 2022-11-18 ASSESSMENT — PAIN SCALES - GENERAL: PAINLEVEL_OUTOF10: 10

## 2022-11-18 NOTE — ED PROVIDER NOTES
EMERGENCY DEPARTMENT ENCOUNTER    Pt Name: Humphrey Bashir  MRN: [id number]  [place] 1960  Date of evaluation: 11/18/22  CHIEF COMPLAINT       Chief Complaint   Patient presents with    Diarrhea     Onset last Sun, notice black stools 3 days ago, yesterday notice red blood . Nausea & Vomiting     HISTORY OF PRESENT ILLNESS   42-year-old female presents emergency room for nausea vomiting diarrhea. Symptoms have been going on for about 5 days. She has had dark stool. Today she had some bright blood in the stool. She reports poor appetite over the last few days due to the nausea and vomiting. Today she does have some increase in wanting to eat. She has some generalized abdominal discomfort no major abdominal pain. No fevers. No recent travel. REVIEW OF SYSTEMS     Review of Systems   Constitutional:  Positive for fatigue. Negative for activity change, chills and diaphoresis. HENT:  Negative for congestion, sinus pain and tinnitus. Eyes: Negative. Respiratory:  Negative for apnea, chest tightness and shortness of breath. Gastrointestinal:  Positive for diarrhea, nausea and vomiting. Negative for abdominal distention and constipation. Genitourinary:  Negative for difficulty urinating and frequency. Musculoskeletal:  Negative for arthralgias and myalgias. Skin:  Negative for color change and rash. Neurological:  Negative for dizziness. Hematological: Negative. Psychiatric/Behavioral: Negative.        PASTMEDICAL HISTORY     Past Medical History:   Diagnosis Date    Arthritis     GENERALIZED ALL OVER RHEUMATOID AND OSTEO    Breast cancer ([address]     left breast    Cancer ([address] 1992    CERVICAL, UTERINE    Chronic back pain     scioliosis,  02/2019 WEARING A BACK BRACE    Degenerative disorder of bone     ALL OVER    Depression     Difficult intravenous access     HANDS AR BEST SITE    GERD (gastroesophageal reflux disease)     Hyperlipidemia     HX OF NO MEDS IN YEARS    Hypertension 2007    Pain management clinic    Insomnia     Neuropathy     hands and feet    Obesity     BMI -  43    Sleep apnea 2016    NO LONGER USES MACHINE    Snores     has been told by family that she stops breathing    Use of cane as ambulatory aid     Wears glasses     READING    Wears glasses     \"CHEATERS\"     Past Problem List  Patient Active Problem List   Diagnosis Code    Idiopathic angioedema T78. 3XXA    HTN (hypertension) I10    Back pain M54.9    Morbid obesity with BMI of 45.0-49.9, adult (HCC) E66.01, Z68.42    Morbid obesity due to excess calories ([facility]) E66.01    Smoking F17.200    Need for vaccination Z23    Carcinoma of upper-outer quadrant of left breast in female, estrogen receptor negative ([facility] C50.412, Z17.1    Status post mastectomy, bilateral Z90.13    S/P mastectomy, bilateral Z90.13    Malignant neoplasm of upper-outer quadrant of left breast in female, estrogen receptor negative ([facility] C50.412, Z17.1     SURGICAL HISTORY       Past Surgical History:   Procedure Laterality Date    BREAST SURGERY Left 1990    CYST    BUNIONECTOMY Bilateral     CERVIX SURGERY  1992    COLONOSCOPY  10/13/2017    FOOT SURGERY Left 2014    PINS IN ALL TOES    FOOT SURGERY Left 5/27/2020    HARDWARE REMOVAL LEFT GREAT TOE- performed by Scott Orlando DPM at [address] Bilateral     HYSTERECTOMY ([facility])  [address], CERVIX     MASTECTOMY Bilateral 2/12/2019    TOTAL MASTECTOMY performed by Abraham Lance DO at [facility], BILATERAL Bilateral 02/12/2019     TOTAL MASTECTOMY,  AXILLARY SENTINEL LYMPH NODE BIOPSY, FROZEN SECTION     OR COLSC FLX W/REMOVAL LESION BY HOT BX FORCEPS N/A 10/13/2017    COLONOSCOPY WITH BIOPSY AND POLYPECTOMY performed by Jensen Patton IV, DO at [facility]    TUNNELED VENOUS PORT PLACEMENT  03/27/2019    X-RAY FOOT 3+VW      both     CURRENT MEDICATIONS       Previous Medications    ALLOPURINOL (ZYLOPRIM) 100 MG TABLET    TAKE 1 TABLET BY MOUTH DAILY    CALCIUM CARBONATE-VITAMIN D (OYSTER SHELL CALCIUM/D) 500-200 MG-UNIT TABS    Take 1 tablet by mouth 2 times daily    GABAPENTIN (NEURONTIN) 600 MG TABLET    TAKE 1 TABLET BY MOUTH 3 TIMES DAILY    HYDROCHLOROTHIAZIDE (HYDRODIURIL) 50 MG TABLET    Take 1 tablet by mouth daily    OXYCODONE-ACETAMINOPHEN (PERCOCET)  MG PER TABLET    Take 1 tablet by mouth every 6 hours as needed for Pain for up to 30 days. ALLERGIES     is allergic to lisinopril, bee venom, amino acids, and sulfa antibiotics. FAMILY HISTORY     She indicated that her mother is . She indicated that her father is . She indicated that all of her three brothers are alive. She indicated that her maternal grandmother is . She indicated that her maternal grandfather is . She indicated that her paternal grandmother is . She indicated that her paternal grandfather is . She indicated that the status of her maternal aunt is unknown. SOCIAL HISTORY       Social History     Tobacco Use    Smoking status: Some Days     Packs/day: 0.25     Years: 41.00     Pack years: 10.25     Types: Cigarettes     Start date: 36    Smokeless tobacco: Never    Tobacco comments:     STATES SMOKES A PACK A WEEK   Vaping Use    Vaping Use: Never used   Substance Use Topics    Alcohol use: No    Drug use: Yes     Frequency: 7.0 times per week     Types: Marijuana (Weed)     Comment: LAST USE 02/10/2018, AND PAIN PILLS      PHYSICAL EXAM     INITIAL VITALS: BP (!) 142/101   Pulse 73   Temp 98.6 °F (37 °C) (Oral)   Resp 18   Wt 220 lb (99.8 kg)   LMP 1992   SpO2 100%   BMI 35.53 kg/m²    Physical Exam  Constitutional:       General: She is not in acute distress. Appearance: She is well-developed. HENT:      Head: Normocephalic. Eyes:      Pupils: Pupils are equal, round, and reactive to light.    Cardiovascular:      Rate and Rhythm: Normal rate and regular rhythm. Heart sounds: Normal heart sounds. Pulmonary:      Effort: Pulmonary effort is normal. No respiratory distress. Breath sounds: Normal breath sounds. Abdominal:      General: Bowel sounds are normal.      Palpations: Abdomen is soft. Tenderness: There is no abdominal tenderness. Musculoskeletal:         General: Normal range of motion. Skin:     General: Skin is warm and dry. Neurological:      Mental Status: She is alert and oriented to person, place, and time. MEDICAL DECISION MAKING:     Alert oriented nondistressed 69-year-old female presenting to the emergency room for nausea vomiting diarrhea ongoing for about 5 days. Patient has soft abdomen. She does not have any significant abdominal tenderness. Labs are within acceptable limits. Patient is tolerating fluids and crackers here in the ED. Plan is to start antiemetics antidiarrheal and Cipro for home. Patient encouraged to return if symptoms worsen over the next 2 days. CRITICAL CARE:       PROCEDURES:    Procedures    DIAGNOSTIC RESULTS   EKG:All EKG's are interpreted by the Emergency Department Physician who either signs or Co-signs this chart in the absence of a cardiologist.        RADIOLOGY:All plain film, CT, MRI, and formal ultrasound images (except ED bedside ultrasound) are read by the radiologist, see reports below, unless otherwisenoted in MDM or here. No orders to display     LABS: All lab results were reviewed by myself, and all abnormals are listed below.   Labs Reviewed   CBC WITH AUTO DIFFERENTIAL - Abnormal; Notable for the following components:       Result Value    WBC 11.7 (*)     RDW 15.2 (*)     Absolute Lymph # 4.04 (*)     All other components within normal limits   COMPREHENSIVE METABOLIC PANEL - Abnormal; Notable for the following components:    Bun/Cre Ratio 24 (*)     Alkaline Phosphatase 115 (*)     All other components within normal limits   OCCULT BLOOD SCREEN - Abnormal; Notable for the following components:    Occult Blood, Stool #1 POSITIVE (*)     All other components within normal limits   LACTIC ACID       EMERGENCY DEPARTMENTCOURSE:         Vitals:    Vitals:    11/18/22 1332   BP: (!) 142/101   Pulse: 73   Resp: 18   Temp: 98.6 °F (37 °C)   TempSrc: Oral   SpO2: 100%   Weight: 220 lb (99.8 kg)       The patient was given the following medications while in the emergency department:  Orders Placed This Encounter   Medications    ondansetron (ZOFRAN) injection 4 mg    0.9 % sodium chloride bolus    ondansetron (ZOFRAN ODT) 4 MG disintegrating tablet     Sig: Take 1 tablet by mouth every 8 hours as needed for Nausea or Vomiting     Dispense:  12 tablet     Refill:  0    loperamide (RA ANTI-DIARRHEAL) 2 MG capsule     Sig: Take 1 capsule by mouth 4 times daily as needed for Diarrhea     Dispense:  12 capsule     Refill:  0    ciprofloxacin (CIPRO) 500 MG tablet     Sig: Take 1 tablet by mouth 2 times daily for 5 days     Dispense:  10 tablet     Refill:  0     CONSULTS:  None    FINAL IMPRESSION      1. Nausea vomiting and diarrhea          DISPOSITION/PLAN   DISPOSITION Decision To Discharge 11/18/2022 07:03:24 PM      PATIENT REFERRED TO:  No follow-up provider specified. DISCHARGE MEDICATIONS:  New Prescriptions    CIPROFLOXACIN (CIPRO) 500 MG TABLET    Take 1 tablet by mouth 2 times daily for 5 days    LOPERAMIDE (RA ANTI-DIARRHEAL) 2 MG CAPSULE    Take 1 capsule by mouth 4 times daily as needed for Diarrhea    ONDANSETRON (ZOFRAN ODT) 4 MG DISINTEGRATING TABLET    Take 1 tablet by mouth every 8 hours as needed for Nausea or Vomiting     Henrry Zhang MD  Attending Emergency Physician      Care during this encounter was due to an unprecedented national emergency due to COVID-19.              Ying Dias MD  11/18/22 0490

## 2022-11-19 NOTE — DISCHARGE INSTRUCTIONS
Take antibiotics as prescribed. You may use the loperamide to help with diarrhea symptoms Zofran is for nausea and vomiting. I recommend sticking to bland diet such as crackers light soup applesauce etc.  Make sure you are getting plenty of fluids. Please return if symptoms do not start to improve over the next 2 days.

## 2022-11-28 DIAGNOSIS — C50.412 MALIGNANT NEOPLASM OF UPPER-OUTER QUADRANT OF LEFT BREAST IN FEMALE, ESTROGEN RECEPTOR NEGATIVE (HCC): ICD-10-CM

## 2022-11-28 DIAGNOSIS — Z17.1 MALIGNANT NEOPLASM OF UPPER-OUTER QUADRANT OF LEFT BREAST IN FEMALE, ESTROGEN RECEPTOR NEGATIVE (HCC): ICD-10-CM

## 2022-11-29 RX ORDER — OXYCODONE AND ACETAMINOPHEN 10; 325 MG/1; MG/1
1 TABLET ORAL EVERY 6 HOURS PRN
Qty: 120 TABLET | Refills: 0 | Status: SHIPPED | OUTPATIENT
Start: 2022-11-29 | End: 2022-12-29

## 2022-12-02 ENCOUNTER — TELEMEDICINE (OUTPATIENT)
Dept: INTERNAL MEDICINE CLINIC | Age: 62
End: 2022-12-02
Payer: COMMERCIAL

## 2022-12-02 DIAGNOSIS — K52.9 GASTROENTERITIS: Primary | ICD-10-CM

## 2022-12-02 PROCEDURE — 99443 PR PHYS/QHP TELEPHONE EVALUATION 21-30 MIN: CPT | Performed by: INTERNAL MEDICINE

## 2022-12-02 NOTE — PROGRESS NOTES
Jade Tyler is a 58 y.o. female evaluated via telephone on 12/2/2022 for ED Follow-up ([facility] Nausea, Vomiting, & Diarrhea) and Health Maintenance (HIV, Hep C, Covid, Flu)  . Documentation:  I communicated with the patient and/or health care decision maker about here for follow-up from the hospital visit for gastroenteritis with nausea and diarrhea and patient says that she is completely back to normal and eating food and having normal bowel movements denies any complaints at this time  Details of this discussion including any medical advice provided: All reports from the hospital reviewed including the lab work and explained to the patient and advised patient to call me back if any issues otherwise we will see in the office in the next couple of months as scheduled  Patient mentioned during her conversation that she had a colonoscopy done in [facility] about 4 years back and I did review the records and could not find any and will check with the patient again  Review in 2 months    Total Time: minutes: 21-30 minutes    Jade Tyler was evaluated through a synchronous (real-time) audio encounter. Patient identification was verified at the start of the visit. She (or guardian if applicable) is aware that this is a billable service, which includes applicable co-pays. This visit was conducted with the patient's (and/or legal guardian's) verbal consent. She has not had a related appointment within my department in the past 7 days or scheduled within the next 24 hours. The patient was located at Home: [place] [address]. The provider was located at [place] [address] (Appt Dept): [place] [address]  [address] [facility],[address]  [place],  [address] [place] [address].     Note: not billable if this call serves to triage the patient into an appointment for the relevant concern    Janes Braun MD

## 2022-12-27 DIAGNOSIS — G89.29 OTHER CHRONIC PAIN: ICD-10-CM

## 2022-12-27 DIAGNOSIS — G62.9 NEUROPATHY: ICD-10-CM

## 2022-12-27 DIAGNOSIS — Z17.1 MALIGNANT NEOPLASM OF UPPER-OUTER QUADRANT OF LEFT BREAST IN FEMALE, ESTROGEN RECEPTOR NEGATIVE (HCC): ICD-10-CM

## 2022-12-27 DIAGNOSIS — C50.412 MALIGNANT NEOPLASM OF UPPER-OUTER QUADRANT OF LEFT BREAST IN FEMALE, ESTROGEN RECEPTOR NEGATIVE (HCC): ICD-10-CM

## 2022-12-27 RX ORDER — OXYCODONE AND ACETAMINOPHEN 10; 325 MG/1; MG/1
1 TABLET ORAL EVERY 6 HOURS PRN
Qty: 120 TABLET | Refills: 0 | Status: SHIPPED | OUTPATIENT
Start: 2022-12-27 | End: 2023-01-26

## 2022-12-27 RX ORDER — GABAPENTIN 600 MG/1
600 TABLET ORAL 3 TIMES DAILY
Qty: 90 TABLET | Refills: 0 | Status: SHIPPED | OUTPATIENT
Start: 2022-12-27 | End: 2023-01-26

## 2022-12-28 ENCOUNTER — TELEPHONE (OUTPATIENT)
Dept: INFUSION THERAPY | Facility: MEDICAL CENTER | Age: 62
End: 2022-12-28

## 2022-12-28 NOTE — TELEPHONE ENCOUNTER
Writer returned pt triage call and explained the percocet was routed to MD to sign yesterday 12/28/22. Pt verbalizes understanding.

## 2022-12-30 ENCOUNTER — OFFICE VISIT (OUTPATIENT)
Dept: INTERNAL MEDICINE CLINIC | Age: 62
End: 2022-12-30
Payer: COMMERCIAL

## 2022-12-30 VITALS
OXYGEN SATURATION: 99 % | HEART RATE: 88 BPM | TEMPERATURE: 97 F | HEIGHT: 66 IN | BODY MASS INDEX: 36.64 KG/M2 | WEIGHT: 228 LBS | RESPIRATION RATE: 22 BRPM | DIASTOLIC BLOOD PRESSURE: 76 MMHG | SYSTOLIC BLOOD PRESSURE: 110 MMHG

## 2022-12-30 DIAGNOSIS — G89.29 CHRONIC MIDLINE LOW BACK PAIN WITH SCIATICA, SCIATICA LATERALITY UNSPECIFIED: Primary | ICD-10-CM

## 2022-12-30 DIAGNOSIS — M47.816 LUMBAR SPONDYLOSIS: ICD-10-CM

## 2022-12-30 DIAGNOSIS — E66.01 MORBID OBESITY WITH BMI OF 45.0-49.9, ADULT (HCC): ICD-10-CM

## 2022-12-30 DIAGNOSIS — M54.40 CHRONIC MIDLINE LOW BACK PAIN WITH SCIATICA, SCIATICA LATERALITY UNSPECIFIED: Primary | ICD-10-CM

## 2022-12-30 DIAGNOSIS — M19.90 ARTHRITIS: ICD-10-CM

## 2022-12-30 DIAGNOSIS — R35.0 URINARY FREQUENCY: ICD-10-CM

## 2022-12-30 PROCEDURE — 4004F PT TOBACCO SCREEN RCVD TLK: CPT | Performed by: INTERNAL MEDICINE

## 2022-12-30 PROCEDURE — 3017F COLORECTAL CA SCREEN DOC REV: CPT | Performed by: INTERNAL MEDICINE

## 2022-12-30 PROCEDURE — G8484 FLU IMMUNIZE NO ADMIN: HCPCS | Performed by: INTERNAL MEDICINE

## 2022-12-30 PROCEDURE — G8417 CALC BMI ABV UP PARAM F/U: HCPCS | Performed by: INTERNAL MEDICINE

## 2022-12-30 PROCEDURE — 3074F SYST BP LT 130 MM HG: CPT | Performed by: INTERNAL MEDICINE

## 2022-12-30 PROCEDURE — 3078F DIAST BP <80 MM HG: CPT | Performed by: INTERNAL MEDICINE

## 2022-12-30 PROCEDURE — G8427 DOCREV CUR MEDS BY ELIG CLIN: HCPCS | Performed by: INTERNAL MEDICINE

## 2022-12-30 PROCEDURE — 99214 OFFICE O/P EST MOD 30 MIN: CPT | Performed by: INTERNAL MEDICINE

## 2022-12-30 RX ORDER — VIBEGRON 75 MG/1
1 TABLET, FILM COATED ORAL DAILY
Qty: 14 TABLET | Refills: 0 | COMMUNITY
Start: 2022-12-30

## 2022-12-30 SDOH — ECONOMIC STABILITY: FOOD INSECURITY: WITHIN THE PAST 12 MONTHS, THE FOOD YOU BOUGHT JUST DIDN'T LAST AND YOU DIDN'T HAVE MONEY TO GET MORE.: NEVER TRUE

## 2022-12-30 SDOH — ECONOMIC STABILITY: FOOD INSECURITY: WITHIN THE PAST 12 MONTHS, YOU WORRIED THAT YOUR FOOD WOULD RUN OUT BEFORE YOU GOT MONEY TO BUY MORE.: NEVER TRUE

## 2022-12-30 ASSESSMENT — PATIENT HEALTH QUESTIONNAIRE - PHQ9
1. LITTLE INTEREST OR PLEASURE IN DOING THINGS: 0
SUM OF ALL RESPONSES TO PHQ9 QUESTIONS 1 & 2: 0
SUM OF ALL RESPONSES TO PHQ QUESTIONS 1-9: 0
2. FEELING DOWN, DEPRESSED OR HOPELESS: 0
SUM OF ALL RESPONSES TO PHQ QUESTIONS 1-9: 0

## 2022-12-30 ASSESSMENT — SOCIAL DETERMINANTS OF HEALTH (SDOH): HOW HARD IS IT FOR YOU TO PAY FOR THE VERY BASICS LIKE FOOD, HOUSING, MEDICAL CARE, AND HEATING?: NOT HARD AT ALL

## 2022-12-30 NOTE — PROGRESS NOTES
Mao Tsang is a 58 y.o. female who presents for   Chief Complaint   Patient presents with    Follow-up     Patient would like to discuss home health aid to assist with ADL's; discuss bones and joints having some pain and having a hard time walking    Nausea & Vomiting     Going on for for 1 week; patient doing better now    Urinary Frequency     Patient states only at night; unable to make it to the bathroom in time    and follow up of chronic medical problems.   Patient Active Problem List   Diagnosis    Idiopathic angioedema    HTN (hypertension)    Back pain    Morbid obesity with BMI of 45.0-49.9, adult ([address]    Morbid obesity due to excess calories (HCC)    Smoking    Need for vaccination    Carcinoma of upper-outer quadrant of left breast in female, estrogen receptor negative ([facility]    Status post mastectomy, bilateral    S/P mastectomy, bilateral    Malignant neoplasm of upper-outer quadrant of left breast in female, estrogen receptor negative ([facility]     HPI  Here to discuss about home health aide and walker as patient's arthritis is bothering her and have difficulty ambulating without assistance and have difficulty doing tasks including washing and cleaning without any help and patient lives by herself at home  Patient had a history of back surgery and chronic back problems with failed back syndrome and getting pain medications from her hematology and oncology specialist who is treating her breast carcinoma which was diagnosed in 2019 shortly after having her back surgery by  at [facility] and patient had seen him again after surgery and after chemotherapy and he advised her to continue the pain management as surgery would not help her much according to the patient  Patient also says that she had a history of rheumatoid arthritis when she was a child and still having problems with her hands and feet and also have neuropathy which is bothering her and currently on gabapentin and oxycodone    Current Outpatient Medications   Medication Sig Dispense Refill    oxyCODONE-acetaminophen (PERCOCET)  MG per tablet Take 1 tablet by mouth every 6 hours as needed for Pain for up to 30 days. 120 tablet 0    gabapentin (NEURONTIN) 600 MG tablet Take 1 tablet by mouth 3 times daily for 30 days. 90 tablet 0    ondansetron (ZOFRAN ODT) 4 MG disintegrating tablet Take 1 tablet by mouth every 8 hours as needed for Nausea or Vomiting 12 tablet 0    allopurinol (ZYLOPRIM) 100 MG tablet TAKE 1 TABLET BY MOUTH DAILY 30 tablet 4    hydroCHLOROthiazide (HYDRODIURIL) 50 MG tablet Take 1 tablet by mouth daily 30 tablet 11    Calcium Carbonate-Vitamin D (OYSTER SHELL CALCIUM/D) 500-200 MG-UNIT TABS Take 1 tablet by mouth 2 times daily 180 tablet 1     No current facility-administered medications for this visit.        Allergies   Allergen Reactions    Lisinopril Anaphylaxis and Swelling     swelling    Bee Venom Swelling     NO TROUBLE BREATHING    Amino Acids     Sulfa Antibiotics Itching, Rash and Other (See Comments)     REDNESS       Past Medical History:   Diagnosis Date    Arthritis     GENERALIZED ALL OVER RHEUMATOID AND OSTEO    Breast cancer ([address]     left breast    Cancer ([address] 1992    CERVICAL, UTERINE    Chronic back pain     scioliosis,  02/2019 WEARING A BACK BRACE    Degenerative disorder of bone     ALL OVER    Depression     Difficult intravenous access     HANDS AR BEST SITE    GERD (gastroesophageal reflux disease)     Hyperlipidemia     HX OF NO MEDS IN YEARS    Hypertension 2007    Pain management clinic    Insomnia     Neuropathy     hands and feet    Obesity     BMI -  43    Sleep apnea 2016    NO LONGER USES MACHINE    Snores     has been told by family that she stops breathing    Use of cane as ambulatory aid     Wears glasses     READING    Wears glasses     \"CHEATERS\"       Past Surgical History:   Procedure Laterality Date    BREAST SURGERY Left 1990    CYST    BUNIONECTOMY Bilateral     CERVIX SURGERY 1992    COLONOSCOPY  10/13/2017    FOOT SURGERY Left 2014    PINS IN ALL TOES    FOOT SURGERY Left 5/27/2020    HARDWARE REMOVAL LEFT GREAT TOE- performed by Arelis Prater DPM at [address] Bilateral     HYSTERECTOMY (CERVIX STATUS UNKNOWN)  1992    UTERUS, CERVIX     MASTECTOMY Bilateral 2/12/2019    TOTAL MASTECTOMY performed by Rory Lemus DO at [address], BILATERAL Bilateral 02/12/2019     TOTAL MASTECTOMY,  AXILLARY SENTINEL LYMPH NODE BIOPSY, FROZEN SECTION     MT COLSC FLX W/REMOVAL LESION BY HOT BX FORCEPS N/A 10/13/2017    COLONOSCOPY WITH BIOPSY AND POLYPECTOMY performed by Billy Jovel IV, DO at [facility]    TUNNELED VENOUS PORT PLACEMENT  03/27/2019    X-RAY FOOT 3+VW      both       Family History   Problem Relation Age of Onset    High Blood Pressure Mother     Cancer Mother         cervical and breast    Diabetes Father     Heart Attack Father     Heart Disease Maternal Aunt         times 2     ROS  Constitutional:  Negative for fatigue, loss of appetite and unexpected weight change  HEENT            : Negative for neck stiffness and pain, no congestion or sinus pressure  Eyes                : No visual disturbance or pain  Cardiovascular: No chest pain or palpitations or leg swelling  Respiratory      : Negative for cough, shortness of breath or wheezing  Gastrointestinal: Negative for abdominal pain, constipation or diarrhea and bloating No nausea or vomiting  Genitourinary:     No urgency or frequency, no burning or hematuria  Musculoskeletal: Positive for arthralgias, back pain or myalgias  Skin                  : Negative for rash or erythema  Neurological    : Negative for dizziness, weakness, tremors ,light headedness or syncope  Psychiatric       : Negative for dysphoric mood, sleep disturbances, nervous or anxious, or decreased concentration  All other review of systems was negative    Objective  Physical Examination:    Nursing note reviewed    /76 (Site: Right Upper Arm, Position: Sitting, Cuff Size: Medium Adult)   Pulse 88   Temp 97 °F (36.1 °C) (Temporal)   Resp 22   Ht 5' 5.98\" (1.676 m)   Wt 228 lb (103.4 kg)   LMP 02/11/1992   SpO2 99%   BMI 36.82 kg/m²   BP Readings from Last 3 Encounters:   12/30/22 110/76   11/18/22 (!) 142/101   10/31/22 133/88         Constitutional:  Juan Wolf is oriented to place, person and time ,appears well-developed and well-nourished  HEENT:  Atraumatic and normocephalic, external ears normal bilaterally, nose normal no oropharyngeal exudate and is clear and moist  Eyes:  EOCM normal; conjunctivae normal; PERRLA bilaterally  Neck:  Normal range of motion, neck supple, no JVD and no thyromegaly  Cardiovascular:  RRR, normal heart sounds and intact distal pulses  Pulmonary:  effort normal and breath sounds normal bilaterally,no wheezes or rales, no respiratory distress  Abdominal:  Soft, non-tender; normal bowel sounds, no masses  Musculoskeletal: Very limited range of motion and no edema and positive for tenderness bilaterally and there is decreased strength in both lower extremities around 3/5 and in the upper extremities 4/5 and there is limitation of movements in both shoulders and in the lower extremities and there is nonspecific tenderness in the lower back  No lymphadenopathy  Neurological:  alert, oriented, and normal reflexes bilaterally  Skin: warm and dry  Psychiatric:  normal mood and effect; behavior normal.    Labs:   Lab Results   Component Value Date    LABA1C 5.5 01/04/2018     Lab Results   Component Value Date    CHOL 204 (H) 04/11/2022     Lab Results   Component Value Date    HDL 52 04/11/2022     No results found for: [address]  Lab Results   Component Value Date    TRIG 133 04/11/2022     No results found for: [place], [place]  Lab Results   Component Value Date    WBC 11.7 (H) 11/18/2022    HGB 13.3 11/18/2022    HCT 41.7 11/18/2022    MCV 91.6 11/18/2022    PLT 211 11/18/2022     Lab Results   Component Value Date    INR 1.0 03/27/2019    PROTIME 10.3 03/27/2019     Lab Results   Component Value Date    GLUCOSE 81 11/18/2022    CREATININE 0.67 11/18/2022    BUN 16 11/18/2022     11/18/2022    K 3.8 11/18/2022     11/18/2022    CO2 25 11/18/2022     Lab Results   Component Value Date    ALT 6 11/18/2022    AST 10 11/18/2022    ALKPHOS 115 (H) 11/18/2022    BILITOT 0.3 11/18/2022     Lab Results   Component Value Date    LABPROT 7.0 08/27/2012    LABALBU 4.1 11/18/2022     Lab Results   Component Value Date    TSH 1.93 04/11/2022     Assessment:  1. Chronic midline low back pain with sciatica, sciatica laterality unspecified    2. Arthritis    3. Urinary frequency        Plan:  Patient has difficulty getting up and walking without assistance and patient needs help with ADLs including cleaning and washing and will advise home health aide to help her with those tasks  Also will provide a walker with wheels and brakes and seat so patient can ambulate better with assistance and with her fear of falling  Patient has mobility limitation that impairs her ability to participate in the activities of daily living in customary locations in the home and could be sufficiently improved with the use of the walker  Patient complaining of urinary frequency and advised to try Gemtesa 75 mg daily and office samples given for 2 weeks and advised her to call me back  Review in 3 months           1. Linnea Florentino received counseling on the following healthy behaviors: nutrition and exercise    2. Prior labs and health maintenance reviewed. 3.  Discussed use, benefit, and side effects of prescribed medications. Barriers to medication compliance addressed. All her questions were answered. Pt voiced understanding. Linnea Florentino will continue current medications, diet and exercise. No orders of the defined types were placed in this encounter.          Completed Refills Requested Prescriptions     Pending Prescriptions Disp Refills    Vibegron (GEMTESA) 75 MG TABS 14 tablet 0     Sig: Take 1 tablet by mouth daily     4. Patient given educational materials - see patient instructions    5. Was a self-tracking handout given in paper form or via [employer]? NO    No orders of the defined types were placed in this encounter. No follow-ups on file. Patient voiced understanding and agreed to treatment plan. Electronically signed by Zachary Ahuja MD on 12/30/2022 at 12:50 PM    This note is created with a voice recognition program and while intend to generate a document that accurately reflects the content of the visit, no guarantee can be provided that every mistake has been identified and corrected by editing.

## 2023-01-01 DIAGNOSIS — G62.9 NEUROPATHY: ICD-10-CM

## 2023-01-01 DIAGNOSIS — G89.29 OTHER CHRONIC PAIN: ICD-10-CM

## 2023-01-01 DIAGNOSIS — Z17.1 MALIGNANT NEOPLASM OF UPPER-OUTER QUADRANT OF LEFT BREAST IN FEMALE, ESTROGEN RECEPTOR NEGATIVE (HCC): ICD-10-CM

## 2023-01-01 DIAGNOSIS — C50.412 MALIGNANT NEOPLASM OF UPPER-OUTER QUADRANT OF LEFT BREAST IN FEMALE, ESTROGEN RECEPTOR NEGATIVE (HCC): ICD-10-CM

## 2023-01-01 RX ORDER — OXYCODONE AND ACETAMINOPHEN 10; 325 MG/1; MG/1
1 TABLET ORAL EVERY 6 HOURS PRN
Qty: 120 TABLET | Refills: 0 | Status: CANCELLED | OUTPATIENT
Start: 2023-01-01 | End: 2023-04-22

## 2023-01-01 RX ORDER — GABAPENTIN 600 MG/1
600 TABLET ORAL 3 TIMES DAILY
Qty: 90 TABLET | Refills: 0 | Status: CANCELLED | OUTPATIENT
Start: 2023-01-01 | End: 2023-04-22

## 2023-01-13 RX ORDER — VIBEGRON 75 MG/1
1 TABLET, FILM COATED ORAL DAILY
Qty: 90 TABLET | Refills: 0 | Status: SHIPPED | OUTPATIENT
Start: 2023-01-13

## 2023-01-23 ENCOUNTER — TELEPHONE (OUTPATIENT)
Dept: ONCOLOGY | Age: 63
End: 2023-01-23

## 2023-01-23 ENCOUNTER — HOSPITAL ENCOUNTER (OUTPATIENT)
Age: 63
Setting detail: SPECIMEN
Discharge: HOME | End: 2023-01-23
Payer: COMMERCIAL

## 2023-01-23 ENCOUNTER — OFFICE VISIT (OUTPATIENT)
Dept: ONCOLOGY | Age: 63
End: 2023-01-23
Payer: COMMERCIAL

## 2023-01-23 VITALS
RESPIRATION RATE: 16 BRPM | HEART RATE: 48 BPM | BODY MASS INDEX: 36.11 KG/M2 | DIASTOLIC BLOOD PRESSURE: 81 MMHG | WEIGHT: 223.6 LBS | TEMPERATURE: 97.3 F | SYSTOLIC BLOOD PRESSURE: 135 MMHG

## 2023-01-23 DIAGNOSIS — C50.412 MALIGNANT NEOPLASM OF UPPER-OUTER QUADRANT OF LEFT BREAST IN FEMALE, ESTROGEN RECEPTOR NEGATIVE (HCC): ICD-10-CM

## 2023-01-23 DIAGNOSIS — F17.200 SMOKING: ICD-10-CM

## 2023-01-23 DIAGNOSIS — Z17.1 MALIGNANT NEOPLASM OF UPPER-OUTER QUADRANT OF LEFT BREAST IN FEMALE, ESTROGEN RECEPTOR NEGATIVE (HCC): Primary | ICD-10-CM

## 2023-01-23 DIAGNOSIS — I10 ESSENTIAL HYPERTENSION: ICD-10-CM

## 2023-01-23 DIAGNOSIS — C50.412 MALIGNANT NEOPLASM OF UPPER-OUTER QUADRANT OF LEFT BREAST IN FEMALE, ESTROGEN RECEPTOR NEGATIVE (HCC): Primary | ICD-10-CM

## 2023-01-23 DIAGNOSIS — Z17.1 MALIGNANT NEOPLASM OF UPPER-OUTER QUADRANT OF LEFT BREAST IN FEMALE, ESTROGEN RECEPTOR NEGATIVE (HCC): ICD-10-CM

## 2023-01-23 DIAGNOSIS — E66.01 MORBID OBESITY DUE TO EXCESS CALORIES (HCC): ICD-10-CM

## 2023-01-23 DIAGNOSIS — G62.9 NEUROPATHY: ICD-10-CM

## 2023-01-23 DIAGNOSIS — G89.29 OTHER CHRONIC PAIN: ICD-10-CM

## 2023-01-23 LAB
ABSOLUTE EOS #: 0.07 K/UL (ref 0–0.44)
ABSOLUTE IMMATURE GRANULOCYTE: 0.07 K/UL (ref 0–0.3)
ABSOLUTE LYMPH #: 3.28 K/UL (ref 1.1–3.7)
ABSOLUTE MONO #: 0.73 K/UL (ref 0.1–1.2)
ALBUMIN SERPL-MCNC: 3.7 G/DL (ref 3.5–5.2)
ALP BLD-CCNC: 97 U/L (ref 35–104)
ALT SERPL-CCNC: 7 U/L (ref 5–33)
AMPHETAMINE SCREEN URINE: NEGATIVE
ANION GAP SERPL CALCULATED.3IONS-SCNC: 10 MMOL/L (ref 9–17)
AST SERPL-CCNC: 11 U/L
BARBITURATE SCREEN URINE: NEGATIVE
BASOPHILS # BLD: 1 % (ref 0–2)
BASOPHILS ABSOLUTE: 0.06 K/UL (ref 0–0.2)
BENZODIAZEPINE SCREEN, URINE: NEGATIVE
BILIRUB SERPL-MCNC: 0.2 MG/DL (ref 0.3–1.2)
BUN BLDV-MCNC: 16 MG/DL (ref 8–23)
BUN/CREAT BLD: 18 (ref 9–20)
CALCIUM SERPL-MCNC: 9.5 MG/DL (ref 8.6–10.4)
CANNABINOID SCREEN URINE: POSITIVE
CHLORIDE BLD-SCNC: 104 MMOL/L (ref 98–107)
CO2: 23 MMOL/L (ref 20–31)
COCAINE METABOLITE, URINE: NEGATIVE
CREAT SERPL-MCNC: 0.88 MG/DL (ref 0.5–0.9)
EOSINOPHILS RELATIVE PERCENT: 1 % (ref 1–4)
FENTANYL URINE: NEGATIVE
GFR SERPL CREATININE-BSD FRML MDRD: >60 ML/MIN/1.73M2
GLUCOSE BLD-MCNC: 138 MG/DL (ref 70–99)
HCT VFR BLD CALC: 42.9 % (ref 36.3–47.1)
HEMOGLOBIN: 13.5 G/DL (ref 11.9–15.1)
IMMATURE GRANULOCYTES: 1 %
LYMPHOCYTES # BLD: 34 % (ref 24–43)
MCH RBC QN AUTO: 29.1 PG (ref 25.2–33.5)
MCHC RBC AUTO-ENTMCNC: 31.5 G/DL (ref 28.4–34.8)
MCV RBC AUTO: 92.5 FL (ref 82.6–102.9)
METHADONE SCREEN, URINE: NEGATIVE
MONOCYTES # BLD: 8 % (ref 3–12)
NRBC AUTOMATED: 0 PER 100 WBC
OPIATES, URINE: NEGATIVE
OXYCODONE SCREEN URINE: POSITIVE
PDW BLD-RTO: 15.6 % (ref 11.8–14.4)
PHENCYCLIDINE, URINE: NEGATIVE
PLATELET # BLD: 222 K/UL (ref 138–453)
PMV BLD AUTO: 9.8 FL (ref 8.1–13.5)
POTASSIUM SERPL-SCNC: 4.1 MMOL/L (ref 3.7–5.3)
RBC # BLD: 4.64 M/UL (ref 3.95–5.11)
RBC # BLD: ABNORMAL 10*6/UL
SEG NEUTROPHILS: 55 % (ref 36–65)
SEGMENTED NEUTROPHILS ABSOLUTE COUNT: 5.32 K/UL (ref 1.5–8.1)
SODIUM BLD-SCNC: 137 MMOL/L (ref 135–144)
TEST INFORMATION: ABNORMAL
TOTAL PROTEIN: 6.9 G/DL (ref 6.4–8.3)
WBC # BLD: 9.5 K/UL (ref 3.5–11.3)

## 2023-01-23 PROCEDURE — 4004F PT TOBACCO SCREEN RCVD TLK: CPT | Performed by: INTERNAL MEDICINE

## 2023-01-23 PROCEDURE — 99211 OFF/OP EST MAY X REQ PHY/QHP: CPT | Performed by: INTERNAL MEDICINE

## 2023-01-23 PROCEDURE — 3017F COLORECTAL CA SCREEN DOC REV: CPT | Performed by: INTERNAL MEDICINE

## 2023-01-23 PROCEDURE — 3074F SYST BP LT 130 MM HG: CPT | Performed by: INTERNAL MEDICINE

## 2023-01-23 PROCEDURE — G8417 CALC BMI ABV UP PARAM F/U: HCPCS | Performed by: INTERNAL MEDICINE

## 2023-01-23 PROCEDURE — 80307 DRUG TEST PRSMV CHEM ANLYZR: CPT

## 2023-01-23 PROCEDURE — 3078F DIAST BP <80 MM HG: CPT | Performed by: INTERNAL MEDICINE

## 2023-01-23 PROCEDURE — 85025 COMPLETE CBC W/AUTO DIFF WBC: CPT

## 2023-01-23 PROCEDURE — 99214 OFFICE O/P EST MOD 30 MIN: CPT | Performed by: INTERNAL MEDICINE

## 2023-01-23 PROCEDURE — G8484 FLU IMMUNIZE NO ADMIN: HCPCS | Performed by: INTERNAL MEDICINE

## 2023-01-23 PROCEDURE — G8428 CUR MEDS NOT DOCUMENT: HCPCS | Performed by: INTERNAL MEDICINE

## 2023-01-23 PROCEDURE — 80053 COMPREHEN METABOLIC PANEL: CPT

## 2023-01-23 PROCEDURE — 36415 COLL VENOUS BLD VENIPUNCTURE: CPT

## 2023-01-23 NOTE — PROGRESS NOTES
Dillon Dominguez                                                                                                                  1/23/2023  MRN:   [id number]  YOB: 1960  PCP:                           Stephanie Sanders MD  Referring Physician: No ref. provider found  Treating Physician Name: Jose Hairston MD      Reason for visit:  Chief Complaint   Patient presents with    Follow-up    Discuss Labs    Other     Been sick every 6-8 days since November / stomach virus        Current problems:   IDC of Left Breast cancer, Triple negative, pT2,pN0,M0  Chronic pain    Active and recent treatments:  Bilateral Mastectomy with left sentinel lymph node  Adjuvant chemotherapy using dose denseAC followed by T on 4/11/19--- 8/ /19    Summary of Case/History:    Dillon Dominguez a 58 y. o.female invasive carcinoma of the breast.     Patient initially felt a lump in her left breast for 2-3 months. She underwent screening mammogram which showed 2 cm mass in upper outer left breast.  Right breast do not have any concerning findings. Ultrasound of left breast conformed mass measuring 1.9 cm in the upper outer left breast.  Patient underwent biopsy on 1/23/19 which revealed invasive ductal carcinoma, grade 3, ER/DE negative. HER-2 results are pending. Patient has history of uterus/cervical cancer diagnosed in 12. Patient states she underwent surgery for the cervical cancer. She does not recall getting any radiation therapy or chemotherapy. Patient states she has been interested in getting bilateral breast reduction due to cosmetic reasons however now with a diagnosis of breast cancer she wishes to proceed with bilateral mastectomy. Patient has history of breast cancer in her mother as well as 3 paternal aunts. Patient's mother also had cervical and uterine cancer. Patient underwent bilateral mastectomy with left sentinel lymph node biopsy.   Pathological stage was T2, N0, M0    Started chemotherapy for triple negative breast cancer using AC followed by T on 4/11/19    Interim History:    Patient presents to the clinic for a follow-up visit and for active surveillance of breast cancer. Clinically patient continues to do well. Continues to have arthritis pain. Denies fever chills. Denies noticing any swollen lymph nodes or mass over her chest.    During this visit patient's allergy, social, medical, surgical history and medications were reviewed and updated.     Past Medical History:   Past Medical History:   Diagnosis Date    Arthritis     GENERALIZED ALL OVER RHEUMATOID AND OSTEO    Breast cancer ([address]     left breast    Cancer ([address] 1992    CERVICAL, UTERINE    Chronic back pain     scioliosis,  02/2019 WEARING A BACK BRACE    Degenerative disorder of bone     ALL OVER    Depression     Difficult intravenous access     HANDS AR BEST SITE    GERD (gastroesophageal reflux disease)     Hyperlipidemia     HX OF NO MEDS IN YEARS    Hypertension 2007    Pain management clinic    Insomnia     Neuropathy     hands and feet    Obesity     BMI -  43    Sleep apnea 2016    NO LONGER USES MACHINE    Snores     has been told by family that she stops breathing    Use of cane as ambulatory aid     Wears glasses     READING    Wears glasses     \"CHEATERS\"       Past Surgical History:     Past Surgical History:   Procedure Laterality Date    BREAST SURGERY Left 1990    CYST    BUNIONECTOMY Bilateral     CERVIX SURGERY  1992    COLONOSCOPY  10/13/2017    FOOT SURGERY Left 2014    PINS IN ALL TOES    FOOT SURGERY Left 5/27/2020    HARDWARE REMOVAL LEFT GREAT TOE- performed by Gary Terrazas DPM at [facility] [address] Bilateral     HYSTERECTOMY ([facility])  [facility], CERVIX     MASTECTOMY Bilateral 2/12/2019    TOTAL MASTECTOMY performed by Shari Bennett DO at [address] [facility], BILATERAL Bilateral 02/12/2019     TOTAL MASTECTOMY,  AXILLARY SENTINEL LYMPH NODE BIOPSY, FROZEN SECTION     ME COLSC FLX W/REMOVAL LESION BY HOT BX FORCEPS N/A 10/13/2017    COLONOSCOPY WITH BIOPSY AND POLYPECTOMY performed by Blake Jovel IV, DO at [address]    TUNNELED VENOUS PORT PLACEMENT  03/27/2019    X-RAY FOOT 3+VW      both       Patient Family Social History:     Social History     Socioeconomic History    Marital status: Single     Spouse name: Not on file    Number of children: Not on file    Years of education: Not on file    Highest education level: Not on file   Occupational History    Not on file   Tobacco Use    Smoking status: Some Days     Packs/day: 0.25     Years: 41.00     Pack years: 10.25     Types: Cigarettes     Start date: 36    Smokeless tobacco: Never    Tobacco comments:     STATES SMOKES A PACK A WEEK   Vaping Use    Vaping Use: Never used   Substance and Sexual Activity    Alcohol use: No    Drug use: Yes     Frequency: 7.0 times per week     Types: Marijuana (Weed)     Comment: LAST USE 02/10/2018, AND PAIN PILLS     Sexual activity: Not Currently   Other Topics Concern    Not on file   Social History Narrative    Not on file     Social Determinants of Health     Financial Resource Strain: Low Risk     Difficulty of Paying Living Expenses: Not hard at all   Food Insecurity: No Food Insecurity    Worried About Running Out of Food in the Last Year: Never true    Ran Out of Food in the Last Year: Never true   Transportation Needs: Not on file   Physical Activity: Not on file   Stress: Not on file   Social Connections: Not on file   Intimate Partner Violence: Not on file   Housing Stability: Not on file      Family History   Problem Relation Age of Onset    High Blood Pressure Mother     Cancer Mother         cervical and breast    Diabetes Father     Heart Attack Father     Heart Disease Maternal Aunt         times 2      Current Medications:     Current Outpatient Medications   Medication Sig Dispense Refill    Vibegron (GEMTESA) 75 MG TABS Take 1 tablet by mouth daily 90 tablet 0    oxyCODONE-acetaminophen (PERCOCET)  MG per tablet Take 1 tablet by mouth every 6 hours as needed for Pain for up to 30 days. 120 tablet 0    gabapentin (NEURONTIN) 600 MG tablet Take 1 tablet by mouth 3 times daily for 30 days. 90 tablet 0    ondansetron (ZOFRAN ODT) 4 MG disintegrating tablet Take 1 tablet by mouth every 8 hours as needed for Nausea or Vomiting 12 tablet 0    allopurinol (ZYLOPRIM) 100 MG tablet TAKE 1 TABLET BY MOUTH DAILY 30 tablet 4    hydroCHLOROthiazide (HYDRODIURIL) 50 MG tablet Take 1 tablet by mouth daily 30 tablet 11    Calcium Carbonate-Vitamin D (OYSTER SHELL CALCIUM/D) 500-200 MG-UNIT TABS Take 1 tablet by mouth 2 times daily 180 tablet 1     No current facility-administered medications for this visit. Allergies:   Lisinopril, Bee venom, Amino acids, and Sulfa antibiotics    Review of Systems:    Constitutional: No fever or chills. No night sweats. Weight now stable  Eyes: No eye discharge, double vision, or eye pain   HEENT: negative for sore mouth, sore throat, hoarseness and voice change   Respiratory: URI with productive cough, sneezing, sinus drainage and congestion, runny eyes  Cardiovascular: negative for chest pain, dyspnea, palpitations, orthopnea, PND   Gastrointestinal: negative for nausea, vomiting, diarrhea, constipation, Dysphagia, hematemesis and hematochezia  Genitourinary: negative for frequency, dysuria, nocturia, urinary incontinence, and hematuria   Integument: negative for rash, skin lesions, bruises.   Nails now growing back slowly, toenail fungus  Hematologic/Lymphatic: negative for easy bruising, bleeding, lymphadenopathy, or petechiae   Endocrine: negative for heat or cold intolerance,weight changes, change in bowel habits and hair loss   Musculoskeletal: negative for pain, joint swelling; +arhtralgias and bone pain +myalgias, improved  Neurological: negative for headaches, dizziness, seizures, weakness; + neuropathy is nearly resolved        Physical Exam:    Vitals: /81   Pulse (!) 48   Temp 97.3 °F (36.3 °C) (Temporal)   Resp 16   Wt 223 lb 9.6 oz (101.4 kg)   LMP 02/11/1992   BMI 36.11 kg/m²   General appearance - well appearing, no in pain or distress  Mental status - AAO X3  Eyes - pupils equal and reactive, extraocular eye movements intact  Mouth - mucous membranes moist, pharynx normal without lesions  Neck - supple, no significant adenopathy  Lymphatics - no palpable lymphadenopathy, no hepatosplenomegaly  Chest - clear to auscultation, no wheezes, rales or rhonchi, symmetric air entry  Heart - normal rate, regular rhythm, normal S1, S2, no murmurs  Abdomen - soft, nontender, nondistended, no masses or organomegaly  Neurological - alert, oriented, normal speech, no focal findings or movement disorder noted; +neuropathy - stable  Extremities -bilateral ankle edema  Skin - normal coloration and turgor, no rashes, no suspicious skin lesions noted;  Breast- bilateral mastectomy, no masses or lumps, excess skin on right side - no lymphedema appreciated        DATA:  Lab Results   Component Value Date    WBC 9.5 01/23/2023    HGB 13.5 01/23/2023    HCT 42.9 01/23/2023    MCV 92.5 01/23/2023     01/23/2023       Chemistry        Component Value Date/Time     01/23/2023 1335    K 4.1 01/23/2023 1335     01/23/2023 1335    CO2 23 01/23/2023 1335    BUN 16 01/23/2023 1335    CREATININE 0.88 01/23/2023 1335        Component Value Date/Time    CALCIUM 9.5 01/23/2023 1335    ALKPHOS 97 01/23/2023 1335    AST 11 01/23/2023 1335    ALT 7 01/23/2023 1335    BILITOT 0.2 (L) 01/23/2023 1335          Component Ref Range & Units 1/23/23 1517 10/31/22 1038   Amphetamine Screen, Ur NEGATIVE NEGATIVE  NEGATIVE CM    Comment:        (Positive cutoff 1000 ng/mL)                Barbiturate Screen, Ur NEGATIVE NEGATIVE  NEGATIVE CM    Comment:        (Positive cutoff 200 ng/mL) Benzodiazepine Screen, Urine NEGATIVE NEGATIVE  NEGATIVE CM    Comment:        (Positive cutoff 200 ng/mL)                Cocaine Metabolite, Urine NEGATIVE NEGATIVE  NEGATIVE CM    Comment:        (Positive cutoff 300 ng/mL)                Methadone Screen, Urine NEGATIVE NEGATIVE  NEGATIVE CM    Comment:        (Positive cutoff 300 ng/mL)                Opiates, Urine NEGATIVE NEGATIVE  NEGATIVE CM    Comment:        (Positive cutoff 300 ng/mL)                Phencyclidine, Urine NEGATIVE NEGATIVE  NEGATIVE CM    Comment:        (Positive cutoff 25 ng/mL)                Cannabinoid Scrn, Ur NEGATIVE POSITIVE Abnormal   POSITIVE Abnormal  CM    Comment:        (Positive cutoff 50 ng/mL)                Oxycodone Screen, Ur NEGATIVE POSITIVE Abnormal   POSITIVE Abnormal  CM    Comment:        (Positive cutoff 100 ng/mL)                Fentanyl, Ur NEGATIVE NEGATIVE  NEGATIVE CM    Comment:        (Positive cutoff  5 ng/ml)            Impression:  Invasive ductal carcinoma of left breast, triple negative, pT2,p N0, M0  Status post bilateral mastectomy and left sentinel lymph node biopsy  Personal history of gynecological malignancy (uterus/cervical cancer) status post surgery in 1992  Cancer related pain  Leg swelling  Anemia secondary to chemotherapy  Family history of malignancy  Chronic back pain  Hypertension  Obesity      Plan:  Personally reviewed results of lab work-up and other relevant clinical data. Patient clinically continues to be in remission in regards to her triple negative breast cancer. No signs or symptoms of disease recurrence based on history and physical exam  Discussed natural history of breast cancer. Continue surveillance. Patient is status post bilateral mastectomy  Patient continues to struggle with chronic back pain. She has been referred to different pain clinics in the past.  Patient reluctant to undergo back surgery. Continue periodic random urine tox screens.   Patient continues to be on marijuana for medicinal purposes. Continue gabapentin for neuropathy. Patient counseled on use of pain medication. Patient was given refill on Percocet. We will continue to check periodic urine tox screens. Patient counseled on tobacco cessation . Return in 3 months. Robles Olmos MD              This note is created with the assistance of a speech recognition program.  While intending to generate a document that actually reflects the content of the visit, the document can still have some errors including those of syntax and sound a like substitutions which may escape proof reading. It such instances, actual meaning can be extrapolated by contextual diversion.

## 2023-01-23 NOTE — TELEPHONE ENCOUNTER
Cuco Richardson MD VISIT  Rv in 3 months   Urine drug screen with each pain med refil   MD VISIT 4/3/23 @ 1:15PM  AVS PRINTED W/ INSTRUCTIONS AND GIVEN TO PT ON EXIT

## 2023-01-25 DIAGNOSIS — Z17.1 MALIGNANT NEOPLASM OF UPPER-OUTER QUADRANT OF LEFT BREAST IN FEMALE, ESTROGEN RECEPTOR NEGATIVE (HCC): ICD-10-CM

## 2023-01-25 DIAGNOSIS — C50.412 MALIGNANT NEOPLASM OF UPPER-OUTER QUADRANT OF LEFT BREAST IN FEMALE, ESTROGEN RECEPTOR NEGATIVE (HCC): ICD-10-CM

## 2023-01-25 DIAGNOSIS — G89.29 OTHER CHRONIC PAIN: ICD-10-CM

## 2023-01-25 DIAGNOSIS — G62.9 NEUROPATHY: ICD-10-CM

## 2023-01-25 RX ORDER — GABAPENTIN 600 MG/1
600 TABLET ORAL 3 TIMES DAILY
Qty: 90 TABLET | Refills: 0 | Status: SHIPPED | OUTPATIENT
Start: 2023-01-25 | End: 2023-02-24

## 2023-01-25 RX ORDER — OXYCODONE AND ACETAMINOPHEN 10; 325 MG/1; MG/1
1 TABLET ORAL EVERY 6 HOURS PRN
Qty: 120 TABLET | Refills: 0 | Status: SHIPPED | OUTPATIENT
Start: 2023-01-25 | End: 2023-02-24

## 2023-02-22 DIAGNOSIS — G62.9 NEUROPATHY: ICD-10-CM

## 2023-02-22 DIAGNOSIS — G89.29 OTHER CHRONIC PAIN: ICD-10-CM

## 2023-02-22 DIAGNOSIS — Z17.1 MALIGNANT NEOPLASM OF UPPER-OUTER QUADRANT OF LEFT BREAST IN FEMALE, ESTROGEN RECEPTOR NEGATIVE (HCC): ICD-10-CM

## 2023-02-22 DIAGNOSIS — C50.412 MALIGNANT NEOPLASM OF UPPER-OUTER QUADRANT OF LEFT BREAST IN FEMALE, ESTROGEN RECEPTOR NEGATIVE (HCC): ICD-10-CM

## 2023-02-23 RX ORDER — GABAPENTIN 600 MG/1
600 TABLET ORAL 3 TIMES DAILY
Qty: 90 TABLET | Refills: 0 | Status: SHIPPED | OUTPATIENT
Start: 2023-02-25 | End: 2023-03-27

## 2023-02-23 RX ORDER — OXYCODONE AND ACETAMINOPHEN 10; 325 MG/1; MG/1
1 TABLET ORAL EVERY 6 HOURS PRN
Qty: 120 TABLET | Refills: 0 | Status: SHIPPED | OUTPATIENT
Start: 2023-02-25 | End: 2023-03-27

## 2023-02-28 DIAGNOSIS — Z90.13 S/P MASTECTOMY, BILATERAL: Primary | ICD-10-CM

## 2023-03-20 ENCOUNTER — TELEPHONE (OUTPATIENT)
Dept: INFUSION THERAPY | Facility: MEDICAL CENTER | Age: 63
End: 2023-03-20

## 2023-03-20 NOTE — TELEPHONE ENCOUNTER
Patient calls triage line requesting refill for percocet and gabapentin. Writer noted patients last refill was completed on 2/25/23; unable to refill until 3/27/23. Writer called patient and notified her we are unable to fill prescriptions this early and that she can call 72 hours ahead to request a refill. Patient verbalizes understanding. Next MD visit date and time provided to patient.

## 2023-03-23 DIAGNOSIS — G62.9 NEUROPATHY: ICD-10-CM

## 2023-03-23 DIAGNOSIS — C50.412 MALIGNANT NEOPLASM OF UPPER-OUTER QUADRANT OF LEFT BREAST IN FEMALE, ESTROGEN RECEPTOR NEGATIVE (HCC): ICD-10-CM

## 2023-03-23 DIAGNOSIS — G89.29 OTHER CHRONIC PAIN: ICD-10-CM

## 2023-03-23 DIAGNOSIS — Z17.1 MALIGNANT NEOPLASM OF UPPER-OUTER QUADRANT OF LEFT BREAST IN FEMALE, ESTROGEN RECEPTOR NEGATIVE (HCC): ICD-10-CM

## 2023-03-24 ENCOUNTER — TELEPHONE (OUTPATIENT)
Dept: INFUSION THERAPY | Facility: MEDICAL CENTER | Age: 63
End: 2023-03-24

## 2023-03-24 RX ORDER — GABAPENTIN 600 MG/1
600 TABLET ORAL 3 TIMES DAILY
Qty: 90 TABLET | Refills: 0 | Status: SHIPPED | OUTPATIENT
Start: 2023-03-24 | End: 2023-04-23

## 2023-03-24 RX ORDER — OXYCODONE AND ACETAMINOPHEN 10; 325 MG/1; MG/1
1 TABLET ORAL EVERY 6 HOURS PRN
Qty: 120 TABLET | Refills: 0 | Status: SHIPPED | OUTPATIENT
Start: 2023-03-24 | End: 2023-04-23

## 2023-03-28 RX ORDER — ALLOPURINOL 100 MG/1
100 TABLET ORAL DAILY
Qty: 30 TABLET | Refills: 3 | Status: SHIPPED | OUTPATIENT
Start: 2023-03-28

## 2023-03-28 NOTE — TELEPHONE ENCOUNTER
Gregorio Meckel is calling to request a refill on the following medication(s):    Medication Request:  Requested Prescriptions     Pending Prescriptions Disp Refills    allopurinol (ZYLOPRIM) 100 MG tablet [Pharmacy Med Name: ALLOPURINOL 100MG] 30 tablet 3     Sig: TAKE 1 TABLET BY MOUTH DAILY       Last Visit Date (If Applicable):  79/32/0531    Next Visit Date:    3/30/2023

## 2023-03-30 ENCOUNTER — OFFICE VISIT (OUTPATIENT)
Dept: INTERNAL MEDICINE CLINIC | Age: 63
End: 2023-03-30

## 2023-03-30 VITALS
OXYGEN SATURATION: 100 % | TEMPERATURE: 96.6 F | HEIGHT: 66 IN | RESPIRATION RATE: 18 BRPM | BODY MASS INDEX: 37.41 KG/M2 | WEIGHT: 232.8 LBS | HEART RATE: 70 BPM

## 2023-03-30 DIAGNOSIS — R35.0 URINARY FREQUENCY: ICD-10-CM

## 2023-03-30 DIAGNOSIS — K63.5 POLYP OF COLON, UNSPECIFIED PART OF COLON, UNSPECIFIED TYPE: ICD-10-CM

## 2023-03-30 DIAGNOSIS — R10.31 RIGHT LOWER QUADRANT ABDOMINAL PAIN: Primary | ICD-10-CM

## 2023-03-30 RX ORDER — PANTOPRAZOLE SODIUM 40 MG/1
40 TABLET, DELAYED RELEASE ORAL
Qty: 30 TABLET | Refills: 0 | Status: SHIPPED | OUTPATIENT
Start: 2023-03-30

## 2023-03-30 SDOH — ECONOMIC STABILITY: HOUSING INSECURITY
IN THE LAST 12 MONTHS, WAS THERE A TIME WHEN YOU DID NOT HAVE A STEADY PLACE TO SLEEP OR SLEPT IN A SHELTER (INCLUDING NOW)?: NO

## 2023-03-30 SDOH — ECONOMIC STABILITY: FOOD INSECURITY: WITHIN THE PAST 12 MONTHS, YOU WORRIED THAT YOUR FOOD WOULD RUN OUT BEFORE YOU GOT MONEY TO BUY MORE.: NEVER TRUE

## 2023-03-30 SDOH — ECONOMIC STABILITY: FOOD INSECURITY: WITHIN THE PAST 12 MONTHS, THE FOOD YOU BOUGHT JUST DIDN'T LAST AND YOU DIDN'T HAVE MONEY TO GET MORE.: NEVER TRUE

## 2023-03-30 SDOH — ECONOMIC STABILITY: INCOME INSECURITY: HOW HARD IS IT FOR YOU TO PAY FOR THE VERY BASICS LIKE FOOD, HOUSING, MEDICAL CARE, AND HEATING?: NOT HARD AT ALL

## 2023-03-30 ASSESSMENT — PATIENT HEALTH QUESTIONNAIRE - PHQ9
1. LITTLE INTEREST OR PLEASURE IN DOING THINGS: 0
SUM OF ALL RESPONSES TO PHQ QUESTIONS 1-9: 0
2. FEELING DOWN, DEPRESSED OR HOPELESS: 0
SUM OF ALL RESPONSES TO PHQ QUESTIONS 1-9: 0
SUM OF ALL RESPONSES TO PHQ9 QUESTIONS 1 & 2: 0
SUM OF ALL RESPONSES TO PHQ QUESTIONS 1-9: 0
SUM OF ALL RESPONSES TO PHQ QUESTIONS 1-9: 0

## 2023-03-30 NOTE — PROGRESS NOTES
for up to 30 days. 120 tablet 0    Breast Prosthesis MISC by Does not apply route To use as needed 2 each 0    Vibegron (GEMTESA) 75 MG TABS Take 1 tablet by mouth daily 90 tablet 0    hydroCHLOROthiazide (HYDRODIURIL) 50 MG tablet Take 1 tablet by mouth daily 30 tablet 11    Calcium Carbonate-Vitamin D (OYSTER SHELL CALCIUM/D) 500-200 MG-UNIT TABS Take 1 tablet by mouth 2 times daily 180 tablet 1    ondansetron (ZOFRAN ODT) 4 MG disintegrating tablet Take 1 tablet by mouth every 8 hours as needed for Nausea or Vomiting (Patient not taking: Reported on 3/30/2023) 12 tablet 0     No current facility-administered medications for this visit.        Allergies   Allergen Reactions    Lisinopril Anaphylaxis and Swelling     swelling    Bee Venom Swelling     NO TROUBLE BREATHING    Amino Acids     Sulfa Antibiotics Itching, Rash and Other (See Comments)     REDNESS       Past Medical History:   Diagnosis Date    Arthritis     GENERALIZED ALL OVER RHEUMATOID AND OSTEO    Breast cancer ([address]     left breast    Cancer ([address] 1992    CERVICAL, UTERINE    Chronic back pain     scioliosis,  02/2019 WEARING A BACK BRACE    Degenerative disorder of bone     ALL OVER    Depression     Difficult intravenous access     HANDS AR BEST SITE    GERD (gastroesophageal reflux disease)     Hyperlipidemia     HX OF NO MEDS IN YEARS    Hypertension 2007    Pain management clinic    Insomnia     Neuropathy     hands and feet    Obesity     BMI -  43    Sleep apnea 2016    NO LONGER USES MACHINE    Snores     has been told by family that she stops breathing    Use of cane as ambulatory aid     Wears glasses     READING    Wears glasses     \"CHEATERS\"       Past Surgical History:   Procedure Laterality Date    BREAST SURGERY Left 1990    CYST    BUNIONECTOMY Bilateral     [address]    COLONOSCOPY  10/13/2017    FOOT SURGERY Left 2014    PINS IN ALL TOES    FOOT SURGERY Left 5/27/2020    HARDWARE REMOVAL LEFT GREAT TOE- performed by

## 2023-04-04 ENCOUNTER — TELEPHONE (OUTPATIENT)
Dept: INTERNAL MEDICINE CLINIC | Age: 63
End: 2023-04-04

## 2023-04-04 NOTE — TELEPHONE ENCOUNTER
Spoke with Callum Venegas at the  home patient passed away on 23    He will call us tomorrow with the time     called them all he knows is that she was found at home and was not in the hospital

## 2023-04-04 NOTE — TELEPHONE ENCOUNTER
Ashok Stone from [facility] asking if you will sign the death certificate?     Patient passed away    Please advise

## 2023-04-05 NOTE — TELEPHONE ENCOUNTER
Linnea Blankenship from [address] [place] [address] called regarding death certificate. TOD was 1140am on 4/1/2023. Per , they are not performing an autopsy. Wondering if you would sign. They are stating natural causes.

## 2023-04-05 NOTE — TELEPHONE ENCOUNTER
Daughter states that patient was found on the couch cold to touch unresponsive by brother Saturday around 11:30a looks as if she was sleeping

## 2025-01-27 NOTE — PROGRESS NOTES
[facility]  Analia Urena Yael, DO  [address], [place] [address]  [phone]         ASSESSMENT AND PLAN    Problem List Items Addressed This Visit          Circulatory    Essential hypertension - Primary      Chronic, stable on Amlodipine.  Will check labs.         Relevant Orders    Lipid Panel    TSH    CBC with Auto Differential    Comprehensive Metabolic Panel       Endocrine    Hypothyroidism      Chronic, stable on Levothyroxine 75 mcg.  Will check labs.         Relevant Orders    Lipid Panel    TSH    CBC with Auto Differential    Comprehensive Metabolic Panel       Musculoskeletal and Integument    Osteoarthritis, multiple sites      Chronic issue, seeing Dr. Purcell for injections in her lumbar spine.  Does yoga to help with symptoms, will add muscle relaxant at night as needed.         Relevant Medications    cyclobenzaprine (FLEXERIL) 10 MG tablet    Other Relevant Orders    Lipid Panel    TSH    CBC with Auto Differential    Comprehensive Metabolic Panel       Other    Hyperlipidemia      Chronic, stable on 10 mg of Atorvastatin.  Will check labs.         Relevant Orders    Lipid Panel    TSH    CBC with Auto Differential    Comprehensive Metabolic Panel    Lateral pain of right hip     Chronic issue for the last year intermittently, denies injuries, notes lateral tenderness along right posterior iliac spine/upper IT band.  Has been stretching without improvement.  Will evaluate with X-ray, sent more exercises and will add muscle relaxant to use at night as needed.         Relevant Orders    XR HIP RIGHT (2-3 VIEWS)        The diagnoses and plan were discussed with the patient, who verbalizes understanding and agrees with plan.  All questions answered.    Chief Complaint    Chief Complaint   Patient presents with    6 Month Follow-Up    Muscle Pain     Right side        HISTORY OF PRESENT ILLNESS    65 y.o. female with HTN presents today for  Pt here for C3D1. Pt seen by Dr. Ehsan Snell prior to treatment. Labs drawn from port and results reviewed. Pt was treated without incident and d/c'd in stable condition. Pt will return in 2 weeks for C4D1.

## 2025-04-10 NOTE — TELEPHONE ENCOUNTER
Elíasr mailed [address] Surgery Worksheet to the patient, faxed to [facility], and scanned in. intact BUEs/finger to nose

## (undated) DEVICE — SUTURE ABSORBABLE BRAIDED 3-0 12X18 IN COAT UD VICRYL + VCP110G

## (undated) DEVICE — PACK SURG ABD SVMMC

## (undated) DEVICE — GOWN,AURORA,NONRNF,XL,30/CS: Brand: MEDLINE

## (undated) DEVICE — GLOVE EXAM M L95IN FNGR THK35MIL PALM THK24MIL OFF WHT

## (undated) DEVICE — CONTAINER,SPECIMEN,4OZ,OR STRL: Brand: MEDLINE

## (undated) DEVICE — 3M™ STERI-STRIP™ COMPOUND BENZOIN TINCTURE 40 BAGS/CARTON 4 CARTONS/CASE C1544: Brand: 3M™ STERI-STRIP™

## (undated) DEVICE — CULTURETTE AERO/ANEROBIC RED/BLUE BUNDLE

## (undated) DEVICE — GLOVE ORANGE PI 8 1/2   MSG9085

## (undated) DEVICE — SUTURE VCRL SZ 3-0 L27IN ABSRB UD L36MM CT-1 1/2 CIR J258H

## (undated) DEVICE — DRAPE,REIN 53X77,STERILE: Brand: MEDLINE

## (undated) DEVICE — MITT PREP W575XL775IN POVIDONE IOD HAIR REMV

## (undated) DEVICE — ELECTROSURGICAL PENCIL BUTTON SWITCH E-Z CLEAN COATED BLADE ELECTRODE 10 FT (3 M) CORD HOLSTER: Brand: MEGADYNE

## (undated) DEVICE — BLADE CLIPPER GEN PURP NS

## (undated) DEVICE — INTENDED FOR TISSUE SEPARATION, AND OTHER PROCEDURES THAT REQUIRE A SHARP SURGICAL BLADE TO PUNCTURE OR CUT.: Brand: BARD-PARKER ® CARBON RIB-BACK BLADES

## (undated) DEVICE — PADDING CAST W4INXL4YD SPUN DACRON POLY POR SYN VERSATILE

## (undated) DEVICE — GLOVE ORANGE PI 8   MSG9080

## (undated) DEVICE — GLOVE SURG SZ 65 CRM LTX FREE POLYISOPRENE POLYMER BEAD ANTI

## (undated) DEVICE — SHEET, T, LAPAROTOMY, STERILE: Brand: MEDLINE

## (undated) DEVICE — TOURNIQUET CUF BLD PRESSURE 4X18 IN 2 PRT SINGLE BLDR RED

## (undated) DEVICE — GLOVE SURG SZ 8 CRM LTX FREE POLYISOPRENE POLYMER BEAD ANTI

## (undated) DEVICE — SUTURE NONABSORBABLE MONOFILAMENT 3-0 PS-1 18 IN BLK ETHILON 1663H

## (undated) DEVICE — SUTURE PERMA-HAND SZ 2-0 L30IN NONABSORBABLE BLK L26MM SH K833H

## (undated) DEVICE — MARKER,SKIN,WI/RULER AND LABELS: Brand: MEDLINE

## (undated) DEVICE — PROTECTOR ULN NRV PUR FOAM HK LOOP STRP ANATOMICALLY

## (undated) DEVICE — PAD,NON-ADHERENT,3X8,STERILE,LF,1/PK: Brand: MEDLINE

## (undated) DEVICE — GARMENT,MEDLINE,DVT,INT,CALF,MED, GEN2: Brand: MEDLINE

## (undated) DEVICE — FORCEPS BX L240CM JAW DIA22MM ORNG STD CAP W NDL RAD JAW 4

## (undated) DEVICE — TOWEL,OR,DSP,ST,NATURAL,DLX,4/PK,20PK/CS: Brand: MEDLINE

## (undated) DEVICE — Z DISCONTINUED BY MEDLINE USE 2711682 TRAY SKIN PREP DRY W/ PREM GLV

## (undated) DEVICE — RESERVOIR,SUCTION,100CC,SILICONE: Brand: MEDLINE

## (undated) DEVICE — GOWN,AURORA,NONREINFORCED,LARGE: Brand: MEDLINE

## (undated) DEVICE — BANDAGE,GAUZE,BULKEE II,4.5"X4.1YD,STRL: Brand: MEDLINE

## (undated) DEVICE — APPLIER LIG CLP M L11IN TI STR RNG HNDL FOR 20 CLP DISP

## (undated) DEVICE — E-Z CLEAN, NON-STICK, PTFE COATED, ELECTROSURGICAL BLADE ELECTRODE, MODIFIED EXTENDED INSULATION, 2.5 INCH (6.35 CM): Brand: MEGADYNE

## (undated) DEVICE — SUTURE ETHLN SZ 3-0 L18IN NONABSORBABLE BLK PS-2 L19MM 3/8 1669H

## (undated) DEVICE — Device

## (undated) DEVICE — SKIN PREP TRAY W/CHG: Brand: MEDLINE INDUSTRIES, INC.

## (undated) DEVICE — GLOVE SURG SZ 75 CRM LTX FREE POLYISOPRENE POLYMER BEAD ANTI

## (undated) DEVICE — ADHESIVE SKIN CLSR 0.7ML TOP DERMBND ADV

## (undated) DEVICE — GLOVE SURG SZ 65 THK91MIL LTX FREE SYN POLYISOPRENE

## (undated) DEVICE — STANDARD HYPODERMIC NEEDLE,POLYPROPYLENE HUB: Brand: MONOJECT

## (undated) DEVICE — SUTURE MCRYL SZ 4-0 L18IN ABSRB UD L16MM PC-3 3/8 CIR PRIM Y845G

## (undated) DEVICE — TOWEL,OR,DSP,ST,BLUE,DLX,XR,4/PK,20PK/CS: Brand: MEDLINE

## (undated) DEVICE — SOLUTION SCRB 4OZ 4% CHG H2O AIDED FOR PREOPERATIVE SKIN

## (undated) DEVICE — SPONGE LAP W18XL18IN WHT COT 4 PLY FLD STRUNG RADPQ DISP ST

## (undated) DEVICE — BINDER ABD UNISX 9IN 62IN L AND XL UNIV

## (undated) DEVICE — GLOVE SURG SZ 7 CRM LTX FREE POLYISOPRENE POLYMER BEAD ANTI

## (undated) DEVICE — PREP-RESISTANT MARKER W/ RULER: Brand: MEDLINE INDUSTRIES, INC.

## (undated) DEVICE — SUTURE VCRL SZ 3-0 L27IN ABSRB UD L26MM SH 1/2 CIR J416H

## (undated) DEVICE — YANKAUER,FLEXIBLE HANDLE,REGLR CAPACITY: Brand: MEDLINE INDUSTRIES, INC.

## (undated) DEVICE — GLOVE ORANGE PI 7   MSG9070

## (undated) DEVICE — GLOVE EXAM SM L95IN FNGR THK35MIL PALM THK24MIL OFF WHT

## (undated) DEVICE — SUTURE ETHLN SZ 5-0 L18IN NONABSORBABLE BLK L16MM PC-3 3/8 1865G

## (undated) DEVICE — NEEDLE HYPO 25GA L1.5IN BLU POLYPR HUB S STL REG BVL STR

## (undated) DEVICE — GOWN,SIRUS,NON REINFRCD,LARGE,SET IN SL: Brand: MEDLINE

## (undated) DEVICE — GLOVE ORANGE PI 7 1/2   MSG9075

## (undated) DEVICE — TUBING, SUCTION, 1/4" X 12', STRAIGHT: Brand: MEDLINE

## (undated) DEVICE — PAD,ABDOMINAL,5"X9",ST,LF,25/BX: Brand: MEDLINE INDUSTRIES, INC.

## (undated) DEVICE — GLOVE,EXAM,NITRILE,RESTORE,OAT SENSE,L: Brand: MEDLINE

## (undated) DEVICE — TOTAL TRAY, 16FR 10ML SIL FOLEY, URN: Brand: MEDLINE

## (undated) DEVICE — 3M™ IOBAN™ 2 ANTIMICROBIAL INCISE DRAPE 6650EZ: Brand: IOBAN™ 2

## (undated) DEVICE — GAUZE,SPONGE,FLUFF,6"X6.75",STRL,5/TRAY: Brand: MEDLINE

## (undated) DEVICE — SOLUTION SURG PREP POV IOD 7.5% 4 OZ

## (undated) DEVICE — PREP SOL PVP IODINE 4%  4 OZ/BTL